# Patient Record
Sex: FEMALE | Race: WHITE | NOT HISPANIC OR LATINO
[De-identification: names, ages, dates, MRNs, and addresses within clinical notes are randomized per-mention and may not be internally consistent; named-entity substitution may affect disease eponyms.]

---

## 2018-07-24 ENCOUNTER — RESULT REVIEW (OUTPATIENT)
Age: 78
End: 2018-07-24

## 2018-09-28 PROBLEM — Z00.00 ENCOUNTER FOR PREVENTIVE HEALTH EXAMINATION: Status: ACTIVE | Noted: 2018-09-28

## 2018-10-11 ENCOUNTER — APPOINTMENT (OUTPATIENT)
Dept: SPINE | Facility: CLINIC | Age: 78
End: 2018-10-11
Payer: MEDICARE

## 2018-10-11 VITALS
SYSTOLIC BLOOD PRESSURE: 132 MMHG | WEIGHT: 135 LBS | BODY MASS INDEX: 23.05 KG/M2 | DIASTOLIC BLOOD PRESSURE: 84 MMHG | HEIGHT: 64 IN

## 2018-10-11 PROCEDURE — 99205 OFFICE O/P NEW HI 60 MIN: CPT

## 2018-10-17 ENCOUNTER — OUTPATIENT (OUTPATIENT)
Dept: OUTPATIENT SERVICES | Facility: HOSPITAL | Age: 78
LOS: 1 days | End: 2018-10-17
Payer: COMMERCIAL

## 2018-10-17 VITALS
SYSTOLIC BLOOD PRESSURE: 147 MMHG | RESPIRATION RATE: 16 BRPM | HEIGHT: 62 IN | TEMPERATURE: 98 F | HEART RATE: 78 BPM | DIASTOLIC BLOOD PRESSURE: 81 MMHG | OXYGEN SATURATION: 99 % | WEIGHT: 132.06 LBS

## 2018-10-17 DIAGNOSIS — M54.16 RADICULOPATHY, LUMBAR REGION: ICD-10-CM

## 2018-10-17 DIAGNOSIS — Z98.890 OTHER SPECIFIED POSTPROCEDURAL STATES: Chronic | ICD-10-CM

## 2018-10-17 DIAGNOSIS — Z29.9 ENCOUNTER FOR PROPHYLACTIC MEASURES, UNSPECIFIED: ICD-10-CM

## 2018-10-17 DIAGNOSIS — S72.009A FRACTURE OF UNSPECIFIED PART OF NECK OF UNSPECIFIED FEMUR, INITIAL ENCOUNTER FOR CLOSED FRACTURE: Chronic | ICD-10-CM

## 2018-10-17 DIAGNOSIS — Z01.818 ENCOUNTER FOR OTHER PREPROCEDURAL EXAMINATION: ICD-10-CM

## 2018-10-17 DIAGNOSIS — M48.00 SPINAL STENOSIS, SITE UNSPECIFIED: ICD-10-CM

## 2018-10-17 DIAGNOSIS — E11.9 TYPE 2 DIABETES MELLITUS WITHOUT COMPLICATIONS: ICD-10-CM

## 2018-10-17 DIAGNOSIS — R91.8 OTHER NONSPECIFIC ABNORMAL FINDING OF LUNG FIELD: Chronic | ICD-10-CM

## 2018-10-17 LAB
ANION GAP SERPL CALC-SCNC: 10 MMOL/L — SIGNIFICANT CHANGE UP (ref 5–17)
BUN SERPL-MCNC: 21 MG/DL — SIGNIFICANT CHANGE UP (ref 7–23)
CALCIUM SERPL-MCNC: 9.7 MG/DL — SIGNIFICANT CHANGE UP (ref 8.4–10.5)
CHLORIDE SERPL-SCNC: 104 MMOL/L — SIGNIFICANT CHANGE UP (ref 96–108)
CO2 SERPL-SCNC: 28 MMOL/L — SIGNIFICANT CHANGE UP (ref 22–31)
CREAT SERPL-MCNC: 0.82 MG/DL — SIGNIFICANT CHANGE UP (ref 0.5–1.3)
GLUCOSE SERPL-MCNC: 103 MG/DL — HIGH (ref 70–99)
HBA1C BLD-MCNC: 5.7 % — HIGH (ref 4–5.6)
HCT VFR BLD CALC: 34.1 % — LOW (ref 34.5–45)
HGB BLD-MCNC: 10.3 G/DL — LOW (ref 11.5–15.5)
MCHC RBC-ENTMCNC: 27.2 PG — SIGNIFICANT CHANGE UP (ref 27–34)
MCHC RBC-ENTMCNC: 30.2 GM/DL — LOW (ref 32–36)
MCV RBC AUTO: 90.2 FL — SIGNIFICANT CHANGE UP (ref 80–100)
PLATELET # BLD AUTO: 165 K/UL — SIGNIFICANT CHANGE UP (ref 150–400)
POTASSIUM SERPL-MCNC: 4.6 MMOL/L — SIGNIFICANT CHANGE UP (ref 3.5–5.3)
POTASSIUM SERPL-SCNC: 4.6 MMOL/L — SIGNIFICANT CHANGE UP (ref 3.5–5.3)
RBC # BLD: 3.78 M/UL — LOW (ref 3.8–5.2)
RBC # FLD: 15.1 % — HIGH (ref 10.3–14.5)
SODIUM SERPL-SCNC: 142 MMOL/L — SIGNIFICANT CHANGE UP (ref 135–145)
WBC # BLD: 5.25 K/UL — SIGNIFICANT CHANGE UP (ref 3.8–10.5)
WBC # FLD AUTO: 5.25 K/UL — SIGNIFICANT CHANGE UP (ref 3.8–10.5)

## 2018-10-17 RX ORDER — LIDOCAINE HCL 20 MG/ML
0.2 VIAL (ML) INJECTION ONCE
Qty: 0 | Refills: 0 | Status: DISCONTINUED | OUTPATIENT
Start: 2018-10-24 | End: 2018-10-24

## 2018-10-17 RX ORDER — CEFAZOLIN SODIUM 1 G
2000 VIAL (EA) INJECTION ONCE
Qty: 0 | Refills: 0 | Status: DISCONTINUED | OUTPATIENT
Start: 2018-10-24 | End: 2018-10-24

## 2018-10-17 RX ORDER — SODIUM CHLORIDE 9 MG/ML
3 INJECTION INTRAMUSCULAR; INTRAVENOUS; SUBCUTANEOUS EVERY 8 HOURS
Qty: 0 | Refills: 0 | Status: DISCONTINUED | OUTPATIENT
Start: 2018-10-24 | End: 2018-10-24

## 2018-10-17 NOTE — H&P PST ADULT - NEUROLOGICAL DETAILS
alert and oriented x 3/responds to verbal commands/responds to pain/cranial nerves intact/normal strength

## 2018-10-17 NOTE — H&P PST ADULT - PSH
Hip fracture  "left hip 2016 & had surgery  pins placed"  Right lower lobe lung mass  Right lower lobectomy 2016   & chemotherapy  till 01/2017  Status post carotid surgery  carotid artery stenosis 2016, left

## 2018-10-17 NOTE — H&P PST ADULT - PMH
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

## 2018-10-17 NOTE — H&P PST ADULT - PROBLEM SELECTOR PLAN 2
Will follow up with labs/ fingerstick.   HgA1c ordered   Instructed to hold Glimepiride the morning of surgery  STAT FS  on the day of surgery ordered

## 2018-10-17 NOTE — H&P PST ADULT - ASSESSMENT
CAPRINI SCORE [CLOT]    AGE RELATED RISK FACTORS                                                       MOBILITY RELATED FACTORS  [ ] Age 41-60 years                                            (1 Point)                  [ ] Bed rest                                                        (1 Point)  [ ] Age: 61-74 years                                           (2 Points)                 [ ] Plaster cast                                                   (2 Points)  [x ] Age= 75 years                                              (3 Points)                 [ ] Bed bound for more than 72 hours                 (2 Points)    DISEASE RELATED RISK FACTORS                                               GENDER SPECIFIC FACTORS  [ ] Edema in the lower extremities                       (1 Point)                  [ ] Pregnancy                                                     (1 Point)  [ ] Varicose veins                                               (1 Point)                  [ ] Post-partum < 6 weeks                                   (1 Point)             [ ] BMI > 25 Kg/m2                                            (1 Point)                  [ ] Hormonal therapy  or oral contraception          (1 Point)                 [ ] Sepsis (in the previous month)                        (1 Point)                  [ ] History of pregnancy complications                 (1 point)  [ ] Pneumonia or serious lung disease                                               [ ] Unexplained or recurrent                     (1 Point)           (in the previous month)                               (1 Point)  [ ] Abnormal pulmonary function test                     (1 Point)                 SURGERY RELATED RISK FACTORS  [ ] Acute myocardial infarction                              (1 Point)                 [ ]  Section                                             (1 Point)  [ ] Congestive heart failure (in the previous month)  (1 Point)               [ ] Minor surgery                                                  (1 Point)   [ ] Inflammatory bowel disease                             (1 Point)                 [ ] Arthroscopic surgery                                        (2 Points)  [ ] Central venous access                                      (2 Points)                [x ] General surgery lasting more than 45 minutes   (2 Points)       [ ] Stroke (in the previous month)                          (5 Points)               [ ] Elective arthroplasty                                         (5 Points)                                                                                                                                               HEMATOLOGY RELATED FACTORS                                                 TRAUMA RELATED RISK FACTORS  [ ] Prior episodes of VTE                                     (3 Points)                 [ ] Fracture of the hip, pelvis, or leg                       (5 Points)  [ ] Positive family history for VTE                         (3 Points)                 [ ] Acute spinal cord injury (in the previous month)  (5 Points)  [ ] Prothrombin 94526 A                                     (3 Points)                 [ ] Paralysis  (less than 1 month)                             (5 Points)  [ ] Factor V Leiden                                             (3 Points)                  [ ] Multiple Trauma within 1 month                        (5 Points)  [ ] Lupus anticoagulants                                     (3 Points)                                                           [ ] Anticardiolipin antibodies                               (3 Points)                                                       [ ] High homocysteine in the blood                      (3 Points)                                             [ ] Other congenital or acquired thrombophilia      (3 Points)                                                [ ] Heparin induced thrombocytopenia                  (3 Points)                                          Total Score [     5     ]

## 2018-10-17 NOTE — H&P PST ADULT - RS GEN PE MLT RESP DETAILS PC
no rales/no wheezes/breath sounds equal/clear to auscultation bilaterally/no chest wall tenderness/no rhonchi

## 2018-10-17 NOTE — H&P PST ADULT - NEUROLOGICAL COMMENTS
lumbar radiculopathy with pain , numbness & tingling in lower back, bilateral lateral lower extremities, more in RLE & right hand

## 2018-10-18 LAB
MRSA PCR RESULT.: SIGNIFICANT CHANGE UP
S AUREUS DNA NOSE QL NAA+PROBE: SIGNIFICANT CHANGE UP

## 2018-10-23 ENCOUNTER — TRANSCRIPTION ENCOUNTER (OUTPATIENT)
Age: 78
End: 2018-10-23

## 2018-10-24 ENCOUNTER — INPATIENT (INPATIENT)
Facility: HOSPITAL | Age: 78
LOS: 1 days | Discharge: HOME | DRG: 460 | End: 2018-10-26
Attending: NEUROLOGICAL SURGERY | Admitting: NEUROLOGICAL SURGERY
Payer: COMMERCIAL

## 2018-10-24 ENCOUNTER — APPOINTMENT (OUTPATIENT)
Dept: SPINE | Facility: HOSPITAL | Age: 78
End: 2018-10-24

## 2018-10-24 VITALS
HEIGHT: 62 IN | RESPIRATION RATE: 16 BRPM | OXYGEN SATURATION: 99 % | WEIGHT: 132.06 LBS | HEART RATE: 74 BPM | DIASTOLIC BLOOD PRESSURE: 76 MMHG | SYSTOLIC BLOOD PRESSURE: 161 MMHG | TEMPERATURE: 98 F

## 2018-10-24 DIAGNOSIS — S72.009A FRACTURE OF UNSPECIFIED PART OF NECK OF UNSPECIFIED FEMUR, INITIAL ENCOUNTER FOR CLOSED FRACTURE: Chronic | ICD-10-CM

## 2018-10-24 DIAGNOSIS — R91.8 OTHER NONSPECIFIC ABNORMAL FINDING OF LUNG FIELD: Chronic | ICD-10-CM

## 2018-10-24 DIAGNOSIS — M48.00 SPINAL STENOSIS, SITE UNSPECIFIED: ICD-10-CM

## 2018-10-24 DIAGNOSIS — Z98.890 OTHER SPECIFIED POSTPROCEDURAL STATES: Chronic | ICD-10-CM

## 2018-10-24 DIAGNOSIS — Z01.818 ENCOUNTER FOR OTHER PREPROCEDURAL EXAMINATION: ICD-10-CM

## 2018-10-24 LAB
ANION GAP SERPL CALC-SCNC: 13 MMOL/L — SIGNIFICANT CHANGE UP (ref 5–17)
BASOPHILS # BLD AUTO: 0 K/UL — SIGNIFICANT CHANGE UP (ref 0–0.2)
BASOPHILS NFR BLD AUTO: 0.4 % — SIGNIFICANT CHANGE UP (ref 0–2)
BUN SERPL-MCNC: 24 MG/DL — HIGH (ref 7–23)
CALCIUM SERPL-MCNC: 8.5 MG/DL — SIGNIFICANT CHANGE UP (ref 8.4–10.5)
CHLORIDE SERPL-SCNC: 107 MMOL/L — SIGNIFICANT CHANGE UP (ref 96–108)
CO2 SERPL-SCNC: 24 MMOL/L — SIGNIFICANT CHANGE UP (ref 22–31)
CREAT SERPL-MCNC: 0.72 MG/DL — SIGNIFICANT CHANGE UP (ref 0.5–1.3)
EOSINOPHIL # BLD AUTO: 0.2 K/UL — SIGNIFICANT CHANGE UP (ref 0–0.5)
EOSINOPHIL NFR BLD AUTO: 4.6 % — SIGNIFICANT CHANGE UP (ref 0–6)
GLUCOSE BLDC GLUCOMTR-MCNC: 185 MG/DL — HIGH (ref 70–99)
GLUCOSE BLDC GLUCOMTR-MCNC: 222 MG/DL — HIGH (ref 70–99)
GLUCOSE BLDC GLUCOMTR-MCNC: 83 MG/DL — SIGNIFICANT CHANGE UP (ref 70–99)
GLUCOSE SERPL-MCNC: 168 MG/DL — HIGH (ref 70–99)
HCT VFR BLD CALC: 28.4 % — LOW (ref 34.5–45)
HGB BLD-MCNC: 9.5 G/DL — LOW (ref 11.5–15.5)
LYMPHOCYTES # BLD AUTO: 1.3 K/UL — SIGNIFICANT CHANGE UP (ref 1–3.3)
LYMPHOCYTES # BLD AUTO: 25.6 % — SIGNIFICANT CHANGE UP (ref 13–44)
MCHC RBC-ENTMCNC: 29.4 PG — SIGNIFICANT CHANGE UP (ref 27–34)
MCHC RBC-ENTMCNC: 33.3 GM/DL — SIGNIFICANT CHANGE UP (ref 32–36)
MCV RBC AUTO: 88.3 FL — SIGNIFICANT CHANGE UP (ref 80–100)
MONOCYTES # BLD AUTO: 0.1 K/UL — SIGNIFICANT CHANGE UP (ref 0–0.9)
MONOCYTES NFR BLD AUTO: 2.3 % — SIGNIFICANT CHANGE UP (ref 2–14)
NEUTROPHILS # BLD AUTO: 3.5 K/UL — SIGNIFICANT CHANGE UP (ref 1.8–7.4)
NEUTROPHILS NFR BLD AUTO: 67.1 % — SIGNIFICANT CHANGE UP (ref 43–77)
PLATELET # BLD AUTO: 115 K/UL — LOW (ref 150–400)
POTASSIUM SERPL-MCNC: 3.6 MMOL/L — SIGNIFICANT CHANGE UP (ref 3.5–5.3)
POTASSIUM SERPL-SCNC: 3.6 MMOL/L — SIGNIFICANT CHANGE UP (ref 3.5–5.3)
RBC # BLD: 3.21 M/UL — LOW (ref 3.8–5.2)
RBC # FLD: 13.3 % — SIGNIFICANT CHANGE UP (ref 10.3–14.5)
SODIUM SERPL-SCNC: 144 MMOL/L — SIGNIFICANT CHANGE UP (ref 135–145)
WBC # BLD: 5.2 K/UL — SIGNIFICANT CHANGE UP (ref 3.8–10.5)
WBC # FLD AUTO: 5.2 K/UL — SIGNIFICANT CHANGE UP (ref 3.8–10.5)

## 2018-10-24 PROCEDURE — 72020 X-RAY EXAM OF SPINE 1 VIEW: CPT

## 2018-10-24 PROCEDURE — 83036 HEMOGLOBIN GLYCOSYLATED A1C: CPT

## 2018-10-24 PROCEDURE — 87641 MR-STAPH DNA AMP PROBE: CPT

## 2018-10-24 PROCEDURE — 72020 X-RAY EXAM OF SPINE 1 VIEW: CPT | Mod: 26

## 2018-10-24 PROCEDURE — 80048 BASIC METABOLIC PNL TOTAL CA: CPT

## 2018-10-24 PROCEDURE — 85027 COMPLETE CBC AUTOMATED: CPT

## 2018-10-24 PROCEDURE — 87640 STAPH A DNA AMP PROBE: CPT

## 2018-10-24 PROCEDURE — G0463: CPT

## 2018-10-24 RX ORDER — OXYCODONE AND ACETAMINOPHEN 5; 325 MG/1; MG/1
1 TABLET ORAL EVERY 4 HOURS
Qty: 0 | Refills: 0 | Status: DISCONTINUED | OUTPATIENT
Start: 2018-10-24 | End: 2018-10-26

## 2018-10-24 RX ORDER — DOCUSATE SODIUM 100 MG
100 CAPSULE ORAL THREE TIMES A DAY
Qty: 0 | Refills: 0 | Status: DISCONTINUED | OUTPATIENT
Start: 2018-10-24 | End: 2018-10-26

## 2018-10-24 RX ORDER — ATORVASTATIN CALCIUM 80 MG/1
20 TABLET, FILM COATED ORAL AT BEDTIME
Qty: 0 | Refills: 0 | Status: DISCONTINUED | OUTPATIENT
Start: 2018-10-24 | End: 2018-10-26

## 2018-10-24 RX ORDER — SODIUM CHLORIDE 9 MG/ML
500 INJECTION INTRAMUSCULAR; INTRAVENOUS; SUBCUTANEOUS ONCE
Qty: 0 | Refills: 0 | Status: COMPLETED | OUTPATIENT
Start: 2018-10-24 | End: 2018-10-24

## 2018-10-24 RX ORDER — DEXTROSE MONOHYDRATE, SODIUM CHLORIDE, AND POTASSIUM CHLORIDE 50; .745; 4.5 G/1000ML; G/1000ML; G/1000ML
1000 INJECTION, SOLUTION INTRAVENOUS
Qty: 0 | Refills: 0 | Status: DISCONTINUED | OUTPATIENT
Start: 2018-10-24 | End: 2018-10-25

## 2018-10-24 RX ORDER — INSULIN LISPRO 100/ML
VIAL (ML) SUBCUTANEOUS AT BEDTIME
Qty: 0 | Refills: 0 | Status: DISCONTINUED | OUTPATIENT
Start: 2018-10-24 | End: 2018-10-26

## 2018-10-24 RX ORDER — FAMOTIDINE 10 MG/ML
20 INJECTION INTRAVENOUS EVERY 12 HOURS
Qty: 0 | Refills: 0 | Status: DISCONTINUED | OUTPATIENT
Start: 2018-10-24 | End: 2018-10-26

## 2018-10-24 RX ORDER — DIAZEPAM 5 MG
5 TABLET ORAL EVERY 8 HOURS
Qty: 0 | Refills: 0 | Status: DISCONTINUED | OUTPATIENT
Start: 2018-10-24 | End: 2018-10-26

## 2018-10-24 RX ORDER — BENZOCAINE AND MENTHOL 5; 1 G/100ML; G/100ML
1 LIQUID ORAL DAILY
Qty: 0 | Refills: 0 | Status: DISCONTINUED | OUTPATIENT
Start: 2018-10-24 | End: 2018-10-26

## 2018-10-24 RX ORDER — HYDROMORPHONE HYDROCHLORIDE 2 MG/ML
0.25 INJECTION INTRAMUSCULAR; INTRAVENOUS; SUBCUTANEOUS EVERY 6 HOURS
Qty: 0 | Refills: 0 | Status: DISCONTINUED | OUTPATIENT
Start: 2018-10-24 | End: 2018-10-26

## 2018-10-24 RX ORDER — INSULIN LISPRO 100/ML
VIAL (ML) SUBCUTANEOUS
Qty: 0 | Refills: 0 | Status: DISCONTINUED | OUTPATIENT
Start: 2018-10-24 | End: 2018-10-26

## 2018-10-24 RX ORDER — ONDANSETRON 8 MG/1
4 TABLET, FILM COATED ORAL EVERY 6 HOURS
Qty: 0 | Refills: 0 | Status: DISCONTINUED | OUTPATIENT
Start: 2018-10-24 | End: 2018-10-26

## 2018-10-24 RX ORDER — CEFAZOLIN SODIUM 1 G
2000 VIAL (EA) INJECTION EVERY 8 HOURS
Qty: 0 | Refills: 0 | Status: COMPLETED | OUTPATIENT
Start: 2018-10-24 | End: 2018-10-25

## 2018-10-24 RX ORDER — ACETAMINOPHEN 500 MG
650 TABLET ORAL EVERY 6 HOURS
Qty: 0 | Refills: 0 | Status: DISCONTINUED | OUTPATIENT
Start: 2018-10-24 | End: 2018-10-26

## 2018-10-24 RX ORDER — ENOXAPARIN SODIUM 100 MG/ML
40 INJECTION SUBCUTANEOUS AT BEDTIME
Qty: 0 | Refills: 0 | Status: DISCONTINUED | OUTPATIENT
Start: 2018-10-25 | End: 2018-10-26

## 2018-10-24 RX ORDER — SENNA PLUS 8.6 MG/1
2 TABLET ORAL AT BEDTIME
Qty: 0 | Refills: 0 | Status: DISCONTINUED | OUTPATIENT
Start: 2018-10-24 | End: 2018-10-26

## 2018-10-24 RX ADMIN — DEXTROSE MONOHYDRATE, SODIUM CHLORIDE, AND POTASSIUM CHLORIDE 85 MILLILITER(S): 50; .745; 4.5 INJECTION, SOLUTION INTRAVENOUS at 22:15

## 2018-10-24 RX ADMIN — BENZOCAINE AND MENTHOL 1 LOZENGE: 5; 1 LIQUID ORAL at 20:48

## 2018-10-24 RX ADMIN — Medication 100 MILLIGRAM(S): at 22:15

## 2018-10-24 RX ADMIN — SODIUM CHLORIDE 1000 MILLILITER(S): 9 INJECTION INTRAMUSCULAR; INTRAVENOUS; SUBCUTANEOUS at 19:38

## 2018-10-24 RX ADMIN — Medication 100 MILLIGRAM(S): at 16:05

## 2018-10-24 RX ADMIN — Medication 5 MILLIGRAM(S): at 16:05

## 2018-10-24 RX ADMIN — Medication 1: at 16:22

## 2018-10-24 RX ADMIN — SENNA PLUS 2 TABLET(S): 8.6 TABLET ORAL at 22:15

## 2018-10-24 RX ADMIN — ATORVASTATIN CALCIUM 20 MILLIGRAM(S): 80 TABLET, FILM COATED ORAL at 22:14

## 2018-10-24 RX ADMIN — Medication 100 MILLIGRAM(S): at 16:06

## 2018-10-24 NOTE — PHYSICAL THERAPY INITIAL EVALUATION ADULT - ADDITIONAL COMMENTS
Pt lives in a  with her daughter, 1 step to enter all needs met on main floor. Pt reports she was ambulating independently without use of an AD and was independent with all ADLS prior. Pt states her daughter will be available at all times to assist upon D/C.

## 2018-10-24 NOTE — PHYSICAL THERAPY INITIAL EVALUATION ADULT - PERTINENT HX OF CURRENT PROBLEM, REHAB EVAL
77 yo F pw Lumbar radiculopathy from L2-5 spinal stenosis causing lower back pain radiating down to bilateral lower extremities more towards the lateral aspect of right leg for nearly a year, tingling & numbness in right hand, legs & feet,  Now s/p L2-5 decompression on 10/24/18.

## 2018-10-24 NOTE — PROGRESS NOTE ADULT - SUBJECTIVE AND OBJECTIVE BOX
Vital Signs Last 24 Hrs  T(C): 36 (24 Oct 2018 13:45), Max: 36.5 (24 Oct 2018 08:40)  T(F): 96.8 (24 Oct 2018 13:45), Max: 97.7 (24 Oct 2018 08:40)  HR: 90 (24 Oct 2018 15:00) (74 - 90)  BP: 130/60 (24 Oct 2018 15:00) (130/60 - 174/72)  BP(mean): 87 (24 Oct 2018 15:00) (87 - 103)  RR: 16 (24 Oct 2018 15:00) (12 - 16)  SpO2: 96% (24 Oct 2018 15:00) (90% - 100%)    PHYSICAL EXAM:    Constitutional: No Acute Distress     Neurological: AOx3, Following Commands    Motor exam:          Upper extremity                         Delt     Bicep     Tricep    HG                                                 R         5/5        5/5        5/5       5/5                                               L          5/5        5/5        5/5       5/5          Lower extremity                        HF         KF        KE       DF         PF                                                  R        5/5        5/5        5/5       5/5         5/5                                               L         5/5        5/5       5/5       5/5          5/5                                                 Sensation: [x] intact to light touch  [] decreased:     No clonus    Incision:

## 2018-10-25 DIAGNOSIS — M54.16 RADICULOPATHY, LUMBAR REGION: ICD-10-CM

## 2018-10-25 LAB
GLUCOSE BLDC GLUCOMTR-MCNC: 126 MG/DL — HIGH (ref 70–99)
GLUCOSE BLDC GLUCOMTR-MCNC: 133 MG/DL — HIGH (ref 70–99)
GLUCOSE BLDC GLUCOMTR-MCNC: 147 MG/DL — HIGH (ref 70–99)
GLUCOSE BLDC GLUCOMTR-MCNC: 174 MG/DL — HIGH (ref 70–99)
HCT VFR BLD CALC: 28.9 % — LOW (ref 34.5–45)
HGB BLD-MCNC: 9.5 G/DL — LOW (ref 11.5–15.5)
MCHC RBC-ENTMCNC: 28.9 PG — SIGNIFICANT CHANGE UP (ref 27–34)
MCHC RBC-ENTMCNC: 32.8 GM/DL — SIGNIFICANT CHANGE UP (ref 32–36)
MCV RBC AUTO: 88.2 FL — SIGNIFICANT CHANGE UP (ref 80–100)
PLATELET # BLD AUTO: 138 K/UL — LOW (ref 150–400)
RBC # BLD: 3.28 M/UL — LOW (ref 3.8–5.2)
RBC # FLD: 13.8 % — SIGNIFICANT CHANGE UP (ref 10.3–14.5)
WBC # BLD: 7.1 K/UL — SIGNIFICANT CHANGE UP (ref 3.8–10.5)
WBC # FLD AUTO: 7.1 K/UL — SIGNIFICANT CHANGE UP (ref 3.8–10.5)

## 2018-10-25 RX ADMIN — Medication 5 MILLIGRAM(S): at 22:37

## 2018-10-25 RX ADMIN — Medication 100 MILLIGRAM(S): at 22:35

## 2018-10-25 RX ADMIN — Medication 5 MILLIGRAM(S): at 00:17

## 2018-10-25 RX ADMIN — Medication 100 MILLIGRAM(S): at 00:16

## 2018-10-25 RX ADMIN — Medication 650 MILLIGRAM(S): at 06:19

## 2018-10-25 RX ADMIN — ATORVASTATIN CALCIUM 20 MILLIGRAM(S): 80 TABLET, FILM COATED ORAL at 22:35

## 2018-10-25 RX ADMIN — SENNA PLUS 2 TABLET(S): 8.6 TABLET ORAL at 22:35

## 2018-10-25 RX ADMIN — Medication 100 MILLIGRAM(S): at 05:49

## 2018-10-25 RX ADMIN — Medication 650 MILLIGRAM(S): at 15:48

## 2018-10-25 RX ADMIN — Medication 100 MILLIGRAM(S): at 13:55

## 2018-10-25 RX ADMIN — Medication 650 MILLIGRAM(S): at 05:49

## 2018-10-25 RX ADMIN — Medication 650 MILLIGRAM(S): at 15:18

## 2018-10-25 RX ADMIN — Medication 5 MILLIGRAM(S): at 13:55

## 2018-10-25 RX ADMIN — ENOXAPARIN SODIUM 40 MILLIGRAM(S): 100 INJECTION SUBCUTANEOUS at 22:35

## 2018-10-25 NOTE — PROGRESS NOTE ADULT - ASSESSMENT
PROCEDURE: Adm 10/24 L3-5 decomp lumbar lami     POD#1    PLAN:  Neuro: Cont HMV drain.  Ck CBC, BMP AM.  Inc activity/OOB.     Respiratory: Patient instructed to use incentive spirometer [ X] YES [ ] NO              DVT ppx: [X ] SQL [ ] SQH and Venodynes [ ] Left [ ] Right [ X] Bilateral    Discharge Planning:  The patient was evaluated by PT and recommended no needs or DME.      Assessment:  Please Check When Present   []  GCS  E   V  M     Heart Failure: []Acute, [] acute on chronic , []chronic  Heart Failure:  [] Diastolic (HFpEF), [] Systolic (HFrEF), []Combined (HFpEF and HFrEF), [] RHF, [] Pulm HTN, [] Other    [] GERSON, [] ATN, [] AIN, [] other  [] CKD1, [] CKD2, [] CKD 3, [] CKD 4, [] CKD 5, []ESRD    Encephalopathy: [] Metabolic, [] Hepatic, [] toxic, [] Neurological, [] Other    Abnormal Nurtitional Status: [] malnurtition (see nutrition note), [ ]underweight: BMI < 19, [] morbid obesity: BMI >40, [] Cachexia    [] Sepsis  [] hypovolemic shock,[] cardiogenic shock, [] hemorrhagic shock, [] neuogenic shock  [] Acute Respiratory Failure  []Cerebral edema, [] Brain compression/ herniation,   [] Functional quadriplegia  [X] Acute blood loss anemia

## 2018-10-25 NOTE — PROGRESS NOTE ADULT - SUBJECTIVE AND OBJECTIVE BOX
SUBJECTIVE: Doing well, no complaints. Wants to be discharged    OVERNIGHT EVENTS: None    Vital Signs Last 24 Hrs  T(C): 36.7 (25 Oct 2018 08:47), Max: 37.2 (24 Oct 2018 22:00)  T(F): 98.1 (25 Oct 2018 08:47), Max: 98.9 (24 Oct 2018 22:00)  HR: 86 (25 Oct 2018 08:47) (75 - 116)  BP: 123/68 (25 Oct 2018 08:47) (110/61 - 186/77)  BP(mean): 90 (24 Oct 2018 20:15) (87 - 114)  RR: 18 (25 Oct 2018 08:47) (11 - 18)  SpO2: 98% (25 Oct 2018 08:47) (91% - 100%)  IVF: [ ] IVL [ X] NS+K@ 85  DIET: [X ] Regular [ ] CCD [ ] Renal [ ] Puree [ ] Dysphagia [ ] Tube Feeds:   PCA: [ ] YES [X ] NO   BRANTLEY: [ ] YES [X ] NO [X ] VOID   BM: [ ] YES [X ] NO     DRAINS: [ ] DALY (cc/24h) [ X]  (cc/24h)    PHYSICAL EXAM:    Constitutional: No Acute Distress     Neurological: AOx3, Following Commands, Moving all Extremities     Motor exam:          Upper extremity                         Delt     Bicep     Tricep    HG                                                 R         5/5        5/5        5/5       5/5                                               L          5/5        5/5        5/5       5/5          Lower extremity                        HF         KF        KE       DF         PF                                                  R        5/5        5/5        5/5     5/5        5/5                                               L         5/5        5/5       5/5     5/5         5/5                                                 Sensation: [X] intact to light touch  [] decreased:     Pulmonary: Clear to Auscultation, No rales, No rhonchi, No wheezes     Cardiovascular: S1, S2, Regular rate and rhythm     Gastrointestinal: Soft, Non-tender, Non-distended     Extremities: No calf tenderness     Incision: HMV active. CDI. Flat    LABS:                        9.5    5.2   )-----------( 115      ( 24 Oct 2018 14:24 )             28.4    10-24    144  |  107  |  24<H>  ----------------------------<  168<H>  3.6   |  24  |  0.72    Ca    8.5      24 Oct 2018 14:24    IMAGING: < from: Xray Spine Single View (10.24.18 @ 13:43) >  Impression: The last well-formed disc will be presumed to be L5-S1. There   is a minimal retrolisthesis of L3 on L4 and a mild grade 1   anterolisthesis of L4 on L5 measuring 2 mm. A posterior localizer is   noted at the L2-L3 level.There is moderate disc height loss at L2-3 and   moderate to severe disc height loss at L3-L4. There is lower lumbar spine   facet arthropathy greatest at L4-5. Aortic calcifications are noted.   Surgical clips and echogenic material project over thelung bases.    MEDICATIONS  (STANDING):  atorvastatin 20 milliGRAM(s) Oral at bedtime  benzocaine 15 mG/menthol 3.6 mG Lozenge 1 Lozenge Oral daily  diazepam    Tablet 5 milliGRAM(s) Oral every 8 hours  docusate sodium 100 milliGRAM(s) Oral three times a day  enoxaparin Injectable 40 milliGRAM(s) SubCutaneous at bedtime  insulin lispro (HumaLOG) corrective regimen sliding scale   SubCutaneous three times a day before meals  insulin lispro (HumaLOG) corrective regimen sliding scale   SubCutaneous at bedtime  senna 2 Tablet(s) Oral at bedtime  sodium chloride 0.9% with potassium chloride 20 mEq/L 1000 milliLiter(s) (85 mL/Hr) IV Continuous <Continuous>    MEDICATIONS  (PRN):  acetaminophen   Tablet .. 650 milliGRAM(s) Oral every 6 hours PRN Temp greater or equal to 38C (100.4F), Mild Pain (1 - 3)  famotidine    Tablet 20 milliGRAM(s) Oral every 12 hours PRN Dyspepsia  HYDROmorphone  Injectable 0.25 milliGRAM(s) IV Push every 6 hours PRN Severe Pain (7 - 10)  ondansetron Injectable 4 milliGRAM(s) IV Push every 6 hours PRN Nausea  oxyCODONE    5 mG/acetaminophen 325 mG 1 Tablet(s) Oral every 4 hours PRN Moderate Pain (4 - 6)

## 2018-10-26 ENCOUNTER — TRANSCRIPTION ENCOUNTER (OUTPATIENT)
Age: 78
End: 2018-10-26

## 2018-10-26 VITALS
DIASTOLIC BLOOD PRESSURE: 66 MMHG | RESPIRATION RATE: 17 BRPM | TEMPERATURE: 99 F | OXYGEN SATURATION: 94 % | HEART RATE: 84 BPM | SYSTOLIC BLOOD PRESSURE: 114 MMHG

## 2018-10-26 LAB
ANION GAP SERPL CALC-SCNC: 9 MMOL/L — SIGNIFICANT CHANGE UP (ref 5–17)
BUN SERPL-MCNC: 15 MG/DL — SIGNIFICANT CHANGE UP (ref 7–23)
CALCIUM SERPL-MCNC: 8.9 MG/DL — SIGNIFICANT CHANGE UP (ref 8.4–10.5)
CHLORIDE SERPL-SCNC: 105 MMOL/L — SIGNIFICANT CHANGE UP (ref 96–108)
CO2 SERPL-SCNC: 27 MMOL/L — SIGNIFICANT CHANGE UP (ref 22–31)
CREAT SERPL-MCNC: 0.64 MG/DL — SIGNIFICANT CHANGE UP (ref 0.5–1.3)
GLUCOSE BLDC GLUCOMTR-MCNC: 128 MG/DL — HIGH (ref 70–99)
GLUCOSE BLDC GLUCOMTR-MCNC: 133 MG/DL — HIGH (ref 70–99)
GLUCOSE BLDC GLUCOMTR-MCNC: 157 MG/DL — HIGH (ref 70–99)
GLUCOSE BLDC GLUCOMTR-MCNC: 158 MG/DL — HIGH (ref 70–99)
GLUCOSE SERPL-MCNC: 137 MG/DL — HIGH (ref 70–99)
POTASSIUM SERPL-MCNC: 3.9 MMOL/L — SIGNIFICANT CHANGE UP (ref 3.5–5.3)
POTASSIUM SERPL-SCNC: 3.9 MMOL/L — SIGNIFICANT CHANGE UP (ref 3.5–5.3)
SODIUM SERPL-SCNC: 141 MMOL/L — SIGNIFICANT CHANGE UP (ref 135–145)

## 2018-10-26 RX ORDER — DOCUSATE SODIUM 100 MG
1 CAPSULE ORAL
Qty: 0 | Refills: 0 | DISCHARGE
Start: 2018-10-26

## 2018-10-26 RX ORDER — ATORVASTATIN CALCIUM 80 MG/1
1 TABLET, FILM COATED ORAL
Qty: 0 | Refills: 0 | COMMUNITY

## 2018-10-26 RX ORDER — SENNA PLUS 8.6 MG/1
2 TABLET ORAL
Qty: 0 | Refills: 0 | DISCHARGE
Start: 2018-10-26

## 2018-10-26 RX ORDER — ATORVASTATIN CALCIUM 80 MG/1
1 TABLET, FILM COATED ORAL
Qty: 0 | Refills: 0 | DISCHARGE
Start: 2018-10-26

## 2018-10-26 RX ORDER — DIAZEPAM 5 MG
1 TABLET ORAL
Qty: 21 | Refills: 0
Start: 2018-10-26 | End: 2018-11-01

## 2018-10-26 RX ORDER — BENZOCAINE AND MENTHOL 5; 1 G/100ML; G/100ML
0 LIQUID ORAL
Qty: 0 | Refills: 0 | DISCHARGE
Start: 2018-10-26

## 2018-10-26 RX ORDER — ACETAMINOPHEN 500 MG
2 TABLET ORAL
Qty: 0 | Refills: 0 | DISCHARGE
Start: 2018-10-26

## 2018-10-26 RX ORDER — MULTIVIT WITH MIN/MFOLATE/K2 340-15/3 G
300 POWDER (GRAM) ORAL ONCE
Qty: 0 | Refills: 0 | Status: COMPLETED | OUTPATIENT
Start: 2018-10-26 | End: 2018-10-26

## 2018-10-26 RX ADMIN — OXYCODONE AND ACETAMINOPHEN 1 TABLET(S): 5; 325 TABLET ORAL at 02:05

## 2018-10-26 RX ADMIN — Medication 1: at 12:07

## 2018-10-26 RX ADMIN — Medication 10 MILLIGRAM(S): at 10:59

## 2018-10-26 RX ADMIN — OXYCODONE AND ACETAMINOPHEN 1 TABLET(S): 5; 325 TABLET ORAL at 02:35

## 2018-10-26 RX ADMIN — Medication 300 MILLILITER(S): at 11:00

## 2018-10-26 RX ADMIN — Medication 5 MILLIGRAM(S): at 06:04

## 2018-10-26 RX ADMIN — Medication 100 MILLIGRAM(S): at 06:04

## 2018-10-26 RX ADMIN — Medication 1 ENEMA: at 09:59

## 2018-10-26 NOTE — DISCHARGE NOTE ADULT - PLAN OF CARE
Strengthening Call Neurosurgery Dr MADHAV De La Rosa for appointment in 1 week for wound check and staple removal.  Followup with your Private MD in 1-2 weeks.  Return to Emergency Department or contact your Neurosurgeon if any changes in mental status, weakness, numbness or tingling of extremities; difficulty swallowing; drainage or redness of wound, fever; pain in legs; difficulty urinating or constipation.  Donot restart your Aspirin or take any Motrin/NSAIDS until checking with your Neurosurgeon. No strenous activity. No heavy lifting. Do not return to work until cleared by physician. No driving until cleared by physician.  Remove dressing on 3rd day after your surgery and leave incision open to air. You may shower on the 3rd day after you surgery.  No soaking in tub,  Donot apply any ointements to incision.

## 2018-10-26 NOTE — DISCHARGE NOTE ADULT - REASON FOR ADMISSION
78 year old female with lumbar radiculopathy from L2-5 spinal stenosis causing lower back pain radiating down to bilateral lower extremities more towards the lateral aspect of right leg for nearly a year, tingling & numbness in right hand, legs & feet,  s/p surgical consult, scheduled for L2-5 laminectomy on 10/24/18.

## 2018-10-26 NOTE — PROGRESS NOTE ADULT - ASSESSMENT
PROCEDURE: Adm 10/24 L3-5 decomp lumbar lami     POD#2    PLAN:  Neuro: HMV Drain DC'd 10/26AM. H/H stable. Bowel regimen.  Inc activity/OOB. DC Home today once BM.    Respiratory: Patient instructed to use incentive spirometer [ X] YES [ ] NO              DVT ppx: [X ] SQL [ ] SQH and Venodynes [ ] Left [ ] Right [ X] Bilateral    Discharge Planning:  The patient was evaluated by PT and recommended no needs or DME.      Assessment:  Please Check When Present   []  GCS  E   V  M     Heart Failure: []Acute, [] acute on chronic , []chronic  Heart Failure:  [] Diastolic (HFpEF), [] Systolic (HFrEF), []Combined (HFpEF and HFrEF), [] RHF, [] Pulm HTN, [] Other    [] GERSON, [] ATN, [] AIN, [] other  [] CKD1, [] CKD2, [] CKD 3, [] CKD 4, [] CKD 5, []ESRD    Encephalopathy: [] Metabolic, [] Hepatic, [] toxic, [] Neurological, [] Other    Abnormal Nurtitional Status: [] malnurtition (see nutrition note), [ ]underweight: BMI < 19, [] morbid obesity: BMI >40, [] Cachexia    [] Sepsis  [] hypovolemic shock,[] cardiogenic shock, [] hemorrhagic shock, [] neuogenic shock  [] Acute Respiratory Failure  []Cerebral edema, [] Brain compression/ herniation,   [] Functional quadriplegia  [X] Acute blood loss anemia

## 2018-10-26 NOTE — DISCHARGE NOTE ADULT - CARE PROVIDER_API CALL
Prakash De La Rosa (MD), Neurological Surgery  [address] [place] [address] [address]  [place] [address]  Phone: [phone]  Fax: [phone]

## 2018-10-26 NOTE — PROGRESS NOTE ADULT - SUBJECTIVE AND OBJECTIVE BOX
SUBJECTIVE: Doing well w/o complaints, except no BM    OVERNIGHT EVENTS: None    Vital Signs Last 24 Hrs  T(C): 37.1 (26 Oct 2018 04:43), Max: 37.3 (25 Oct 2018 21:14)  T(F): 98.7 (26 Oct 2018 04:43), Max: 99.1 (25 Oct 2018 21:14)  HR: 94 (26 Oct 2018 04:43) (80 - 99)  BP: 145/64 (26 Oct 2018 04:43) (94/56 - 151/67)  BP(mean): --  RR: 16 (26 Oct 2018 04:43) (16 - 17)  SpO2: 95% (26 Oct 2018 04:43) (94% - 97%)  IVF: [X ] IVL  DIET: [ ] Regular [X ] CCD [ ] Renal [ ] Puree [ ] Dysphagia [ ] Tube Feeds:   PCA: [ ] YES [X ] NO   BRANTLEY: [ ] YES [X ] NO [X ] VOID   BM: [ ] YES [X ] NO     DRAINS: [ ] DALY (cc/24h) [ X]  (cc/24h)    PHYSICAL EXAM:    Constitutional: No Acute Distress     Neurological: No interval change. Ox3, Following Commands, Moving all Extremities     Motor exam:          Upper extremity                         Delt     Bicep     Tricep    HG                                                 R         5/5        5/5        5/5       5/5                                               L          5/5        5/5        5/5       5/5          Lower extremity                        HF         KF        KE       DF         PF                                                  R        5/5        5/5        5/5     5/5        5/5                                               L         5/5        5/5       5/5     5/5         5/5                                                 Sensation: [X] intact to light touch  [] decreased:     Pulmonary: Clear to Auscultation, No rales, No rhonchi, No wheezes     Cardiovascular: S1, S2, Regular rate and rhythm     Gastrointestinal: Softly distended, Non tender    Extremities: No calf tenderness     Incision: HMV active. +staples. CDI. Flat    LABS:                          9.5    7.1   )-----------( 138      ( 25 Oct 2018 14:07 )             28.9               10-26    141  |  105  |  15  ----------------------------<  137<H>  3.9   |  27  |  0.64    Ca    8.9      26 Oct 2018 07:19    IMAGING: < from: Xray Spine Single View (10.24.18 @ 13:43) >  Impression: The last well-formed disc will be presumed to be L5-S1. There   is a minimal retrolisthesis of L3 on L4 and a mild grade 1   anterolisthesis of L4 on L5 measuring 2 mm. A posterior localizer is   noted at the L2-L3 level.There is moderate disc height loss at L2-3 and   moderate to severe disc height loss at L3-L4. There is lower lumbar spine   facet arthropathy greatest at L4-5. Aortic calcifications are noted.   Surgical clips and echogenic material project over thelung bases.    MEDICATIONS  (STANDING):  atorvastatin 20 milliGRAM(s) Oral at bedtime  benzocaine 15 mG/menthol 3.6 mG Lozenge 1 Lozenge Oral daily  diazepam    Tablet 5 milliGRAM(s) Oral every 8 hours  docusate sodium 100 milliGRAM(s) Oral three times a day  enoxaparin Injectable 40 milliGRAM(s) SubCutaneous at bedtime  insulin lispro (HumaLOG) corrective regimen sliding scale   SubCutaneous three times a day before meals  insulin lispro (HumaLOG) corrective regimen sliding scale   SubCutaneous at bedtime  senna 2 Tablet(s) Oral at bedtime  sodium biphosphate Rectal Enema 1 Enema Rectal once    MEDICATIONS  (PRN):  acetaminophen   Tablet .. 650 milliGRAM(s) Oral every 6 hours PRN Temp greater or equal to 38C (100.4F), Mild Pain (1 - 3)  famotidine    Tablet 20 milliGRAM(s) Oral every 12 hours PRN Dyspepsia  HYDROmorphone  Injectable 0.25 milliGRAM(s) IV Push every 6 hours PRN Severe Pain (7 - 10)  ondansetron Injectable 4 milliGRAM(s) IV Push every 6 hours PRN Nausea  oxyCODONE    5 mG/acetaminophen 325 mG 1 Tablet(s) Oral every 4 hours PRN Moderate Pain (4 - 6)

## 2018-10-26 NOTE — DISCHARGE NOTE ADULT - MEDICATION SUMMARY - MEDICATIONS TO TAKE
I will START or STAY ON the medications listed below when I get home from the hospital:    acetaminophen 325 mg oral tablet  -- 2 tab(s) by mouth every 6 hours, As needed, Temp greater or equal to 38C (100.4F), Mild Pain (1 - 3)  -- Indication: For fever    oxyCODONE-acetaminophen 5 mg-325 mg oral tablet  -- 1  to 2tab(s) by mouth every 4 hours, As needed, Moderate to severe Pain (4 - 6) MDD:6  -- Indication: For pain    glimepiride 2 mg oral tablet  -- 1 tab(s) by mouth once a day  -- Indication: For DM    atorvastatin 20 mg oral tablet  -- 1 tab(s) by mouth once a day (at bedtime)  -- Indication: For HLD    docusate sodium 100 mg oral capsule  -- 1 cap(s) by mouth 3 times a day  -- Indication: For Laxative    senna oral tablet  -- 2 tab(s) by mouth once a day (at bedtime)  -- Indication: For constipation    benzocaine-menthol 15 mg-3.6 mg mucous membrane lozenge  --  mucous membrane   -- Indication: For Soar throat I will START or STAY ON the medications listed below when I get home from the hospital:    acetaminophen 325 mg oral tablet  -- 2 tab(s) by mouth every 6 hours, As needed, Temp greater or equal to 38C (100.4F), Mild Pain (1 - 3)  -- Indication: For fever    oxyCODONE-acetaminophen 5 mg-325 mg oral tablet  -- 1  to 2tab(s) by mouth every 4 hours, As needed, Moderate to severe Pain (4 - 6) MDD:6  -- Indication: For pain    diazePAM 5 mg oral tablet  -- 1 tab(s) by mouth every 8 hours as needed for muscle relaxer MDD:3  -- Indication: For muscle spasm    glimepiride 2 mg oral tablet  -- 1 tab(s) by mouth once a day  -- Indication: For DM    atorvastatin 20 mg oral tablet  -- 1 tab(s) by mouth once a day (at bedtime)  -- Indication: For HLD    docusate sodium 100 mg oral capsule  -- 1 cap(s) by mouth 3 times a day  -- Indication: For Laxative    senna oral tablet  -- 2 tab(s) by mouth once a day (at bedtime)  -- Indication: For constipation    benzocaine-menthol 15 mg-3.6 mg mucous membrane lozenge  --  mucous membrane   -- Indication: For Soar throat

## 2018-10-26 NOTE — PATIENT PROFILE ADULT - NSPROPTRIGHTBILLOFRIGHTS_GEN_A_NUR
Number Of Tubes Drawn: 2 Detail Level: None Venipuncture Paragraph: An alcohol pad was applied to the venipuncture site. Venipuncture was performed using a butterfly needle. Pressure and a bandaid was applied to the site. No complications were noted. patient

## 2018-10-26 NOTE — DISCHARGE NOTE ADULT - PATIENT PORTAL LINK FT
You can access the [facility] Portal, offered by [facility], by registering with the following website: [email or web address]

## 2018-10-26 NOTE — DISCHARGE NOTE ADULT - HOSPITAL COURSE
The patient was seen in PST on 10/17/2018 and admitted via SDA on 10/24/2018 and taken to the OR this same day.  Postop course was uneventful.  The patient was on Ancef and SQL for routine postop management.    PROCEDURE: Adm 10/24 L3-5 decomp lumbar lami     Neuro: HMV Drain DC'd 10/26AM. H/H stable. Bowel regimen.  Inc activity/OOB. DC Home today once BM.    Respiratory: Patient instructed to use incentive spirometer [ X] YES [ ] NO              DVT ppx: [X ] SQL [ ] SQH and Venodynes [ ] Left [ ] Right [ X] Bilateral    Discharge Planning:  The patient was evaluated by PT and recommended no needs or DME.        The patient was evaluated by PT/OT/PMR and recommended out patient PT/A. Rehab/S. Acute Rehab.   She/He was subsequently DC on /2018 in stable condition.

## 2018-10-30 PROBLEM — M54.16 RADICULOPATHY, LUMBAR REGION: Chronic | Status: ACTIVE | Noted: 2018-10-17

## 2018-11-01 ENCOUNTER — APPOINTMENT (OUTPATIENT)
Dept: SPINE | Facility: CLINIC | Age: 78
End: 2018-11-01
Payer: MEDICARE

## 2018-11-01 VITALS
SYSTOLIC BLOOD PRESSURE: 140 MMHG | DIASTOLIC BLOOD PRESSURE: 82 MMHG | TEMPERATURE: 98.1 F | BODY MASS INDEX: 22.2 KG/M2 | WEIGHT: 130 LBS | HEIGHT: 64 IN

## 2018-11-01 PROCEDURE — 99024 POSTOP FOLLOW-UP VISIT: CPT

## 2018-11-11 PROCEDURE — 97161 PT EVAL LOW COMPLEX 20 MIN: CPT

## 2018-11-11 PROCEDURE — 97116 GAIT TRAINING THERAPY: CPT

## 2018-11-11 PROCEDURE — C1889: CPT

## 2018-11-11 PROCEDURE — 80048 BASIC METABOLIC PNL TOTAL CA: CPT

## 2018-11-11 PROCEDURE — 97110 THERAPEUTIC EXERCISES: CPT

## 2018-11-11 PROCEDURE — 82962 GLUCOSE BLOOD TEST: CPT

## 2018-11-11 PROCEDURE — 85027 COMPLETE CBC AUTOMATED: CPT

## 2018-11-29 ENCOUNTER — APPOINTMENT (OUTPATIENT)
Dept: SPINE | Facility: CLINIC | Age: 78
End: 2018-11-29
Payer: MEDICARE

## 2018-11-29 VITALS
SYSTOLIC BLOOD PRESSURE: 134 MMHG | BODY MASS INDEX: 22.2 KG/M2 | DIASTOLIC BLOOD PRESSURE: 76 MMHG | HEIGHT: 64 IN | WEIGHT: 130 LBS

## 2018-11-29 PROCEDURE — 99024 POSTOP FOLLOW-UP VISIT: CPT

## 2018-11-29 RX ORDER — OXYCODONE AND ACETAMINOPHEN 5; 325 MG/1; MG/1
5-325 TABLET ORAL
Refills: 0 | Status: DISCONTINUED | COMMUNITY
End: 2018-11-29

## 2019-01-10 ENCOUNTER — APPOINTMENT (OUTPATIENT)
Dept: SPINE | Facility: CLINIC | Age: 79
End: 2019-01-10
Payer: MEDICARE

## 2019-01-10 VITALS
WEIGHT: 135 LBS | BODY MASS INDEX: 23.92 KG/M2 | DIASTOLIC BLOOD PRESSURE: 84 MMHG | HEIGHT: 63 IN | SYSTOLIC BLOOD PRESSURE: 126 MMHG

## 2019-01-10 PROCEDURE — 99024 POSTOP FOLLOW-UP VISIT: CPT

## 2019-01-10 NOTE — REASON FOR VISIT
[Follow-Up: _____] : a [unfilled] follow-up visit [FreeTextEntry1] : Pain-free and happy with the results of surgery. On no medication. Recent x-rays in flexion and extension show no dynamic instability.

## 2021-03-18 NOTE — PATIENT PROFILE ADULT - NSSCCAGEEYEOPENER_GEN_A_NUR
-- DO NOT REPLY / DO NOT REPLY ALL --  -- Message is from the [facility]--    COVID-19 Universal Screening: Negative    General Patient Message      Reason for Call: Patient would like to schedule another appointment with the dr. Nava Information       Type Contact Phone    03/18/2021 09:12 AM CDT Phone (Incoming) Miguel Berkowitz (Self) [phone] (M)          Alternative phone number: n/a    Turnaround time given to caller:   \"This message will be sent to [state Provider's name]. The clinical team will fulfill your request as soon as they review your message.\"    
no

## 2021-06-02 ENCOUNTER — INPATIENT (INPATIENT)
Facility: HOSPITAL | Age: 81
LOS: 6 days | Discharge: HOME | DRG: 291 | End: 2021-06-09
Attending: FAMILY MEDICINE | Admitting: FAMILY MEDICINE
Payer: MEDICARE

## 2021-06-02 VITALS
SYSTOLIC BLOOD PRESSURE: 203 MMHG | RESPIRATION RATE: 29 BRPM | HEART RATE: 126 BPM | OXYGEN SATURATION: 99 % | DIASTOLIC BLOOD PRESSURE: 92 MMHG

## 2021-06-02 DIAGNOSIS — R91.8 OTHER NONSPECIFIC ABNORMAL FINDING OF LUNG FIELD: Chronic | ICD-10-CM

## 2021-06-02 DIAGNOSIS — S72.009A FRACTURE OF UNSPECIFIED PART OF NECK OF UNSPECIFIED FEMUR, INITIAL ENCOUNTER FOR CLOSED FRACTURE: Chronic | ICD-10-CM

## 2021-06-02 DIAGNOSIS — Z98.890 OTHER SPECIFIED POSTPROCEDURAL STATES: Chronic | ICD-10-CM

## 2021-06-02 DIAGNOSIS — R06.03 ACUTE RESPIRATORY DISTRESS: ICD-10-CM

## 2021-06-02 LAB
ALBUMIN SERPL ELPH-MCNC: 4 G/DL — SIGNIFICANT CHANGE UP (ref 3.3–5)
ALP SERPL-CCNC: 97 U/L — SIGNIFICANT CHANGE UP (ref 40–120)
ALT FLD-CCNC: 30 U/L — SIGNIFICANT CHANGE UP (ref 12–78)
ANION GAP SERPL CALC-SCNC: 10 MMOL/L — SIGNIFICANT CHANGE UP (ref 5–17)
ANION GAP SERPL CALC-SCNC: 9 MMOL/L — SIGNIFICANT CHANGE UP (ref 5–17)
APPEARANCE UR: CLEAR — SIGNIFICANT CHANGE UP
APTT BLD: 44 SEC — HIGH (ref 27.5–35.5)
AST SERPL-CCNC: 39 U/L — HIGH (ref 15–37)
BACTERIA # UR AUTO: ABNORMAL
BASE EXCESS BLDA CALC-SCNC: -0.8 MMOL/L — SIGNIFICANT CHANGE UP (ref -2–2)
BASE EXCESS BLDA CALC-SCNC: 4.6 MMOL/L — HIGH (ref -2–2)
BASOPHILS # BLD AUTO: 0.16 K/UL — SIGNIFICANT CHANGE UP (ref 0–0.2)
BASOPHILS NFR BLD AUTO: 1 % — SIGNIFICANT CHANGE UP (ref 0–2)
BILIRUB SERPL-MCNC: 0.4 MG/DL — SIGNIFICANT CHANGE UP (ref 0.2–1.2)
BILIRUB UR-MCNC: NEGATIVE — SIGNIFICANT CHANGE UP
BLOOD GAS COMMENTS ARTERIAL: SIGNIFICANT CHANGE UP
BUN SERPL-MCNC: 24 MG/DL — HIGH (ref 7–23)
BUN SERPL-MCNC: 25 MG/DL — HIGH (ref 7–23)
CALCIUM SERPL-MCNC: 8.6 MG/DL — SIGNIFICANT CHANGE UP (ref 8.5–10.1)
CALCIUM SERPL-MCNC: 9 MG/DL — SIGNIFICANT CHANGE UP (ref 8.5–10.1)
CHLORIDE SERPL-SCNC: 104 MMOL/L — SIGNIFICANT CHANGE UP (ref 96–108)
CHLORIDE SERPL-SCNC: 106 MMOL/L — SIGNIFICANT CHANGE UP (ref 96–108)
CK MB BLD-MCNC: 5.6 % — HIGH (ref 0–3.5)
CK MB CFR SERPL CALC: 1.7 NG/ML — SIGNIFICANT CHANGE UP (ref 0–3.6)
CK MB CFR SERPL CALC: 2.9 NG/ML — SIGNIFICANT CHANGE UP (ref 0–3.6)
CK SERPL-CCNC: 52 U/L — SIGNIFICANT CHANGE UP (ref 26–192)
CO2 SERPL-SCNC: 24 MMOL/L — SIGNIFICANT CHANGE UP (ref 22–31)
CO2 SERPL-SCNC: 29 MMOL/L — SIGNIFICANT CHANGE UP (ref 22–31)
COLOR SPEC: YELLOW — SIGNIFICANT CHANGE UP
CREAT SERPL-MCNC: 1.1 MG/DL — SIGNIFICANT CHANGE UP (ref 0.5–1.3)
CREAT SERPL-MCNC: 1.2 MG/DL — SIGNIFICANT CHANGE UP (ref 0.5–1.3)
DIFF PNL FLD: ABNORMAL
EOSINOPHIL # BLD AUTO: 0.41 K/UL — SIGNIFICANT CHANGE UP (ref 0–0.5)
EOSINOPHIL NFR BLD AUTO: 2.6 % — SIGNIFICANT CHANGE UP (ref 0–6)
EPI CELLS # UR: SIGNIFICANT CHANGE UP
GLUCOSE SERPL-MCNC: 202 MG/DL — HIGH (ref 70–99)
GLUCOSE SERPL-MCNC: 258 MG/DL — HIGH (ref 70–99)
GLUCOSE UR QL: 100 MG/DL
HCO3 BLDA-SCNC: 23 MMOL/L — SIGNIFICANT CHANGE UP (ref 23–27)
HCO3 BLDA-SCNC: SIGNIFICANT CHANGE UP MMOL/L (ref 23–27)
HCT VFR BLD CALC: 33.5 % — LOW (ref 34.5–45)
HGB BLD-MCNC: 10.3 G/DL — LOW (ref 11.5–15.5)
HOROWITZ INDEX BLDA+IHG-RTO: 100 — SIGNIFICANT CHANGE UP
HOROWITZ INDEX BLDA+IHG-RTO: 40 — SIGNIFICANT CHANGE UP
IMM GRANULOCYTES NFR BLD AUTO: 0.4 % — SIGNIFICANT CHANGE UP (ref 0–1.5)
INR BLD: 1.11 RATIO — SIGNIFICANT CHANGE UP (ref 0.88–1.16)
KETONES UR-MCNC: NEGATIVE — SIGNIFICANT CHANGE UP
LACTATE SERPL-SCNC: 2.2 MMOL/L — HIGH (ref 0.7–2)
LACTATE SERPL-SCNC: 4.5 MMOL/L — CRITICAL HIGH (ref 0.7–2)
LEUKOCYTE ESTERASE UR-ACNC: NEGATIVE — SIGNIFICANT CHANGE UP
LYMPHOCYTES # BLD AUTO: 44.1 % — HIGH (ref 13–44)
LYMPHOCYTES # BLD AUTO: 7.06 K/UL — HIGH (ref 1–3.3)
MAGNESIUM SERPL-MCNC: 1.9 MG/DL — SIGNIFICANT CHANGE UP (ref 1.6–2.6)
MAGNESIUM SERPL-MCNC: 2.3 MG/DL — SIGNIFICANT CHANGE UP (ref 1.6–2.6)
MCHC RBC-ENTMCNC: 28.8 PG — SIGNIFICANT CHANGE UP (ref 27–34)
MCHC RBC-ENTMCNC: 30.7 GM/DL — LOW (ref 32–36)
MCV RBC AUTO: 93.6 FL — SIGNIFICANT CHANGE UP (ref 80–100)
MONOCYTES # BLD AUTO: 0.88 K/UL — SIGNIFICANT CHANGE UP (ref 0–0.9)
MONOCYTES NFR BLD AUTO: 5.5 % — SIGNIFICANT CHANGE UP (ref 2–14)
NEUTROPHILS # BLD AUTO: 7.43 K/UL — HIGH (ref 1.8–7.4)
NEUTROPHILS NFR BLD AUTO: 46.4 % — SIGNIFICANT CHANGE UP (ref 43–77)
NITRITE UR-MCNC: NEGATIVE — SIGNIFICANT CHANGE UP
NRBC # BLD: 0 /100 WBCS — SIGNIFICANT CHANGE UP (ref 0–0)
NT-PROBNP SERPL-SCNC: 1195 PG/ML — HIGH (ref 0–450)
PCO2 BLDA: 46 MMHG — SIGNIFICANT CHANGE UP (ref 32–46)
PCO2 BLDA: 51 MMHG — HIGH (ref 32–46)
PH BLDA: 7.3 — LOW (ref 7.35–7.45)
PH BLDA: 7.42 — SIGNIFICANT CHANGE UP (ref 7.35–7.45)
PH UR: 7 — SIGNIFICANT CHANGE UP (ref 5–8)
PHOSPHATE SERPL-MCNC: 3.4 MG/DL — SIGNIFICANT CHANGE UP (ref 2.5–4.5)
PLATELET # BLD AUTO: 294 K/UL — SIGNIFICANT CHANGE UP (ref 150–400)
PO2 BLDA: 116 MMHG — HIGH (ref 74–108)
PO2 BLDA: 362 MMHG — HIGH (ref 74–108)
POTASSIUM SERPL-MCNC: 3.3 MMOL/L — LOW (ref 3.5–5.3)
POTASSIUM SERPL-MCNC: 3.9 MMOL/L — SIGNIFICANT CHANGE UP (ref 3.5–5.3)
POTASSIUM SERPL-SCNC: 3.3 MMOL/L — LOW (ref 3.5–5.3)
POTASSIUM SERPL-SCNC: 3.9 MMOL/L — SIGNIFICANT CHANGE UP (ref 3.5–5.3)
PROCALCITONIN SERPL-MCNC: 0.05 NG/ML — HIGH (ref 0–0.04)
PROT SERPL-MCNC: 7.6 G/DL — SIGNIFICANT CHANGE UP (ref 6–8.3)
PROT UR-MCNC: NEGATIVE — SIGNIFICANT CHANGE UP
PROTHROM AB SERPL-ACNC: 12.9 SEC — SIGNIFICANT CHANGE UP (ref 10.6–13.6)
RBC # BLD: 3.58 M/UL — LOW (ref 3.8–5.2)
RBC # FLD: 14 % — SIGNIFICANT CHANGE UP (ref 10.3–14.5)
RBC CASTS # UR COMP ASSIST: ABNORMAL /HPF (ref 0–4)
SAO2 % BLDA: 100 % — HIGH (ref 92–96)
SAO2 % BLDA: SIGNIFICANT CHANGE UP % (ref 92–96)
SARS-COV-2 RNA SPEC QL NAA+PROBE: SIGNIFICANT CHANGE UP
SODIUM SERPL-SCNC: 140 MMOL/L — SIGNIFICANT CHANGE UP (ref 135–145)
SODIUM SERPL-SCNC: 142 MMOL/L — SIGNIFICANT CHANGE UP (ref 135–145)
SP GR SPEC: 1.01 — SIGNIFICANT CHANGE UP (ref 1.01–1.02)
TROPONIN I SERPL-MCNC: 0.07 NG/ML — HIGH (ref 0.01–0.04)
TROPONIN I SERPL-MCNC: <.015 NG/ML — SIGNIFICANT CHANGE UP (ref 0.01–0.04)
TSH SERPL-MCNC: 3.09 UIU/ML — SIGNIFICANT CHANGE UP (ref 0.36–3.74)
UROBILINOGEN FLD QL: NEGATIVE — SIGNIFICANT CHANGE UP
WBC # BLD: 16 K/UL — HIGH (ref 3.8–10.5)
WBC # FLD AUTO: 16 K/UL — HIGH (ref 3.8–10.5)
WBC UR QL: SIGNIFICANT CHANGE UP

## 2021-06-02 PROCEDURE — 99292 CRITICAL CARE ADDL 30 MIN: CPT

## 2021-06-02 PROCEDURE — 76604 US EXAM CHEST: CPT | Mod: 26

## 2021-06-02 PROCEDURE — 99291 CRITICAL CARE FIRST HOUR: CPT

## 2021-06-02 PROCEDURE — 71045 X-RAY EXAM CHEST 1 VIEW: CPT | Mod: 26

## 2021-06-02 PROCEDURE — 93010 ELECTROCARDIOGRAM REPORT: CPT

## 2021-06-02 PROCEDURE — 71250 CT THORAX DX C-: CPT | Mod: 26

## 2021-06-02 RX ORDER — DEXTROSE 50 % IN WATER 50 %
25 SYRINGE (ML) INTRAVENOUS ONCE
Refills: 0 | Status: DISCONTINUED | OUTPATIENT
Start: 2021-06-02 | End: 2021-06-03

## 2021-06-02 RX ORDER — POTASSIUM CHLORIDE 20 MEQ
40 PACKET (EA) ORAL EVERY 4 HOURS
Refills: 0 | Status: COMPLETED | OUTPATIENT
Start: 2021-06-02 | End: 2021-06-03

## 2021-06-02 RX ORDER — AZITHROMYCIN 500 MG/1
500 TABLET, FILM COATED ORAL EVERY 24 HOURS
Refills: 0 | Status: DISCONTINUED | OUTPATIENT
Start: 2021-06-02 | End: 2021-06-03

## 2021-06-02 RX ORDER — INSULIN LISPRO 100/ML
VIAL (ML) SUBCUTANEOUS AT BEDTIME
Refills: 0 | Status: DISCONTINUED | OUTPATIENT
Start: 2021-06-02 | End: 2021-06-03

## 2021-06-02 RX ORDER — LABETALOL HCL 100 MG
10 TABLET ORAL ONCE
Refills: 0 | Status: COMPLETED | OUTPATIENT
Start: 2021-06-02 | End: 2021-06-02

## 2021-06-02 RX ORDER — LABETALOL HCL 100 MG
10 TABLET ORAL EVERY 4 HOURS
Refills: 0 | Status: DISCONTINUED | OUTPATIENT
Start: 2021-06-02 | End: 2021-06-04

## 2021-06-02 RX ORDER — NITROGLYCERIN 6.5 MG
0.4 CAPSULE, EXTENDED RELEASE ORAL ONCE
Refills: 0 | Status: COMPLETED | OUTPATIENT
Start: 2021-06-02 | End: 2021-06-02

## 2021-06-02 RX ORDER — DEXTROSE 50 % IN WATER 50 %
12.5 SYRINGE (ML) INTRAVENOUS ONCE
Refills: 0 | Status: DISCONTINUED | OUTPATIENT
Start: 2021-06-02 | End: 2021-06-03

## 2021-06-02 RX ORDER — CEFTRIAXONE 500 MG/1
1000 INJECTION, POWDER, FOR SOLUTION INTRAMUSCULAR; INTRAVENOUS EVERY 24 HOURS
Refills: 0 | Status: DISCONTINUED | OUTPATIENT
Start: 2021-06-02 | End: 2021-06-03

## 2021-06-02 RX ORDER — GLUCAGON INJECTION, SOLUTION 0.5 MG/.1ML
1 INJECTION, SOLUTION SUBCUTANEOUS ONCE
Refills: 0 | Status: DISCONTINUED | OUTPATIENT
Start: 2021-06-02 | End: 2021-06-03

## 2021-06-02 RX ORDER — DEXTROSE 50 % IN WATER 50 %
15 SYRINGE (ML) INTRAVENOUS ONCE
Refills: 0 | Status: DISCONTINUED | OUTPATIENT
Start: 2021-06-02 | End: 2021-06-03

## 2021-06-02 RX ORDER — PIPERACILLIN AND TAZOBACTAM 4; .5 G/20ML; G/20ML
3.38 INJECTION, POWDER, LYOPHILIZED, FOR SOLUTION INTRAVENOUS ONCE
Refills: 0 | Status: DISCONTINUED | OUTPATIENT
Start: 2021-06-02 | End: 2021-06-02

## 2021-06-02 RX ORDER — FUROSEMIDE 40 MG
40 TABLET ORAL ONCE
Refills: 0 | Status: COMPLETED | OUTPATIENT
Start: 2021-06-02 | End: 2021-06-02

## 2021-06-02 RX ORDER — IPRATROPIUM/ALBUTEROL SULFATE 18-103MCG
3 AEROSOL WITH ADAPTER (GRAM) INHALATION ONCE
Refills: 0 | Status: COMPLETED | OUTPATIENT
Start: 2021-06-02 | End: 2021-06-02

## 2021-06-02 RX ORDER — INSULIN LISPRO 100/ML
VIAL (ML) SUBCUTANEOUS
Refills: 0 | Status: DISCONTINUED | OUTPATIENT
Start: 2021-06-02 | End: 2021-06-03

## 2021-06-02 RX ADMIN — Medication 125 MILLIGRAM(S): at 14:10

## 2021-06-02 RX ADMIN — Medication 40 MILLIGRAM(S): at 14:10

## 2021-06-02 RX ADMIN — Medication 0.05 MILLIGRAM(S): at 19:53

## 2021-06-02 RX ADMIN — CEFTRIAXONE 100 MILLIGRAM(S): 500 INJECTION, POWDER, FOR SOLUTION INTRAMUSCULAR; INTRAVENOUS at 18:30

## 2021-06-02 RX ADMIN — AZITHROMYCIN 255 MILLIGRAM(S): 500 TABLET, FILM COATED ORAL at 16:16

## 2021-06-02 RX ADMIN — Medication 0.4 MILLIGRAM(S): at 14:22

## 2021-06-02 RX ADMIN — Medication 10 MILLIGRAM(S): at 18:14

## 2021-06-02 RX ADMIN — Medication 0.4 MILLIGRAM(S): at 14:39

## 2021-06-02 RX ADMIN — Medication 3 MILLILITER(S): at 14:15

## 2021-06-02 RX ADMIN — Medication 40 MILLIEQUIVALENT(S): at 22:14

## 2021-06-02 NOTE — ED PROVIDER NOTE - CLINICAL SUMMARY MEDICAL DECISION MAKING FREE TEXT BOX
80 female in respiratory distress, CHF vs COPD excarbation, f/u labs, ekg, chest xray, abg, duoneb, sol medrol, nitro 0.4mg SL, lasix IVP

## 2021-06-02 NOTE — H&P ADULT - HISTORY OF PRESENT ILLNESS
80 year old female with PMH lymphoma (RCHOP 5 yrs ago) with RLLobectomy, HTN, HLD, "borderline" DM (not on meds), right ankle fx, L ALEXANDRA, herniated disc surgery. Pt at usual well state of health until this afternoon. While at PT for "pulled muscle" in RLE pt developed sudden onset acute SOB, drove to [facility] house, felt worse then was driven to [place] ED by daughter patrica salmon didn't take her to [facility]. ED reports pt on arrival in acute resp distress, hypoxemic (sat 80s), hypertensive urgency (200/100), diaphoretic, rales bilaterally. Failed NRB, given SL NTG x 2, lasix 40 IV x 1, solumedrol 125mg IV x 1, placed on BiPAP.

## 2021-06-02 NOTE — CONSULT NOTE ADULT - ASSESSMENT
[facility]  Chart Reviewed, case discussed with ICU team and agree with abx pending clarification-CT findings impressive and concerning for airspace disease    -Full Consult to follow for any immediate concerns please fell free to contact us directly at  [phone] and have us paged or text my cell # [phone]  Simón Nolasco MD PhD

## 2021-06-02 NOTE — CONSULT NOTE ADULT - ASSESSMENT
80 year old female with PMH lymphoma (RCHOP 5 yrs ago) with RLLobectomy, HTN, HLD, "borderline" DM (not on meds), right ankle fx, L ALEXANDRA, herniated disc surgery. P/w acute SOB, hypoxemia, hypertensive urgency. A/w acute hypercapnic respiratory failure, concern for GERSON (creat 1.2, baseline 0.9 per Dr Juarez), picture more consistent with severe sepsis d/t PNA (viral/COVID vs atypical vs bacterial) rather than acute pulmonary edema.    Plan:  Neuro:  intact, no acute issues    CV:  initial hypertensive urgency now improved post lasix and SL NTG  hold lisinopril for now   nothing to suggest MI as cause of possible acute pulm edema so will hold off on beta-blockers (no aspirin d/t allergy, can resume home dose lipitor)  trend Molina and ECG in am  TTE ordered    Pulm:  initial hypoxemic respiratory failure now improved  continue BiPAP support with increased IPAP (from 12 to 15 for acute hypercapnic respiratory failure (concerning as this is despite pt being tachypneic and on BiPAP)  check repeat ABG  continue steroids  f/u chest CT ordered    GI:  NPO while on BiPAP    :  continue little catheter for urine output monitoring in critically ill patient     Renal:  adjust meds per CrCl  Hold nephrotoxic meds  Trend urine output  Trend BMP  hold further lasix for now    Heme:  heparin subQ and SCDs for DVT PPX    ID:  ceftriaxone and azithromycin for empiric CAP coverage  urine legionella pending  blood cx pending  airborne precautions pending COVID PCR results  repeat lactate ordered  no sepsis bundle IVF boluses based on pt's initial presentation     Endo:  FS AC+HS/Q6 with SSI  A1c pending    Misc:   admit to ICU  supportive care as needed   80 year old female with PMH lymphoma (RCHOP 5 yrs ago) with RLLobectomy, HTN, HLD, "borderline" DM (not on meds), right ankle fx, L ALEXANDRA, herniated disc surgery. P/w acute SOB, hypoxemia, hypertensive urgency. A/w acute hypercapnic respiratory failure, concern for GERSON (creat 1.2, baseline 0.9 per Dr Juarez), picture more consistent with severe sepsis d/t PNA (viral/COVID vs atypical vs bacterial) rather than acute pulmonary edema.    Plan:  Neuro:  intact, no acute issues    CV:  initial hypertensive urgency now improved post lasix and SL NTG  hold lisinopril for now   nothing to suggest MI as cause of possible acute pulm edema so will hold off on beta-blockers (no aspirin d/t allergy, can resume home dose lipitor)  trend Molina and ECG in am  TTE ordered    Pulm:  initial hypoxemic respiratory failure now improved  continue BiPAP support with increased IPAP (from 12 to 15 for acute hypercapnic respiratory failure (concerning as this is despite pt being tachypneic and on BiPAP)  check repeat ABG  hold off on further steroids at this time  f/u chest CT ordered    GI:  NPO while on BiPAP    :  continue little catheter for urine output monitoring in critically ill patient     Renal:  adjust meds per CrCl  Hold nephrotoxic meds  Trend urine output  Trend BMP  hold further lasix for now    Heme:  heparin subQ and SCDs for DVT PPX    ID:  ceftriaxone and azithromycin for empiric CAP coverage  urine legionella pending  blood cx pending  airborne precautions pending COVID PCR results  repeat lactate ordered  no sepsis bundle IVF boluses based on pt's initial presentation     Endo:  FS AC+HS/Q6 with SSI  A1c pending    Misc:   admit to ICU  supportive care as needed

## 2021-06-02 NOTE — ED PROVIDER NOTE - PROGRESS NOTE DETAILS
called ICU PA, case discussed, will consult spoke with pulmonary Dr. Victoria, will consult pagedevante Clark, spoke with Dr. Perlman, will admit to Dr. Clark, patient in CHF excaerabtion, to hold iv fluids,

## 2021-06-02 NOTE — ED PROVIDER NOTE - OBJECTIVE STATEMENT
80 female PMH lymphoma, presents to ER with daughter c/o difficulty breathing, shortness of breath. Patient had gone to PT today for her "pulled muscle", after PT patient drove to her daughter's house she felt shortness of breath, worse with exertion.

## 2021-06-02 NOTE — CONSULT NOTE ADULT - SUBJECTIVE AND OBJECTIVE BOX
Date/Time Patient Seen:  		  Referring MD:   Data Reviewed	       Patient is a 80y old  Female who presents with a chief complaint of     Subjective/HPI     PAST MEDICAL & SURGICAL HISTORY:  No significant past surgical history          Medication list         MEDICATIONS  (STANDING):    MEDICATIONS  (PRN):         Vitals log        ICU Vital Signs Last 24 Hrs  T(C): --  T(F): --  HR: 117 (02 Jun 2021 14:42) (117 - 126)  BP: 178/80 (02 Jun 2021 14:42) (178/80 - 203/92)  BP(mean): --  ABP: --  ABP(mean): --  RR: 33 (02 Jun 2021 14:42) (29 - 33)  SpO2: 100% (02 Jun 2021 14:42) (99% - 100%)           Input and Output:  I&O's Detail      Lab Data                        10.3   16.00 )-----------( 294      ( 02 Jun 2021 14:31 )             33.5                   Review of Systems	      Objective     Physical Examination        Pertinent Lab findings & Imaging      Nuñez:  NO   Adequate UO     I&O's Detail           Discussed with:     Cultures:	        Radiology                             Date/Time Patient Seen:  		  Referring MD:   Data Reviewed	       Patient is a 80y old  Female who presents with a chief complaint of     Subjective/HPI  in bed  seen and examined  vs and meds reviewed  labs reviewed  er provider note reviewed  spoke with dtr  pt lives at home with friend  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   follows with Medicine - PMD  non smoker  non drinker  has 2 children    no travel    ros - resp distress  family hx - ashd  medical hx - Lymphoma treated 5 years ago  · Chief Complaint: The patient is a 80y Female complaining of shortness of breath.  · HPI Objective Statement: 80 female PMH lymphoma, presents to ER with daughter c/o difficulty breathing, shortness of breath. Patient had gone to PT today for her "pulled muscle", after PT patient drove to her daughter's house she felt shortness of breath, worse with exertion.  · Presenting Symptoms: SHORTNESS OF BREATH  · Negative Findings: no vomiting  · Radiation: none  · Timing: sudden onset  · Duration: today  · Severity: SEVERE  · Aggravating Factors: exertion  · Relieving Factors: none       PAST MEDICAL & SURGICAL HISTORY:  No significant past surgical history    Surgical History  Unreviewed Unverified: History of Carotid artery reconstruction  Unreviewed Unverified: History of Hip surgery  Unreviewed Unverified: History of Lung surgery    Family History  Unreviewed Unverified: Family history of diabetes mellitus (V18.0) (Z83.3) : Father    Social History  Unreviewed Unverified: No alcohol use  Unreviewed Unverified: No illicit drug use  Unreviewed Unverified: Non-smoker (V49.89) (Z78.9)    Allergies  aspirin        Medication list         MEDICATIONS  (STANDING):    MEDICATIONS  (PRN):         Vitals log        ICU Vital Signs Last 24 Hrs  T(C): --  T(F): --  HR: 117 (02 Jun 2021 14:42) (117 - 126)  BP: 178/80 (02 Jun 2021 14:42) (178/80 - 203/92)  BP(mean): --  ABP: --  ABP(mean): --  RR: 33 (02 Jun 2021 14:42) (29 - 33)  SpO2: 100% (02 Jun 2021 14:42) (99% - 100%)           Input and Output:  I&O's Detail      Lab Data                        10.3   16.00 )-----------( 294      ( 02 Jun 2021 14:31 )             33.5                   Review of Systems	  resp distress  ams    Objective     Physical Examination    little cath  on bipap  head nc  head at  lethargic  extr chr changes  lung dec BS  heart s1s2  tachy      Pertinent Lab findings & Imaging      Little:  NO   Adequate UO     I&O's Detail           Discussed with:     Cultures:	        Radiology    cxr prelim reviewed

## 2021-06-02 NOTE — H&P ADULT - NSHPPHYSICALEXAM_GEN_ALL_CORE
heent nl  no jvd  cor rrr s1s2  lungs rhonchi bl  abd soft nt  ext -c/c/e, no calf tenderness  neuro non focal

## 2021-06-02 NOTE — CONSULT NOTE ADULT - ATTENDING COMMENTS
Pt seen and assessed with ACP in ED. Agree with above. In brief, pt is 80F h/o lymphoma s/p RCHOP and R lower lobectomy, HLD p/w acute onset SOB. Pt had been at PT earlier in the day, drove to [email or web address] house and had onset of symptoms. Pt placed on BiPAP in ED, received lasix 40mg ivp x1, solumedrol and duonebs, sublingual nitro x2 with some improvement. Initial /92, , SpO2 99% on 100% on BiPAP.    POCUS showed cardiac silhouette with rightward shift likely post-R lower lobectomy with grossly preserved LV function, no RV dilation, plump IVC with respiratory variation. Lungs with b-line predominance, trace to small effusions R pleural effusion and small-moderate L pleural effusion with some LLL consolidation.     CXR: diffuse patchy airspace disease with opacification of RLL c/w history of lobectomy    PE:   Gen: somnalent but arousable, following commands  CV: tachy but regular  Pulm: decreased at bases, rhonchorous diffusely  Abd: soft, nt, nd  Ext: no edema/swelling    A/P: 80F h/o lymphoma s/p RCHOP and R lower lobectomy, HLD a/w hypoxic respiratory failure on BiPAP with differential at this time concerning for bacterial vs viral vs atypical PNA, vs Covid vs less likely flash pulmonary edema.     Neuro: metabolic encephalopathy likely in setting of hypoxia and possible sepsis  CV: POCUS suggesting grossly preserved LV function, will get official TTE  - initial troponin negative and EKG with no ischemic changes - will trend cardiac enzymes  - BP now improved s/p sublingual nitro x2, will monitor  - pro-BNP not convincing, can hold off on standing diuresis and give as needed  Pulm: continue BiPAP for respiratory support  - obtain CT chest now to further assess pulmonary process  - ABG with respiratory acidosis despite tachypnea and BiPAP - high risk for further decompensation  Renal: unknown baseline sCr, likely GERSON from ATN in setting of hypoxia, sepsis  with sCr of 1.2, electrolytes otherwise wnl  - strict I/Os  ID: clinical picture concerning for infectious etiology for her presentation over cardiogenic - Covid swab pending, send pro-april, urine legionella, LDH, blood and urine cultures  - emperic CAP coverage   - repeat lactate  - isolation precautions pending covid result  - CT chest pending  Endo: hyperglycemia likely stress induced - add ISS coverage, check HbA1c in AM  Heme: dvt ppx with hsq    D/w daughter at bedside

## 2021-06-02 NOTE — H&P ADULT - NSHPREVIEWOFSYSTEMS_GEN_ALL_CORE
gen resp distress  heent nl  resp cough with thick yellow sputum   cor denies chest pain or palpitation  Denied N/V, fever/chills, CP, headache, myalgias, palpitations, dizziness, weakness, abd pain, weight loss, ill contacts.   Pt developed chills during eval in ED.  negative x 10 systems except as noted above

## 2021-06-02 NOTE — ED ADULT NURSE NOTE - OBJECTIVE STATEMENT
Patient presents with sudden onset shortness of breath.  Bilateral wheezes on auscultation.  Patient tripoding on 100 percent nonrebreather, stating she cannot breathe.  Respiratory Therapist Nathan at bedside, BiPap applied.  Patient appears more comfortable. Patient presents with sudden onset shortness of breath.  Patient states she has been coughing for about a week and bringing up thick sputum.  Bilateral wheezes on auscultation.  Patient tripoding on 100 percent nonrebreather, stating she cannot breathe.  Respiratory Therapist Nathan at bedside, BiPap applied.  Patient appears more comfortable.

## 2021-06-02 NOTE — CONSULT NOTE ADULT - SUBJECTIVE AND OBJECTIVE BOX
Reason for consult:    HPI:        Review of Systems         Unable to obtain due to: intubated & sedated altered mental status _______________    Constitutional: denies fever, chills, general malaise, weight loss, weight gain, diaphoresis   HEENT: denies dry mouth, sore throat, runny nose, photophobia, blurry vision, double vision, glasses, discharge, eye pain, difficulty hearing, vertigo, dysphagia, epistaxis, recent dental work    Respiratory: denies SOB, IVAN, cough, phlegm, wheezing, hemoptysis  Cardiovascular: denies CP, palpitations, edema, diaphoresis   Gastrointestinal: denies nausea, vomiting, diarrhea, constipation, abdominal pain, melena   Genitourinary: denies dysuria, frequency, urgency, incontinence, hematuria   Skin/Breast: denies rash, hives, itching, masses, hair loss   Musculoskeletal: denies myalgias, arthritis, joint swelling, muscle weakness  Neurologic: denies syncope, LOC, headache, weakness, dizziness, parasthesias, numbness, seizures, confusion, dementia   Psychiatric: denies feeling anxious, depressed, suicidal, homicidal  Endocrine: denies increased fingerstick glucoses, cold or heat intolerance, polydipsia, polyuria, polyphagia   Hematology/Oncology: denies bruising, tender or enlarged lymph nodes   ROS negative x 10 systems except as noted above      PMH/PSH:  Hyperlipidemia    Lymphoma    Former smoker      No significant past surgical history        Relevant Family History  FAMILY HISTORY:      SOCIAL HISTORY:  Smoker:   ETOH use:   Ilicit Drug use:     Occupation:  Lives with:  Assist device use:    Code Status: full code  HCP:      Allergy Status Unknown                                                            LABS:                        10.3   16.00 )-----------( 294      ( 02 Jun 2021 14:31 )             33.5     06-02    140  |  106  |  24<H>  ----------------------------<  258<H>  3.9   |  24  |  1.20    Ca    9.0      02 Jun 2021 14:31  Mg     2.3     06-02    TPro  7.6  /  Alb  4.0  /  TBili  0.4  /  DBili  x   /  AST  39<H>  /  ALT  30  /  AlkPhos  97  06-02    PT/INR - ( 02 Jun 2021 14:31 )   PT: 12.9 sec;   INR: 1.11 ratio         PTT - ( 02 Jun 2021 14:31 )  PTT:44.0 sec    Troponin I, Serum: <.015 ng/mL (06.02.21 @ 14:31)    CKMB Mass Assay (06.02.21 @ 14:31)    CKMB Units: 1.7 ng/mL    Lactate, Blood (06.02.21 @ 14:31)    Lactate, Blood: 4.5 mmol/L    Blood Gas Profile - Arterial (06.02.21 @ 15:23)    pH, Arterial: 7.30    pCO2, Arterial: 51 mmHg    pO2, Arterial: 362 mmHg    HCO3, Arterial: 23 mmol/L    Base Excess, Arterial: -0.8 mmol/L    Oxygen Saturation, Arterial: 100 %    FIO2, Arterial: 100.0    Blood Gas Comments Arterial: Rt. Radial ABG on BiPAP  ST,  12/7,  100%    Thyroid Stimulating Hormone, Serum (06.02.21 @ 14:31)    Thyroid Stimulating Hormone, Serum: 3.09 uIU/mL    Serum Pro-Brain Natriuretic Peptide (06.02.21 @ 14:31)    Serum Pro-Brain Natriuretic Peptide: 1195 pg/mL      CXR (my read): bilateral patchy infiltrates, R base opacity (pt with hx of RLLobectomy), cardiac silhouette shifted to right     POCUS: diffuse B lines bilaterally, cardiac function grossly normal (heart ultrasound performed to right of sternum)    EKG (my read): ST, no acute ST/T wave changes noted      T(C): 36.1 (06-02-21 @ 15:57), Max: 36.1 (06-02-21 @ 15:57)  HR: 111 (06-02-21 @ 15:40) (106 - 126), ST  BP: 164/72 (06-02-21 @ 15:40) (164/72 - 203/92)  RR: 30 (06-02-21 @ 15:40) (29 - 33)  SpO2: 95% (06-02-21 @ 15:40) (95% - 100%) BiPAP 15/6, 60%      Physical Exam  General: comfortable but tachypneic on BiPAP, acute respiratory distress (more tachypneic, accessory muscle use, audible "rattle") when off BiPAP even briefly  Neuro: A+O x 3, non-focal, speech clear and intact  Eyes: PERRL, EOMI   ENT: dry mouth/tongue   Neck: supple, no JVD, trachea midline   Chest: rhonchi throughout, good air entry, equal bilaterally  CV: tachycardic, regular rhythm, +S1S2  GI: soft, NT, ND, +BS  Extremities: WEBER x 4, no C/C/E, distal motor/neuro/circ intact  SKIN: warm, dry, intact  Reason for consult/HPI:  80 year old female with PMH lymphoma (RCHOP 5 yrs ago) with RLLobectomy, HTN, HLD, "borderline" DM (not on meds), right ankle fx, L ALEXANDRA, herniated disc surgery. Pt at usual well state of health until this afternoon. While at PT for "pulled muscle" in RLE pt developed sudden onset acute SOB, drove to daughters house, felt worse then was driven to [place] ED by daughter patrica salmon didn't take her to [facility]. ED reports pt on arrival in acute resp distress, hypoxemic (sat 80s), hypertensive urgency (200/100), diaphoretic, rales bilaterally. Failed NRB, given SL NTG x 2, lasix 40 IV x 1, solumedrol 125mg IV x 1, placed on BiPAP.    ROS:   + cough with thick yellow sputum once/day described by pt as rubber band-like.   Denied N/V, fever/chills, CP, headache, myalgias, palpitations, dizziness, weakness, abd pain, weight loss, ill contacts.   Pt developed chills during eval in ED.  negative x 10 systems except as noted above      SOCIAL HISTORY:  Smoker: quit 20 years ago, smoked ~1 PPD x 40 years  ETOH use: 1 glass wine nightly  Ilicit Drug use: 1 piece of chocolate "edible" nightly for insomnia    Occupation:  Lives with: Bright (family friend)  ADLs: independent    Code Status: full code  HCP:      Home Medications:  atorvastatin 20 mg oral tablet: 1 tab(s) orally once a day (02 Jun 2021 16:39)  lisinopril 20 mg oral tablet: 1 tab(s) orally once a day (02 Jun 2021 16:39)  Trelegy Ellipta 100 mcg-62.5 mcg-25 mcg/inh inhalation powder: 1 puff(s) inhaled once a day  (*spoke to pharmacist at [facility] in [place]. Pt has not filled trelegy Rx yet there)     Allergy: aspirin (unknown reaction)                                                             LABS:                        10.3   16.00 )-----------( 294      ( 02 Jun 2021 14:31 )             33.5     06-02    140  |  106  |  24<H>  ----------------------------<  258<H>  3.9   |  24  |  1.20    Ca    9.0      02 Jun 2021 14:31  Mg     2.3     06-02    TPro  7.6  /  Alb  4.0  /  TBili  0.4  /  DBili  x   /  AST  39<H>  /  ALT  30  /  AlkPhos  97  06-02    PT/INR - ( 02 Jun 2021 14:31 )   PT: 12.9 sec;   INR: 1.11 ratio      PTT - ( 02 Jun 2021 14:31 )  PTT:44.0 sec    Troponin I, Serum: <.015 ng/mL (06.02.21 @ 14:31)    CKMB Mass Assay (06.02.21 @ 14:31)    CKMB Units: 1.7 ng/mL    Lactate, Blood (06.02.21 @ 14:31)    Lactate, Blood: 4.5 mmol/L    Blood Gas Profile - Arterial (06.02.21 @ 15:23)    pH, Arterial: 7.30    pCO2, Arterial: 51 mmHg    pO2, Arterial: 362 mmHg    HCO3, Arterial: 23 mmol/L    Base Excess, Arterial: -0.8 mmol/L    Oxygen Saturation, Arterial: 100 %    FIO2, Arterial: 100.0    Blood Gas Comments Arterial: Rt. Radial ABG on BiPAP  ST,  12/7,  100%    Thyroid Stimulating Hormone, Serum (06.02.21 @ 14:31)    Thyroid Stimulating Hormone, Serum: 3.09 uIU/mL    Serum Pro-Brain Natriuretic Peptide (06.02.21 @ 14:31)    Serum Pro-Brain Natriuretic Peptide: 1195 pg/mL      CXR (my read): bilateral patchy infiltrates, R base opacity (pt with hx of RLLobectomy), cardiac silhouette shifted to right     POCUS: diffuse B lines bilaterally, LV systolic function grossly normal (heart ultrasound performed to right of sternum)    EKG (my read): ST, no acute ST/T wave changes noted      T(C): 36.1 (06-02-21 @ 15:57), Max: 36.1 (06-02-21 @ 15:57)  HR: 111 (06-02-21 @ 15:40) (106 - 126), ST  BP: 164/72 (06-02-21 @ 15:40) (164/72 - 203/92)  RR: 30 (06-02-21 @ 15:40) (29 - 33)  SpO2: 95% (06-02-21 @ 15:40) (95% - 100%) BiPAP 15/6, 60% (IPAP increased from 12/5 once ABG results reviewed, FiO2 weaned as tolerated)      Physical Exam  General: comfortable but tachypneic on BiPAP, acute respiratory distress (more tachypneic, accessory muscle use, audible "rattle") when off BiPAP even briefly  Neuro: A+O x 3, non-focal, speech clear and intact  Eyes: PERRL, EOMI   ENT: dry mouth/tongue   Neck: supple, no JVD, trachea midline   Chest: rhonchi throughout, good air entry, equal bilaterally  CV: tachycardic, regular rhythm, +S1S2  GI: soft, NT, ND, +BS  Extremities: WEBER x 4, no C/C/E, distal motor/neuro/circ intact  SKIN: warm, dry, intact       Critical Care time: 57 minutes assessing presenting problems of acute illness that poses high probability of life threatening deterioration or end organ damage/dysfunction.  Medical decision making including Initiating plan of care, reviewing data, reviewing radiology, discussing with multidisciplinary team, non inclusive of procedures, discussing goals of care with patient/family

## 2021-06-02 NOTE — H&P ADULT - ASSESSMENT
80 yr old female adm to ICU with acute hypoxic respiratory failure in setting of atypical pneumonia and hypertensive urgency  NIPPV with Bipap  iv nanette/zmax  plan per ICU team

## 2021-06-02 NOTE — ED ADULT NURSE REASSESSMENT NOTE - NS ED NURSE REASSESS COMMENT FT1
Patient on bi-pap, appears more comfortable.  Nuñez catheter inserted under sterile conditions.  Patient tolerated procedure well.  Clear yellow urine draining.  Respiratory Therapist Nathan at bedside drawing ABG

## 2021-06-02 NOTE — ED ADULT NURSE NOTE - NSIMPLEMENTINTERV_GEN_ALL_ED
Implemented All Fall Risk Interventions:  [place] to call system. Call bell, personal items and telephone within reach. Instruct patient to call for assistance. Room bathroom lighting operational. Non-slip footwear when patient is off stretcher. Physically safe environment: no spills, clutter or unnecessary equipment. Stretcher in lowest position, wheels locked, appropriate side rails in place. Provide visual cue, wrist band, yellow gown, etc. Monitor gait and stability. Monitor for mental status changes and reorient to person, place, and time. Review medications for side effects contributing to fall risk. Reinforce activity limits and safety measures with patient and family.

## 2021-06-03 DIAGNOSIS — R06.03 ACUTE RESPIRATORY DISTRESS: ICD-10-CM

## 2021-06-03 DIAGNOSIS — J18.9 PNEUMONIA, UNSPECIFIED ORGANISM: ICD-10-CM

## 2021-06-03 LAB
A1C WITH ESTIMATED AVERAGE GLUCOSE RESULT: 5.8 % — HIGH (ref 4–5.6)
ALBUMIN SERPL ELPH-MCNC: 3.2 G/DL — LOW (ref 3.3–5)
ALP SERPL-CCNC: 72 U/L — SIGNIFICANT CHANGE UP (ref 40–120)
ALT FLD-CCNC: 26 U/L — SIGNIFICANT CHANGE UP (ref 12–78)
ANION GAP SERPL CALC-SCNC: 10 MMOL/L — SIGNIFICANT CHANGE UP (ref 5–17)
ANION GAP SERPL CALC-SCNC: 6 MMOL/L — SIGNIFICANT CHANGE UP (ref 5–17)
AST SERPL-CCNC: 27 U/L — SIGNIFICANT CHANGE UP (ref 15–37)
BASOPHILS # BLD AUTO: 0 K/UL — SIGNIFICANT CHANGE UP (ref 0–0.2)
BASOPHILS NFR BLD AUTO: 0 % — SIGNIFICANT CHANGE UP (ref 0–2)
BILIRUB SERPL-MCNC: 0.4 MG/DL — SIGNIFICANT CHANGE UP (ref 0.2–1.2)
BUN SERPL-MCNC: 26 MG/DL — HIGH (ref 7–23)
BUN SERPL-MCNC: 27 MG/DL — HIGH (ref 7–23)
CALCIUM SERPL-MCNC: 8 MG/DL — LOW (ref 8.5–10.1)
CALCIUM SERPL-MCNC: 9.2 MG/DL — SIGNIFICANT CHANGE UP (ref 8.5–10.1)
CHLORIDE SERPL-SCNC: 100 MMOL/L — SIGNIFICANT CHANGE UP (ref 96–108)
CHLORIDE SERPL-SCNC: 104 MMOL/L — SIGNIFICANT CHANGE UP (ref 96–108)
CK MB BLD-MCNC: 4.7 % — HIGH (ref 0–3.5)
CK MB CFR SERPL CALC: 2.5 NG/ML — SIGNIFICANT CHANGE UP (ref 0–3.6)
CK SERPL-CCNC: 53 U/L — SIGNIFICANT CHANGE UP (ref 26–192)
CO2 SERPL-SCNC: 26 MMOL/L — SIGNIFICANT CHANGE UP (ref 22–31)
CO2 SERPL-SCNC: 32 MMOL/L — HIGH (ref 22–31)
COVID-19 SPIKE DOMAIN AB INTERP: POSITIVE
COVID-19 SPIKE DOMAIN ANTIBODY RESULT: >250 U/ML — HIGH
CREAT SERPL-MCNC: 1 MG/DL — SIGNIFICANT CHANGE UP (ref 0.5–1.3)
CREAT SERPL-MCNC: 1.2 MG/DL — SIGNIFICANT CHANGE UP (ref 0.5–1.3)
CULTURE RESULTS: NO GROWTH — SIGNIFICANT CHANGE UP
EOSINOPHIL # BLD AUTO: 0 K/UL — SIGNIFICANT CHANGE UP (ref 0–0.5)
EOSINOPHIL NFR BLD AUTO: 0 % — SIGNIFICANT CHANGE UP (ref 0–6)
ESTIMATED AVERAGE GLUCOSE: 120 MG/DL — HIGH (ref 68–114)
GLUCOSE SERPL-MCNC: 130 MG/DL — HIGH (ref 70–99)
GLUCOSE SERPL-MCNC: 167 MG/DL — HIGH (ref 70–99)
HCT VFR BLD CALC: 25.5 % — LOW (ref 34.5–45)
HGB BLD-MCNC: 8.4 G/DL — LOW (ref 11.5–15.5)
IMM GRANULOCYTES NFR BLD AUTO: 0.3 % — SIGNIFICANT CHANGE UP (ref 0–1.5)
LDH SERPL L TO P-CCNC: 197 U/L — SIGNIFICANT CHANGE UP (ref 50–242)
LEGIONELLA AG UR QL: NEGATIVE — SIGNIFICANT CHANGE UP
LYMPHOCYTES # BLD AUTO: 0.44 K/UL — LOW (ref 1–3.3)
LYMPHOCYTES # BLD AUTO: 11.1 % — LOW (ref 13–44)
MAGNESIUM SERPL-MCNC: 1.8 MG/DL — SIGNIFICANT CHANGE UP (ref 1.6–2.6)
MCHC RBC-ENTMCNC: 29.4 PG — SIGNIFICANT CHANGE UP (ref 27–34)
MCHC RBC-ENTMCNC: 32.9 GM/DL — SIGNIFICANT CHANGE UP (ref 32–36)
MCV RBC AUTO: 89.2 FL — SIGNIFICANT CHANGE UP (ref 80–100)
MONOCYTES # BLD AUTO: 0.28 K/UL — SIGNIFICANT CHANGE UP (ref 0–0.9)
MONOCYTES NFR BLD AUTO: 7 % — SIGNIFICANT CHANGE UP (ref 2–14)
NEUTROPHILS # BLD AUTO: 3.25 K/UL — SIGNIFICANT CHANGE UP (ref 1.8–7.4)
NEUTROPHILS NFR BLD AUTO: 81.6 % — HIGH (ref 43–77)
NRBC # BLD: 0 /100 WBCS — SIGNIFICANT CHANGE UP (ref 0–0)
PHOSPHATE SERPL-MCNC: 3.2 MG/DL — SIGNIFICANT CHANGE UP (ref 2.5–4.5)
PLATELET # BLD AUTO: 108 K/UL — LOW (ref 150–400)
POTASSIUM SERPL-MCNC: 4.2 MMOL/L — SIGNIFICANT CHANGE UP (ref 3.5–5.3)
POTASSIUM SERPL-MCNC: 4.3 MMOL/L — SIGNIFICANT CHANGE UP (ref 3.5–5.3)
POTASSIUM SERPL-SCNC: 4.2 MMOL/L — SIGNIFICANT CHANGE UP (ref 3.5–5.3)
POTASSIUM SERPL-SCNC: 4.3 MMOL/L — SIGNIFICANT CHANGE UP (ref 3.5–5.3)
PROT SERPL-MCNC: 6.2 G/DL — SIGNIFICANT CHANGE UP (ref 6–8.3)
RBC # BLD: 2.86 M/UL — LOW (ref 3.8–5.2)
RBC # FLD: 13.7 % — SIGNIFICANT CHANGE UP (ref 10.3–14.5)
SARS-COV-2 IGG+IGM SERPL QL IA: >250 U/ML — HIGH
SARS-COV-2 IGG+IGM SERPL QL IA: POSITIVE
SODIUM SERPL-SCNC: 138 MMOL/L — SIGNIFICANT CHANGE UP (ref 135–145)
SODIUM SERPL-SCNC: 140 MMOL/L — SIGNIFICANT CHANGE UP (ref 135–145)
SPECIMEN SOURCE: SIGNIFICANT CHANGE UP
TROPONIN I SERPL-MCNC: 0.04 NG/ML — SIGNIFICANT CHANGE UP (ref 0.01–0.04)
WBC # BLD: 3.98 K/UL — SIGNIFICANT CHANGE UP (ref 3.8–10.5)
WBC # FLD AUTO: 3.98 K/UL — SIGNIFICANT CHANGE UP (ref 3.8–10.5)

## 2021-06-03 PROCEDURE — 93306 TTE W/DOPPLER COMPLETE: CPT | Mod: 26

## 2021-06-03 PROCEDURE — 99223 1ST HOSP IP/OBS HIGH 75: CPT

## 2021-06-03 PROCEDURE — 99291 CRITICAL CARE FIRST HOUR: CPT

## 2021-06-03 PROCEDURE — 93010 ELECTROCARDIOGRAM REPORT: CPT

## 2021-06-03 PROCEDURE — 76604 US EXAM CHEST: CPT | Mod: 26

## 2021-06-03 RX ORDER — TIOTROPIUM BROMIDE 18 UG/1
1 CAPSULE ORAL; RESPIRATORY (INHALATION) DAILY
Refills: 0 | Status: DISCONTINUED | OUTPATIENT
Start: 2021-06-03 | End: 2021-06-09

## 2021-06-03 RX ORDER — FUROSEMIDE 40 MG
40 TABLET ORAL DAILY
Refills: 0 | Status: DISCONTINUED | OUTPATIENT
Start: 2021-06-03 | End: 2021-06-04

## 2021-06-03 RX ORDER — HEPARIN SODIUM 5000 [USP'U]/ML
5000 INJECTION INTRAVENOUS; SUBCUTANEOUS EVERY 8 HOURS
Refills: 0 | Status: DISCONTINUED | OUTPATIENT
Start: 2021-06-03 | End: 2021-06-06

## 2021-06-03 RX ORDER — ATORVASTATIN CALCIUM 80 MG/1
0 TABLET, FILM COATED ORAL
Qty: 0 | Refills: 0 | DISCHARGE

## 2021-06-03 RX ORDER — ACETAMINOPHEN 500 MG
650 TABLET ORAL ONCE
Refills: 0 | Status: COMPLETED | OUTPATIENT
Start: 2021-06-03 | End: 2021-06-03

## 2021-06-03 RX ORDER — LISINOPRIL 2.5 MG/1
20 TABLET ORAL DAILY
Refills: 0 | Status: DISCONTINUED | OUTPATIENT
Start: 2021-06-03 | End: 2021-06-09

## 2021-06-03 RX ORDER — BUDESONIDE AND FORMOTEROL FUMARATE DIHYDRATE 160; 4.5 UG/1; UG/1
2 AEROSOL RESPIRATORY (INHALATION)
Refills: 0 | Status: DISCONTINUED | OUTPATIENT
Start: 2021-06-03 | End: 2021-06-09

## 2021-06-03 RX ORDER — ATORVASTATIN CALCIUM 80 MG/1
20 TABLET, FILM COATED ORAL
Qty: 0 | Refills: 0 | DISCHARGE

## 2021-06-03 RX ORDER — ALPRAZOLAM 0.25 MG
0.25 TABLET ORAL ONCE
Refills: 0 | Status: DISCONTINUED | OUTPATIENT
Start: 2021-06-03 | End: 2021-06-03

## 2021-06-03 RX ADMIN — LISINOPRIL 20 MILLIGRAM(S): 2.5 TABLET ORAL at 10:40

## 2021-06-03 RX ADMIN — BUDESONIDE AND FORMOTEROL FUMARATE DIHYDRATE 2 PUFF(S): 160; 4.5 AEROSOL RESPIRATORY (INHALATION) at 21:38

## 2021-06-03 RX ADMIN — Medication 10 MILLIGRAM(S): at 15:54

## 2021-06-03 RX ADMIN — Medication 650 MILLIGRAM(S): at 16:56

## 2021-06-03 RX ADMIN — Medication 40 MILLIEQUIVALENT(S): at 02:16

## 2021-06-03 RX ADMIN — HEPARIN SODIUM 5000 UNIT(S): 5000 INJECTION INTRAVENOUS; SUBCUTANEOUS at 21:38

## 2021-06-03 RX ADMIN — Medication 40 MILLIGRAM(S): at 10:40

## 2021-06-03 RX ADMIN — Medication 650 MILLIGRAM(S): at 16:34

## 2021-06-03 RX ADMIN — Medication 0.25 MILLIGRAM(S): at 22:33

## 2021-06-03 NOTE — CONSULT NOTE ADULT - ASSESSMENT
80 year old female with PMH lymphoma (RCHOP 5 yrs ago) with RL Lobectomy, HTN, HLD, "borderline" DM (not on meds), here with an episode of dyspnea.  She was found to be significantly hypertensive and dyspneic, and required IV diuresis and bipap    - dyspnea and volume overload in the setting of hypertensive urgency  - improvement in her volume and breathing with iv diuresis and nitrates  - some degree of aortic stenosis based on exam  - cont iv Lasix  - check echocardiogram to evaluate LV function and AS  - monitor BP closely. Cont with lisinopril for now, and can add vasodilators if needed.  - strict i/o's and daily weights  - oxygen supplementation as needed  - ct with ground glass infiltrates though COVID negative and no suggestion of infection  - trend creatinine and electrolytes. Keep K>4, Mg>2  - will follow with you

## 2021-06-03 NOTE — PROGRESS NOTE ADULT - SUBJECTIVE AND OBJECTIVE BOX
Date/Time Patient Seen:  		  Referring MD:   Data Reviewed	       Patient is a 80y old  Female who presents with a chief complaint of     Subjective/HPI     PAST MEDICAL & SURGICAL HISTORY:  Hyperlipidemia    Lymphoma    Former smoker    No significant past surgical history          Medication list         MEDICATIONS  (STANDING):  azithromycin  IVPB 500 milliGRAM(s) IV Intermittent every 24 hours  cefTRIAXone   IVPB 1000 milliGRAM(s) IV Intermittent every 24 hours  dextrose 40% Gel 15 Gram(s) Oral once  dextrose 50% Injectable 25 Gram(s) IV Push once  dextrose 50% Injectable 12.5 Gram(s) IV Push once  dextrose 50% Injectable 25 Gram(s) IV Push once  glucagon  Injectable 1 milliGRAM(s) IntraMuscular once  insulin lispro (ADMELOG) corrective regimen sliding scale   SubCutaneous three times a day before meals  insulin lispro (ADMELOG) corrective regimen sliding scale   SubCutaneous at bedtime    MEDICATIONS  (PRN):  labetalol Injectable 10 milliGRAM(s) IV Push every 4 hours PRN SBP >160 and/or HR >90         Vitals log        ICU Vital Signs Last 24 Hrs  T(C): 37 (03 Jun 2021 03:33), Max: 37.1 (02 Jun 2021 21:01)  T(F): 98.6 (03 Jun 2021 03:33), Max: 98.8 (02 Jun 2021 21:01)  HR: 84 (03 Jun 2021 05:00) (80 - 126)  BP: 114/56 (03 Jun 2021 05:00) (114/54 - 203/92)  BP(mean): 80 (03 Jun 2021 05:00) (78 - 126)  ABP: --  ABP(mean): --  RR: 25 (03 Jun 2021 05:00) (17 - 39)  SpO2: 94% (03 Jun 2021 05:00) (91% - 100%)           Input and Output:  I&O's Detail    02 Jun 2021 07:01  -  03 Jun 2021 06:01  --------------------------------------------------------  IN:    IV PiggyBack: 50 mL  Total IN: 50 mL    OUT:    Indwelling Catheter - Urethral (mL): 2000 mL  Total OUT: 2000 mL    Total NET: -1950 mL          Lab Data                        10.3   16.00 )-----------( 294      ( 02 Jun 2021 14:31 )             33.5     06-02    142  |  104  |  25<H>  ----------------------------<  202<H>  3.3<L>   |  29  |  1.10    Ca    8.6      02 Jun 2021 20:59  Phos  3.4     06-02  Mg     1.9     06-02    TPro  7.6  /  Alb  4.0  /  TBili  0.4  /  DBili  x   /  AST  39<H>  /  ALT  30  /  AlkPhos  97  06-02    ABG - ( 02 Jun 2021 18:17 )  pH, Arterial: 7.42  pH, Blood: x     /  pCO2: 46    /  pO2: 116   / HCO3: ?28.4 / Base Excess: 4.6   /  SaO2: ?98.6             CARDIAC MARKERS ( 02 Jun 2021 20:59 )  .072 ng/mL / x     / 52 U/L / x     / 2.9 ng/mL  CARDIAC MARKERS ( 02 Jun 2021 14:31 )  <.015 ng/mL / x     / x     / x     / 1.7 ng/mL        Review of Systems	      Objective     Physical Examination    heart s1s2  lung dc BS  abd soft  head nc  on o2 support      Pertinent Lab findings & Imaging      [place]:  NO   Adequate UO     I&O's Detail    02 Jun 2021 07:01  -  03 Jun 2021 06:01  --------------------------------------------------------  IN:    IV PiggyBack: 50 mL  Total IN: 50 mL    OUT:    Indwelling Catheter - Urethral (mL): 2000 mL  Total OUT: 2000 mL    Total NET: -1950 mL               Discussed with:     Cultures:	        Radiology

## 2021-06-03 NOTE — CONSULT NOTE ADULT - SUBJECTIVE AND OBJECTIVE BOX
[facility] - Mg Win, Marguerite, June, Faby, Jada Avery  Office Number: [phone]    Initial Consult Note    CHIEF COMPLAINT: Patient is a 80y old  Female who presents with a chief complaint of dyspnea on exertion    HPI:  80 year old female with PMH lymphoma (RCHOP 5 yrs ago) with RLLobectomy, HTN, HLD, "borderline" DM (not on meds), right ankle fx, L ALEXANDRA, herniated disc surgery. Pt at usual well state of health until this afternoon. While at PT for "pulled muscle" in RLE pt developed sudden onset acute SOB, drove to [facility] house, felt worse then was driven to [place] ED by daughter patrica salmon didn't take her to [facility]. ED reports pt on arrival in acute resp distress, hypoxemic (sat 80s), hypertensive urgency (200/100), diaphoretic, rales bilaterally. Failed NRB, given SL NTG x 2, lasix 40 IV x 1, solumedrol 125mg IV x 1, placed on BiPAP.    She Lasix and bipap with improvement  She feels better this morning and wants to go home  a reported history of aortic stenosis  she last saw a cardiologist before the pandemic    PAST MEDICAL & SURGICAL HISTORY:  Hyperlipidemia    Lymphoma    Former smoker    No significant past surgical history        SOCIAL HISTORY:  No tobacco, ethanol, or drug abuse.    FAMILY HISTORY:    No family history of acute MI or sudden cardiac death.    MEDICATIONS  (STANDING):  budesonide  80 MICROgram(s)/formoterol 4.5 MICROgram(s) Inhaler 2 Puff(s) Inhalation two times a day  cefTRIAXone   IVPB 1000 milliGRAM(s) IV Intermittent every 24 hours  dextrose 40% Gel 15 Gram(s) Oral once  dextrose 50% Injectable 25 Gram(s) IV Push once  dextrose 50% Injectable 12.5 Gram(s) IV Push once  dextrose 50% Injectable 25 Gram(s) IV Push once  furosemide   Injectable 40 milliGRAM(s) IV Push daily  glucagon  Injectable 1 milliGRAM(s) IntraMuscular once  insulin lispro (ADMELOG) corrective regimen sliding scale   SubCutaneous three times a day before meals  insulin lispro (ADMELOG) corrective regimen sliding scale   SubCutaneous at bedtime  lisinopril 20 milliGRAM(s) Oral daily  tiotropium 18 MICROgram(s) Capsule 1 Capsule(s) Inhalation daily    MEDICATIONS  (PRN):  labetalol Injectable 10 milliGRAM(s) IV Push every 4 hours PRN SBP >160 and/or HR >90      Allergies    aspirin (Unknown)    Intolerances        REVIEW OF SYSTEMS:    CONSTITUTIONAL: No weakness, fevers or chills  EYES/ENT: No visual changes;  No vertigo or throat pain   NECK: No pain or stiffness  RESPIRATORY: No cough, wheezing, hemoptysis; reported shortness of breath  CARDIOVASCULAR: No chest pain or palpitations  GASTROINTESTINAL: No abdominal pain. No nausea, vomiting, or hematemesis; No diarrhea or constipation. No melena or hematochezia.  GENITOURINARY: No dysuria, frequency or hematuria  NEUROLOGICAL: No numbness or weakness  SKIN: No itching or rash  All other review of systems is negative unless indicated above    VITAL SIGNS:   Vital Signs Last 24 Hrs  T(C): 37 (03 Jun 2021 12:00), Max: 37.2 (03 Jun 2021 08:00)  T(F): 98.6 (03 Jun 2021 12:00), Max: 99 (03 Jun 2021 08:00)  HR: 97 (03 Jun 2021 13:00) (80 - 126)  BP: 159/74 (03 Jun 2021 13:00) (95/45 - 203/92)  BP(mean): 106 (03 Jun 2021 13:00) (65 - 126)  RR: 33 (03 Jun 2021 13:00) (17 - 39)  SpO2: 97% (03 Jun 2021 13:00) (91% - 100%)    I&O's Summary    02 Jun 2021 07:01  -  03 Jun 2021 07:00  --------------------------------------------------------  IN: 50 mL / OUT: 3200 mL / NET: -3150 mL    03 Jun 2021 07:01  -  03 Jun 2021 13:21  --------------------------------------------------------  IN: 0 mL / OUT: 1140 mL / NET: -1140 mL        On Exam:    Constitutional: NAD, alert and oriented x 3  Lungs:  Non-labored, breath sounds are clear bilaterally, No wheezing, rales or rhonchi  Cardiovascular: RRR.  S1 and S2 positive. syst murmur at ru/lusb, no rubs, gallops or clicks  Gastrointestinal: Bowel Sounds present, soft, nontender.   Lymph: No peripheral edema. No cervical lymphadenopathy.  Neurological: Alert, no focal deficits  Skin: No rashes or ulcers   Psych:  Mood & affect appropriate.    LABS: All Labs Reviewed:                        8.4    3.98  )-----------( 108      ( 03 Jun 2021 05:49 )             25.5                         10.3   16.00 )-----------( 294      ( 02 Jun 2021 14:31 )             33.5     03 Jun 2021 05:49    140    |  104    |  26     ----------------------------<  167    4.2     |  26     |  1.00   02 Jun 2021 20:59    142    |  104    |  25     ----------------------------<  202    3.3     |  29     |  1.10   02 Jun 2021 14:31    140    |  106    |  24     ----------------------------<  258    3.9     |  24     |  1.20     Ca    8.0        03 Jun 2021 05:49  Ca    8.6        02 Jun 2021 20:59  Ca    9.0        02 Jun 2021 14:31  Phos  3.2       03 Jun 2021 05:49  Phos  3.4       02 Jun 2021 20:59  Mg     1.8       03 Jun 2021 05:49  Mg     1.9       02 Jun 2021 20:59  Mg     2.3       02 Jun 2021 14:31    TPro  6.2    /  Alb  3.2    /  TBili  0.4    /  DBili  x      /  AST  27     /  ALT  26     /  AlkPhos  72     03 Jun 2021 05:49  TPro  7.6    /  Alb  4.0    /  TBili  0.4    /  DBili  x      /  AST  39     /  ALT  30     /  AlkPhos  97     02 Jun 2021 14:31    PT/INR - ( 02 Jun 2021 14:31 )   PT: 12.9 sec;   INR: 1.11 ratio         PTT - ( 02 Jun 2021 14:31 )  PTT:44.0 sec  CARDIAC MARKERS ( 03 Jun 2021 05:49 )  .044 ng/mL / x     / 53 U/L / x     / 2.5 ng/mL  CARDIAC MARKERS ( 02 Jun 2021 20:59 )  .072 ng/mL / x     / 52 U/L / x     / 2.9 ng/mL  CARDIAC MARKERS ( 02 Jun 2021 14:31 )  <.015 ng/mL / x     / x     / x     / 1.7 ng/mL      Blood Culture:   06-02 @ 14:31  Pro Bnp [id number]    06-02 @ 14:31  TSH: 3.09      RADIOLOGY:    EKG st

## 2021-06-03 NOTE — CONSULT NOTE ADULT - ASSESSMENT
80 year old female with PMH lymphoma (RCHOP 5 yrs ago) with RLLobectomy, HTN, HLD, "borderline" DM (not on meds), right ankle fx, L ALEXANDRA, herniated disc surgery. adm after acuute onset of SOB,     RECOMMENDATIONS    history is much more consistent with hemodynamic issue despite CT findings. No sig leukocytosis, no fever, lower suspicion for an acute infectious  so would dc azithro now,  and if no concerning micro data anticipate dc of CTX after today's dose.    Thank you for consulting us and involving us in the management of this most interesting and challenging case.  We will follow along in the care of this patient. Please call us at [phone] or text me directly on my cell# at [phone] with any concerns.

## 2021-06-03 NOTE — PROGRESS NOTE ADULT - ASSESSMENT
80 year old female with PMH lymphoma (RCHOP 5 yrs ago) with RLLobectomy, HTN, HLD, "borderline" DM (not on meds), right ankle fx, L ALEXANDRA, herniated disc surgery. Pt at usual well state of health until this afternoon. While at PT for "pulled muscle" in RLE pt developed sudden onset acute SOB, drove to [employer], felt worse then was driven to [place] ED by daughter so patrica hoyt didn't take her to [facility]. ED reports pt on arrival in acute resp distress, hypoxemic (sat 80s), hypertensive urgency (200/100), diaphoretic, rales bilaterally. Failed NRB, given SL NTG x 2, lasix 40 IV x 1, solumedrol 125mg IV x 1, placed on BiPAP.  off bipap  ct chest with pna and small eff, crazy paving seen on CT parenchyma -   eval for Acute Infectious Process  ID eval noted  I and O  cx and markers in progress  monitor VS and HD and Sat  ICU management in progress  cvs rx regimen, TTE planned,

## 2021-06-03 NOTE — DIETITIAN INITIAL EVALUATION ADULT. - OTHER INFO
80 year old female with PMH lymphoma (RCHOP 5 yrs ago) with RLLobectomy, HTN, HLD, "borderline" DM (not on meds), right ankle fx, L ALEXANDRA, herniated disc surgery. Admitted to ICU with acute hypoxic respiratory failure in setting of atypical pneumonia and hypertensive urgency.    Pt A+Ox4 during visit. Off bipap now on O2 via NC. Tolerating consistent carbohydrate, low na diet however reports lack of appetite due to current medical condition. Usually with relatively good appetite/po intake pta; consumes ~2 meals/day with snacks throughout the day. No report difficulty chewing/swallowing. No N/V. +BM 6/2. -130lbs. Relatively stable per pt. Hx pre-diabetes, hgbA1c 5.8%. Diet controlled. Hyperglycemia on admission. Pt contributes to new medication. States she was recently started on Trelegy for SOB possible COPD. Avoids adding salt to food though does admit some items she consumes may be higher in salt than other. Verbal education discussed on Low Na, consistent carbohydrate diet with emphasis on importance of HBV protein sources. Pt declined nutritional supplements. Food preferences/meal alternatives obtained. 80 year old female with PMH lymphoma (RCHOP 5 yrs ago) with RLLobectomy, HTN, HLD, "borderline" DM (not on meds), right ankle fx, L ALEXANDRA, herniated disc surgery. Admitted to ICU with acute hypoxic respiratory failure in setting of atypical pneumonia and hypertensive urgency.    Pt A+Ox4 during visit. Off bipap now on O2 via NC. Tolerating consistent carbohydrate, low na diet however reports lack of appetite due to current medical condition. Usually with relatively good appetite/po intake pta; consumes ~2 meals/day with snacks throughout the day. No report difficulty chewing/swallowing. No N/V. +BM 6/2. -130lbs. Relatively stable per pt. Hx pre-diabetes, hgbA1c 5.8%. Diet controlled. Hyperglycemia on admission. Pt contributes to new medication. States she was recently started on Trelegy for SOB possible COPD. Avoids adding salt to food though does admit some items she consumes may be higher in salt than other. Verbal education discussed on Low Na, consistent carbohydrate diet with emphasis on importance of HBV protein sources & nutrient dense foods. Pt declined nutritional supplements. Food preferences/meal alternatives obtained.

## 2021-06-03 NOTE — PROGRESS NOTE ADULT - SUBJECTIVE AND OBJECTIVE BOX
Patient is a 80y old  Female who presents with a chief complaint of   PAST MEDICAL & SURGICAL HISTORY:  Hyperlipidemia    Lymphoma    Former smoker    No significant past surgical history      CAROL ALEXANDRE   80y    Female    BRIEF HOSPITAL COURSE:      80 year old female with PMH lymphoma (RCHOP 5 yrs ago) with RLLobectomy, HTN, HLD, "borderline" DM (not on meds), right ankle fx, L ALEXANDRA, herniated disc surgery. Pt at usual well state of health until this afternoon. While at PT for "pulled muscle" in RLE pt developed sudden onset acute SOB, drove to [facility] house, felt worse then was driven to [place] ED by daughter erlinda hoyt,ance didn't take her to [facility]. ED reports pt on arrival in acute resp distress, hypoxemic (sat 80s), hypertensive urgency (200/100), diaphoretic, rales bilaterally. Failed NRB, given SL NTG x 2, lasix 40 IV x 1, solumedrol 125mg IV x 1, placed on BiPAP.      Review of Systems:    leg cramps                    All other ROS are negative.    Allergies    aspirin (Unknown)    Intolerances      Weight (kg): 59 (21 @ 17:10)  BMI (kg/m2): 23 (21 @ 17:10)    ICU Vital Signs Last 24 Hrs  T(C): 36.8 (2021 23:45), Max: 37.1 (2021 21:01)  T(F): 98.2 (2021 23:45), Max: 98.8 (2021 21:01)  HR: 91 (2021 01:00) (80 - 126)  BP: 115/56 (2021 01:00) (115/56 - 203/92)  BP(mean): 81 (2021 01:00) (80 - 126)  ABP: --  ABP(mean): --  RR: 20 (2021 01:00) (17 - 39)  SpO2: 91% (2021 01:00) (91% - 100%)    Physical Examination:    General: NAD    HEENT: No JVD    PULM: bilateral BS     CVS: s1 s2 reg    ABD: soft NT    EXT: no edema     SKIN:  warm    Neuro: nf    ABG - ( 2021 18:17 )  pH, Arterial: 7.42  pH, Blood: x     /  pCO2: 46    /  pO2: 116   / HCO3: ?28.4 / Base Excess: 4.6   /  SaO2: ?98.6         LABS:                        10.3   16.00 )-----------( 294      ( 2021 14:31 )             33.5     06-02    142  |  104  |  25<H>  ----------------------------<  202<H>  3.3<L>   |  29  |  1.10    Ca    8.6      2021 20:59  Phos  3.4     06-02  Mg     1.9     06-02    TPro  7.6  /  Alb  4.0  /  TBili  0.4  /  DBili  x   /  AST  39<H>  /  ALT  30  /  AlkPhos  97  06-02      CARDIAC MARKERS ( 2021 20:59 )  .072 ng/mL / x     / 52 U/L / x     / 2.9 ng/mL  CARDIAC MARKERS ( 2021 14:31 )  <.015 ng/mL / x     / x     / x     / 1.7 ng/mL      CAPILLARY BLOOD GLUCOSE      POCT Blood Glucose.: 215 mg/dL (2021 22:11)    PT/INR - ( 2021 14:31 )   PT: 12.9 sec;   INR: 1.11 ratio         PTT - ( 2021 14:31 )  PTT:44.0 sec  Urinalysis Basic - ( 2021 17:43 )    Color: Yellow / Appearance: Clear / S.010 / pH: x  Gluc: x / Ketone: Negative  / Bili: Negative / Urobili: Negative   Blood: x / Protein: Negative / Nitrite: Negative   Leuk Esterase: Negative / RBC: 6-10 /HPF / WBC 0-2   Sq Epi: x / Non Sq Epi: Occasional / Bacteria: Occasional      CULTURES:      Medications:  MEDICATIONS  (STANDING):  azithromycin  IVPB 500 milliGRAM(s) IV Intermittent every 24 hours  cefTRIAXone   IVPB 1000 milliGRAM(s) IV Intermittent every 24 hours  dextrose 40% Gel 15 Gram(s) Oral once  dextrose 50% Injectable 25 Gram(s) IV Push once  dextrose 50% Injectable 12.5 Gram(s) IV Push once  dextrose 50% Injectable 25 Gram(s) IV Push once  glucagon  Injectable 1 milliGRAM(s) IntraMuscular once  insulin lispro (ADMELOG) corrective regimen sliding scale   SubCutaneous three times a day before meals  insulin lispro (ADMELOG) corrective regimen sliding scale   SubCutaneous at bedtime  potassium chloride    Tablet ER 40 milliEquivalent(s) Oral every 4 hours    MEDICATIONS  (PRN):  labetalol Injectable 10 milliGRAM(s) IV Push every 4 hours PRN SBP >160 and/or HR >90        06-02 @ 07:01  -  06-03 @ 01:31  --------------------------------------------------------  IN: 50 mL / OUT: 2000 mL / NET: -1950 mL        RADIOLOGY/IMAGING/ECHO      < from: CT Chest No Cont (21 @ 17:08) >  IMPRESSION: Small bilateral pleural effusions with loculation of the right pleural fluid in a subpulmonic location.  Extensive bilateral groundglass infiltrates  Upper abdomen demonstrates ascites and probable splenomegaly. There is also cholelithiasis.  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

## 2021-06-03 NOTE — PROGRESS NOTE ADULT - SUBJECTIVE AND OBJECTIVE BOX
Patient is a 80y old  Female who presents with a chief complaint of SOB    INTERVAL /OVERNIGHT EVENTS: still dyspneic    MEDICATIONS  (STANDING):  budesonide  80 MICROgram(s)/formoterol 4.5 MICROgram(s) Inhaler 2 Puff(s) Inhalation two times a day  furosemide   Injectable 40 milliGRAM(s) IV Push daily  lisinopril 20 milliGRAM(s) Oral daily  tiotropium 18 MICROgram(s) Capsule 1 Capsule(s) Inhalation daily    MEDICATIONS  (PRN):  labetalol Injectable 10 milliGRAM(s) IV Push every 4 hours PRN SBP >160 and/or HR >90      Allergies    aspirin (Unknown)    Intolerances    aspirin (Stomach Upset)      REVIEW OF SYSTEMS:  CONSTITUTIONAL: No fever, weight loss, or fatigue  EYES: No eye pain, visual disturbances, or discharge  ENMT:  No difficulty hearing, tinnitus, vertigo; No sinus or throat pain  NECK: No pain or stiffness  RESPIRATORY: No cough, wheezing, chills or hemoptysis; + shortness of breath  CARDIOVASCULAR: No chest pain, palpitations, dizziness, or leg swelling  GASTROINTESTINAL: No abdominal or epigastric pain. No nausea, vomiting, or hematemesis; No diarrhea or constipation. No melena or hematochezia.  GENITOURINARY: No dysuria, frequency, hematuria, or incontinence  NEUROLOGICAL: No headaches, memory loss, loss of strength, numbness, or tremors  SKIN: No itching, burning, rashes, or lesions   LYMPH NODES: No enlarged glands  ENDOCRINE: No heat or cold intolerance; No hair loss; No polydipsia or polyuria  MUSCULOSKELETAL: No joint pain or swelling; No muscle, back, or extremity pain  PSYCHIATRIC: No depression, anxiety, mood swings, or difficulty sleeping  HEME/LYMPH: No easy bruising, or bleeding gums  ALLERGY AND IMMUNOLOGIC: No hives or eczema    Vital Signs Last 24 Hrs  T(C): 37 (2021 12:00), Max: 37.2 (2021 08:00)  T(F): 98.6 (2021 12:00), Max: 99 (2021 08:00)  HR: 97 (2021 14:00) (80 - 113)  BP: 167/72 (2021 14:00) (95/45 - 198/88)  BP(mean): 104 (2021 14:00) (65 - 126)  RR: 28 (2021 14:00) (17 - 39)  SpO2: 97% (2021 14:00) (91% - 100%)    PHYSICAL EXAM:  GENERAL: NAD, well-groomed, well-developed  HEAD:  Atraumatic, Normocephalic  EYES: EOMI, PERRLA, conjunctiva and sclera clear  ENMT: No tonsillar erythema, exudates, or enlargement; Moist mucous membranes, Good dentition, No lesions  NECK: Supple, No JVD, Normal thyroid  NERVOUS SYSTEM:  Alert & Oriented X3, Good concentration; Motor Strength 5/5 B/L upper and lower extremities; DTRs 2+ intact and symmetric  CHEST/LUNG: Clear to auscultation bilaterally; No rales, rhonchi, wheezing, or rubs  HEART: Regular rate and rhythm; No murmurs, rubs, or gallops  ABDOMEN: Soft, Nontender, Nondistended; Bowel sounds present  EXTREMITIES:  2+ Peripheral Pulses, No clubbing, cyanosis, or edema  LYMPH: No lymphadenopathy noted  SKIN: No rashes or lesions    LABS:                        8.4    3.98  )-----------( 108      ( 2021 05:49 )             25.5     2021 05:49    140    |  104    |  26     ----------------------------<  167    4.2     |  26     |  1.00     Ca    8.0        2021 05:49  Phos  3.2       2021 05:49  Mg     1.8       2021 05:49    TPro  6.2    /  Alb  3.2    /  TBili  0.4    /  DBili  x      /  AST  27     /  ALT  26     /  AlkPhos  72     2021 05:49    PT/INR - ( 2021 14:31 )   PT: 12.9 sec;   INR: 1.11 ratio         PTT - ( 2021 14:31 )  PTT:44.0 sec  Urinalysis Basic - ( 2021 17:43 )    Color: Yellow / Appearance: Clear / S.010 / pH: x  Gluc: x / Ketone: Negative  / Bili: Negative / Urobili: Negative   Blood: x / Protein: Negative / Nitrite: Negative   Leuk Esterase: Negative / RBC: 6-10 /HPF / WBC 0-2   Sq Epi: x / Non Sq Epi: Occasional / Bacteria: Occasional      CAPILLARY BLOOD GLUCOSE      POCT Blood Glucose.: 115 mg/dL (2021 12:14)  POCT Blood Glucose.: 130 mg/dL (2021 07:44)  POCT Blood Glucose.: 215 mg/dL (2021 22:11)      RADIOLOGY & ADDITIONAL TESTS:    Notes Reviewed:  [x ] YES  [ ] NO    Care Discussed with Consultants/Other Providers [x ] YES  [ ] NO

## 2021-06-03 NOTE — CONSULT NOTE ADULT - SUBJECTIVE AND OBJECTIVE BOX
[facility] DIVISION of INFECTIOUS DISEASE  Simón Nolasco MD PhD, Patrica Bear MD, Lluvia Sathl MD, Tree Avery MD  and providing coverage with Karmen Larkin MD and Gerson Ortiz MD  Providing Infectious Disease Consultations at [facility], [facility], [facility]    Office# [phone] to schedule follow up appointments  Answering Service for urgent calls or New Consults [phone]  Cell# to text for urgent issues Simón Nolasco [phone]     HPI:  80 year old female with PMH lymphoma (RCHOP 5 yrs ago) with RLLobectomy, HTN, HLD, "borderline" DM (not on meds), right ankle fx, L ALEXANDRA, herniated disc surgery. Pt at usual [facility]. While at PT for "pulled muscle" in RLE pt developed sudden onset acute SOB, drove to [facility], felt worse then was driven to [facility] by daughter patrica salmon didn't take her to [facility]. ED reports pt on arrival in acute resp distress, hypoxemic (sat 80s), hypertensive urgency (200/100), diaphoretic, rales bilaterally. Failed NRB, given SL NTG x 2, lasix 40 IV x 1, solumedrol 125mg IV x 1, placed on BiPAP.     (2021 19:47)      PAST MEDICAL & SURGICAL HISTORY:  Hyperlipidemia  Lymphoma  AS    Former smoker    No significant past surgical history        Antimicrobials  azithromycin  IVPB 500 milliGRAM(s) IV Intermittent every 24 hours  cefTRIAXone   IVPB 1000 milliGRAM(s) IV Intermittent every 24 hours      Immunological      Other  dextrose 40% Gel 15 Gram(s) Oral once  dextrose 50% Injectable 25 Gram(s) IV Push once  dextrose 50% Injectable 12.5 Gram(s) IV Push once  dextrose 50% Injectable 25 Gram(s) IV Push once  glucagon  Injectable 1 milliGRAM(s) IntraMuscular once  insulin lispro (ADMELOG) corrective regimen sliding scale   SubCutaneous three times a day before meals  insulin lispro (ADMELOG) corrective regimen sliding scale   SubCutaneous at bedtime  labetalol Injectable 10 milliGRAM(s) IV Push every 4 hours PRN      Allergies    aspirin (Unknown)    Intolerances        SOCIAL HISTORY:  Social History:  quit tobacco 20 yrs ago  social etoh  consumes marijuana edible for sleep (2021 19:47)      FAMILY HISTORY: noncontrib      ROS:    EYES:  Negative  blurry vision or double vision  GASTROINTESTINAL:  Negative for nausea, vomiting, diarrhea  -otherwise negative except for subjective    Vital Signs Last 24 Hrs  T(C): 37.2 (2021 08:00), Max: 37.2 (2021 08:00)  T(F): 99 (2021 08:00), Max: 99 (2021 08:00)  HR: 85 (2021 08:00) (80 - 126)  BP: 147/68 (2021 08:00) (95/45 - 203/92)  BP(mean): 98 (2021 08:00) (65 - 126)  RR: 21 (2021 08:00) (17 - 39)  SpO2: 95% (2021 08:00) (91% - 100%)    PE:  WDWN in no distress  HEENT:  NC, PERRL, sclerae anicteric, conjunctivae clear, EOMI.  Sinuses nontender, no nasal exudate.  No buccal or pharyngeal lesions, erythema or exudate  Neck:  Supple, no adenopathy, noted JVD  Lungs:  No accessory muscle use, bilaterally clear to auscultation  Cor:  RRR, S1, S2, murmur appreciated  Abd:  Symmetric, normoactive BS.  Soft, nontender, no masses, guarding or rebound.  Liver and spleen not enlarged  Extrem:  No cyanosis or edema  Skin:  No rashes.  Neuro: grossly intact  Musc: moving all limbs freely, no focal deficits        LABS:                        8.4    3.98  )-----------( 108      ( 2021 05:49 )             25.5       WBC Count: 3.98 K/uL (21 @ 05:49)  WBC Count: 16.00 K/uL (21 @ 14:31)      -    140  |  104  |  26<H>  ----------------------------<  167<H>  4.2   |  26  |  1.00    Ca    8.0<L>      2021 05:49  Phos  3.2     06-  Mg     1.8     06-    TPro  6.2  /  Alb  3.2<L>  /  TBili  0.4  /  DBili  x   /  AST  27  /  ALT  26  /  AlkPhos  72        Creatinine, Serum: 1.00 mg/dL (21 @ 05:49)  Creatinine, Serum: 1.10 mg/dL (21 @ 20:59)  Creatinine, Serum: 1.20 mg/dL (21 @ 14:31)      Urinalysis Basic - ( 2021 17:43 )    Color: Yellow / Appearance: Clear / S.010 / pH: x  Gluc: x / Ketone: Negative  / Bili: Negative / Urobili: Negative   Blood: x / Protein: Negative / Nitrite: Negative   Leuk Esterase: Negative / RBC: 6-10 /HPF / WBC 0-2   Sq Epi: x / Non Sq Epi: Occasional / Bacteria: Occasional    Procalcitonin, Serum: 0.05:  ng/mL (21 @ 14:31)      MICROBIOLOGY:      RADIOLOGY & ADDITIONAL STUDIES:    --< from: CT Chest No Cont (21 @ 17:08) >    EXAM:  CT CHEST                            PROCEDURE DATE:  2021          INTERPRETATION:  CLINICAL INFORMATION: Shortness of breath. History of lymphoma    COMPARISON: Chest x-ray of earlier in the day.    CONTRAST/COMPLICATIONS:  IV Contrast: NONE  0 cc administered   0 cc discarded  Oral Contrast: NONE  Complications: None reported at time of study completion    PROCEDURE:  CT of the Chest was performed.  Sagittal and coronal reformats were performed.    FINDINGS:    LUNGS AND AIRWAYS:Patent central airways.  Lungs are remarkable for extensive groundglass infiltrates in the lungs with relative sparing of the left lung base. There is an additional focus of linear scar or atelectasis at the lateral left lung base. There is subpleural scarring in the right upper lobe and lateral right lower lobe.  PLEURA: There is a small free-flowing left pleural effusion. There is a small right pleural effusion which appears subpulmonic.  MEDIASTINUM AND MIS: No lymphadenopathy. There are surgical clips in the right infrahilar region  VESSELS: There is extensive atherosclerotic calcification of the thoracic aorta. There is coronary artery calcification  HEART: Heart size is normal. No pericardial effusion.  CHEST WALL AND LOWER NECK: Smallhypodense focus in the lower pole of the left thyroid gland measures approximately 8 mm.  VISUALIZED UPPER ABDOMEN: Small amount of upper abdominal ascites. Cholelithiasis. Visualized spleen suggests enlargement. Relative atrophy of the left kidney. Fullness of the visualized right renal collecting system.  BONES: There are degenerative changes of the spine    IMPRESSION: Small bilateral pleural effusions with loculation of the right pleural fluid in a subpulmonic location.  Extensive bilateral groundglass infiltrates  Upper abdomen demonstrates ascites and probable splenomegaly. There is also cholelithiasis.  Postsurgical changes with surgical clips in the right perihilar region

## 2021-06-03 NOTE — PROGRESS NOTE ADULT - SUBJECTIVE AND OBJECTIVE BOX
CHARTING IN PROGRESS    Patient is a 80y old  Female who presents with a chief complaint of   24 hour events: ***    REVIEW OF SYSTEMS  Constitutional: No fever, chills, fatigue  Neuro: No headache, numbness, weakness  Resp: No cough, wheezing, shortness of breath  CVS: No chest pain, palpitations, leg swelling  GI: No abdominal pain, nausea, vomiting, diarrhea   : No dysuria, frequency, incontinence  Skin: No itching, burning, rashes, or lesions   Msk: No joint pain or swelling  Psych: No depression, anxiety, mood swings  Heme: No bleeding    T(F): 99 (21 @ 08:00), Max: 99 (21 @ 08:00)  HR: 91 (21 @ 10:00) (80 - 126)  BP: 168/72 (21 @ 10:00) (95/45 - 203/92)  RR: 28 (21 @ 10:00) (17 - 39)  SpO2: 96% (21 @ 10:00) (91% - 100%)  Wt(kg): --            I&O's Summary     @ 07:  -   @ 07:00  --------------------------------------------------------  IN: 50 mL / OUT: 3200 mL / NET: -3150 mL     @ 07:  -   @ 10:37  --------------------------------------------------------  IN: 0 mL / OUT: 60 mL / NET: -60 mL      PHYSICAL EXAM  General:   CNS:   HEENT:   Resp:   CVS:   Abd:   Ext:   Skin:     MEDICATIONS  cefTRIAXone   IVPB IV Intermittent    furosemide   Injectable IV Push  labetalol Injectable IV Push PRN  lisinopril Oral    dextrose 40% Gel Oral  dextrose 50% Injectable IV Push  dextrose 50% Injectable IV Push  dextrose 50% Injectable IV Push  glucagon  Injectable IntraMuscular  insulin lispro (ADMELOG) corrective regimen sliding scale SubCutaneous  insulin lispro (ADMELOG) corrective regimen sliding scale SubCutaneous    budesonide  80 MICROgram(s)/formoterol 4.5 MICROgram(s) Inhaler Inhalation  tiotropium 18 MICROgram(s) Capsule Inhalation                                            8.4    3.98  )-----------( 108      ( 2021 05:49 )             25.5       06-03    140  |  104  |  26<H>  ----------------------------<  167<H>  4.2   |  26  |  1.00    Ca    8.0<L>      2021 05:49  Phos  3.2     06-03  Mg     1.8     06-03    TPro  6.2  /  Alb  3.2<L>  /  TBili  0.4  /  DBili  x   /  AST  27  /  ALT  26  /  AlkPhos  72  06-03    Lactate 2.2           06-02 @ 20:59    Lactate 4.5           06-02 @ 14:31      CARDIAC MARKERS ( 2021 05:49 )  .044 ng/mL / x     / 53 U/L / x     / 2.5 ng/mL  CARDIAC MARKERS ( 2021 20:59 )  .072 ng/mL / x     / 52 U/L / x     / 2.9 ng/mL  CARDIAC MARKERS ( 2021 14:31 )  <.015 ng/mL / x     / x     / x     / 1.7 ng/mL      PT/INR - ( 2021 14:31 )   PT: 12.9 sec;   INR: 1.11 ratio         PTT - ( 2021 14:31 )  PTT:44.0 sec  Urinalysis Basic - ( 2021 17:43 )    Color: Yellow / Appearance: Clear / S.010 / pH: x  Gluc: x / Ketone: Negative  / Bili: Negative / Urobili: Negative   Blood: x / Protein: Negative / Nitrite: Negative   Leuk Esterase: Negative / RBC: 6-10 /HPF / WBC 0-2   Sq Epi: x / Non Sq Epi: Occasional / Bacteria: Occasional            Radiology: ***  Bedside lung ultrasound: ***  Bedside ECHO: ***    CENTRAL LINE: Y/N          DATE INSERTED:              REMOVE: Y/N  BRANTLEY: Y/N                        DATE INSERTED:              REMOVE: Y/N  A-LINE: Y/N                       DATE INSERTED:              REMOVE: Y/N    GLOBAL ISSUE/BEST PRACTICE  Analgesia:   Sedation:   CAM-ICU:   HOB elevation: yes  Stress ulcer prophylaxis:   VTE prophylaxis:   Glycemic control:   Nutrition:     CODE STATUS: ***  GOC discussion: Y       CHARTING IN PROGRESS    Patient is a 80y old  Female with PMH lymphoma (RCHOP 5 yrs ago) with RLLobectomy, HTN, HLD, "borderline" DM (not on meds), right ankle fx, L ALEXANDRA, herniated disc surgery who presents for acute SOB, hypoxemia and hypertensive urgency (200/100 at time of arrival). Patient reports that she took her BP medication yesterday morning, but she skipped the previous 4 days due to running out of medication. She has been experiencing SOB upon exertion on occasion, for the past month. To alleviate these symptoms, she has been using her Trelegy inhaler for the past week that was prescribed by her PCP. Her SOB came on suddenly yesterday and was severe. Patient reports that her SOB today is dramatically improved compared to yesterday. She denies heart palpitations chest pain, fever, changes in vision.   24 hour events: Patient was having cramps in her calves and feet last night and was given Atavan for the pain. Patient reports that these leg cramps have been occurring for years when she does not drink enough water. Patient was diuresed prior to the onset of this symptom.      REVIEW OF SYSTEMS  Constitutional: No fever, chills, fatigue  Neuro: No headache, numbness, weakness  Resp: No cough, wheezing, mild shortness of breath  CVS: No chest pain, palpitations, leg swelling  GI: No abdominal pain, nausea, vomiting, diarrhea   : No dysuria, frequency, incontinence  Skin: No itching, burning, rashes, or lesions   Msk: No joint pain or swelling    T(F): 99 (21 @ 08:00), Max: 99 (21 @ 08:00)  HR: 91 (21 @ 10:00) (80 - 126)  BP: 168/72 (21 @ 10:00) (95/45 - 203/92)  RR: 28 (21 @ 10:00) (17 - 39)  SpO2: 96% (21 @ 10:00) (91% - 100%)  Wt(kg): --            I&O's Summary     @ 07:01  -   @ 07:00  --------------------------------------------------------  IN: 50 mL / OUT: 3200 mL / NET: -3150 mL     @ 07:01  -   @ 10:37  --------------------------------------------------------  IN: 0 mL / OUT: 60 mL / NET: -60 mL      PHYSICAL EXAM  General: Patient is alert and oriented x3, in NAD, very pleasant.  CNS: Patient answers all questions appropriately.   HEENT: Head is normocephalic, EOM intact, Pupils equal and round, anicteric, no injection, moist mucus membranes.   Resp: Slight rhonchi in lower lobes b/l.   CVS: S1, S2, no murmurs, rubs or gallops. No JVD.   Abd: Soft and nontender.  Ext: No edema, cyanosis, tenderness.  Skin: Warm, no rashes    MEDICATIONS  cefTRIAXone   IVPB IV Intermittent    furosemide   Injectable IV Push  labetalol Injectable IV Push PRN  lisinopril Oral    dextrose 40% Gel Oral  dextrose 50% Injectable IV Push  dextrose 50% Injectable IV Push  dextrose 50% Injectable IV Push  glucagon  Injectable IntraMuscular  insulin lispro (ADMELOG) corrective regimen sliding scale SubCutaneous  insulin lispro (ADMELOG) corrective regimen sliding scale SubCutaneous    budesonide  80 MICROgram(s)/formoterol 4.5 MICROgram(s) Inhaler Inhalation  tiotropium 18 MICROgram(s) Capsule Inhalation                                            8.4    3.98  )-----------( 108      ( 2021 05:49 )             25.5       06-    140  |  104  |  26<H>  ----------------------------<  167<H>  4.2   |  26  |  1.00    Ca    8.0<L>      2021 05:49  Phos  3.2     06-03  Mg     1.8     06-    TPro  6.2  /  Alb  3.2<L>  /  TBili  0.4  /  DBili  x   /  AST  27  /  ALT  26  /  AlkPhos  72  06-03    Lactate 2.2           06-02 @ 20:59    Lactate 4.5           06-02 @ 14:31      CARDIAC MARKERS ( 2021 05:49 )  .044 ng/mL / x     / 53 U/L / x     / 2.5 ng/mL  CARDIAC MARKERS ( 2021 20:59 )  .072 ng/mL / x     / 52 U/L / x     / 2.9 ng/mL  CARDIAC MARKERS ( 2021 14:31 )  <.015 ng/mL / x     / x     / x     / 1.7 ng/mL      PT/INR - ( 2021 14:31 )   PT: 12.9 sec;   INR: 1.11 ratio         PTT - ( 2021 14:31 )  PTT:44.0 sec  Urinalysis Basic - ( 2021 17:43 )    Color: Yellow / Appearance: Clear / S.010 / pH: x  Gluc: x / Ketone: Negative  / Bili: Negative / Urobili: Negative   Blood: x / Protein: Negative / Nitrite: Negative   Leuk Esterase: Negative / RBC: 6-10 /HPF / WBC 0-2   Sq Epi: x / Non Sq Epi: Occasional / Bacteria: Occasional            Radiology: ***  Bedside lung ultrasound: Small pleural effusions b/l.   Bedside ECHO: Aortic valve not able to be clearly assessed.     CENTRAL LINE: Y/N          DATE INSERTED:              REMOVE: Y/N  BRANTLEY: Y/N                        DATE INSERTED:              REMOVE: Y/N  A-LINE: Y/N                       DATE INSERTED:              REMOVE: Y/N    GLOBAL ISSUE/BEST PRACTICE  Analgesia:   Sedation:   CAM-ICU:   HOB elevation: yes  Stress ulcer prophylaxis:   VTE prophylaxis:   Glycemic control:   Nutrition:     CODE STATUS: ***  GO discussion: Y

## 2021-06-03 NOTE — PROGRESS NOTE ADULT - ASSESSMENT
80 year old female with PMH lymphoma (RCHOP 5 yrs ago) with RLLobectomy, HTN, HLD, "borderline" DM (not on meds), right ankle fx, L ALEXANDRA, herniated disc surgery. P/w acute SOB, hypoxemia, hypertensive urgency. A/w acute hypercapnic respiratory failure, concern for GERSON (creat 1.2, baseline 0.9 per Dr Juarez), picture more consistent with severe sepsis d/t PNA (viral/COVID vs atypical vs bacterial) rather than acute pulmonary edema.    Plan:  Neuro:  intact, no acute issues    CV:  initial hypertensive urgency now improved post lasix and SL NTG  hold lisinopril for now   nothing to suggest MI as cause of possible acute pulm edema so will hold off on beta-blockers (no aspirin d/t allergy, can resume home dose lipitor)  trend Molina and ECG in am  TTE ordered    Pulm:  initial hypoxemic respiratory failure now improved  continue BiPAP support with increased IPAP (from 12 to 15 for acute hypercapnic respiratory failure (concerning as this is despite pt being tachypneic and on BiPAP)  check repeat ABG  hold off on further steroids at this time  f/u chest CT ordered    GI:  NPO while on BiPAP    :  continue little catheter for urine output monitoring in critically ill patient     Renal:  adjust meds per CrCl  Hold nephrotoxic meds  Trend urine output  Trend BMP  hold further lasix for now    Heme:  heparin subQ and SCDs for DVT PPX    ID:  ceftriaxone and azithromycin for empiric CAP coverage  urine legionella pending  blood cx pending  airborne precautions pending COVID PCR results  repeat lactate ordered  no sepsis bundle IVF boluses based on pt's initial presentation     Endo:  FS AC+HS/Q6 with SSI  A1c pending    Misc:   admit to ICU  supportive care as needed   80 year old female with PMH lymphoma (RCHOP 5 yrs ago) with RLLobectomy, HTN, HLD, "borderline" DM (not on meds), right ankle fx, L ALEXANDRA, herniated disc surgery. P/w acute SOB, hypoxemia, hypertensive urgency. A/w acute hypercapnic respiratory failure, concern for GERSON (creat 1.2, baseline 0.9 per Dr Juarez), picture more consistent with severe sepsis d/t PNA (viral/COVID vs atypical vs bacterial) rather than acute pulmonary edema.    Plan:  Neuro:  intact, no acute issues    CV:  initial hypertensive urgency now improved post lasix and SL NTG  home lisinopril restarted  POCUS suggesting grossly preserved LV function  ?history of AS, unable to discern on bedside POCUS  TTE ordered, f/u    Pulm:  initial hypoxemic respiratory failure now improved and patient saturating well off BIPAP, on NC  hold off on further steroids at this time  will diurese with IV lasix 40mg x1 as bedisde US showed small b/l pleural effusions     GI:  Diet as ordered    :  continue little catheter for urine output monitoring in critically ill patient     Renal:  adjust meds per CrCl  Trend urine output  Trend BMP  Keep K >4, repeat BMP at 2pm    Heme:  SCDs for DVT PPX    ID:  ceftriaxone for empiric CAP coverage  covid pcr negative  lactate downtrending, no further IVF given tenuous respiratory status  urine legionella pending  blood cx pending    Endo:  FS AC+HS/Q6 with SSI  A1c 5.8, AM   Elevated BG on admission likely stress induced

## 2021-06-04 DIAGNOSIS — D64.9 ANEMIA, UNSPECIFIED: ICD-10-CM

## 2021-06-04 LAB
ALBUMIN SERPL ELPH-MCNC: 3.4 G/DL — SIGNIFICANT CHANGE UP (ref 3.3–5)
ALP SERPL-CCNC: 71 U/L — SIGNIFICANT CHANGE UP (ref 40–120)
ALT FLD-CCNC: 20 U/L — SIGNIFICANT CHANGE UP (ref 12–78)
ANION GAP SERPL CALC-SCNC: 5 MMOL/L — SIGNIFICANT CHANGE UP (ref 5–17)
ANION GAP SERPL CALC-SCNC: 6 MMOL/L — SIGNIFICANT CHANGE UP (ref 5–17)
APTT BLD: 56.4 SEC — HIGH (ref 27.5–35.5)
AST SERPL-CCNC: 17 U/L — SIGNIFICANT CHANGE UP (ref 15–37)
BASOPHILS # BLD AUTO: 0.02 K/UL — SIGNIFICANT CHANGE UP (ref 0–0.2)
BASOPHILS NFR BLD AUTO: 0.5 % — SIGNIFICANT CHANGE UP (ref 0–2)
BILIRUB SERPL-MCNC: 0.6 MG/DL — SIGNIFICANT CHANGE UP (ref 0.2–1.2)
BUN SERPL-MCNC: 26 MG/DL — HIGH (ref 7–23)
BUN SERPL-MCNC: 27 MG/DL — HIGH (ref 7–23)
CALCIUM SERPL-MCNC: 8.6 MG/DL — SIGNIFICANT CHANGE UP (ref 8.5–10.1)
CALCIUM SERPL-MCNC: 9.1 MG/DL — SIGNIFICANT CHANGE UP (ref 8.5–10.1)
CHLORIDE SERPL-SCNC: 101 MMOL/L — SIGNIFICANT CHANGE UP (ref 96–108)
CHLORIDE SERPL-SCNC: 98 MMOL/L — SIGNIFICANT CHANGE UP (ref 96–108)
CO2 SERPL-SCNC: 32 MMOL/L — HIGH (ref 22–31)
CO2 SERPL-SCNC: 33 MMOL/L — HIGH (ref 22–31)
CREAT SERPL-MCNC: 1 MG/DL — SIGNIFICANT CHANGE UP (ref 0.5–1.3)
CREAT SERPL-MCNC: 1.1 MG/DL — SIGNIFICANT CHANGE UP (ref 0.5–1.3)
EOSINOPHIL # BLD AUTO: 0.03 K/UL — SIGNIFICANT CHANGE UP (ref 0–0.5)
EOSINOPHIL NFR BLD AUTO: 0.7 % — SIGNIFICANT CHANGE UP (ref 0–6)
GLUCOSE SERPL-MCNC: 126 MG/DL — HIGH (ref 70–99)
GLUCOSE SERPL-MCNC: 133 MG/DL — HIGH (ref 70–99)
HCT VFR BLD CALC: 27.1 % — LOW (ref 34.5–45)
HGB BLD-MCNC: 8.7 G/DL — LOW (ref 11.5–15.5)
IMM GRANULOCYTES NFR BLD AUTO: 0.2 % — SIGNIFICANT CHANGE UP (ref 0–1.5)
INR BLD: 1.29 RATIO — HIGH (ref 0.88–1.16)
LYMPHOCYTES # BLD AUTO: 0.69 K/UL — LOW (ref 1–3.3)
LYMPHOCYTES # BLD AUTO: 16.7 % — SIGNIFICANT CHANGE UP (ref 13–44)
MAGNESIUM SERPL-MCNC: 1.6 MG/DL — SIGNIFICANT CHANGE UP (ref 1.6–2.6)
MAGNESIUM SERPL-MCNC: 2.6 MG/DL — SIGNIFICANT CHANGE UP (ref 1.6–2.6)
MCHC RBC-ENTMCNC: 28.7 PG — SIGNIFICANT CHANGE UP (ref 27–34)
MCHC RBC-ENTMCNC: 32.1 GM/DL — SIGNIFICANT CHANGE UP (ref 32–36)
MCV RBC AUTO: 89.4 FL — SIGNIFICANT CHANGE UP (ref 80–100)
MONOCYTES # BLD AUTO: 0.43 K/UL — SIGNIFICANT CHANGE UP (ref 0–0.9)
MONOCYTES NFR BLD AUTO: 10.4 % — SIGNIFICANT CHANGE UP (ref 2–14)
NEUTROPHILS # BLD AUTO: 2.96 K/UL — SIGNIFICANT CHANGE UP (ref 1.8–7.4)
NEUTROPHILS NFR BLD AUTO: 71.5 % — SIGNIFICANT CHANGE UP (ref 43–77)
NRBC # BLD: 0 /100 WBCS — SIGNIFICANT CHANGE UP (ref 0–0)
PHOSPHATE SERPL-MCNC: 2.4 MG/DL — LOW (ref 2.5–4.5)
PHOSPHATE SERPL-MCNC: 2.5 MG/DL — SIGNIFICANT CHANGE UP (ref 2.5–4.5)
PLATELET # BLD AUTO: 105 K/UL — LOW (ref 150–400)
POTASSIUM SERPL-MCNC: 3.5 MMOL/L — SIGNIFICANT CHANGE UP (ref 3.5–5.3)
POTASSIUM SERPL-MCNC: 4.1 MMOL/L — SIGNIFICANT CHANGE UP (ref 3.5–5.3)
POTASSIUM SERPL-SCNC: 3.5 MMOL/L — SIGNIFICANT CHANGE UP (ref 3.5–5.3)
POTASSIUM SERPL-SCNC: 4.1 MMOL/L — SIGNIFICANT CHANGE UP (ref 3.5–5.3)
PROT SERPL-MCNC: 6.8 G/DL — SIGNIFICANT CHANGE UP (ref 6–8.3)
PROTHROM AB SERPL-ACNC: 14.9 SEC — HIGH (ref 10.6–13.6)
RBC # BLD: 3.03 M/UL — LOW (ref 3.8–5.2)
RBC # FLD: 13.7 % — SIGNIFICANT CHANGE UP (ref 10.3–14.5)
SODIUM SERPL-SCNC: 136 MMOL/L — SIGNIFICANT CHANGE UP (ref 135–145)
SODIUM SERPL-SCNC: 139 MMOL/L — SIGNIFICANT CHANGE UP (ref 135–145)
WBC # BLD: 4.14 K/UL — SIGNIFICANT CHANGE UP (ref 3.8–10.5)
WBC # FLD AUTO: 4.14 K/UL — SIGNIFICANT CHANGE UP (ref 3.8–10.5)

## 2021-06-04 PROCEDURE — 76604 US EXAM CHEST: CPT | Mod: 26

## 2021-06-04 PROCEDURE — 99233 SBSQ HOSP IP/OBS HIGH 50: CPT

## 2021-06-04 RX ORDER — ZALEPLON 10 MG
5 CAPSULE ORAL ONCE
Refills: 0 | Status: DISCONTINUED | OUTPATIENT
Start: 2021-06-04 | End: 2021-06-05

## 2021-06-04 RX ORDER — SODIUM,POTASSIUM PHOSPHATES 278-250MG
1 POWDER IN PACKET (EA) ORAL ONCE
Refills: 0 | Status: COMPLETED | OUTPATIENT
Start: 2021-06-04 | End: 2021-06-04

## 2021-06-04 RX ORDER — POTASSIUM CHLORIDE 20 MEQ
40 PACKET (EA) ORAL ONCE
Refills: 0 | Status: COMPLETED | OUTPATIENT
Start: 2021-06-04 | End: 2021-06-04

## 2021-06-04 RX ORDER — FUROSEMIDE 40 MG
20 TABLET ORAL DAILY
Refills: 0 | Status: DISCONTINUED | OUTPATIENT
Start: 2021-06-04 | End: 2021-06-09

## 2021-06-04 RX ORDER — ALPRAZOLAM 0.25 MG
0.25 TABLET ORAL ONCE
Refills: 0 | Status: DISCONTINUED | OUTPATIENT
Start: 2021-06-04 | End: 2021-06-04

## 2021-06-04 RX ORDER — MAGNESIUM SULFATE 500 MG/ML
2 VIAL (ML) INJECTION ONCE
Refills: 0 | Status: COMPLETED | OUTPATIENT
Start: 2021-06-04 | End: 2021-06-04

## 2021-06-04 RX ADMIN — Medication 50 GRAM(S): at 07:59

## 2021-06-04 RX ADMIN — Medication 1 TABLET(S): at 17:12

## 2021-06-04 RX ADMIN — Medication 0.25 MILLIGRAM(S): at 23:16

## 2021-06-04 RX ADMIN — HEPARIN SODIUM 5000 UNIT(S): 5000 INJECTION INTRAVENOUS; SUBCUTANEOUS at 06:25

## 2021-06-04 RX ADMIN — LISINOPRIL 20 MILLIGRAM(S): 2.5 TABLET ORAL at 06:24

## 2021-06-04 RX ADMIN — HEPARIN SODIUM 5000 UNIT(S): 5000 INJECTION INTRAVENOUS; SUBCUTANEOUS at 14:35

## 2021-06-04 RX ADMIN — Medication 40 MILLIGRAM(S): at 06:25

## 2021-06-04 RX ADMIN — TIOTROPIUM BROMIDE 1 CAPSULE(S): 18 CAPSULE ORAL; RESPIRATORY (INHALATION) at 06:25

## 2021-06-04 RX ADMIN — Medication 40 MILLIEQUIVALENT(S): at 17:12

## 2021-06-04 RX ADMIN — BUDESONIDE AND FORMOTEROL FUMARATE DIHYDRATE 2 PUFF(S): 160; 4.5 AEROSOL RESPIRATORY (INHALATION) at 06:25

## 2021-06-04 RX ADMIN — BUDESONIDE AND FORMOTEROL FUMARATE DIHYDRATE 2 PUFF(S): 160; 4.5 AEROSOL RESPIRATORY (INHALATION) at 19:11

## 2021-06-04 RX ADMIN — HEPARIN SODIUM 5000 UNIT(S): 5000 INJECTION INTRAVENOUS; SUBCUTANEOUS at 22:21

## 2021-06-04 NOTE — PROGRESS NOTE ADULT - ASSESSMENT
80 year old female with PMH lymphoma (RCHOP 5 yrs ago) with RLLobectomy, HTN, HLD, "borderline" DM (not on meds), right ankle fx, L ALEXANDRA, herniated disc surgery. adm after acuute onset of SOB.    RECOMMENDATIONS  history is much more consistent with hemodynamic issue despite CT findings.   No sig leukocytosis, no fever, lower suspicion for an acute infectious    Infectious w/u, including uPNA Ag, negative    C/w monitoring off Abx at this time.     Dr. Ortiz will be covering the service this weekend.   Infectious Diseases will continue to follow. Please call with any questions.   Lluvia Stahl M.D.  [facility], Division of Infectious Diseases [phone]  For over the weekend and after hours, please call [phone]

## 2021-06-04 NOTE — PROGRESS NOTE ADULT - ASSESSMENT
INCOMPLETE NOTE - CHARTING IN PROGRESS    80 year old female with PMH lymphoma (RCHOP 5 yrs ago) with RLLobectomy, HTN, HLD, "borderline" DM (not on meds), right ankle fx, L ALEXANDRA, herniated disc surgery. P/w acute SOB, hypoxemia, hypertensive urgency. A/w acute hypercapnic respiratory failure, concern for GERSON (creat 1.2, baseline 0.9 per Dr Juarez), picture more consistent with severe sepsis d/t PNA (viral/COVID vs atypical vs bacterial) rather than acute pulmonary edema.    Plan:    #Neuro:  - Intact, No acute issues  - s/p Xanax x1 last night    #CV:  - Hypertensive urgency improved  - Continue home Lisinopril  - POCUS: Grossly preserved LV function  - Questionable history of AS, unable to discern on bedside POCUS  - FU TTE report    #Pulm:  - Initial hypoxemic respiratory failure now improved and patient saturating well off BIPAP, on 2L NC  - Hold off on further steroids at this time  - Symbicort and Spiriva    #GI:  - DASH/TLC Diet    #Renal:  - Adjust meds per CrCl  - Continue Nuñez catheter  - Trend urine output/I&Os  - Trend BMPs; Replete electrolytes as needed    #Heme:  - DVT ppx: SQ Heparin, SCDs    #ID:  - Continue Ceftriaxone for empiric CAP coverage  - Urine legionella negative  - BCx NGTD (6/2)  - UCx: NGF    #Endo:  - Fingersticks at night and Q6hrs  - Continue sliding scale  - Hgb A1c 5.8    #Dispo  - Continue critical care management   INCOMPLETE NOTE - CHARTING IN PROGRESS    80 year old female with PMH lymphoma (RCHOP 5 yrs ago) with RLLobectomy, HTN, HLD, "borderline" DM (not on meds), right ankle fx, L ALEXANDRA, herniated disc surgery. P/w acute SOB, hypoxemia, hypertensive urgency. A/w acute hypercapnic respiratory failure, concern for GERSON (creat 1.2, baseline 0.9 per Dr Juarez), picture more consistent with severe sepsis d/t PNA (viral/COVID vs atypical vs bacterial) rather than acute pulmonary edema.    Plan:    #Neuro:  - Intact, No acute issues    #CV:  - Hypertensive urgency improved  - Continue home Lisinopril  - POCUS: Grossly preserved LV function  - Questionable history of AS, unable to discern on bedside POCUS  - FU TTE report    #Pulm:  - Initial hypoxemic respiratory failure now improved and patient saturating well off BIPAP, on 2L NC  - Hold off on further steroids at this time  - Symbicort and Spiriva    #GI:  - Continue DASH/TLC Diet    #Renal:  - Adjust meds per CrCl  - Continue Nuñez catheter  - Trend urine output/I&Os  - Trend BMPs; Replete electrolytes as needed. Keep >4.     #Heme:  - DVT ppx: SQ Heparin, SCDs    #ID:  - Antibiotics have been discontinued due to negative blood cultures.  - Urine legionella negative  - BCx NGTD (6/2)  - UCx: NGF    #Endo:  - Fingersticks at night and Q6hrs  - Sliding scale/ diabetic meds discontinued.   - Hgb A1c 5.8    #Dispo  - Continue critical care management     80 year old female with PMH lymphoma (RCHOP 5 yrs ago) with RLLobectomy, HTN, HLD, "borderline" DM (not on meds), right ankle fx, L ALEXANDRA, herniated disc surgery. P/w acute SOB, hypoxemia, hypertensive urgency. A/w acute hypercapnic respiratory failure, concern for GERSON (creat 1.2, baseline 0.9 per Dr Juarez), picture more consistent with severe sepsis d/t PNA (viral/COVID vs atypical vs bacterial) rather than acute pulmonary edema.    Plan:    #Neuro:  - Intact, No acute issues    #CV:  - Hypertensive urgency improved  - Continue home Lisinopril  - POCUS: Grossly preserved LV function  - Questionable history of AS, unable to discern on bedside POCUS  - FU TTE report  - Lasix 20mg for diuresis    #Pulm:  - Initial hypoxemic respiratory failure now improved and patient saturating well off BIPAP, on 2L NC  - Hold off on further steroids at this time  - Symbicort and Spiriva  - Bedside lung ultrasound: Small pleural effusions still present b/l. Noticeable improvement from yesterday.     #GI:  - Continue DASH/TLC Diet    #Renal:  - Adjust meds per CrCl  - Continue Nuñez catheter  - Trend urine output/I&Os  - Trend BMPs; Replete electrolytes as needed. Keep >4.     #Heme:  - DVT ppx: SQ Heparin, SCDs    #ID:  - Antibiotics have been discontinued due to negative blood cultures.  - Urine legionella negative  - BCx NGTD (6/2)  - UCx: NGF    #Endo:  - Fingersticks at night and Q6hrs  - Sliding scale/ diabetic meds discontinued.   - Hgb A1c 5.8    #Dispo  - Continue critical care management

## 2021-06-04 NOTE — PROGRESS NOTE ADULT - SUBJECTIVE AND OBJECTIVE BOX
[facility]    Mg Win, Marguerite, June, Faby, Bennie, Jada      [phone]    CHIEF COMPLAINT: Patient is a 80y old  Female who presents with a chief complaint of SOB (03 Jun 2021 11:02)      Follow Up: hypertensive urgency, hf    Interim history: The patient reports no new symptoms.  Denies chest discomfort and shortness of breath.  No abdominal pain.  No new neurologic symptoms.      MEDICATIONS  (STANDING):  budesonide  80 MICROgram(s)/formoterol 4.5 MICROgram(s) Inhaler 2 Puff(s) Inhalation two times a day  furosemide   Injectable 40 milliGRAM(s) IV Push daily  heparin   Injectable 5000 Unit(s) SubCutaneous every 8 hours  lisinopril 20 milliGRAM(s) Oral daily  tiotropium 18 MICROgram(s) Capsule 1 Capsule(s) Inhalation daily    MEDICATIONS  (PRN):  labetalol Injectable 10 milliGRAM(s) IV Push every 4 hours PRN SBP >160 and/or HR >90      REVIEW OF SYSTEMS:  eye, ent, GI, , allergic, dermatologic, musculoskeletal and neurologic are negative except as described above    Vital Signs Last 24 Hrs  T(C): 36.9 (04 Jun 2021 03:40), Max: 37.3 (04 Jun 2021 00:00)  T(F): 98.5 (04 Jun 2021 03:40), Max: 99.1 (04 Jun 2021 00:00)  HR: 83 (04 Jun 2021 09:00) (73 - 97)  BP: 131/60 (04 Jun 2021 09:00) (117/57 - 182/78)  BP(mean): 86 (04 Jun 2021 09:00) (80 - 112)  RR: 14 (04 Jun 2021 09:00) (14 - 35)  SpO2: 99% (04 Jun 2021 09:00) (92% - 100%)    I&O's Summary    03 Jun 2021 07:01  -  04 Jun 2021 07:00  --------------------------------------------------------  IN: 210 mL / OUT: 3235 mL / NET: -3025 mL    04 Jun 2021 07:01  -  04 Jun 2021 09:40  --------------------------------------------------------  IN: 0 mL / OUT: 1150 mL / NET: -1150 mL        Telemetry past 24h: sr apcs    PHYSICAL EXAM:    Constitutional: well-nourished, well-developed, NAD   HEENT:  MMM, sclerae anicteric, conjunctivae clear, no oral cyanosis.  Pulmonary: Non-labored, breath sounds are clear bilaterally, No wheezing, rales or rhonchi  Cardiovascular: Regular, S1 and S2.  2/6 sys murmur.  No rubs, gallops or clicks  Gastrointestinal: Bowel Sounds present, soft, nontender.   Lymph: No peripheral edema.   Neurological: Alert, no focal deficits  Skin: No rashes.  Psych:  Mood & affect appropriate    LABS: All Labs Reviewed:                        8.7    4.14  )-----------( 105      ( 04 Jun 2021 04:57 )             27.1                         8.4    3.98  )-----------( 108      ( 03 Jun 2021 05:49 )             25.5                         10.3   16.00 )-----------( 294      ( 02 Jun 2021 14:31 )             33.5     04 Jun 2021 04:57    139    |  101    |  27     ----------------------------<  133    4.1     |  32     |  1.10   03 Jun 2021 14:49    138    |  100    |  27     ----------------------------<  130    4.3     |  32     |  1.20   03 Jun 2021 05:49    140    |  104    |  26     ----------------------------<  167    4.2     |  26     |  1.00     Ca    8.6        04 Jun 2021 04:57  Ca    9.2        03 Jun 2021 14:49  Ca    8.0        03 Jun 2021 05:49  Phos  2.5       04 Jun 2021 04:57  Phos  3.2       03 Jun 2021 05:49  Phos  3.4       02 Jun 2021 20:59  Mg     1.6       04 Jun 2021 04:57  Mg     1.8       03 Jun 2021 05:49  Mg     1.9       02 Jun 2021 20:59    TPro  6.8    /  Alb  3.4    /  TBili  0.6    /  DBili  x      /  AST  17     /  ALT  20     /  AlkPhos  71     04 Jun 2021 04:57  TPro  6.2    /  Alb  3.2    /  TBili  0.4    /  DBili  x      /  AST  27     /  ALT  26     /  AlkPhos  72     03 Jun 2021 05:49  TPro  7.6    /  Alb  4.0    /  TBili  0.4    /  DBili  x      /  AST  39     /  ALT  30     /  AlkPhos  97     02 Jun 2021 14:31    PT/INR - ( 04 Jun 2021 04:57 )   PT: 14.9 sec;   INR: 1.29 ratio         PTT - ( 04 Jun 2021 04:57 )  PTT:56.4 sec  CARDIAC MARKERS ( 03 Jun 2021 05:49 )  .044 ng/mL / x     / 53 U/L / x     / 2.5 ng/mL  CARDIAC MARKERS ( 02 Jun 2021 20:59 )  .072 ng/mL / x     / 52 U/L / x     / 2.9 ng/mL  CARDIAC MARKERS ( 02 Jun 2021 14:31 )  <.015 ng/mL / x     / x     / x     / 1.7 ng/mL      Blood Culture: Organism --  Gram Stain Blood -- Gram Stain --  Specimen Source .Urine Catheterized  Culture-Blood --    Organism --  Gram Stain Blood -- Gram Stain --  Specimen Source .Blood Blood-Peripheral  Culture-Blood --      06-02 @ 14:31  Pro Bnp [id number]    06-02 @ 14:31  TSH: 3.09      RADIOLOGY:    EKG:    Echo:

## 2021-06-04 NOTE — CHART NOTE - NSCHARTNOTEFT_GEN_A_CORE
Called by RN for rapid a-fib for 6 minutes, BP stable.    Cardio note appreciated, not AC candidate.     Routine monitoring, requesting benzo for sleep, on diazepam outpatient, will give 0.25 mg PO.

## 2021-06-04 NOTE — PROVIDER CONTACT NOTE (OTHER) - ACTION/TREATMENT ORDERED:
MD Mckeon made aware and ordered Ativan PO, but pt refused it. Spoke with Dr. Sanchez who's is covering for Dr. Clark and ordered Xanax 0.25mg PO. pt converted back to NSR.

## 2021-06-04 NOTE — PROGRESS NOTE ADULT - ASSESSMENT
80 year old female with PMH lymphoma (RCHOP 5 yrs ago) with RL Lobectomy, HTN, HLD, "borderline" DM (not on meds), here with an episode of dyspnea.  She was found to be significantly hypertensive and dyspneic, and required IV diuresis and bipap    - presented with dyspnea and volume overload in the setting of hypertensive urgency  - improvement in her volume and breathing with iv diuresis and nitrates  -net out >3L 48h ago and >1L out in the last 24h  -remains on iv lasix and would consider transitioning to PO after iv dose tomorrow pending clinical course  - strict i/o's and daily weights  - oxygen supplementation as needed, off bipap    - some degree of aortic stenosis based on exam  - await echocardiogram to evaluate LV function and AS    - monitor BP closely. Cont with lisinopril for now, and can add vasodilators if needed.  - bp remains labile but is improved overall    -rhythm over the past 24h was sinus with apcs  -unable to fully review tele from earlier given technical limitations. after midnight early yesterday am, there are recorded strips with a tachycardic rate, which appears to be afib  -ekgs difficult to interpret and though was officially read as sinus tach, might have been af  -is possible that in the setting of rapid af and AS she evolved heart failure, and this needs to be considered  -for now would continue to monitor  -noting anemia and thrombocytopenia, she is not an ideal AC candidate, but if she has a recurrence this will need to be considered more carefully     - trend creatinine and electrolytes. Keep K>4, Mg>2  - will follow with you

## 2021-06-04 NOTE — PROGRESS NOTE ADULT - SUBJECTIVE AND OBJECTIVE BOX
[facility], Division of Infectious Diseases  KAREN Pedraza Y. Patel, S. Shah  [phone]    Name: CAROL ALEXANDRE  Age: 80y  Gender: Female  MRN: [id number]    Interval History:  Patient seen and examined at bedside this morning  No acute overnight events. Afebrile overnight   Notes reviewed    Antibiotics:      Medications:  budesonide  80 MICROgram(s)/formoterol 4.5 MICROgram(s) Inhaler 2 Puff(s) Inhalation two times a day  furosemide   Injectable 40 milliGRAM(s) IV Push daily  heparin   Injectable 5000 Unit(s) SubCutaneous every 8 hours  labetalol Injectable 10 milliGRAM(s) IV Push every 4 hours PRN  lisinopril 20 milliGRAM(s) Oral daily  tiotropium 18 MICROgram(s) Capsule 1 Capsule(s) Inhalation daily      Review of Systems:  A 10-point review of systems was obtained.   Review of systems otherwise negative except as previously noted.    Allergies: aspirin (Unknown)    For details regarding the patient's past medical history, social history, family history, and other miscellaneous elements, please refer the initial infectious diseases consultation and/or the admitting history and physical examination for this admission.    Objective:  Vitals:   T(C): 36.9 (21 @ 03:40), Max: 37.3 (21 @ 00:00)  HR: 83 (21 @ 09:00) (73 - 97)  BP: 131/60 (21 @ 09:00) (117/57 - 182/78)  RR: 14 (21 @ 09:00) (14 - 35)  SpO2: 99% (21 @ 09:00) (92% - 100%)    Physical Examination:  General: no acute distress  HEENT: NC/AT, EOMI  Neck: supple, no palpable LAD  Cardio: S1, S2 heard, RRR, no murmurs  Resp: decreased b/l breath sounds  Abd: soft, NT, ND, + bowel sounds  Ext: no edema or cyanosis    Laboratory Studies:  CBC:                       8.7    4.14  )-----------( 105      ( 2021 04:57 )             27.1     CMP:     139  |  101  |  27<H>  ----------------------------<  133<H>  4.1   |  32<H>  |  1.10    Ca    8.6      2021 04:57  Phos  2.5     06-  Mg     1.6     -    TPro  6.8  /  Alb  3.4  /  TBili  0.6  /  DBili  x   /  AST  17  /  ALT  20  /  AlkPhos  71  06-04    LIVER FUNCTIONS - ( 2021 04:57 )  Alb: 3.4 g/dL / Pro: 6.8 g/dL / ALK PHOS: 71 U/L / ALT: 20 U/L / AST: 17 U/L / GGT: x           Urinalysis Basic - ( 2021 17:43 )    Color: Yellow / Appearance: Clear / S.010 / pH: x  Gluc: x / Ketone: Negative  / Bili: Negative / Urobili: Negative   Blood: x / Protein: Negative / Nitrite: Negative   Leuk Esterase: Negative / RBC: 6-10 /HPF / WBC 0-2   Sq Epi: x / Non Sq Epi: Occasional / Bacteria: Occasional        Microbiology: reviewed    Culture - Urine (collected 21 @ 22:06)  Source: .Urine Catheterized  Final Report (21 @ 17:30):    No growth    Culture - Blood (collected 21 @ 18:34)  Source: .Blood Blood-Peripheral  Preliminary Report (21 @ 19:01):    No growth to date.    Culture - Blood (collected 21 @ 18:34)  Source: .Blood Blood-Peripheral  Preliminary Report (21 @ 19:01):    No growth to date.        Radiology: reviewed

## 2021-06-04 NOTE — PROGRESS NOTE ADULT - SUBJECTIVE AND OBJECTIVE BOX
Patient is a 80y old  Female who presents with a chief complaint of SOB (03 Jun 2021 11:02)      INTERVAL /OVERNIGHT EVENTS: feels much better    MEDICATIONS  (STANDING):  budesonide  80 MICROgram(s)/formoterol 4.5 MICROgram(s) Inhaler 2 Puff(s) Inhalation two times a day  heparin   Injectable 5000 Unit(s) SubCutaneous every 8 hours  lisinopril 20 milliGRAM(s) Oral daily  tiotropium 18 MICROgram(s) Capsule 1 Capsule(s) Inhalation daily    MEDICATIONS  (PRN):      Allergies    aspirin (Unknown)    Intolerances    aspirin (Stomach Upset)      REVIEW OF SYSTEMS:  CONSTITUTIONAL: No fever, weight loss, or fatigue  EYES: No eye pain, visual disturbances, or discharge  ENMT:  No difficulty hearing, tinnitus, vertigo; No sinus or throat pain  NECK: No pain or stiffness  RESPIRATORY: No cough, wheezing, chills or hemoptysis; No shortness of breath  CARDIOVASCULAR: No chest pain, palpitations, dizziness, or leg swelling  GASTROINTESTINAL: No abdominal or epigastric pain. No nausea, vomiting, or hematemesis; No diarrhea or constipation. No melena or hematochezia.  GENITOURINARY: No dysuria, frequency, hematuria, or incontinence  NEUROLOGICAL: No headaches, memory loss, loss of strength, numbness, or tremors  SKIN: No itching, burning, rashes, or lesions   LYMPH NODES: No enlarged glands  ENDOCRINE: No heat or cold intolerance; No hair loss; No polydipsia or polyuria  MUSCULOSKELETAL: No joint pain or swelling; No muscle, back, or extremity pain  PSYCHIATRIC: No depression, anxiety, mood swings, or difficulty sleeping  HEME/LYMPH: No easy bruising, or bleeding gums  ALLERGY AND IMMUNOLOGIC: No hives or eczema    Vital Signs Last 24 Hrs  T(C): 36.9 (04 Jun 2021 03:40), Max: 37.3 (04 Jun 2021 00:00)  T(F): 98.5 (04 Jun 2021 03:40), Max: 99.1 (04 Jun 2021 00:00)  HR: 81 (04 Jun 2021 18:00) (70 - 89)  BP: 142/63 (04 Jun 2021 18:00) (117/57 - 177/72)  BP(mean): 91 (04 Jun 2021 18:00) (76 - 115)  RR: 26 (04 Jun 2021 18:00) (14 - 34)  SpO2: 98% (04 Jun 2021 18:00) (80% - 100%)    PHYSICAL EXAM:  GENERAL: NAD, well-groomed, well-developed  HEAD:  Atraumatic, Normocephalic  EYES: EOMI, PERRLA, conjunctiva and sclera clear  ENMT: No tonsillar erythema, exudates, or enlargement; Moist mucous membranes, Good dentition, No lesions  NECK: Supple, No JVD, Normal thyroid  NERVOUS SYSTEM:  Alert & Oriented X3, Good concentration; Motor Strength 5/5 B/L upper and lower extremities; DTRs 2+ intact and symmetric  CHEST/LUNG: Clear to auscultation bilaterally; No rales, rhonchi, wheezing, or rubs  HEART: Regular rate and rhythm; No murmurs, rubs, or gallops  ABDOMEN: Soft, Nontender, Nondistended; Bowel sounds present  EXTREMITIES:  2+ Peripheral Pulses, No clubbing, cyanosis, or edema  LYMPH: No lymphadenopathy noted  SKIN: No rashes or lesions    LABS:                        8.7    4.14  )-----------( 105      ( 04 Jun 2021 04:57 )             27.1     04 Jun 2021 15:12    136    |  98     |  26     ----------------------------<  126    3.5     |  33     |  1.00     Ca    9.1        04 Jun 2021 15:12  Phos  2.4       04 Jun 2021 15:12  Mg     2.6       04 Jun 2021 15:12    TPro  6.8    /  Alb  3.4    /  TBili  0.6    /  DBili  x      /  AST  17     /  ALT  20     /  AlkPhos  71     04 Jun 2021 04:57    PT/INR - ( 04 Jun 2021 04:57 )   PT: 14.9 sec;   INR: 1.29 ratio         PTT - ( 04 Jun 2021 04:57 )  PTT:56.4 sec    CAPILLARY BLOOD GLUCOSE          RADIOLOGY & ADDITIONAL TESTS:    Notes Reviewed:  [x ] YES  [ ] NO    Care Discussed with Consultants/Other Providers [x ] YES  [ ] NO

## 2021-06-04 NOTE — PROGRESS NOTE ADULT - SUBJECTIVE AND OBJECTIVE BOX
CHARTING IN PROGRESS    Patient is a 80y old  Female with PMH lymphoma (RCHOP 5 yrs ago) with RLLobectomy, HTN, HLD, "borderline" DM (not on meds), right ankle fx, L ALEXANDRA, herniated disc surgery who presents for acute SOB, hypoxemia and hypertensive urgency (200/100 at time of arrival). Patient reports that she took her BP medication yesterday morning, but she skipped the previous 4 days due to running out of medication. She has been experiencing SOB upon exertion on occasion, for the past month. To alleviate these symptoms, she has been using her Trelegy inhaler for the past week that was prescribed by her PCP. Her SOB came on suddenly yesterday and was severe. Patient reports that her SOB today is dramatically improved compared to yesterday. She denies heart palpitations chest pain, fever, changes in vision.       Interval events: Patient seen and examined.     REVIEW OF SYSTEMS  Constitutional: No fever, chills, fatigue  Neuro: No headache, numbness, weakness  Resp: No cough, wheezing, mild shortness of breath  CVS: No chest pain, palpitations, leg swelling  GI: No abdominal pain, nausea, vomiting, diarrhea   : No dysuria, frequency, incontinence  Skin: No itching, burning, rashes, or lesions   Msk: No joint pain or swelling      ICU Vital Signs Last 24 Hrs  T(C): 36.9 (2021 03:40), Max: 37.3 (2021 00:00)  T(F): 98.5 (2021 03:40), Max: 99.1 (2021 00:00)  HR: 74 (2021 07:00) (74 - 97)  BP: 132/60 (2021 07:00) (117/57 - 182/78)  BP(mean): 87 (2021 07:00) (80 - 112)  ABP: --  ABP(mean): --  RR: 24 (2021 07:00) (18 - 36)  SpO2: 96% (2021 07:00) (92% - 100%)                            8.7    4.14  )-----------( 105      ( 2021 04:57 )             27.1       06-04    139  |  101  |  27<H>  ----------------------------<  133<H>  4.1   |  32<H>  |  1.10    Ca    8.6      2021 04:57  Phos  2.5     06-04  Mg     1.6     06-04    TPro  6.8  /  Alb  3.4  /  TBili  0.6  /  DBili  x   /  AST  17  /  ALT  20  /  AlkPhos  71  06-04        ABG - ( 2021 18:17 )  pH, Arterial: 7.42  pH, Blood: x     /  pCO2: 46    /  pO2: 116   / HCO3: ?28.4 / Base Excess: 4.6   /  SaO2: ?98.6    PT/INR - ( 2021 04:57 )   PT: 14.9 sec;   INR: 1.29 ratio    PTT - ( 2021 04:57 )  PTT:56.4 sec      PHYSICAL EXAM  General: Patient is alert and oriented x3, in NAD, very pleasant.  CNS: Patient answers all questions appropriately.   HEENT: Head is normocephalic, EOM intact, Pupils equal and round, anicteric, no injection, moist mucus membranes.   Resp: Slight rhonchi in lower lobes b/l.   CVS: S1, S2, no murmurs, rubs or gallops. No JVD.   Abd: Soft and nontender.  Ext: No edema, cyanosis, tenderness.  Skin: Warm, no rashes                            8.7    4.14  )-----------( 105      ( 2021 04:57 )             27.1       06-04    139  |  101  |  27<H>  ----------------------------<  133<H>  4.1   |  32<H>  |  1.10    Ca    8.6      2021 04:57  Phos  2.5     06-04  Mg     1.6     06-04    TPro  6.8  /  Alb  3.4  /  TBili  0.6  /  DBili  x   /  AST  17  /  ALT  20  /  AlkPhos  71  06-04          ABG - ( 2021 18:17 )  pH, Arterial: 7.42  pH, Blood: x     /  pCO2: 46    /  pO2: 116   / HCO3: ?28.4 / Base Excess: 4.6   /  SaO2: ?98.6     PT/INR - ( 2021 04:57 )   PT: 14.9 sec;   INR: 1.29 ratio      PTT - ( 2021 04:57 )  PTT:56.4 sec    Lactate 2.2           06-02 @ 20:59    Lactate 4.5           06-02 @ 14:31      CARDIAC MARKERS ( 2021 05:49 )  .044 ng/mL / x     / 53 U/L / x     / 2.5 ng/mL  CARDIAC MARKERS ( 2021 20:59 )  .072 ng/mL / x     / 52 U/L / x     / 2.9 ng/mL  CARDIAC MARKERS ( 2021 14:31 )  <.015 ng/mL / x     / x     / x     / 1.7 ng/mL    Urinalysis Basic - ( 2021 17:43 )    Color: Yellow / Appearance: Clear / S.010 / pH: x  Gluc: x / Ketone: Negative  / Bili: Negative / Urobili: Negative   Blood: x / Protein: Negative / Nitrite: Negative   Leuk Esterase: Negative / RBC: 6-10 /HPF / WBC 0-2   Sq Epi: x / Non Sq Epi: Occasional / Bacteria: Occasional      Bedside lung ultrasound: Small pleural effusions b/l.   Bedside ECHO: Aortic valve not able to be clearly assessed.     CENTRAL LINE: Y/N          DATE INSERTED:              REMOVE: Y/N  BRANTLEY: Y/N                        DATE INSERTED:              REMOVE: Y/N  A-LINE: Y/N                       DATE INSERTED:              REMOVE: Y/N    GLOBAL ISSUE/BEST PRACTICE  Analgesia:   Sedation:   CAM-ICU:   HOB elevation: yes  Stress ulcer prophylaxis:   VTE prophylaxis:   Glycemic control:   Nutrition:   GOC discussion: Y       CHARTING IN PROGRESS    Patient is a 80y old  Female with PMH lymphoma (RCHOP 5 yrs ago) with RLLobectomy, HTN, HLD, "borderline" DM (not on meds), right ankle fx, L ALEXANDRA, herniated disc surgery who presents for acute SOB, hypoxemia and hypertensive urgency (200/100 at time of arrival). Patient reports that she took her BP medication 2 days ago (the morning of the day she was admitted), but she skipped the previous 4 days due to running out of medication. She has been experiencing SOB upon exertion on occasion, for the past month. To alleviate these symptoms, she has been using her Trelegy inhaler for the past week that was prescribed by her PCP. Her SOB came on suddenly 2 days ago and was severe. Patient reports that today she has no SOB and she feels much better. She denies heart palpitations chest pain.    Interval events: Patient seen and examined at bedside. She reports feeling much better. She has no SOB, chest pain, edema, or complaints at this time. Yesterday at 4:30PM she had a headache, and was given acetaminophen which resolved her headache within an hour. Patient was given IV lasix 40 once yesterday. She has remained off Bipap and is on oxygen 2L, with an O2 stat of 96%.  Her blood cultures all came back negative, so her antibiotics were discontinued. Yesterday afternoon a TTE echo with contrast was done, results pending.    REVIEW OF SYSTEMS  Constitutional: No fever, chills, fatigue  Neuro: No headache, numbness, weakness  Resp: No cough, wheezing, mild shortness of breath  CVS: No chest pain, palpitations, leg swelling  GI: No abdominal pain, nausea, vomiting, diarrhea   : No dysuria, frequency, incontinence  Skin: No itching, burning, rashes, or lesions   Msk: No joint pain or swelling      ICU Vital Signs Last 24 Hrs  T(C): 36.9 (2021 03:40), Max: 37.3 (2021 00:00)  T(F): 98.5 (2021 03:40), Max: 99.1 (2021 00:00)  HR: 74 (2021 07:00) (74 - 97)  BP: 132/60 (2021 07:00) (117/57 - 182/78)  BP(mean): 87 (2021 07:00) (80 - 112)  ABP: --  ABP(mean): --  RR: 24 (2021 07:00) (18 - 36)  SpO2: 96% (2021 07:00) (92% - 100%)                            8.7    4.14  )-----------( 105      ( 2021 04:57 )             27.1       06-04    139  |  101  |  27<H>  ----------------------------<  133<H>  4.1   |  32<H>  |  1.10    Ca    8.6      2021 04:57  Phos  2.5     06-04  Mg     1.6     06-04    TPro  6.8  /  Alb  3.4  /  TBili  0.6  /  DBili  x   /  AST  17  /  ALT  20  /  AlkPhos  71  06-04        ABG - ( 2021 18:17 )  pH, Arterial: 7.42  pH, Blood: x     /  pCO2: 46    /  pO2: 116   / HCO3: ?28.4 / Base Excess: 4.6   /  SaO2: ?98.6    PT/INR - ( 2021 04:57 )   PT: 14.9 sec;   INR: 1.29 ratio    PTT - ( 2021 04:57 )  PTT:56.4 sec      PHYSICAL EXAM  General: Patient is alert and oriented x3, in NAD, very pleasant. Patient was sleepy because I woke her up.  CNS: Patient answers all questions appropriately.   HEENT: Head is normocephalic, EOM intact, Pupils equal and round, anicteric, no injection, moist mucus membranes.   Resp: No wheezes, rhonchi, rhales.  CVS: S1, S2, no murmurs, rubs or gallops. No JVD.   Abd: Soft and nontender.  Ext: No edema, cyanosis, tenderness.  Skin: Warm, no rashes                            8.7    4.14  )-----------( 105      ( 2021 04:57 )             27.1       06-04    139  |  101  |  27<H>  ----------------------------<  133<H>  4.1   |  32<H>  |  1.10    Ca    8.6      2021 04:57  Phos  2.5     06-04  Mg     1.6     06-04    TPro  6.8  /  Alb  3.4  /  TBili  0.6  /  DBili  x   /  AST  17  /  ALT  20  /  AlkPhos  71  06-04          ABG - ( 2021 18:17 )  pH, Arterial: 7.42  pH, Blood: x     /  pCO2: 46    /  pO2: 116   / HCO3: ?28.4 / Base Excess: 4.6   /  SaO2: ?98.6     PT/INR - ( 2021 04:57 )   PT: 14.9 sec;   INR: 1.29 ratio      PTT - ( 2021 04:57 )  PTT:56.4 sec    Lactate 2.2           06-02 @ 20:59    Lactate 4.5           06-02 @ 14:31      CARDIAC MARKERS ( 2021 05:49 )  .044 ng/mL / x     / 53 U/L / x     / 2.5 ng/mL  CARDIAC MARKERS ( 2021 20:59 )  .072 ng/mL / x     / 52 U/L / x     / 2.9 ng/mL  CARDIAC MARKERS ( 2021 14:31 )  <.015 ng/mL / x     / x     / x     / 1.7 ng/mL    Urinalysis Basic - ( 2021 17:43 )    Color: Yellow / Appearance: Clear / S.010 / pH: x  Gluc: x / Ketone: Negative  / Bili: Negative / Urobili: Negative   Blood: x / Protein: Negative / Nitrite: Negative   Leuk Esterase: Negative / RBC: 6-10 /HPF / WBC 0-2   Sq Epi: x / Non Sq Epi: Occasional / Bacteria: Occasional      Bedside lung ultrasound: Small pleural effusions still present b/l. Noticeable improvement from yesterday.     CENTRAL LINE: Y/N          DATE INSERTED:              REMOVE: Y/N  BRANTLEY: Y/N                        DATE INSERTED:              REMOVE: Y/N  A-LINE: Y/N                       DATE INSERTED:              REMOVE: Y/N    GLOBAL ISSUE/BEST PRACTICE  Analgesia:   Sedation:   CAM-ICU:   HOB elevation: yes  Stress ulcer prophylaxis:   VTE prophylaxis:   Glycemic control:   Nutrition:   GOC discussion: Y       Patient is a 80y old  Female with PMH lymphoma (RCHOP 5 yrs ago) with RLLobectomy, HTN, HLD, "borderline" DM (not on meds), right ankle fx, L ALEXANDRA, herniated disc surgery who presents for acute SOB, hypoxemia and hypertensive urgency (200/100 at time of arrival). Patient reports that she took her BP medication 2 days ago (the morning of the day she was admitted), but she skipped the previous 4 days due to running out of medication. She has been experiencing SOB upon exertion on occasion, for the past month. To alleviate these symptoms, she has been using her Trelegy inhaler for the past week that was prescribed by her PCP. Her SOB came on suddenly 2 days ago and was severe. Patient reports that today she has no SOB and she feels much better. She denies heart palpitations chest pain.    Interval events: Patient seen and examined at bedside. She reports feeling much better. She has no SOB, chest pain, edema, or complaints at this time. Yesterday at 4:30PM she had a headache, and was given acetaminophen which resolved her headache within an hour. Patient was given IV lasix 40 once yesterday. She has remained off Bipap and is on oxygen 2L, with an O2 stat of 96%.  Her blood cultures all came back negative, so her antibiotics were discontinued. Yesterday afternoon a TTE echo with contrast was done, results pending.    REVIEW OF SYSTEMS  Constitutional: No fever, chills, fatigue  Neuro: No headache, numbness, weakness  Resp: No cough, wheezing, mild shortness of breath  CVS: No chest pain, palpitations, leg swelling  GI: No abdominal pain, nausea, vomiting, diarrhea   : No dysuria, frequency, incontinence  Skin: No itching, burning, rashes, or lesions   Msk: No joint pain or swelling      ICU Vital Signs Last 24 Hrs  T(C): 36.9 (2021 03:40), Max: 37.3 (2021 00:00)  T(F): 98.5 (2021 03:40), Max: 99.1 (2021 00:00)  HR: 74 (2021 07:00) (74 - 97)  BP: 132/60 (2021 07:00) (117/57 - 182/78)  BP(mean): 87 (2021 07:00) (80 - 112)  ABP: --  ABP(mean): --  RR: 24 (2021 07:00) (18 - 36)  SpO2: 96% (2021 07:00) (92% - 100%)                            8.7    4.14  )-----------( 105      ( 2021 04:57 )             27.1       06-04    139  |  101  |  27<H>  ----------------------------<  133<H>  4.1   |  32<H>  |  1.10    Ca    8.6      2021 04:57  Phos  2.5     06-04  Mg     1.6     06-04    TPro  6.8  /  Alb  3.4  /  TBili  0.6  /  DBili  x   /  AST  17  /  ALT  20  /  AlkPhos  71  06-04        ABG - ( 2021 18:17 )  pH, Arterial: 7.42  pH, Blood: x     /  pCO2: 46    /  pO2: 116   / HCO3: ?28.4 / Base Excess: 4.6   /  SaO2: ?98.6    PT/INR - ( 2021 04:57 )   PT: 14.9 sec;   INR: 1.29 ratio    PTT - ( 2021 04:57 )  PTT:56.4 sec      PHYSICAL EXAM  General: Patient is alert and oriented x3, in NAD, very pleasant. Patient was sleepy because I woke her up.  CNS: Patient answers all questions appropriately.   HEENT: Head is normocephalic, EOM intact, Pupils equal and round, anicteric, no injection, moist mucus membranes.   Resp: No wheezes, rhonchi, rhales.  CVS: S1, S2, no murmurs, rubs or gallops. No JVD.   Abd: Soft and nontender.  Ext: No edema, cyanosis, tenderness.  Skin: Warm, no rashes                   Lactate 2.2           06-02 @ 20:59    Lactate 4.5           06-02 @ 14:31      CARDIAC MARKERS ( 2021 05:49 )  .044 ng/mL / x     / 53 U/L / x     / 2.5 ng/mL  CARDIAC MARKERS ( 2021 20:59 )  .072 ng/mL / x     / 52 U/L / x     / 2.9 ng/mL  CARDIAC MARKERS ( 2021 14:31 )  <.015 ng/mL / x     / x     / x     / 1.7 ng/mL    Urinalysis Basic - ( 2021 17:43 )    Color: Yellow / Appearance: Clear / S.010 / pH: x  Gluc: x / Ketone: Negative  / Bili: Negative / Urobili: Negative   Blood: x / Protein: Negative / Nitrite: Negative   Leuk Esterase: Negative / RBC: 6-10 /HPF / WBC 0-2   Sq Epi: x / Non Sq Epi: Occasional / Bacteria: Occasional      Bedside lung ultrasound: Small pleural effusions still present b/l. Noticeable improvement from yesterday.     CENTRAL LINE: Y/N          DATE INSERTED:              REMOVE: Y/N  BRANTLEY: Y/N                        DATE INSERTED:              REMOVE: Y/N  A-LINE: Y/N                       DATE INSERTED:              REMOVE: Y/N    GLOBAL ISSUE/BEST PRACTICE  Analgesia:   Sedation:   CAM-ICU:   HOB elevation: yes  Stress ulcer prophylaxis:   VTE prophylaxis:   Glycemic control:   Nutrition:   GOC discussion: Y

## 2021-06-04 NOTE — PROVIDER CONTACT NOTE (OTHER) - ASSESSMENT
pt is alert and awake, calming speaking with her daughter on the phone. denies chest pain/discomfort.   /76, . pt stated she was agitated because she asked for Xanax and was unable to get it. pt is alert and awake, calmly speaking with her daughter on the phone. denies chest pain/discomfort.   /76, . pt stated she was agitated because she asked for Xanax and was unable to get it.

## 2021-06-04 NOTE — PROGRESS NOTE ADULT - SUBJECTIVE AND OBJECTIVE BOX
Date/Time Patient Seen:  		  Referring MD:   Data Reviewed	       Patient is a 80y old  Female who presents with a chief complaint of SOB (03 Jun 2021 11:02)      Subjective/HPI     PAST MEDICAL & SURGICAL HISTORY:  HLD (hyperlipidemia)    Carotid artery obstruction, left  2016, had carotid surgery 2016    DM (diabetes mellitus)  2    Lung mass  RLL, 2016  lymphoma, had RL lobectomy &amp; chemotherapy    Hip fracture  left femur, had pins placed , 2016    Lumbar radiculopathy    Hyperlipidemia    Lymphoma    Former smoker    Hip fracture  &quot;left hip 2016 &amp; had surgery  pins placed&quot;    Right lower lobe lung mass  Right lower lobectomy 2016   &amp; chemotherapy  till 01/2017    Status post carotid surgery  carotid artery stenosis 2016, left    No significant past surgical history          Medication list         MEDICATIONS  (STANDING):  budesonide  80 MICROgram(s)/formoterol 4.5 MICROgram(s) Inhaler 2 Puff(s) Inhalation two times a day  furosemide   Injectable 40 milliGRAM(s) IV Push daily  heparin   Injectable 5000 Unit(s) SubCutaneous every 8 hours  lisinopril 20 milliGRAM(s) Oral daily  tiotropium 18 MICROgram(s) Capsule 1 Capsule(s) Inhalation daily    MEDICATIONS  (PRN):  labetalol Injectable 10 milliGRAM(s) IV Push every 4 hours PRN SBP >160 and/or HR >90         Vitals log        ICU Vital Signs Last 24 Hrs  T(C): 36.9 (04 Jun 2021 03:40), Max: 37.3 (04 Jun 2021 00:00)  T(F): 98.5 (04 Jun 2021 03:40), Max: 99.1 (04 Jun 2021 00:00)  HR: 80 (04 Jun 2021 04:00) (80 - 97)  BP: 145/64 (04 Jun 2021 04:00) (95/45 - 182/78)  BP(mean): 92 (04 Jun 2021 04:00) (65 - 112)  ABP: --  ABP(mean): --  RR: 18 (04 Jun 2021 04:00) (18 - 36)  SpO2: 98% (04 Jun 2021 04:00) (92% - 100%)           Input and Output:  I&O's Detail    02 Jun 2021 07:01  -  03 Jun 2021 07:00  --------------------------------------------------------  IN:    IV PiggyBack: 50 mL  Total IN: 50 mL    OUT:    Indwelling Catheter - Urethral (mL): 3200 mL  Total OUT: 3200 mL    Total NET: -3150 mL      03 Jun 2021 07:01  -  04 Jun 2021 05:46  --------------------------------------------------------  IN:    Oral Fluid: 100 mL  Total IN: 100 mL    OUT:    Indwelling Catheter - Urethral (mL): 2915 mL  Total OUT: 2915 mL    Total NET: -2815 mL          Lab Data                        8.7    4.14  )-----------( 105      ( 04 Jun 2021 04:57 )             27.1     06-04    139  |  101  |  27<H>  ----------------------------<  133<H>  4.1   |  32<H>  |  1.10    Ca    8.6      04 Jun 2021 04:57  Phos  2.5     06-04  Mg     1.6     06-04    TPro  6.8  /  Alb  3.4  /  TBili  0.6  /  DBili  x   /  AST  17  /  ALT  20  /  AlkPhos  71  06-04    ABG - ( 02 Jun 2021 18:17 )  pH, Arterial: 7.42  pH, Blood: x     /  pCO2: 46    /  pO2: 116   / HCO3: ?28.4 / Base Excess: 4.6   /  SaO2: ?98.6             CARDIAC MARKERS ( 03 Jun 2021 05:49 )  .044 ng/mL / x     / 53 U/L / x     / 2.5 ng/mL  CARDIAC MARKERS ( 02 Jun 2021 20:59 )  .072 ng/mL / x     / 52 U/L / x     / 2.9 ng/mL  CARDIAC MARKERS ( 02 Jun 2021 14:31 )  <.015 ng/mL / x     / x     / x     / 1.7 ng/mL        Review of Systems	      Objective     Physical Examination    heart s1s2  lung dc BS  abd soft  head nc      Pertinent Lab findings & Imaging      [place]:  NO   Adequate UO     I&O's Detail    02 Jun 2021 07:01  -  03 Jun 2021 07:00  --------------------------------------------------------  IN:    IV PiggyBack: 50 mL  Total IN: 50 mL    OUT:    Indwelling Catheter - Urethral (mL): 3200 mL  Total OUT: 3200 mL    Total NET: -3150 mL      03 Jun 2021 07:01  -  04 Jun 2021 05:46  --------------------------------------------------------  IN:    Oral Fluid: 100 mL  Total IN: 100 mL    OUT:    Indwelling Catheter - Urethral (mL): 2915 mL  Total OUT: 2915 mL    Total NET: -2815 mL               Discussed with:     Cultures:	        Radiology

## 2021-06-04 NOTE — PROGRESS NOTE ADULT - ASSESSMENT
80 year old female with PMH lymphoma (RCHOP 5 yrs ago) with RLLobectomy, HTN, HLD, "borderline" DM (not on meds), right ankle fx, L ALEXANDRA, herniated disc surgery. Pt at usual well state of health until this afternoon. While at PT for "pulled muscle" in RLE pt developed sudden onset acute SOB, drove to [employer], felt worse then was driven to [place] ED by daughter erlinda patrica hoyt didn't take her to [facility]. ED reports pt on arrival in acute resp distress, hypoxemic (sat 80s), hypertensive urgency (200/100), diaphoretic, rales bilaterally. Failed NRB, given SL NTG x 2, lasix 40 IV x 1, solumedrol 125mg IV x 1, placed on BiPAP.  off bipap - oxygenation improved  ct chest with pna and small eff, crazy paving seen on CT parenchyma -   eval for Acute Infectious Process - done - ID eval noted  I and O  cardio eval noted - on lasix - cvs rx regimen - HF eval - AS eval in progress -   cx and markers in progress  monitor VS and HD and Sat  ICU management in progress  cvs rx regimen, TTE planned,

## 2021-06-05 ENCOUNTER — TRANSCRIPTION ENCOUNTER (OUTPATIENT)
Age: 81
End: 2021-06-05

## 2021-06-05 DIAGNOSIS — J81.0 ACUTE PULMONARY EDEMA: ICD-10-CM

## 2021-06-05 LAB
ALBUMIN SERPL ELPH-MCNC: 3.4 G/DL — SIGNIFICANT CHANGE UP (ref 3.3–5)
ALP SERPL-CCNC: 73 U/L — SIGNIFICANT CHANGE UP (ref 40–120)
ALT FLD-CCNC: 15 U/L — SIGNIFICANT CHANGE UP (ref 12–78)
ANION GAP SERPL CALC-SCNC: 5 MMOL/L — SIGNIFICANT CHANGE UP (ref 5–17)
APTT BLD: 64.2 SEC — HIGH (ref 27.5–35.5)
AST SERPL-CCNC: 13 U/L — LOW (ref 15–37)
BASOPHILS # BLD AUTO: 0.04 K/UL — SIGNIFICANT CHANGE UP (ref 0–0.2)
BASOPHILS NFR BLD AUTO: 0.9 % — SIGNIFICANT CHANGE UP (ref 0–2)
BILIRUB SERPL-MCNC: 0.4 MG/DL — SIGNIFICANT CHANGE UP (ref 0.2–1.2)
BUN SERPL-MCNC: 27 MG/DL — HIGH (ref 7–23)
CALCIUM SERPL-MCNC: 8.6 MG/DL — SIGNIFICANT CHANGE UP (ref 8.5–10.1)
CHLORIDE SERPL-SCNC: 101 MMOL/L — SIGNIFICANT CHANGE UP (ref 96–108)
CO2 SERPL-SCNC: 33 MMOL/L — HIGH (ref 22–31)
CREAT SERPL-MCNC: 0.97 MG/DL — SIGNIFICANT CHANGE UP (ref 0.5–1.3)
EOSINOPHIL # BLD AUTO: 0.17 K/UL — SIGNIFICANT CHANGE UP (ref 0–0.5)
EOSINOPHIL NFR BLD AUTO: 3.7 % — SIGNIFICANT CHANGE UP (ref 0–6)
GLUCOSE SERPL-MCNC: 122 MG/DL — HIGH (ref 70–99)
HCT VFR BLD CALC: 30 % — LOW (ref 34.5–45)
HGB BLD-MCNC: 9.6 G/DL — LOW (ref 11.5–15.5)
IMM GRANULOCYTES NFR BLD AUTO: 0.2 % — SIGNIFICANT CHANGE UP (ref 0–1.5)
INR BLD: 1.16 RATIO — SIGNIFICANT CHANGE UP (ref 0.88–1.16)
LYMPHOCYTES # BLD AUTO: 1.23 K/UL — SIGNIFICANT CHANGE UP (ref 1–3.3)
LYMPHOCYTES # BLD AUTO: 27 % — SIGNIFICANT CHANGE UP (ref 13–44)
MAGNESIUM SERPL-MCNC: 2.4 MG/DL — SIGNIFICANT CHANGE UP (ref 1.6–2.6)
MCHC RBC-ENTMCNC: 28.6 PG — SIGNIFICANT CHANGE UP (ref 27–34)
MCHC RBC-ENTMCNC: 32 GM/DL — SIGNIFICANT CHANGE UP (ref 32–36)
MCV RBC AUTO: 89.3 FL — SIGNIFICANT CHANGE UP (ref 80–100)
MONOCYTES # BLD AUTO: 0.52 K/UL — SIGNIFICANT CHANGE UP (ref 0–0.9)
MONOCYTES NFR BLD AUTO: 11.4 % — SIGNIFICANT CHANGE UP (ref 2–14)
NEUTROPHILS # BLD AUTO: 2.59 K/UL — SIGNIFICANT CHANGE UP (ref 1.8–7.4)
NEUTROPHILS NFR BLD AUTO: 56.8 % — SIGNIFICANT CHANGE UP (ref 43–77)
NRBC # BLD: 0 /100 WBCS — SIGNIFICANT CHANGE UP (ref 0–0)
PHOSPHATE SERPL-MCNC: 3.1 MG/DL — SIGNIFICANT CHANGE UP (ref 2.5–4.5)
PLATELET # BLD AUTO: 154 K/UL — SIGNIFICANT CHANGE UP (ref 150–400)
POTASSIUM SERPL-MCNC: 4.3 MMOL/L — SIGNIFICANT CHANGE UP (ref 3.5–5.3)
POTASSIUM SERPL-SCNC: 4.3 MMOL/L — SIGNIFICANT CHANGE UP (ref 3.5–5.3)
PROT SERPL-MCNC: 6.9 G/DL — SIGNIFICANT CHANGE UP (ref 6–8.3)
PROTHROM AB SERPL-ACNC: 13.5 SEC — SIGNIFICANT CHANGE UP (ref 10.6–13.6)
RBC # BLD: 3.36 M/UL — LOW (ref 3.8–5.2)
RBC # FLD: 13.5 % — SIGNIFICANT CHANGE UP (ref 10.3–14.5)
SODIUM SERPL-SCNC: 139 MMOL/L — SIGNIFICANT CHANGE UP (ref 135–145)
WBC # BLD: 4.56 K/UL — SIGNIFICANT CHANGE UP (ref 3.8–10.5)
WBC # FLD AUTO: 4.56 K/UL — SIGNIFICANT CHANGE UP (ref 3.8–10.5)

## 2021-06-05 PROCEDURE — 93970 EXTREMITY STUDY: CPT | Mod: 26

## 2021-06-05 PROCEDURE — 99233 SBSQ HOSP IP/OBS HIGH 50: CPT

## 2021-06-05 PROCEDURE — 71045 X-RAY EXAM CHEST 1 VIEW: CPT | Mod: 26

## 2021-06-05 RX ORDER — FUROSEMIDE 40 MG
1 TABLET ORAL
Qty: 30 | Refills: 0 | DISCHARGE
Start: 2021-06-05 | End: 2021-07-04

## 2021-06-05 RX ORDER — ALPRAZOLAM 0.25 MG
0.25 TABLET ORAL ONCE
Refills: 0 | Status: DISCONTINUED | OUTPATIENT
Start: 2021-06-05 | End: 2021-06-05

## 2021-06-05 RX ORDER — LANOLIN ALCOHOL/MO/W.PET/CERES
3 CREAM (GRAM) TOPICAL AT BEDTIME
Refills: 0 | Status: DISCONTINUED | OUTPATIENT
Start: 2021-06-05 | End: 2021-06-09

## 2021-06-05 RX ORDER — FUROSEMIDE 40 MG
1 TABLET ORAL
Qty: 30 | Refills: 0
Start: 2021-06-05 | End: 2021-07-04

## 2021-06-05 RX ORDER — LANOLIN ALCOHOL/MO/W.PET/CERES
1 CREAM (GRAM) TOPICAL
Qty: 0 | Refills: 0 | DISCHARGE
Start: 2021-06-05

## 2021-06-05 RX ORDER — ATORVASTATIN CALCIUM 80 MG/1
1 TABLET, FILM COATED ORAL
Qty: 0 | Refills: 0 | DISCHARGE

## 2021-06-05 RX ADMIN — TIOTROPIUM BROMIDE 1 CAPSULE(S): 18 CAPSULE ORAL; RESPIRATORY (INHALATION) at 05:46

## 2021-06-05 RX ADMIN — HEPARIN SODIUM 5000 UNIT(S): 5000 INJECTION INTRAVENOUS; SUBCUTANEOUS at 13:11

## 2021-06-05 RX ADMIN — BUDESONIDE AND FORMOTEROL FUMARATE DIHYDRATE 2 PUFF(S): 160; 4.5 AEROSOL RESPIRATORY (INHALATION) at 05:46

## 2021-06-05 RX ADMIN — LISINOPRIL 20 MILLIGRAM(S): 2.5 TABLET ORAL at 05:45

## 2021-06-05 RX ADMIN — Medication 0.25 MILLIGRAM(S): at 22:20

## 2021-06-05 RX ADMIN — HEPARIN SODIUM 5000 UNIT(S): 5000 INJECTION INTRAVENOUS; SUBCUTANEOUS at 05:45

## 2021-06-05 RX ADMIN — BUDESONIDE AND FORMOTEROL FUMARATE DIHYDRATE 2 PUFF(S): 160; 4.5 AEROSOL RESPIRATORY (INHALATION) at 17:05

## 2021-06-05 RX ADMIN — HEPARIN SODIUM 5000 UNIT(S): 5000 INJECTION INTRAVENOUS; SUBCUTANEOUS at 21:16

## 2021-06-05 RX ADMIN — Medication 20 MILLIGRAM(S): at 05:45

## 2021-06-05 NOTE — CONSULT NOTE ADULT - ASSESSMENT
79 y/o woman w hx lymphoma (dx'd 5 yrs ago via right lung bx/resection w axillary LN involvement, post RCHOP under care Dr Kandy Griffin [facility], last seen 12/2019, has not been back cue to COVID pandemic), HTN, HLD, "borderline" DM (not on meds), right ankle fx, L ALEXANDRA, herniated disc surgery.  Reports 1 mon hx in SOB/IVAN, dry cough, no fever/anorexia/weight loss, denies sweats but daughter reports sweats "all the time", respiratory sx much improved when traveled to [place] 2 wks ago.  Adm w acute exacerbation of SOB while doing PT. When seen in ER was in acute resp distress, hypoxemic (sat 80s), hypertensive urgency (200/100), diaphoretic, rales bilaterally. Failed NRB, given SL NTG x 2, lasix 40 IV x 1, solumedrol 125mg IV x 1, placed on BiPAP.  Feeling much improved today, off BiPAP.  CT chest w extensive groundglass infiltrates in lungs w relative sparring of Left base, focus of ilnear scar or atelectasis lateral lung base, subpleural scarring RUL and lat RLL, sm luis alberto effusions. No LADs. Sm ascites.  Echo no sig left or right heart ds    -CT chest findings-no lymphadenopathy, no distinct masses. ?infectious. skye viral as WBC sl low on admission(higher now but may be due to the steroid) Already seen by Pulm who also feels likely infectious etiology, w/u in progress  -continue present management, instructed once discharged to obtain tests reports and CT chest disc to bring to own Heme/Onc to f/u as well  -anemia-may be from chronic ds, acute illness. Improved since admission, will check iron, B12, folate, thyroid function, monitor serially    further heme/onc recommendations pending above  discussed w pt and daughter  thank you, will follow w you

## 2021-06-05 NOTE — DISCHARGE NOTE PROVIDER - NSDCCPCAREPLAN_GEN_ALL_CORE_FT
PRINCIPAL DISCHARGE DIAGNOSIS  Diagnosis: Respiratory distress  Assessment and Plan of Treatment: follow up with lung Dr. GRANT       PRINCIPAL DISCHARGE DIAGNOSIS  Diagnosis: Respiratory distress  Assessment and Plan of Treatment: follow up with lung Dr. GRANT, cardiologist dr Everett.  Respiratory distress was secondary to fluid overload from diastolic heart dysfunction      SECONDARY DISCHARGE DIAGNOSES  Diagnosis: Paroxysmal atrial fibrillation  Assessment and Plan of Treatment: per cardiologist recommendations, eliquis for stroke prevention, and amiodarone to help control heart rate

## 2021-06-05 NOTE — SWALLOW BEDSIDE ASSESSMENT ADULT - COMMENTS
Per charting, the patient is a "80 year old female with PMH lymphoma (RCHOP 5 yrs ago) with RLLobectomy, HTN, HLD, "borderline" DM (not on meds), right ankle fx, L ALEXANDRA, herniated disc surgery. adm after acuute onset of SOB."    CT CHEST 6/2: "Small bilateral pleural effusions with loculation of the right pleural fluid in a subpulmonic location. Extensive bilateral groundglass infiltrates. Upper abdomen demonstrates ascites and probable splenomegaly. There is also cholelithiasis. Postsurgical changes with surgical clips in the right perihilar region"    Consult received and chart reviewed. Patient seen at bedside this morning for an assessment of swallow function, at which time she was awake/alert and oriented X3. The patient was able to follow directives, communicate basic wants/needs and engage in conversational discourse with SLP. Patient stated the "vegetables are too hard" and requested a soft solid diet.

## 2021-06-05 NOTE — DISCHARGE NOTE PROVIDER - CARE PROVIDER_API CALL
Raheem Juarez  FAMILY MEDICINE  [address] [id number]  Phone: [phone]  Fax: [phone]  Follow Up Time:     Arnaud Fisher  INTERNAL MEDICINE  [address] [facility] [address]  [place] [address]  Phone: [phone]  Fax: [phone]  Follow Up Time:     Raleigh Everett)  Cardiovascular Disease; Internal Medicine  [facility], [address] [place] [address], [address] [place] [address]  Phone: [phone]  Fax: [phone]  Follow Up Time:     Amanda Kennedy)  Medical Oncology  [address] [facility], [place] [address]  Phone: [phone]  Fax: [phone]  Follow Up Time:

## 2021-06-05 NOTE — SWALLOW BEDSIDE ASSESSMENT ADULT - SWALLOW EVAL: DIAGNOSIS
1. The patient demonstrated functional oral management of puree, mechanical soft and thin liquid textures marked by adequate bolus collection, transfer and posterior transport. 2. Mild oral dysphagia for regular solids marked by increased mastication time/prolonged manipulation resulting in delayed bolus collection, transfer and posterior transport. Trace lingual residue noted subsequent to deglutition which cleared with a liquid wash. 3. Patient demonstrated judged functional pharyngeal skills for all consistencies trialed marked by suspected timely initiation of pharyngeal swallow trigger with + hyolaryngeal elevation noted upon digital palpation without evidence of airway penetration.

## 2021-06-05 NOTE — PROGRESS NOTE ADULT - SUBJECTIVE AND OBJECTIVE BOX
[facility] -- Mg Win Grossman, [place], Bennie Hobbs Savella, Goodger: Office # [phone]    Follow Up:  Afib, HTN, CHF    Subjective/Observations: Patient seen and examined. Patient awake, alert, resting in bed. No complaints of chest pain, dyspnea, palpitations or dizziness. No signs of orthopnea or PND. Tolerating O2 via nasal cannula. Had episode of A fib RVR overnight lasting almost 45 minutes     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, had RL lobectomy &amp; chemotherapy    Hip fracture  left femur, had pins placed , 2016    Lumbar radiculopathy    Hyperlipidemia    Lymphoma    Former smoker    Hip fracture  &quot;left hip 2016 &amp; had surgery  pins placed&quot;    Right lower lobe lung mass  Right lower lobectomy 2016   &amp; chemotherapy  till 01/2017    Status post carotid surgery  carotid artery stenosis 2016, left    No significant past surgical history    MEDICATIONS  (STANDING):  budesonide  80 MICROgram(s)/formoterol 4.5 MICROgram(s) Inhaler 2 Puff(s) Inhalation two times a day  furosemide    Tablet 20 milliGRAM(s) Oral daily  heparin   Injectable 5000 Unit(s) SubCutaneous every 8 hours  lisinopril 20 milliGRAM(s) Oral daily  tiotropium 18 MICROgram(s) Capsule 1 Capsule(s) Inhalation daily    MEDICATIONS  (PRN):  zaleplon 5 milliGRAM(s) Oral once PRN Insomnia    Allergies  aspirin (Unknown)    Intolerances  aspirin (Stomach Upset)    Vital Signs Last 24 Hrs  T(C): 36.4 (05 Jun 2021 04:19), Max: 36.7 (04 Jun 2021 20:07)  T(F): 97.6 (05 Jun 2021 04:19), Max: 98 (04 Jun 2021 20:07)  HR: 76 (05 Jun 2021 04:19) (76 - 140)  BP: 126/60 (05 Jun 2021 04:19) (118/53 - 159/67)  BP(mean): 85 (04 Jun 2021 19:00) (76 - 115)  RR: 19 (05 Jun 2021 04:19) (15 - 29)  SpO2: 99% (05 Jun 2021 04:19) (80% - 100%)  I&O's Summary    04 Jun 2021 07:01  -  05 Jun 2021 07:00  --------------------------------------------------------  IN: 0 mL / OUT: 2340 mL / NET: -2340 mL      Weight (kg): 53 (06-04 @ 20:07)    TELE: SR-> Afib -150s --> SR 70-80s   PHYSICAL EXAM:  Appearance: NAD, no distress, alert, Frail   HEENT: Moist Mucous Membranes, Anicteric  Cardiovascular: Regular rate and rhythm, Normal S1 S2, No JVD, + murmurs, No rubs, gallops or clicks  Respiratory: Non-labored, Clear to auscultation, No rales, No rhonchi, No wheezing.   Gastrointestinal:  Soft, Non-tender, + BS  Neurologic: Non-focal  Skin: Warm and dry, No visible rashes or ulcers, No ecchymosis, No cyanosis  Musculoskeletal: No clubbing, No cyanosis, No joint swelling/tenderness  Psychiatry: Mood & affect appropriate  Lymph: No peripheral edema.     LABS: All Labs Reviewed:                        9.6    4.56  )-----------( 154      ( 05 Jun 2021 06:36 )             30.0                         8.7    4.14  )-----------( 105      ( 04 Jun 2021 04:57 )             27.1                         8.4    3.98  )-----------( 108      ( 03 Jun 2021 05:49 )             25.5     05 Jun 2021 06:36    139    |  101    |  27     ----------------------------<  122    4.3     |  33     |  0.97   04 Jun 2021 15:12    136    |  98     |  26     ----------------------------<  126    3.5     |  33     |  1.00   04 Jun 2021 04:57    139    |  101    |  27     ----------------------------<  133    4.1     |  32     |  1.10     Ca    8.6        05 Jun 2021 06:36  Ca    9.1        04 Jun 2021 15:12  Ca    8.6        04 Jun 2021 04:57  Phos  3.1       05 Jun 2021 06:36  Phos  2.4       04 Jun 2021 15:12  Phos  2.5       04 Jun 2021 04:57  Mg     2.4       05 Jun 2021 06:36  Mg     2.6       04 Jun 2021 15:12  Mg     1.6       04 Jun 2021 04:57    TPro  6.9    /  Alb  3.4    /  TBili  0.4    /  DBili  x      /  AST  13     /  ALT  15     /  AlkPhos  73     05 Jun 2021 06:36  TPro  6.8    /  Alb  3.4    /  TBili  0.6    /  DBili  x      /  AST  17     /  ALT  20     /  AlkPhos  71     04 Jun 2021 04:57  TPro  6.2    /  Alb  3.2    /  TBili  0.4    /  DBili  x      /  AST  27     /  ALT  26     /  AlkPhos  72     03 Jun 2021 05:49    PT/INR - ( 05 Jun 2021 06:36 )   PT: 13.5 sec;   INR: 1.16 ratio    PTT - ( 05 Jun 2021 06:36 )  PTT:64.2 sec  Creatine Kinase, Serum: 53 U/L (06-03-21 @ 05:49)  Troponin I, Serum: .044 ng/mL (06-03-21 @ 05:49)  Creatine Kinase, Serum: 52 U/L (06-02-21 @ 20:59)    Lactate, Blood: 2.2 mmol/L (06-02-21 @ 20:59)  Lactate, Blood: 4.5 mmol/L (06-02-21 @ 14:31)    12 Lead ECG:   Ventricular Rate 96 BPM  Atrial Rate 96 BPM  P-R Interval 172 ms  QRS Duration 106 ms  Q-T Interval 382 ms  QTC Calculation(Bazett) 482 ms  P Axis 43 degrees  R Axis -50 degrees  T Axis 41 degrees  Diagnosis Line Normal sinus rhythm  Left axis deviation  Confirmed by emanuel Elias ([id number]) on 6/3/2021 3:31:49 PM (06-03-21 @ 09:06)    < from: TTE Echo Complete w/o Contrast w/ Doppler (06.03.21 @ 20:01) >  Dimensions:  LA 3.7       Normal Values: 2.0 - 4.0 cm  Ao 2.7        Normal Values: 2.0 - 3.8 cm  SEPTUM 1.0       Normal Values: 0.6 - 1.2 cm  PWT 0.9       Normal Values: 0.6 - 1.1 cm  LVIDd 5.2         Normal Values: 3.0 - 5.6 cm  LVIDs 3.8         Normal Values: 1.8 - 4.0 cm    OBSERVATIONS:  Mitral Valve: Mild MR.  Aortic Valve/Aorta: Calcified trileaflet aortic valve with decreased opening. Peak transaortic valve gradient equals 15 mmHg with a mean transaortic valve gradient of 9 mmHg. This consistent with mild aortic stenosis.  Tricuspid Valve: Mild TR.  Pulmonic Valve: Not well-visualized  Left Atrium: normal  Right Atrium: Not well-visualized  Left Ventricle: normal LV size and systolic function, estimated LVEF of 55%.  Right Ventricle: Grossly normal size and systolic function.  Pericardium: no significant pericardial effusion.  Small bilateral pleural effusions    IMPRESSION:  Normal left ventricular internal dimensions and systolic function, estimated LVEF of 55%.  Grossly normal RV size and systolic function.  Calcified trileaflet aortic valve with mild aortic stenosis  Mild MR and TR.  No significant pericardial effusion.  < end of copied text >    < from: CT Chest No Cont (06.02.21 @ 17:08) >  IMPRESSION: Small bilateral pleural effusions with loculation of the right pleural fluid in a subpulmonic location.  Extensive bilateral groundglass infiltrates  Upper abdomen demonstrates ascites and probable splenomegaly. There is also cholelithiasis.  Postsurgical changes with surgical clips in the right perihilar region  < end of copied text >    < from: Xray Chest 1 View- PORTABLE-Urgent (Xray Chest 1 View- PORTABLE-Urgent .) (06.02.21 @ 15:10) >  IMPRESSION:   Bilateral lung diffuse airspace disease. RIGHT apical/RIGHT basilar pleural thickening  and/or basilar airspace consolidation.  < end of copied text >

## 2021-06-05 NOTE — DISCHARGE NOTE PROVIDER - NSDCMRMEDTOKEN_GEN_ALL_CORE_FT
acetaminophen 325 mg oral tablet: 2 tab(s) orally every 6 hours, As needed, Temp greater or equal to 38C (100.4F), Mild Pain (1 - 3)  atorvastatin 20 mg oral tablet: 1 tab(s) orally once a day (at bedtime)  docusate sodium 100 mg oral capsule: 1 cap(s) orally 3 times a day  furosemide 20 mg oral tablet: 1 tab(s) orally once a day  glimepiride 2 mg oral tablet: 1 tab(s) orally once a day  lisinopril 20 mg oral tablet: 1 tab(s) orally once a day  melatonin 3 mg oral tablet: 1 tab(s) orally once a day (at bedtime)  senna oral tablet: 2 tab(s) orally once a day (at bedtime)  Trelegy Ellipta 100 mcg-62.5 mcg-25 mcg/inh inhalation powder: 1 puff(s) inhaled once a day    *New regimen   acetaminophen 325 mg oral tablet: 2 tab(s) orally every 6 hours, As needed, Temp greater or equal to 38C (100.4F), Mild Pain (1 - 3)  amiodarone 200 mg oral tablet: 1 tab(s) orally once a day   atorvastatin 20 mg oral tablet: 1 tab(s) orally once a day (at bedtime)  docusate sodium 100 mg oral capsule: 1 cap(s) orally 3 times a day  Eliquis 5 mg oral tablet: 1 tab(s) orally 2 times a day   FeroSul 325 mg (65 mg elemental iron) oral tablet: 1 tab(s) orally once a day   furosemide 20 mg oral tablet: 1 tab(s) orally once a day  glimepiride 2 mg oral tablet: 1 tab(s) orally once a day  lisinopril 20 mg oral tablet: 1 tab(s) orally once a day  melatonin 3 mg oral tablet: 1 tab(s) orally once a day (at bedtime)  senna oral tablet: 2 tab(s) orally once a day (at bedtime)  Trelegy Ellipta 100 mcg-62.5 mcg-25 mcg/inh inhalation powder: 1 puff(s) inhaled once a day    *New regimen

## 2021-06-05 NOTE — OCCUPATIONAL THERAPY INITIAL EVALUATION ADULT - ADDITIONAL COMMENTS
Patient lives on the main floor of a house with no steps. Patient has a stall shower with grab bars. Patient owns a shower chair, rollator, knee scooter, commode and  shoe horn. Patient drives a car and reports that she gets her groceries delivered by [employer]. Patient reports that she sustained a right foot fracture 12/2020 that she wore a Cam boot for and went to outpatient PT. Patient was recently going to outpatient PT to improve strength Right LE. Patient performed ADLs and functional ambulation in hospital room with no devices at a supervision level. Patient reports that her daughter lives nearby and that she lives with a friend who is available to assist her upon d/c home prn.

## 2021-06-05 NOTE — PROGRESS NOTE ADULT - SUBJECTIVE AND OBJECTIVE BOX
Patient is a 80y old  Female who presents with a chief complaint of sob (04 Jun 2021 13:46)      INTERVAL /OVERNIGHT EVENTS: feels better    MEDICATIONS  (STANDING):  budesonide  80 MICROgram(s)/formoterol 4.5 MICROgram(s) Inhaler 2 Puff(s) Inhalation two times a day  furosemide    Tablet 20 milliGRAM(s) Oral daily  heparin   Injectable 5000 Unit(s) SubCutaneous every 8 hours  lisinopril 20 milliGRAM(s) Oral daily  melatonin 3 milliGRAM(s) Oral at bedtime  tiotropium 18 MICROgram(s) Capsule 1 Capsule(s) Inhalation daily    MEDICATIONS  (PRN):      Allergies    aspirin (Unknown)    Intolerances    aspirin (Stomach Upset)      REVIEW OF SYSTEMS:  CONSTITUTIONAL: No fever, weight loss, or fatigue  EYES: No eye pain, visual disturbances, or discharge  ENMT:  No difficulty hearing, tinnitus, vertigo; No sinus or throat pain  NECK: No pain or stiffness  RESPIRATORY: No cough, wheezing, chills or hemoptysis; No shortness of breath  CARDIOVASCULAR: No chest pain, palpitations, dizziness, or leg swelling  GASTROINTESTINAL: No abdominal or epigastric pain. No nausea, vomiting, or hematemesis; No diarrhea or constipation. No melena or hematochezia.  GENITOURINARY: No dysuria, frequency, hematuria, or incontinence  NEUROLOGICAL: No headaches, memory loss, loss of strength, numbness, or tremors  SKIN: No itching, burning, rashes, or lesions   LYMPH NODES: No enlarged glands  ENDOCRINE: No heat or cold intolerance; No hair loss; No polydipsia or polyuria  MUSCULOSKELETAL: No joint pain or swelling; No muscle, back, or extremity pain  PSYCHIATRIC: No depression, anxiety, mood swings, or difficulty sleeping  HEME/LYMPH: No easy bruising, or bleeding gums  ALLERGY AND IMMUNOLOGIC: No hives or eczema    Vital Signs Last 24 Hrs  T(C): 36.5 (05 Jun 2021 12:37), Max: 36.7 (04 Jun 2021 20:07)  T(F): 97.7 (05 Jun 2021 12:37), Max: 98 (04 Jun 2021 20:07)  HR: 65 (05 Jun 2021 12:37) (65 - 140)  BP: 145/67 (05 Jun 2021 12:37) (126/59 - 152/67)  BP(mean): 85 (04 Jun 2021 19:00) (85 - 115)  RR: 18 (05 Jun 2021 12:37) (15 - 28)  SpO2: 100% (05 Jun 2021 12:37) (95% - 100%)    PHYSICAL EXAM:  GENERAL: NAD, well-groomed, well-developed  HEAD:  Atraumatic, Normocephalic  EYES: EOMI, PERRLA, conjunctiva and sclera clear  ENMT: No tonsillar erythema, exudates, or enlargement; Moist mucous membranes, Good dentition, No lesions  NECK: Supple, No JVD, Normal thyroid  NERVOUS SYSTEM:  Alert & Oriented X3, Good concentration; Motor Strength 5/5 B/L upper and lower extremities; DTRs 2+ intact and symmetric  CHEST/LUNG: Clear to auscultation bilaterally; No rales, rhonchi, wheezing, or rubs  HEART: Regular rate and rhythm; No murmurs, rubs, or gallops  ABDOMEN: Soft, Nontender, Nondistended; Bowel sounds present  EXTREMITIES:  2+ Peripheral Pulses, No clubbing, cyanosis, or edema  LYMPH: No lymphadenopathy noted  SKIN: No rashes or lesions    LABS:                        9.6    4.56  )-----------( 154      ( 05 Jun 2021 06:36 )             30.0     05 Jun 2021 06:36    139    |  101    |  27     ----------------------------<  122    4.3     |  33     |  0.97     Ca    8.6        05 Jun 2021 06:36  Phos  3.1       05 Jun 2021 06:36  Mg     2.4       05 Jun 2021 06:36    TPro  6.9    /  Alb  3.4    /  TBili  0.4    /  DBili  x      /  AST  13     /  ALT  15     /  AlkPhos  73     05 Jun 2021 06:36    PT/INR - ( 05 Jun 2021 06:36 )   PT: 13.5 sec;   INR: 1.16 ratio         PTT - ( 05 Jun 2021 06:36 )  PTT:64.2 sec    CAPILLARY BLOOD GLUCOSE          RADIOLOGY & ADDITIONAL TESTS:    Notes Reviewed:  [x ] YES  [ ] NO    Care Discussed with Consultants/Other Providers [x ] YES  [ ] NO

## 2021-06-05 NOTE — DISCHARGE NOTE PROVIDER - NSDCDCMDCOMP_GEN_ALL_CORE
PT DAILY TREATMENT NOTE 10-18    Patient Name: Belkis Saenz  Date:2020  : 1976  [x]  Patient  Verified  Payor: [address] [place] [address] Road / Plan: Theresa Hoof / Product Type: Workers Comp /    In time:1:15  Out time:1:56  Total Treatment Time (min): 41  Visit #: 5 of 9    Treatment Area: Low back pain [M54.5]    SUBJECTIVE  Pain Level (0-10 scale): 5  Any medication changes, allergies to medications, adverse drug reactions, diagnosis change, or new procedure performed?: [x] No    [] Yes (see summary sheet for update)  Subjective functional status/changes:   [] No changes reported  Pt reports still hurting and pain going into his thigh    OBJECTIVE    31 min Therapeutic Exercise:  [x] See flow sheet :   Rationale: increase ROM and increase strength to improve the patients ability to perform ADLs      10 min Neuromuscular Re-education:  [x]  See flow sheet :   Rationale: increase strength, improve balance and increase proprioception  to improve the patients ability to perform functional tasks            With   [] TE   [] TA   [] neuro   [] other: Patient Education: [x] Review HEP    [] Progressed/Changed HEP based on:   [] positioning   [] body mechanics   [] transfers   [] heat/ice application    [] other:      Other Objective/Functional Measures:   vc's for breathing technique     Pain Level (0-10 scale) post treatment: 4    ASSESSMENT/Changes in Function: Challenged with positional changes, req's extra time for sit to stand due to pain. Cont's to req vc's for breathing technique and to avoid abdominal bulge with TA activation. Only min decr'd pain following treatment today.      Patient will continue to benefit from skilled PT services to modify and progress therapeutic interventions, address functional mobility deficits, address ROM deficits, address strength deficits, analyze and address soft tissue restrictions, analyze and cue movement patterns, analyze and modify body mechanics/ergonomics and assess and modify postural abnormalities to attain remaining goals. []  See Plan of Care  []  See progress note/recertification  []  See Discharge Summary         Progress towards goals / Updated goals:  Short Term Goals: To be accomplished in 1 weeks:  1. Patient will be ind and compliant with HEP 1-2x/day to increase ease with ADLs.              Eval: HEP established               MET     Long Term Goals: To be accomplished in 3 weeks:  1. Patient will improve FOTO to at least 55  to demonstrate improved function. Eval; FOTO: 44              To be addressed at end of POC  2. Patient will report being able to ambulate 20 minutes with pain no more then 3/10 to increase ease with grocery shopping. Eval: Pain level is approximately 6-7/10 at 20 minutes of walking. Pt reports pain levels are never lower than 6/10 pain unless he takes his medicine  3. Patient will be able to perform 5 sit to stand transfers with only 1 UE support to increase ease with car transfers.               Eval: patient utilizes (B) UE support with sit to stand transfers.               No significant changes due to pain        PLAN  []  Upgrade activities as tolerated     [x]  Continue plan of care  []  Update interventions per flow sheet       []  Discharge due to:_  []  Other:_      Henry Florence, PTA 9/9/2020  1:13 PM    Future Appointments   Date Time Provider Tim Chapa   9/9/2020  1:15 PM Lonny Luevano [facility]   9/11/2020  1:15 PM Elian Wiseman PT [facility]   9/14/2020  1:15 PM [facility] 1 [facility]   9/16/2020  1:15 PM [facility] PT [facility] 1 [facility]   9/18/2020  1:15 PM Elian Wiseman PT [facility] This document is complete and the patient is ready for discharge.

## 2021-06-05 NOTE — CONSULT NOTE ADULT - CONSULT REQUESTED DATE/TIME
03-Jun-2021 13:20
05-Jun-2021 14:36
03-Jun-2021 09:35
02-Jun-2021 14:48
02-Jun-2021 15:00
02-Jun-2021 17:48

## 2021-06-05 NOTE — OCCUPATIONAL THERAPY INITIAL EVALUATION ADULT - MD ORDER
79 y/o female with PMH lymphoma (RCHOP 5 yrs ago) with RL Lobectomy, HTN, HLD, "borderline" DM (not on meds) here with an episode of dyspnea. She was found to be significantly hypertensive and dyspneic, and required IV diuresis and bipap HTN urgency Pt presented with dyspnea and volume overload in the setting of hypertensive urgency. OT evaluate and treat. OT evaluate and treat. OOB with assistance.

## 2021-06-05 NOTE — CONSULT NOTE ADULT - CONSULT REASON
Resp distress
op  oa  resp distress
respiratory distress
dyspnea
hx lymphoma, abnormal CT chest, hypoxia
resp distress

## 2021-06-05 NOTE — DISCHARGE NOTE PROVIDER - PROVIDER TOKENS
PROVIDER:[TOKEN:[[id number]]],PROVIDER:[TOKEN:[[id number]]],PROVIDER:[TOKEN:[[id number]]],PROVIDER:[TOKEN:[[id number]]]

## 2021-06-05 NOTE — DISCHARGE NOTE PROVIDER - HOSPITAL COURSE
admitted for acute respiratory failure  likely from acute pulm edema  responded well to diuresis with lasix  HTN - labile  PNA ruled out  DC after PULM and cardio clearance admitted for acute respiratory failure  likely from acute pulm edema  responded well to diuresis with lasix  HTN - labile  PNA ruled out  PAF - now on amiodarone and AC with lovenox  DC after PULM and cardio clearance admitted for acute respiratory failure  likely from acute pulm edema  responded well to diuresis with lasix  HTN - labile  PNA ruled out  PAF - now on amiodarone and - eliquis on dc  DC after PULM and cardio clearance

## 2021-06-05 NOTE — PROGRESS NOTE ADULT - ASSESSMENT
TTE shows valv heart disease - pt is on PO Lasix - Cardio following  ID f/u noted - monitored off ABX  ? need for Inhaler regimen - no hx of COPD  on RA - VS noted - Labs reviewed -     80 year old female with PMH lymphoma (RCHOP 5 yrs ago) with RLLobectomy, HTN, HLD, "borderline" DM (not on meds), right ankle fx, L ALEXANDRA, herniated disc surgery. Pt at usual well state of health until this afternoon. While at PT for "pulled muscle" in RLE pt developed sudden onset acute SOB, drove to [employer], felt worse then was driven to [place] ED by daughter erlinda hoyt,[facility] didn't take her to [facility]. ED reports pt on arrival in acute resp distress, hypoxemic (sat 80s), hypertensive urgency (200/100), diaphoretic, rales bilaterally. Failed NRB, given SL NTG x 2, lasix 40 IV x 1, solumedrol 125mg IV x 1, placed on BiPAP.    off bipap - oxygenation improved  ct chest with pna and small eff, crazy paving seen on CT parenchyma -   eval for Acute Infectious Process - done - ID eval noted - monitored off ABX  I and O  cardio eval noted - on lasix - [facility] rx regimen - TTE reviewed -   cx and markers in progress  monitor VS and HD and Sat

## 2021-06-05 NOTE — PROGRESS NOTE ADULT - SUBJECTIVE AND OBJECTIVE BOX
Date/Time Patient Seen:  		  Referring MD:   Data Reviewed	       Patient is a 80y old  Female who presents with a chief complaint of sob (04 Jun 2021 13:46)      Subjective/HPI     PAST MEDICAL & SURGICAL HISTORY:  HLD (hyperlipidemia)    Carotid artery obstruction, left  2016, had carotid surgery 2016    DM (diabetes mellitus)  2    Lung mass  RLL, 2016  lymphoma, had RL lobectomy &amp; chemotherapy    Hip fracture  left femur, had pins placed , 2016    Lumbar radiculopathy    Hyperlipidemia    Lymphoma    Former smoker    Hip fracture  &quot;left hip 2016 &amp; had surgery  pins placed&quot;    Right lower lobe lung mass  Right lower lobectomy 2016   &amp; chemotherapy  till 01/2017    Status post carotid surgery  carotid artery stenosis 2016, left    No significant past surgical history          Medication list         MEDICATIONS  (STANDING):  budesonide  80 MICROgram(s)/formoterol 4.5 MICROgram(s) Inhaler 2 Puff(s) Inhalation two times a day  furosemide    Tablet 20 milliGRAM(s) Oral daily  heparin   Injectable 5000 Unit(s) SubCutaneous every 8 hours  lisinopril 20 milliGRAM(s) Oral daily  tiotropium 18 MICROgram(s) Capsule 1 Capsule(s) Inhalation daily    MEDICATIONS  (PRN):  zaleplon 5 milliGRAM(s) Oral once PRN Insomnia         Vitals log        ICU Vital Signs Last 24 Hrs  T(C): 36.4 (05 Jun 2021 04:19), Max: 36.7 (04 Jun 2021 20:07)  T(F): 97.6 (05 Jun 2021 04:19), Max: 98 (04 Jun 2021 20:07)  HR: 76 (05 Jun 2021 04:19) (70 - 140)  BP: 126/60 (05 Jun 2021 04:19) (118/53 - 159/67)  BP(mean): 85 (04 Jun 2021 19:00) (76 - 115)  ABP: --  ABP(mean): --  RR: 19 (05 Jun 2021 04:19) (14 - 29)  SpO2: 99% (05 Jun 2021 04:19) (80% - 100%)           Input and Output:  I&O's Detail    03 Jun 2021 07:01  -  04 Jun 2021 07:00  --------------------------------------------------------  IN:    IV PiggyBack: 50 mL    Oral Fluid: 160 mL  Total IN: 210 mL    OUT:    Indwelling Catheter - Urethral (mL): 3235 mL  Total OUT: 3235 mL    Total NET: -3025 mL      04 Jun 2021 07:01  -  05 Jun 2021 06:03  --------------------------------------------------------  IN:  Total IN: 0 mL    OUT:    Indwelling Catheter - Urethral (mL): 1740 mL    Voided (mL): 600 mL  Total OUT: 2340 mL    Total NET: -2340 mL          Lab Data                        8.7    4.14  )-----------( 105      ( 04 Jun 2021 04:57 )             27.1     06-04    136  |  98  |  26<H>  ----------------------------<  126<H>  3.5   |  33<H>  |  1.00    Ca    9.1      04 Jun 2021 15:12  Phos  2.4     06-04  Mg     2.6     06-04    TPro  6.8  /  Alb  3.4  /  TBili  0.6  /  DBili  x   /  AST  17  /  ALT  20  /  AlkPhos  71  06-04            Review of Systems	      Objective     Physical Examination    heart s1s2  lung dec BS  abd soft  head nc  on RA  verbal      Pertinent Lab findings & Imaging      [place]:  NO   Adequate UO     I&O's Detail    03 Jun 2021 07:01  -  04 Jun 2021 07:00  --------------------------------------------------------  IN:    IV PiggyBack: 50 mL    Oral Fluid: 160 mL  Total IN: 210 mL    OUT:    Indwelling Catheter - Urethral (mL): 3235 mL  Total OUT: 3235 mL    Total NET: -3025 mL      04 Jun 2021 07:01  -  05 Jun 2021 06:03  --------------------------------------------------------  IN:  Total IN: 0 mL    OUT:    Indwelling Catheter - Urethral (mL): 1740 mL    Voided (mL): 600 mL  Total OUT: 2340 mL    Total NET: -2340 mL               Discussed with:     Cultures:	        Radiology

## 2021-06-05 NOTE — PROGRESS NOTE ADULT - ASSESSMENT
80 year old female with PMH lymphoma (RCHOP 5 yrs ago) with RL Lobectomy, HTN, HLD, "borderline" DM (not on meds), here with an episode of dyspnea. She was found to be significantly hypertensive and dyspneic, and required IV diuresis and bipap    HTN urgency   - Pt presented with dyspnea and volume overload in the setting of hypertensive urgency  - BP: 126/60 (06-05-21 @ 04:19) (118/53 - 159/67)  - BP improved and there is improvement in her volume and breathing with iv diuresis and nitrates  - S/p IV Lasix and is now on Lasix 20 mg PO daily   - Monitor strict i/o's and daily weights  - Oxygen supplementation as needed,   - ECHO showed normal LV & RV size and function, mild AS, mild MR and TR  - Continue lisinopril for now, and can add vasodilators if needed.  - BP remains labile but is improved overall.     A fib.   - Patient was in SR with PACs on admission with questionable episode of A fib in ICU. Patient went into A fib RVR overnight lasted for 45 minutes, now SR.   - It is possible thatshe evolved heart failure in the setting of A fib RVR  - Would start Amiodarone load 5 gm to keep her out of A fib.   - Patient does have anemia and thrombocytopenia, she is not an ideal AC candidate, but given recurrence, need to consider AC    Anemia/GI Bleed   - Hemoglobin <--9.6, <--8.7, <--8.4  - Hematocrit <--30.0, <--27.1, <--25.5  - Trend CBC    - Monitor and replete lytes, keep K>4, Mg>2.  - All other medical needs as per primary team.  - Other cardiovascular workup will depend on clinical course.  - Will continue to follow.    Jocelyn Grant, MS FNP, [facility]  Nurse Practitioner- Cardiology   Spectra #[phone] 80 year old female with PMH lymphoma (RCHOP 5 yrs ago) with RL Lobectomy, HTN, HLD, "borderline" DM (not on meds), here with an episode of dyspnea. She was found to be significantly hypertensive and dyspneic, and required IV diuresis and bipap    HTN urgency   - Pt presented with dyspnea and volume overload in the setting of hypertensive urgency  - BP: 126/60 (06-05-21 @ 04:19) (118/53 - 159/67)  - BP improved and there is improvement in her volume and breathing with iv diuresis and nitrates  - S/p IV Lasix and is now on Lasix 20 mg PO daily   - Monitor strict i/o's and daily weights  - Oxygen supplementation as needed,   - ECHO showed normal LV & RV size and function, mild AS, mild MR and TR  - Continue lisinopril for now, and can add vasodilators if needed.  - BP remains labile but is improved overall.     A fib.   - Patient was in SR with PACs on admission with questionable episode of A fib in ICU. Patient went into A fib RVR overnight lasted for 45 minutes, now SR.   - It is possible thatshe evolved heart failure in the setting of A fib RVR  - Would start Amiodarone load 5 gm to keep her out of A fib.   - Patient does have anemia and thrombocytopenia, she is not an ideal AC candidate, but given recurrence, need to consider AC    Anemia/GI Bleed   - Hemoglobin <--9.6, <--8.7, <--8.4  - Hematocrit <--30.0, <--27.1, <--25.5  - Trend CBC    Hyperlipidemia  - Resume Lipitor    - Monitor and replete lytes, keep K>4, Mg>2.  - All other medical needs as per primary team.  - Other cardiovascular workup will depend on clinical course.  - Will continue to follow.    Jocelyn Grant, MS FNP, [facility]  Nurse Practitioner- Cardiology   Spectra #[phone]

## 2021-06-05 NOTE — OCCUPATIONAL THERAPY INITIAL EVALUATION ADULT - PERTINENT HX OF CURRENT PROBLEM, REHAB EVAL
81 y/o female with PMH lymphoma (RCHOP 5 yrs ago) with RL Lobectomy, HTN, HLD, "borderline" DM (not on meds) here with an episode of dyspnea. She was found to be significantly hypertensive and dyspneic, and required IV diuresis and bipap HTN urgency Pt presented with dyspnea and volume overload in the setting of hypertensive urgency. 81 y/o female with PMH lymphoma (RCHOP 5 yrs ago) with RL Lobectomy, HTN, HLD, "borderline" DM (not on meds) here with an episode of dyspnea. Patient was found to be significantly hypertensive and dyspneic, and required IV diuresis and bipap. Patient admitted 6/2/2021 with acute respiratory distress. Doppler 6/5 (-) DVT LE's, CT angio 6/6 (-) PE. Patient reports that she fractured her right foot 12/2020.

## 2021-06-05 NOTE — CONSULT NOTE ADULT - SUBJECTIVE AND OBJECTIVE BOX
All records reviewed.  Hx from chart, pt, daughter at bedside    HPI:  79 y/o woman w hx lymphoma (dx'd 5 yrs ago via right lung bx/resection w axillary LN involvement, [facility] last seen 12/2019, has not been back cue to COVID pandemic), HTN, HLD, "borderline" DM (not on meds), right ankle fx, L ALEXANDRA, herniated disc surgery.  Reports 1 mon hx in SOB/IVAN, dry cough, no fever/anorexia/weight loss, denies sweats but daughter reports sweats "all the time", respiratory sx much improved when traveled to [place] 2 wks ago.  Adm w acute exacerbation of SOB while doing PT. When seen in ER was in acute resp distress, hypoxemic (sat 80s), hypertensive urgency (200/100), diaphoretic, rales bilaterally. Failed NRB, given SL NTG x 2, lasix 40 IV x 1, solumedrol 125mg IV x 1, placed on BiPAP.  Feeling much improved today, off BiPAP.  CT chest w extensive groundglass infiltrates in lungs w relative sparring of Left base, focus of ilnear scar or atelectasis lateral lung base, subpleural scarring RUL and lat RLL, sm luis alberto effusions. No LADs. Sm ascites.  Echo no sig left or right heart ds.      PAST MEDICAL & SURGICAL HISTORY:  HLD (hyperlipidemia)    Carotid artery obstruction, left  2016, had carotid surgery 2016    DM (diabetes mellitus)  2    Lung mass  RLL, 2016  lymphoma, had RL lobectomy &amp; chemotherapy    Hip fracture  left femur, had pins placed , 2016    Lumbar radiculopathy    Hyperlipidemia    Lymphoma    Former smoker    Hip fracture  &quot;left hip 2016 &amp; had surgery  pins placed&quot;    Right lower lobe lung mass  Right lower lobectomy 2016   &amp; chemotherapy  till 01/2017    Status post carotid surgery  carotid artery stenosis 2016, left    No significant past surgical history        Review of System:  see above    MEDICATIONS  (STANDING):  budesonide  80 MICROgram(s)/formoterol 4.5 MICROgram(s) Inhaler 2 Puff(s) Inhalation two times a day  furosemide    Tablet 20 milliGRAM(s) Oral daily  heparin   Injectable 5000 Unit(s) SubCutaneous every 8 hours  lisinopril 20 milliGRAM(s) Oral daily  melatonin 3 milliGRAM(s) Oral at bedtime  tiotropium 18 MICROgram(s) Capsule 1 Capsule(s) Inhalation daily    MEDICATIONS  (PRN):  zaleplon 5 milliGRAM(s) Oral once PRN Insomnia      Allergies    aspirin (Unknown)    Intolerances    aspirin (Stomach Upset)      SOCIAL HISTORY:  d/c tobacco 20+ yrs ago, no Etoh    FAMILY HISTORY:  no lymphoma      Vital Signs Last 24 Hrs  T(C): 36.5 (05 Jun 2021 12:37), Max: 36.7 (04 Jun 2021 20:07)  T(F): 97.7 (05 Jun 2021 12:37), Max: 98 (04 Jun 2021 20:07)  HR: 65 (05 Jun 2021 12:37) (65 - 140)  BP: 145/67 (05 Jun 2021 12:37) (126/59 - 152/67)  BP(mean): 85 (04 Jun 2021 19:00) (85 - 115)  RR: 18 (05 Jun 2021 12:37) (15 - 29)  SpO2: 100% (05 Jun 2021 12:37) (80% - 100%)    PHYSICAL EXAM:      General:well developed well nourished, in no acute distress  Eyes:sclera anicteric, pupils equal and reactive to light  ENMT:buccal mucosa moist, pharynx not injected  Neck:supple, trachea midline  Lungs:clear, no wheeze/rhonchi  Cardiovascular:regular rate and rhythm, S1 S2  Abdomen:soft, nontender, no organomegaly present, bowel sounds normal  Neurological:alert and oriented x3, Cranial Nerves II-XII grossly intact  Skin:no increased ecchymosis/petechiae/purpura  Lymph Nodes:no palpable cervical/supraclavicular lymph nodes enlargements  Extremities:no cyanosis/clubbing/edema        LABS:                        9.6    4.56  )-----------( 154      ( 05 Jun 2021 06:36 )             30.0     06-05 @ 06:36  WBC4.56  RBC3.36 Hgb9.6 Hct30.0  MCV89.3  [id number]  Auto Ozrhjk42.8 Band-- Auto Lymph27.0 Atypical Lymph-- Reactive Lymph-- Auto Mono11.4 Auto Eos3.7 Auto Baso0.9        06-04 @ 04:57  WBC4.14  RBC3.03 Hgb8.7 Hct27.1  MCV89.4  Qsnd277  Auto Eximrh45.5 Band-- Auto Lymph16.7 Atypical Lymph-- Reactive Lymph-- Auto Mono10.4 Auto Eos0.7 Auto Baso0.5        06-03 @ 05:49  WBC3.98  RBC2.86 Hgb8.4 Hct25.5  MCV89.2  Dxfe637  Auto Qyazyd64.6 Band-- Auto Lymph11.1 Atypical Lymph-- Reactive Lymph-- Auto Mono7.0 Auto Eos0.0 Auto Baso0.0        06-02 @ 14:31  WBC16.00  RBC3.58 Hgb10.3 Hct33.5  MCV93.6  Teoc171  Auto Ikxggw69.4 Band-- Auto Lymph44.1 Atypical Lymph-- Reactive Lymph-- Auto Mono5.5 Auto Eos2.6 Auto Baso1.0          06-05    139  |  101  |  27<H>  ----------------------------<  122<H>  4.3   |  33<H>  |  0.97    Ca    8.6      05 Jun 2021 06:36  Phos  3.1     06-05  Mg     2.4     06-05    TPro  6.9  /  Alb  3.4  /  TBili  0.4  /  DBili  x   /  AST  13<L>  /  ALT  15  /  AlkPhos  73  06-05    06-05 @ 06:36  PT13.5 INR1.16  PTT64.2  06-04 @ 04:57  PT14.9 INR1.29  PTT56.4  06-02 @ 14:31  PT12.9 INR1.11  PTT44.0        PERIPHERAL BLOOD SMEAR REVIEW:      RADIOLOGY & ADDITIONAL STUDIES:  < from: CT Chest No Cont (06.02.21 @ 17:08) >    EXAM:  CT CHEST                            PROCEDURE DATE:  06/02/2021          INTERPRETATION:  CLINICAL INFORMATION: Shortness of breath. History of lymphoma    COMPARISON: Chest x-ray of earlier in the day.    CONTRAST/COMPLICATIONS:  IV Contrast: NONE  0 cc administered   0 cc discarded  Oral Contrast: NONE  Complications: None reported at time of study completion    PROCEDURE:  CT of the Chest was performed.  Sagittal and coronal reformats were performed.    FINDINGS:    LUNGS AND AIRWAYS:Patent central airways.  Lungs are remarkable for extensive groundglass infiltrates in the lungs with relative sparing of the left lung base. There is an additional focus of linear scar or atelectasis at the lateral left lung base. There is subpleural scarring in the right upper lobe and lateral right lower lobe.  PLEURA: There is a small free-flowing left pleural effusion. There is a small right pleural effusion which appears subpulmonic.  MEDIASTINUM AND MIS: No lymphadenopathy. There are surgical clips in the right infrahilar region  VESSELS: There is extensive atherosclerotic calcification of the thoracic aorta. There is coronary artery calcification  HEART: Heart size is normal. No pericardial effusion.  CHEST WALL AND LOWER NECK: Smallhypodense focus in the lower pole of the left thyroid gland measures approximately 8 mm.  VISUALIZED UPPER ABDOMEN: Small amount of upper abdominal ascites. Cholelithiasis. Visualized spleen suggests enlargement. Relative atrophy of the left kidney. Fullness of the visualized right renal collecting system.  BONES: There are degenerative changes of the spine    IMPRESSION: Small bilateral pleural effusions with loculation of the right pleural fluid in a subpulmonic location.  Extensive bilateral groundglass infiltrates  Upper abdomen demonstrates ascites and probable splenomegaly. There is also cholelithiasis.  Postsurgical changes with surgical clips in the right perihilar region      < end of copied text >  < from: US Duplex Venous Lower Ext Complete, Bilateral (06.05.21 @ 14:01) >    EXAM:  US DPLX LWR EXT VEINS COMPL BI                            PROCEDURE DATE:  06/05/2021          INTERPRETATION:  CLINICAL INFORMATION: Fall and respiratory failure, no other history    COMPARISON: None available.    TECHNIQUE: Duplex sonography of the BILATERAL LOWER extremity veins with color and spectral Doppler, with and without compression.    FINDINGS:    RIGHT:  Normal compressibility of the RIGHT common femoral, femoral and popliteal veins.  Doppler examination shows normal spontaneous and phasic flow.  No RIGHT calf vein thrombosis is detected.    LEFT:  Normal compressibility of the LEFT common femoral, femoral and popliteal veins.  Doppler examination shows normal spontaneous and phasic flow.  No LEFT calf vein thrombosis isdetected.    IMPRESSION:  No evidence of deep venous thrombosis in either lower extremity.      < end of copied text >

## 2021-06-06 LAB
FERRITIN SERPL-MCNC: 49 NG/ML — SIGNIFICANT CHANGE UP (ref 15–150)
FOLATE SERPL-MCNC: 11.1 NG/ML — SIGNIFICANT CHANGE UP
HCT VFR BLD CALC: 29.4 % — LOW (ref 34.5–45)
HGB BLD-MCNC: 9.5 G/DL — LOW (ref 11.5–15.5)
IRON SATN MFR SERPL: 26 UG/DL — LOW (ref 30–160)
IRON SATN MFR SERPL: 9 % — LOW (ref 14–50)
MCHC RBC-ENTMCNC: 28.5 PG — SIGNIFICANT CHANGE UP (ref 27–34)
MCHC RBC-ENTMCNC: 32.3 GM/DL — SIGNIFICANT CHANGE UP (ref 32–36)
MCV RBC AUTO: 88.3 FL — SIGNIFICANT CHANGE UP (ref 80–100)
NRBC # BLD: 0 /100 WBCS — SIGNIFICANT CHANGE UP (ref 0–0)
PLATELET # BLD AUTO: SIGNIFICANT CHANGE UP K/UL (ref 150–400)
RBC # BLD: 3.33 M/UL — LOW (ref 3.8–5.2)
RBC # FLD: 13.3 % — SIGNIFICANT CHANGE UP (ref 10.3–14.5)
T4 FREE SERPL-MCNC: 1.1 NG/DL — SIGNIFICANT CHANGE UP (ref 0.9–1.8)
TIBC SERPL-MCNC: 291 UG/DL — SIGNIFICANT CHANGE UP (ref 220–430)
TSH SERPL-MCNC: 3.46 UIU/ML — SIGNIFICANT CHANGE UP (ref 0.36–3.74)
UIBC SERPL-MCNC: 264 UG/DL — SIGNIFICANT CHANGE UP (ref 110–370)
VIT B12 SERPL-MCNC: 530 PG/ML — SIGNIFICANT CHANGE UP (ref 232–1245)
WBC # BLD: 3.9 K/UL — SIGNIFICANT CHANGE UP (ref 3.8–10.5)
WBC # FLD AUTO: 3.9 K/UL — SIGNIFICANT CHANGE UP (ref 3.8–10.5)

## 2021-06-06 PROCEDURE — 71275 CT ANGIOGRAPHY CHEST: CPT | Mod: 26

## 2021-06-06 PROCEDURE — 99233 SBSQ HOSP IP/OBS HIGH 50: CPT

## 2021-06-06 RX ORDER — AMIODARONE HYDROCHLORIDE 400 MG/1
200 TABLET ORAL DAILY
Refills: 0 | Status: DISCONTINUED | OUTPATIENT
Start: 2021-06-10 | End: 2021-06-09

## 2021-06-06 RX ORDER — ALPRAZOLAM 0.25 MG
0.25 TABLET ORAL AT BEDTIME
Refills: 0 | Status: DISCONTINUED | OUTPATIENT
Start: 2021-06-06 | End: 2021-06-06

## 2021-06-06 RX ORDER — AMIODARONE HYDROCHLORIDE 400 MG/1
400 TABLET ORAL EVERY 8 HOURS
Refills: 0 | Status: DISCONTINUED | OUTPATIENT
Start: 2021-06-06 | End: 2021-06-09

## 2021-06-06 RX ORDER — AMIODARONE HYDROCHLORIDE 400 MG/1
TABLET ORAL
Refills: 0 | Status: DISCONTINUED | OUTPATIENT
Start: 2021-06-06 | End: 2021-06-09

## 2021-06-06 RX ORDER — ENOXAPARIN SODIUM 100 MG/ML
60 INJECTION SUBCUTANEOUS EVERY 12 HOURS
Refills: 0 | Status: DISCONTINUED | OUTPATIENT
Start: 2021-06-06 | End: 2021-06-09

## 2021-06-06 RX ADMIN — AMIODARONE HYDROCHLORIDE 400 MILLIGRAM(S): 400 TABLET ORAL at 21:46

## 2021-06-06 RX ADMIN — Medication 20 MILLIGRAM(S): at 05:34

## 2021-06-06 RX ADMIN — AMIODARONE HYDROCHLORIDE 400 MILLIGRAM(S): 400 TABLET ORAL at 14:11

## 2021-06-06 RX ADMIN — ENOXAPARIN SODIUM 60 MILLIGRAM(S): 100 INJECTION SUBCUTANEOUS at 17:11

## 2021-06-06 RX ADMIN — LISINOPRIL 20 MILLIGRAM(S): 2.5 TABLET ORAL at 05:34

## 2021-06-06 RX ADMIN — BUDESONIDE AND FORMOTEROL FUMARATE DIHYDRATE 2 PUFF(S): 160; 4.5 AEROSOL RESPIRATORY (INHALATION) at 21:48

## 2021-06-06 RX ADMIN — TIOTROPIUM BROMIDE 1 CAPSULE(S): 18 CAPSULE ORAL; RESPIRATORY (INHALATION) at 07:31

## 2021-06-06 RX ADMIN — HEPARIN SODIUM 5000 UNIT(S): 5000 INJECTION INTRAVENOUS; SUBCUTANEOUS at 05:34

## 2021-06-06 RX ADMIN — HEPARIN SODIUM 5000 UNIT(S): 5000 INJECTION INTRAVENOUS; SUBCUTANEOUS at 14:11

## 2021-06-06 RX ADMIN — Medication 0.25 MILLIGRAM(S): at 21:46

## 2021-06-06 RX ADMIN — BUDESONIDE AND FORMOTEROL FUMARATE DIHYDRATE 2 PUFF(S): 160; 4.5 AEROSOL RESPIRATORY (INHALATION) at 07:30

## 2021-06-06 NOTE — CHART NOTE - NSCHARTNOTEFT_GEN_A_CORE
Assessment: Pt seen for malnutrition follow-up. As per chart pt is a 80 year old male with a PMH of lymphoma (RCHOP 5 yrs ago) with RL Lobectomy, HTN, HLD, "borderline" DM (not on meds), here with an episode of dyspnea. Found to have respiratory distress, PNA, HTN urgency.     Pt seen at bedside. Seen by SLP on 6/5 recommending Soft solids with thin liquids. Pt reports poor to fair appetite and PO intake, admits to not liking much of the food. States that she does not consider cereal, bread, and pasta "real food", made aware of alternative options she can have. Also pt's daughter is bringing her in food. Per chart pt noted to be consuming about 25-75% of meals in house. Pt noted to decline oral nutritional supplements. Denies any nausea/ vomiting/ diarrhea/ constipation, reports + 3 BMs (6/5).     Factors impacting intake: [ x] none [ ] nausea  [ ] vomiting [ ] diarrhea [ ] constipation  [ ]chewing problems [ ] swallowing issues  [ ] other:     Diet Presciption: Diet, DASH/TLC:   Sodium & Cholesterol Restricted  Soft  Dysphagia 3, Soft, Thin Liquids ([id number]) (06-05-21 @ 12:06)    Intake:     Current Weight: Weight (kg): 53 (06-04 @ 20:07)  % Weight Change    Pertinent Medications: MEDICATIONS  (STANDING):  aMIOdarone    Tablet   Oral   aMIOdarone    Tablet 400 milliGRAM(s) Oral every 8 hours  budesonide  80 MICROgram(s)/formoterol 4.5 MICROgram(s) Inhaler 2 Puff(s) Inhalation two times a day  furosemide    Tablet 20 milliGRAM(s) Oral daily  heparin   Injectable 5000 Unit(s) SubCutaneous every 8 hours  lisinopril 20 milliGRAM(s) Oral daily  melatonin 3 milliGRAM(s) Oral at bedtime  tiotropium 18 MICROgram(s) Capsule 1 Capsule(s) Inhalation daily    MEDICATIONS  (PRN):    Pertinent Labs: 06-05 Na139 mmol/L Glu 122 mg/dL<H> K+ 4.3 mmol/L Cr  0.97 mg/dL BUN 27 mg/dL<H> 06-05 Phos 3.1 mg/dL 06-05 Alb 3.4 g/dL     CAPILLARY BLOOD GLUCOSE      POCT Blood Glucose.: 128 mg/dL (06 Jun 2021 07:35)    Skin:     Estimated Needs:   [ ] no change since previous assessment  [ ] recalculated:     Previous Nutrition Diagnosis:   [ ] Inadequate Energy Intake [ ]Inadequate Oral Intake [ ] Excessive Energy Intake   [ ] Underweight [ ] Increased Nutrient Needs [ ] Overweight/Obesity   [ ] Altered GI Function [ ] Unintended Weight Loss [ ] Food & Nutrition Related Knowledge Deficit [ ] Malnutrition     Nutrition Diagnosis is [ ] ongoing  [ ] resolved [ ] not applicable     New Nutrition Diagnosis: [ ] not applicable       Interventions:   Recommend  [ ] Change Diet To:  [ ] Nutrition Supplement  [ ] Nutrition Support  [ ] Other:     Monitoring and Evaluation:   [ ] PO intake [ x ] Tolerance to diet prescription [ x ] weights [ x ] labs[ x ] follow up per protocol  [ ] other: Assessment: Pt seen for malnutrition follow-up. As per chart pt is a 80 year old male with a PMH of lymphoma (RCHOP 5 yrs ago) with RL Lobectomy, HTN, HLD, "borderline" DM (not on meds), here with an episode of dyspnea. Found to have respiratory distress, PNA, HTN urgency.     Pt seen at bedside. Seen by SLP on 6/5 recommending Soft solids with thin liquids. Pt reports poor to fair appetite and PO intake, admits to not liking much of the food. States that she does not consider cereal, bread, and pasta "real food", made aware of alternative options she can have. Also pt's daughter is bringing her in food. Per chart pt noted to be consuming about 25-75% of meals in house. Pt noted to decline oral nutritional supplements. Denies any nausea/ vomiting/ diarrhea/ constipation, reports + 3 BMs (6/5).     Factors impacting intake: [ x] none [ ] nausea  [ ] vomiting [ ] diarrhea [ ] constipation  [ ]chewing problems [ ] swallowing issues  [ ] other:     Diet Presciption: Diet, DASH/TLC:   Sodium & Cholesterol Restricted  Soft  Dysphagia 3, Soft, Thin Liquids ([id number]) (06-05-21 @ 12:06)    Intake: poor to fair     Current Weight: (6/5) 116.1lbs  Previous Weight: (6/4) 121.9lbs   Admission Weight: 116.4lbs   % Weight Change- slight weight fluctuations likely due to fluid shifts as pt is currently on Lasix, continue to monitor weights     Pertinent Medications: MEDICATIONS  (STANDING):  aMIOdarone    Tablet   Oral   aMIOdarone    Tablet 400 milliGRAM(s) Oral every 8 hours  budesonide  80 MICROgram(s)/formoterol 4.5 MICROgram(s) Inhaler 2 Puff(s) Inhalation two times a day  furosemide    Tablet 20 milliGRAM(s) Oral daily  heparin   Injectable 5000 Unit(s) SubCutaneous every 8 hours  lisinopril 20 milliGRAM(s) Oral daily  melatonin 3 milliGRAM(s) Oral at bedtime  tiotropium 18 MICROgram(s) Capsule 1 Capsule(s) Inhalation daily    MEDICATIONS  (PRN):    Pertinent Labs: 06-05 Na139 mmol/L Glu 122 mg/dL<H> K+ 4.3 mmol/L Cr  0.97 mg/dL BUN 27 mg/dL<H> 06-05 Phos 3.1 mg/dL 06-05 Alb 3.4 g/dL, 6/6- Hgb 9.5, Hct 29.4      CAPILLARY BLOOD GLUCOSE    POCT Blood Glucose.: 128 mg/dL (06 Jun 2021 07:35)    Skin: No edema or pressure injuries noted at this time     Estimated Needs:   [x ] no change since previous assessment  [ ] recalculated:     Previous Nutrition Diagnosis:    [x ] Malnutrition     Nutrition Diagnosis is [x ] ongoing    New Nutrition Diagnosis: [x ] not applicable     Interventions: Continue with current DASH/TLC diet   Recommend  [ ] Change Diet To:  [ ] Nutrition Supplement  [ ] Nutrition Support  [x ] Other:   1) Pt encouraged adequate PO intake, made aware of meal alternatives to choose from   2) Monitor pt's PO intake, weight, skin, edema, GI distress     Monitoring and Evaluation:   [ x] PO intake [ x ] Tolerance to diet prescription [ x ] weights [ x ] labs[ x ] follow up per protocol  [x ] other: RD to remain available

## 2021-06-06 NOTE — PROGRESS NOTE ADULT - ASSESSMENT
79 y/o woman w hx lymphoma (dx'd 5 yrs ago via right lung bx/resection w axillary LN involvement, post RCHOP under care Dr Kandy Griffin [facility], last seen 12/2019, has not been back cue to COVID pandemic), HTN, HLD, "borderline" DM (not on meds), right ankle fx, L ALEXANDRA, herniated disc surgery.  Reports 1 mon hx in SOB/IVAN, dry cough, no fever/anorexia/weight loss, denies sweats but daughter reports sweats "all the time", respiratory sx much improved when traveled to [place] 2 wks ago.  Adm w acute exacerbation of SOB while doing PT. When seen in ER was in acute resp distress, hypoxemic (sat 80s), hypertensive urgency (200/100), diaphoretic, rales bilaterally. Failed NRB, given SL NTG x 2, lasix 40 IV x 1, solumedrol 125mg IV x 1, placed on BiPAP. w subsequent sig improvement of sx  CT chest w extensive groundglass infiltrates in lungs w relative sparring of Left base, focus of ilnear scar or atelectasis lateral lung base, subpleural scarring RUL and lat RLL, sm luis alberto effusions. No LADs. Sm ascites.  Echo no sig left or right heart ds    -CT chest findings-no lymphadenopathy, no distinct masses. ?infectious. skye viral as WBC sl low on admission(higher now but may be due to the steroid) Already seen by Pulm who also feels likely infectious etiology, w/u in progress  clinically continues to be stable, no recur of sig respiratory distress.    -mild pancytopenia-stable, ?baseline w exacerbation by acute illness. Not symptomatic at this level, can cont serial monitoring. Anemia w/u in progress will f/u  -ultimately will follow up w own Heme Onc upon discharge.

## 2021-06-06 NOTE — PROGRESS NOTE ADULT - ASSESSMENT
80 year old female with PMH lymphoma (RCHOP 5 yrs ago) with RL Lobectomy, HTN, HLD, "borderline" DM (not on meds), here with an episode of dyspnea. She was found to be significantly hypertensive and dyspneic, and required IV diuresis and bipap    HTN urgency   - Pt presented with dyspnea and volume overload in the setting of hypertensive urgency  - BP: 113/62 (06-06-21 @ 11:04) (113/62 - 147/77)  - BP improved and there is improvement in her volume and breathing with iv diuresis and nitrates  - S/p IV Lasix and is now on Lasix 20 mg PO daily   - Monitor strict i/o's and daily weights  - Oxygen supplementation as needed,   - ECHO showed normal LV & RV size and function, mild AS, mild MR and TR  - Continue lisinopril for now, and can add vasodilators if needed.  - BP remains labile but is improved overall.     A fib.   - Patient was in SR with PACs on admission with questionable episode of A fib in ICU. Patyient had episodes of p Afib during the stay in tele.   - It is possible thatshe evolved heart failure in the setting of A fib RVR  - Would start Amiodarone load 5 gm to keep her out of A fib.   - Patient does have anemia and thrombocytopenia, she is not an ideal AC candidate, but given recurrence, need to consider AC    Anemia/GI Bleed   - Hemoglobin <--9.5, <--9.6, <--8.7  - Hematocrit <--29.4, <--30.0, <--27.1  - Trend CBC    Hyperlipidemia  - Resume Lipitor    - Monitor and replete lytes, keep K>4, Mg>2.  - All other medical needs as per primary team.  - Other cardiovascular workup will depend on clinical course.  - Will continue to follow.    Jocelyn Grant, MS FNP, [facility]  Nurse Practitioner- Cardiology   Spectra #[phone]

## 2021-06-06 NOTE — PROGRESS NOTE ADULT - ASSESSMENT
on RA - feels and looks better - vs noted  TTE shows valv heart disease - pt is on PO Lasix - Cardio following  ID f/u noted - monitored off ABX  pt does not have COPD - pt is a remote ex smoker - would DC inhaler regimen    80 year old female with PMH lymphoma (RCHOP 5 yrs ago) with RLLobectomy, HTN, HLD, "borderline" DM (not on meds), right ankle fx, L ALEXANDRA, herniated disc surgery. Pt at usual well state of health until this afternoon. While at PT for "pulled muscle" in RLE pt developed sudden onset acute SOB, drove to [facility], felt worse then was driven to [place] ED by daughter erlinda hoytpatrica didn't take her to [facility]. ED reports pt on arrival in acute resp distress, hypoxemic (sat 80s), hypertensive urgency (200/100), diaphoretic, rales bilaterally. Failed NRB, given SL NTG x 2, lasix 40 IV x 1, solumedrol 125mg IV x 1, placed on BiPAP.    off bipap - oxygenation improved  ct chest with pna and small eff, crazy paving seen on CT parenchyma -   eval for Acute Infectious Process - done - ID eval noted - monitored off ABX  I and O  cardio eval noted - on lasix - [facility] rx regimen - TTE reviewed -

## 2021-06-06 NOTE — PROGRESS NOTE ADULT - SUBJECTIVE AND OBJECTIVE BOX
Patient is a 80y old  Female who presents with a chief complaint of sob (04 Jun 2021 13:46)      INTERVAL /OVERNIGHT EVENTS: feels better    MEDICATIONS  (STANDING):  aMIOdarone    Tablet   Oral   aMIOdarone    Tablet 400 milliGRAM(s) Oral every 8 hours  budesonide  80 MICROgram(s)/formoterol 4.5 MICROgram(s) Inhaler 2 Puff(s) Inhalation two times a day  enoxaparin Injectable 60 milliGRAM(s) SubCutaneous every 12 hours  furosemide    Tablet 20 milliGRAM(s) Oral daily  lisinopril 20 milliGRAM(s) Oral daily  melatonin 3 milliGRAM(s) Oral at bedtime  tiotropium 18 MICROgram(s) Capsule 1 Capsule(s) Inhalation daily    MEDICATIONS  (PRN):      REVIEW OF SYSTEMS:  CONSTITUTIONAL: No fever, weight loss, or fatigue  EYES: No eye pain, visual disturbances, or discharge  ENMT:  No difficulty hearing, tinnitus, vertigo; No sinus or throat pain  NECK: No pain or stiffness  RESPIRATORY: No cough, wheezing, chills or hemoptysis; No shortness of breath  CARDIOVASCULAR: No chest pain, palpitations, dizziness, or leg swelling  GASTROINTESTINAL: No abdominal or epigastric pain. No nausea, vomiting, or hematemesis; No diarrhea or constipation. No melena or hematochezia.  GENITOURINARY: No dysuria, frequency, hematuria, or incontinence  NEUROLOGICAL: No headaches, memory loss, loss of strength, numbness, or tremors  SKIN: No itching, burning, rashes, or lesions   LYMPH NODES: No enlarged glands  ENDOCRINE: No heat or cold intolerance; No hair loss; No polydipsia or polyuria  MUSCULOSKELETAL: No joint pain or swelling; No muscle, back, or extremity pain  PSYCHIATRIC: No depression, anxiety, mood swings, or difficulty sleeping  HEME/LYMPH: No easy bruising, or bleeding gums  ALLERGY AND IMMUNOLOGIC: No hives or eczema    T(C): 36.8 (06-06-21 @ 19:01), Max: 36.9 (06-06-21 @ 11:04)  HR: 85 (06-06-21 @ 19:01) (72 - 85)  BP: 163/66 (06-06-21 @ 19:01) (113/62 - 163/66)  RR: 18 (06-06-21 @ 19:01) (18 - 18)  SpO2: 96% (06-06-21 @ 19:01) (96% - 96%)    PHYSICAL EXAM:  GENERAL: NAD, well-groomed, well-developed  HEAD:  Atraumatic, Normocephalic  NECK: Supple, No JVD, Normal thyroid  NERVOUS SYSTEM:  Alert & Oriented X3, Good concentration; Motor Strength 5/5 B/L upper and lower extremities; DTRs 2+ intact and symmetric  CHEST/LUNG: Clear to auscultation bilaterally; No rales, rhonchi, wheezing, or rubs  HEART: Regular rate and rhythm; No murmurs, rubs, or gallops  ABDOMEN: Soft, Nontender, Nondistended; Bowel sounds present  EXTREMITIES:  2+ Peripheral Pulses, No clubbing, cyanosis, or edema  LYMPH: No lymphadenopathy noted  SKIN: No rashes or lesions                            9.5    3.90  )-----------( Clumped    ( 06 Jun 2021 06:32 )             29.4           LIVER FUNCTIONS - ( 05 Jun 2021 06:36 )  Alb: 3.4 g/dL / Pro: 6.9 g/dL / ALK PHOS: 73 U/L / ALT: 15 U/L / AST: 13 U/L / GGT: x           PT/INR - ( 05 Jun 2021 06:36 )   PT: 13.5 sec;   INR: 1.16 ratio         PTT - ( 05 Jun 2021 06:36 )  PTT:64.2 sec      CAPILLARY BLOOD GLUCOSE      POCT Blood Glucose.: 128 mg/dL (06 Jun 2021 07:35)        RADIOLOGY & ADDITIONAL TESTS:    Notes Reviewed:  [x ] YES  [ ] NO    Care Discussed with Consultants/Other Providers [x ] YES  [ ] NO

## 2021-06-06 NOTE — PROGRESS NOTE ADULT - SUBJECTIVE AND OBJECTIVE BOX
All interim records and events noted.    comfortable in bed, HOB sl elevated, not in respiratory distress, reports breathing fine, on phone w daughter, sl confused, wants to go home  spoke w daughter who reports mother was like this yesterday morning as well( I saw both of them yesterday afternoon and mental status fine)      MEDICATIONS  (STANDING):  budesonide  80 MICROgram(s)/formoterol 4.5 MICROgram(s) Inhaler 2 Puff(s) Inhalation two times a day  furosemide    Tablet 20 milliGRAM(s) Oral daily  heparin   Injectable 5000 Unit(s) SubCutaneous every 8 hours  lisinopril 20 milliGRAM(s) Oral daily  melatonin 3 milliGRAM(s) Oral at bedtime  tiotropium 18 MICROgram(s) Capsule 1 Capsule(s) Inhalation daily    MEDICATIONS  (PRN):      Vital Signs Last 24 Hrs  T(C): 36.4 (06 Jun 2021 05:07), Max: 36.7 (05 Jun 2021 19:45)  T(F): 97.5 (06 Jun 2021 05:07), Max: 98 (05 Jun 2021 19:45)  HR: 80 (06 Jun 2021 05:07) (65 - 87)  BP: 135/74 (06 Jun 2021 05:07) (135/74 - 147/77)  BP(mean): --  RR: 18 (06 Jun 2021 05:07) (18 - 18)  SpO2: 94% (06 Jun 2021 05:07) (94% - 100%)    PHYSICAL EXAM  General: well developed  well nourished, in no acute distress  Head: atraumatic, normocephalic  ENT: sclera anicteric, buccal mucosa moist  Neck: supple, trachea midline  CV: S1 S2, regular rate and rhythm  Lungs: clear to auscultation, no wheezes/rhonchi  Abdomen: soft, nontender, bowel sounds present, no palpable masses  Skin: no significant increased ecchymosis/petechiae  Neuro: alert and oriented X3,  no focal deficits      LABS:             9.5    3.90  )-----------( Clumped    ( 06-06 @ 06:32 )             29.4                9.6    4.56  )-----------( 154      ( 06-05 @ 06:36 )             30.0                8.7    4.14  )-----------( 105      ( 06-04 @ 04:57 )             27.1       06-05    139  |  101  |  27<H>  ----------------------------<  122<H>  4.3   |  33<H>  |  0.97    Ca    8.6      05 Jun 2021 06:36  Phos  3.1     06-05  Mg     2.4     06-05    TPro  6.9  /  Alb  3.4  /  TBili  0.4  /  DBili  x   /  AST  13<L>  /  ALT  15  /  AlkPhos  73  06-05    06-05 @ 06:36  PT13.5 INR1.16  PTT64.2  06-04 @ 04:57  PT14.9 INR1.29  PTT56.4  06-02 @ 14:31  PT12.9 INR1.11  PTT44.0      RADIOLOGY & ADDITIONAL STUDIES:    IMPRESSION/RECOMMENDATIONS:

## 2021-06-06 NOTE — PROGRESS NOTE ADULT - SUBJECTIVE AND OBJECTIVE BOX
[facility] -- Mg Win, June Everett, Bennie Hobbs [place], Goodger: Office # [phone]    Follow Up:  Afib, HTN, CHF    Subjective/Observations: Patient seen and examined. Patient awake, alert, resting in bed. No complaints of chest pain, dyspnea, palpitations or dizziness. No signs of orthopnea or PND. Tolerating O2 via nasal cannula. Had episode of A fib briefly overnight, rate controlled.     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, had RL lobectomy &amp; chemotherapy    Hip fracture  left femur, had pins placed , 2016    Lumbar radiculopathy    Hyperlipidemia    Lymphoma    Former smoker    Hip fracture  &quot;left hip 2016 &amp; had surgery  pins placed&quot;    Right lower lobe lung mass  Right lower lobectomy 2016   &amp; chemotherapy  till 01/2017    Status post carotid surgery  carotid artery stenosis 2016, left    No significant past surgical history    MEDICATIONS  (STANDING):  budesonide  80 MICROgram(s)/formoterol 4.5 MICROgram(s) Inhaler 2 Puff(s) Inhalation two times a day  furosemide    Tablet 20 milliGRAM(s) Oral daily  heparin   Injectable 5000 Unit(s) SubCutaneous every 8 hours  lisinopril 20 milliGRAM(s) Oral daily  melatonin 3 milliGRAM(s) Oral at bedtime  tiotropium 18 MICROgram(s) Capsule 1 Capsule(s) Inhalation daily    MEDICATIONS  (PRN):    Allergies  aspirin (Unknown)    Intolerances  aspirin (Stomach Upset)    Vital Signs Last 24 Hrs  T(C): 36.9 (06 Jun 2021 11:04), Max: 36.9 (06 Jun 2021 11:04)  T(F): 98.4 (06 Jun 2021 11:04), Max: 98.4 (06 Jun 2021 11:04)  HR: 72 (06 Jun 2021 11:04) (65 - 87)  BP: 113/62 (06 Jun 2021 11:04) (113/62 - 147/77)  BP(mean): --  RR: 18 (06 Jun 2021 11:04) (18 - 18)  SpO2: 96% (06 Jun 2021 11:04) (94% - 100%)  I&O's Summary    05 Jun 2021 07:01  -  06 Jun 2021 07:00  --------------------------------------------------------  IN: 0 mL / OUT: 250 mL / NET: -250 mL    TELE:  PACS 60-80s, p Afib briefly   PHYSICAL EXAM:  Appearance: NAD, no distress, alert, Frail   HEENT: Moist Mucous Membranes, Anicteric  Cardiovascular: Regular rate and rhythm, Normal S1 S2, No JVD, + murmurs, No rubs, gallops or clicks  Respiratory: Non-labored, Clear to auscultation, No rales, No rhonchi, No wheezing.   Gastrointestinal:  Soft, Non-tender, + BS  Neurologic: Non-focal  Skin: Warm and dry, No visible rashes or ulcers, No ecchymosis, No cyanosis  Musculoskeletal: No clubbing, No cyanosis, No joint swelling/tenderness  Psychiatry: Mood & affect appropriate  Lymph: No peripheral edema.     LABS: All Labs Reviewed:                        9.5    3.90  )-----------( Clumped    ( 06 Jun 2021 06:32 )             29.4                         9.6    4.56  )-----------( 154      ( 05 Jun 2021 06:36 )             30.0                         8.7    4.14  )-----------( 105      ( 04 Jun 2021 04:57 )             27.1     05 Jun 2021 06:36    139    |  101    |  27     ----------------------------<  122    4.3     |  33     |  0.97   04 Jun 2021 15:12    136    |  98     |  26     ----------------------------<  126    3.5     |  33     |  1.00   04 Jun 2021 04:57    139    |  101    |  27     ----------------------------<  133    4.1     |  32     |  1.10     Ca    8.6        05 Jun 2021 06:36  Ca    9.1        04 Jun 2021 15:12  Ca    8.6        04 Jun 2021 04:57  Phos  3.1       05 Jun 2021 06:36  Phos  2.4       04 Jun 2021 15:12  Phos  2.5       04 Jun 2021 04:57  Mg     2.4       05 Jun 2021 06:36  Mg     2.6       04 Jun 2021 15:12  Mg     1.6       04 Jun 2021 04:57    TPro  6.9    /  Alb  3.4    /  TBili  0.4    /  DBili  x      /  AST  13     /  ALT  15     /  AlkPhos  73     05 Jun 2021 06:36  TPro  6.8    /  Alb  3.4    /  TBili  0.6    /  DBili  x      /  AST  17     /  ALT  20     /  AlkPhos  71     04 Jun 2021 04:57    PT/INR - ( 05 Jun 2021 06:36 )   PT: 13.5 sec;   INR: 1.16 ratio         PTT - ( 05 Jun 2021 06:36 )  PTT:64.2 sec  Creatine Kinase, Serum: 53 U/L (06-03-21 @ 05:49)  Troponin I, Serum: .044 ng/mL (06-03-21 @ 05:49)  Creatine Kinase, Serum: 52 U/L (06-02-21 @ 20:59)      12 Lead ECG:   Ventricular Rate 96 BPM  Atrial Rate 96 BPM  P-R Interval 172 ms  QRS Duration 106 ms  Q-T Interval 382 ms  QTC Calculation(Bazett) 482 ms  P Axis 43 degrees  R Axis -50 degrees  T Axis 41 degrees  Diagnosis Line Normal sinus rhythm  Left axis deviation  Confirmed by emanuel Elias ([id number]) on 6/3/2021 3:31:49 PM (06-03-21 @ 09:06)    < from: TTE Echo Complete w/o Contrast w/ Doppler (06.03.21 @ 20:01) >  Dimensions:  LA 3.7       Normal Values: 2.0 - 4.0 cm  Ao 2.7        Normal Values: 2.0 - 3.8 cm  SEPTUM 1.0       Normal Values: 0.6 - 1.2 cm  PWT 0.9       Normal Values: 0.6 - 1.1 cm  LVIDd 5.2         Normal Values: 3.0 - 5.6 cm  LVIDs 3.8         Normal Values: 1.8 - 4.0 cm    OBSERVATIONS:  Mitral Valve: Mild MR.  Aortic Valve/Aorta: Calcified trileaflet aortic valve with decreased opening. Peak transaortic valve gradient equals 15 mmHg with a mean transaortic valve gradient of 9 mmHg. This consistent with mild aortic stenosis.  Tricuspid Valve: Mild TR.  Pulmonic Valve: Not well-visualized  Left Atrium: normal  Right Atrium: Not well-visualized  Left Ventricle: normal LV size and systolic function, estimated LVEF of 55%.  Right Ventricle: Grossly normal size and systolic function.  Pericardium: no significant pericardial effusion.  Small bilateral pleural effusions    IMPRESSION:  Normal left ventricular internal dimensions and systolic function, estimated LVEF of 55%.  Grossly normal RV size and systolic function.  Calcified trileaflet aortic valve with mild aortic stenosis  Mild MR and TR.  No significant pericardial effusion.  < end of copied text >    < from: CT Chest No Cont (06.02.21 @ 17:08) >  IMPRESSION: Small bilateral pleural effusions with loculation of the right pleural fluid in a subpulmonic location.  Extensive bilateral ground glass infiltrates  Upper abdomen demonstrates ascites and probable splenomegaly. There is also cholelithiasis.  Postsurgical changes with surgical clips in the right perihilar region  < end of copied text >    < from: Xray Chest 1 View- PORTABLE-Urgent (Xray Chest 1 View- PORTABLE-Urgent .) (06.02.21 @ 15:10) >  IMPRESSION:   Bilateral lung diffuse airspace disease. RIGHT apical/RIGHT basilar pleural thickening  and/or basilar airspace consolidation.  < end of copied text >    < from: CT Angio Chest PE Protocol w/ IV Cont (06.06.21 @ 09:49) >  IMPRESSION:  No pulmonary embolus.  In comparison with 6/2/2021, interval resolution of interlobular septal thickening and patchy groundglass opacities representing pulmonary edema.  Again noted, the patient is status post right lower lobectomy. Severe diffuse right pleural thickening is unchanged. Trace loculated right pleural effusion is unchanged. Trace layering left pleural effusion is decreased.  No consolidation. No lymphadenopathy.  < end of copied text >

## 2021-06-06 NOTE — PROGRESS NOTE ADULT - SUBJECTIVE AND OBJECTIVE BOX
Date/Time Patient Seen:  		  Referring MD:   Data Reviewed	       Patient is a 80y old  Female who presents with a chief complaint of respiratory distress (05 Jun 2021 16:59)      Subjective/HPI     PAST MEDICAL & SURGICAL HISTORY:  HLD (hyperlipidemia)    Carotid artery obstruction, left  2016, had carotid surgery 2016    DM (diabetes mellitus)  2    Lung mass  RLL, 2016  lymphoma, had RL lobectomy &amp; chemotherapy    Hip fracture  left femur, had pins placed , 2016    Lumbar radiculopathy    Hyperlipidemia    Lymphoma    Former smoker    Hip fracture  &quot;left hip 2016 &amp; had surgery  pins placed&quot;    Right lower lobe lung mass  Right lower lobectomy 2016   &amp; chemotherapy  till 01/2017    Status post carotid surgery  carotid artery stenosis 2016, left    No significant past surgical history          Medication list         MEDICATIONS  (STANDING):  budesonide  80 MICROgram(s)/formoterol 4.5 MICROgram(s) Inhaler 2 Puff(s) Inhalation two times a day  furosemide    Tablet 20 milliGRAM(s) Oral daily  heparin   Injectable 5000 Unit(s) SubCutaneous every 8 hours  lisinopril 20 milliGRAM(s) Oral daily  melatonin 3 milliGRAM(s) Oral at bedtime  tiotropium 18 MICROgram(s) Capsule 1 Capsule(s) Inhalation daily    MEDICATIONS  (PRN):         Vitals log        ICU Vital Signs Last 24 Hrs  T(C): 36.4 (06 Jun 2021 05:07), Max: 36.7 (05 Jun 2021 19:45)  T(F): 97.5 (06 Jun 2021 05:07), Max: 98 (05 Jun 2021 19:45)  HR: 80 (06 Jun 2021 05:07) (65 - 87)  BP: 135/74 (06 Jun 2021 05:07) (135/74 - 147/77)  BP(mean): --  ABP: --  ABP(mean): --  RR: 18 (06 Jun 2021 05:07) (18 - 18)  SpO2: 94% (06 Jun 2021 05:07) (94% - 100%)           Input and Output:  I&O's Detail    04 Jun 2021 07:01  -  05 Jun 2021 07:00  --------------------------------------------------------  IN:  Total IN: 0 mL    OUT:    Indwelling Catheter - Urethral (mL): 1740 mL    Voided (mL): 600 mL  Total OUT: 2340 mL    Total NET: -2340 mL      05 Jun 2021 07:01  -  06 Jun 2021 06:04  --------------------------------------------------------  IN:  Total IN: 0 mL    OUT:    Indwelling Catheter - Urethral (mL): 250 mL  Total OUT: 250 mL    Total NET: -250 mL          Lab Data                        9.6    4.56  )-----------( 154      ( 05 Jun 2021 06:36 )             30.0     06-05    139  |  101  |  27<H>  ----------------------------<  122<H>  4.3   |  33<H>  |  0.97    Ca    8.6      05 Jun 2021 06:36  Phos  3.1     06-05  Mg     2.4     06-05    TPro  6.9  /  Alb  3.4  /  TBili  0.4  /  DBili  x   /  AST  13<L>  /  ALT  15  /  AlkPhos  73  06-05            Review of Systems	      Objective     Physical Examination    heart s1s2  lung dec BS  abd soft  head nc  head at      Pertinent Lab findings & Imaging      [place]:  NO   Adequate UO     I&O's Detail    04 Jun 2021 07:01  -  05 Jun 2021 07:00  --------------------------------------------------------  IN:  Total IN: 0 mL    OUT:    Indwelling Catheter - Urethral (mL): 1740 mL    Voided (mL): 600 mL  Total OUT: 2340 mL    Total NET: -2340 mL      05 Jun 2021 07:01  -  06 Jun 2021 06:04  --------------------------------------------------------  IN:  Total IN: 0 mL    OUT:    Indwelling Catheter - Urethral (mL): 250 mL  Total OUT: 250 mL    Total NET: -250 mL               Discussed with:     Cultures:	        Radiology

## 2021-06-06 NOTE — PHYSICAL THERAPY INITIAL EVALUATION ADULT - THERAPY FREQUENCY, PT EVAL
Pt is Independent with all mobility and is D/C from physical therapy services.  Will no longer follow.

## 2021-06-07 DIAGNOSIS — I48.91 UNSPECIFIED ATRIAL FIBRILLATION: ICD-10-CM

## 2021-06-07 LAB
ANION GAP SERPL CALC-SCNC: 7 MMOL/L — SIGNIFICANT CHANGE UP (ref 5–17)
BUN SERPL-MCNC: 40 MG/DL — HIGH (ref 7–23)
CALCIUM SERPL-MCNC: 9.1 MG/DL — SIGNIFICANT CHANGE UP (ref 8.5–10.1)
CHLORIDE SERPL-SCNC: 101 MMOL/L — SIGNIFICANT CHANGE UP (ref 96–108)
CO2 SERPL-SCNC: 30 MMOL/L — SIGNIFICANT CHANGE UP (ref 22–31)
CREAT SERPL-MCNC: 1.3 MG/DL — SIGNIFICANT CHANGE UP (ref 0.5–1.3)
CULTURE RESULTS: SIGNIFICANT CHANGE UP
CULTURE RESULTS: SIGNIFICANT CHANGE UP
GLUCOSE SERPL-MCNC: 138 MG/DL — HIGH (ref 70–99)
POTASSIUM SERPL-MCNC: 4.7 MMOL/L — SIGNIFICANT CHANGE UP (ref 3.5–5.3)
POTASSIUM SERPL-SCNC: 4.7 MMOL/L — SIGNIFICANT CHANGE UP (ref 3.5–5.3)
SODIUM SERPL-SCNC: 138 MMOL/L — SIGNIFICANT CHANGE UP (ref 135–145)
SPECIMEN SOURCE: SIGNIFICANT CHANGE UP
SPECIMEN SOURCE: SIGNIFICANT CHANGE UP

## 2021-06-07 PROCEDURE — 74230 X-RAY XM SWLNG FUNCJ C+: CPT | Mod: 26

## 2021-06-07 PROCEDURE — 99233 SBSQ HOSP IP/OBS HIGH 50: CPT

## 2021-06-07 RX ORDER — IRON SUCROSE 20 MG/ML
100 INJECTION, SOLUTION INTRAVENOUS EVERY 24 HOURS
Refills: 0 | Status: COMPLETED | OUTPATIENT
Start: 2021-06-07 | End: 2021-06-09

## 2021-06-07 RX ORDER — ALPRAZOLAM 0.25 MG
0.25 TABLET ORAL DAILY
Refills: 0 | Status: DISCONTINUED | OUTPATIENT
Start: 2021-06-07 | End: 2021-06-08

## 2021-06-07 RX ADMIN — Medication 20 MILLIGRAM(S): at 05:20

## 2021-06-07 RX ADMIN — AMIODARONE HYDROCHLORIDE 400 MILLIGRAM(S): 400 TABLET ORAL at 05:20

## 2021-06-07 RX ADMIN — AMIODARONE HYDROCHLORIDE 400 MILLIGRAM(S): 400 TABLET ORAL at 21:52

## 2021-06-07 RX ADMIN — LISINOPRIL 20 MILLIGRAM(S): 2.5 TABLET ORAL at 05:20

## 2021-06-07 RX ADMIN — Medication 0.25 MILLIGRAM(S): at 21:52

## 2021-06-07 RX ADMIN — ENOXAPARIN SODIUM 60 MILLIGRAM(S): 100 INJECTION SUBCUTANEOUS at 16:42

## 2021-06-07 RX ADMIN — TIOTROPIUM BROMIDE 1 CAPSULE(S): 18 CAPSULE ORAL; RESPIRATORY (INHALATION) at 05:20

## 2021-06-07 RX ADMIN — IRON SUCROSE 100 MILLIGRAM(S): 20 INJECTION, SOLUTION INTRAVENOUS at 16:42

## 2021-06-07 RX ADMIN — AMIODARONE HYDROCHLORIDE 400 MILLIGRAM(S): 400 TABLET ORAL at 13:44

## 2021-06-07 RX ADMIN — ENOXAPARIN SODIUM 60 MILLIGRAM(S): 100 INJECTION SUBCUTANEOUS at 05:20

## 2021-06-07 RX ADMIN — BUDESONIDE AND FORMOTEROL FUMARATE DIHYDRATE 2 PUFF(S): 160; 4.5 AEROSOL RESPIRATORY (INHALATION) at 05:21

## 2021-06-07 RX ADMIN — BUDESONIDE AND FORMOTEROL FUMARATE DIHYDRATE 2 PUFF(S): 160; 4.5 AEROSOL RESPIRATORY (INHALATION) at 21:51

## 2021-06-07 NOTE — PROGRESS NOTE ADULT - SUBJECTIVE AND OBJECTIVE BOX
Date/Time Patient Seen:  		  Referring MD:   Data Reviewed	       Patient is a 80y old  Female who presents with a chief complaint of sob (06 Jun 2021 16:24)      Subjective/HPI     PAST MEDICAL & SURGICAL HISTORY:  HLD (hyperlipidemia)    Carotid artery obstruction, left  2016, had carotid surgery 2016    DM (diabetes mellitus)  2    Lung mass  RLL, 2016  lymphoma, had RL lobectomy &amp; chemotherapy    Hip fracture  left femur, had pins placed , 2016    Lumbar radiculopathy    Hyperlipidemia    Lymphoma    Former smoker    Hip fracture  &quot;left hip 2016 &amp; had surgery  pins placed&quot;    Right lower lobe lung mass  Right lower lobectomy 2016   &amp; chemotherapy  till 01/2017    Status post carotid surgery  carotid artery stenosis 2016, left    No significant past surgical history          Medication list         MEDICATIONS  (STANDING):  aMIOdarone    Tablet 400 milliGRAM(s) Oral every 8 hours  aMIOdarone    Tablet   Oral   budesonide  80 MICROgram(s)/formoterol 4.5 MICROgram(s) Inhaler 2 Puff(s) Inhalation two times a day  enoxaparin Injectable 60 milliGRAM(s) SubCutaneous every 12 hours  furosemide    Tablet 20 milliGRAM(s) Oral daily  lisinopril 20 milliGRAM(s) Oral daily  melatonin 3 milliGRAM(s) Oral at bedtime  tiotropium 18 MICROgram(s) Capsule 1 Capsule(s) Inhalation daily    MEDICATIONS  (PRN):         Vitals log        ICU Vital Signs Last 24 Hrs  T(C): 36.4 (07 Jun 2021 04:57), Max: 36.9 (06 Jun 2021 11:04)  T(F): 97.5 (07 Jun 2021 04:57), Max: 98.4 (06 Jun 2021 11:04)  HR: 74 (07 Jun 2021 05:18) (62 - 85)  BP: 112/52 (07 Jun 2021 05:18) (102/56 - 163/66)  BP(mean): --  ABP: --  ABP(mean): --  RR: 18 (07 Jun 2021 04:57) (18 - 18)  SpO2: 99% (07 Jun 2021 04:57) (96% - 99%)           Input and Output:  I&O's Detail    05 Jun 2021 07:01  -  06 Jun 2021 07:00  --------------------------------------------------------  IN:  Total IN: 0 mL    OUT:    Indwelling Catheter - Urethral (mL): 250 mL  Total OUT: 250 mL    Total NET: -250 mL      06 Jun 2021 07:01  -  07 Jun 2021 06:03  --------------------------------------------------------  IN:    Oral Fluid: 480 mL  Total IN: 480 mL    OUT:  Total OUT: 0 mL    Total NET: 480 mL          Lab Data                        9.5    3.90  )-----------( Clumped    ( 06 Jun 2021 06:32 )             29.4     06-05    139  |  101  |  27<H>  ----------------------------<  122<H>  4.3   |  33<H>  |  0.97    Ca    8.6      05 Jun 2021 06:36  Phos  3.1     06-05  Mg     2.4     06-05    TPro  6.9  /  Alb  3.4  /  TBili  0.4  /  DBili  x   /  AST  13<L>  /  ALT  15  /  AlkPhos  73  06-05            Review of Systems	      Objective     Physical Examination    heart s1s2  lung dec BS  abd soft  head nc      Pertinent Lab findings & Imaging      [place]:  NO   Adequate UO     I&O's Detail    05 Jun 2021 07:01  -  06 Jun 2021 07:00  --------------------------------------------------------  IN:  Total IN: 0 mL    OUT:    Indwelling Catheter - Urethral (mL): 250 mL  Total OUT: 250 mL    Total NET: -250 mL      06 Jun 2021 07:01  -  07 Jun 2021 06:03  --------------------------------------------------------  IN:    Oral Fluid: 480 mL  Total IN: 480 mL    OUT:  Total OUT: 0 mL    Total NET: 480 mL               Discussed with:     Cultures:	        Radiology

## 2021-06-07 NOTE — SWALLOW VFSS/MBS ASSESSMENT ADULT - RECOMMENDED CONSISTENCY
1. Regular solids with thin liquids via single/small cup sips.  2. Aspiration precautions.  3. Safe swallowing guidelines: position upright 90 degrees, small bite/sip, complete full mastication of solid textures, alternate bite/sip and complete double swallow per bolus to assist with pharyngeal clearance, pace meal slowly, and remain upright 30 minutes post meal.  4. Continue to monitor respiratory status closely.  5. Consider short-term swallowing therapy s/p d/c to maximize oropharyngeal swallow mechanism.  6. Ongoing oral care.

## 2021-06-07 NOTE — SWALLOW VFSS/MBS ASSESSMENT ADULT - ORAL PHASE
Incomplete tongue to palate contact/Uncontrolled bolus / spillover in amaya-pharynx Incomplete tongue to palate contact/Uncontrolled bolus / spillover in hypopharynx within functional limits

## 2021-06-07 NOTE — PROGRESS NOTE ADULT - SUBJECTIVE AND OBJECTIVE BOX
[facility] -- Mg Win, June Everett, Bennie Hobbs [place], [place]: Office # [phone]    Follow Up:   Afib, HTN, CHF    Subjective/Observations:  Patient seen and examined. Patient awake, alert, resting in bed. No complaints of chest pain, dyspnea, palpitations or dizziness. No signs of orthopnea or PND. Tolerating O2 via nasal cannula. Had episode of A fib overnight for 3 hours, rate controlled.     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, had RL lobectomy &amp; chemotherapy    Hip fracture  left femur, had pins placed , 2016    Lumbar radiculopathy    Hyperlipidemia    Lymphoma    Former smoker    Hip fracture  &quot;left hip 2016 &amp; had surgery  pins placed&quot;    Right lower lobe lung mass  Right lower lobectomy 2016   &amp; chemotherapy  till 01/2017    Status post carotid surgery  carotid artery stenosis 2016, left    No significant past surgical history    MEDICATIONS  (STANDING):  ALPRAZolam 0.25 milliGRAM(s) Oral daily  aMIOdarone    Tablet   Oral   aMIOdarone    Tablet 400 milliGRAM(s) Oral every 8 hours  budesonide  80 MICROgram(s)/formoterol 4.5 MICROgram(s) Inhaler 2 Puff(s) Inhalation two times a day  enoxaparin Injectable 60 milliGRAM(s) SubCutaneous every 12 hours  furosemide    Tablet 20 milliGRAM(s) Oral daily  lisinopril 20 milliGRAM(s) Oral daily  melatonin 3 milliGRAM(s) Oral at bedtime  tiotropium 18 MICROgram(s) Capsule 1 Capsule(s) Inhalation daily    MEDICATIONS  (PRN):    Allergies  aspirin (Unknown)    Intolerances  aspirin (Stomach Upset)    Vital Signs Last 24 Hrs  T(C): 36.4 (07 Jun 2021 04:57), Max: 36.8 (06 Jun 2021 19:01)  T(F): 97.5 (07 Jun 2021 04:57), Max: 98.3 (06 Jun 2021 19:01)  HR: 74 (07 Jun 2021 05:18) (62 - 85)  BP: 112/52 (07 Jun 2021 05:18) (102/56 - 163/66)  BP(mean): --  RR: 18 (07 Jun 2021 04:57) (18 - 18)  SpO2: 99% (07 Jun 2021 04:57) (96% - 99%)  I&O's Summary    06 Jun 2021 07:01  -  07 Jun 2021 07:00  --------------------------------------------------------  IN: 480 mL / OUT: 0 mL / NET: 480 mL      TELE:  60-80s, A fib overnight for 3 hours rate controlled   PHYSICAL EXAM:  Appearance: NAD, no distress, alert, Frail   HEENT: Moist Mucous Membranes, Anicteric  Cardiovascular: Regular rate and rhythm, Normal S1 S2, No JVD, + murmurs, No rubs, gallops or clicks  Respiratory: Non-labored, Clear to auscultation, No rales, No rhonchi, No wheezing.   Gastrointestinal:  Soft, Non-tender, + BS  Neurologic: Non-focal  Skin: Warm and dry, No visible rashes or ulcers, No ecchymosis, No cyanosis  Musculoskeletal: No clubbing, No cyanosis, No joint swelling/tenderness  Psychiatry: Mood & affect appropriate  Lymph: No peripheral edema.     LABS: All Labs Reviewed:                        9.5    3.90  )-----------( Clumped    ( 06 Jun 2021 06:32 )             29.4                         9.6    4.56  )-----------( 154      ( 05 Jun 2021 06:36 )             30.0     05 Jun 2021 06:36    139    |  101    |  27     ----------------------------<  122    4.3     |  33     |  0.97   04 Jun 2021 15:12    136    |  98     |  26     ----------------------------<  126    3.5     |  33     |  1.00     Ca    8.6        05 Jun 2021 06:36  Ca    9.1        04 Jun 2021 15:12  Phos  3.1       05 Jun 2021 06:36  Phos  2.4       04 Jun 2021 15:12  Mg     2.4       05 Jun 2021 06:36  Mg     2.6       04 Jun 2021 15:12    TPro  6.9    /  Alb  3.4    /  TBili  0.4    /  DBili  x      /  AST  13     /  ALT  15     /  AlkPhos  73     05 Jun 2021 06:36  Creatine Kinase, Serum: 53 U/L (06-03-21 @ 05:49)  Troponin I, Serum: .044 ng/mL (06-03-21 @ 05:49)  Creatine Kinase, Serum: 52 U/L (06-02-21 @ 20:59)    12 Lead ECG:   Ventricular Rate 96 BPM  Atrial Rate 96 BPM  P-R Interval 172 ms  QRS Duration 106 ms  Q-T Interval 382 ms  QTC Calculation(Bazett) 482 ms  P Axis 43 degrees  R Axis -50 degrees  T Axis 41 degrees  Diagnosis Line Normal sinus rhythm  Left axis deviation  Confirmed by emanuel Elias ([id number]) on 6/3/2021 3:31:49 PM (06-03-21 @ 09:06)      < from: TTE Echo Complete w/o Contrast w/ Doppler (06.03.21 @ 20:01) >  Dimensions:  LA 3.7       Normal Values: 2.0 - 4.0 cm  Ao 2.7        Normal Values: 2.0 - 3.8 cm  SEPTUM 1.0       Normal Values: 0.6 - 1.2 cm  PWT 0.9       Normal Values: 0.6 - 1.1 cm  LVIDd 5.2         Normal Values: 3.0 - 5.6 cm  LVIDs 3.8         Normal Values: 1.8 - 4.0 cm    OBSERVATIONS:  Mitral Valve: Mild MR.  Aortic Valve/Aorta: Calcified trileaflet aortic valve with decreased opening. Peak transaortic valve gradient equals 15 mmHg with a mean transaortic valve gradient of 9 mmHg. This consistent with mild aortic stenosis.  Tricuspid Valve: Mild TR.  Pulmonic Valve: Not well-visualized  Left Atrium: normal  Right Atrium: Not well-visualized  Left Ventricle: normal LV size and systolic function, estimated LVEF of 55%.  Right Ventricle: Grossly normal size and systolic function.  Pericardium: no significant pericardial effusion.  Small bilateral pleural effusions    IMPRESSION:  Normal left ventricular internal dimensions and systolic function, estimated LVEF of 55%.  Grossly normal RV size and systolic function.  Calcified trileaflet aortic valve with mild aortic stenosis  Mild MR and TR.  No significant pericardial effusion.  < end of copied text >    < from: CT Chest No Cont (06.02.21 @ 17:08) >  IMPRESSION: Small bilateral pleural effusions with loculation of the right pleural fluid in a subpulmonic location.  Extensive bilateral ground glass infiltrates  Upper abdomen demonstrates ascites and probable splenomegaly. There is also cholelithiasis.  Postsurgical changes with surgical clips in the right perihilar region  < end of copied text >    < from: Xray Chest 1 View- PORTABLE-Urgent (Xray Chest 1 View- PORTABLE-Urgent .) (06.02.21 @ 15:10) >  IMPRESSION:   Bilateral lung diffuse airspace disease. RIGHT apical/RIGHT basilar pleural thickening  and/or basilar airspace consolidation.  < end of copied text >    < from: CT Angio Chest PE Protocol w/ IV Cont (06.06.21 @ 09:49) >  IMPRESSION:  No pulmonary embolus.  In comparison with 6/2/2021, interval resolution of interlobular septal thickening and patchy groundglass opacities representing pulmonary edema.  Again noted, the patient is status post right lower lobectomy. Severe diffuse right pleural thickening is unchanged. Trace loculated right pleural effusion is unchanged. Trace layering left pleural effusion is decreased.  No consolidation. No lymphadenopathy.  < end of copied text >

## 2021-06-07 NOTE — SWALLOW VFSS/MBS ASSESSMENT ADULT - LARYNGEAL PENETRATION DURING THE SWALLOW - SILENT
Ongoing SW/CM Assessment/Plan of Care Note     See SW/CM flowsheets for goals and other objective data.    Patient/Family discharge goal (s):  Goal #1: Communication facilitated  Goal #2: Psychosocial needs assessed       PT Recommendation:  Recommendation for Discharge: PT WI: SNF    OT Recommendation:  Recommendations for Discharge: OT WI: SNF    SLP Recommendation:       Disposition:  Planned Discharge Destination: Rehabilitation/Skilled Care    Progress note:   8:42- Writer received VM from Maida from [facility] ([phone]). Maida asked writer if pt is in isolation. Maida asked writer to call back to coordinate d/c time.    Writer spoke to Zainab ARMSTRONG. Pt is in isolation; droplet for influenza, and contact for MRSA. Zainab ARMSTRONG asked writer to f/u with [facility] regarding fully cath.      Writer spoke to Maida from [facility]. Maida said she will notify facility of pt's isolation and fully cath. Maida said she will contact writer later regarding d/c time.      UPDATE 9:36- Per morning meeting, MD said pt can d/c today. MD said fully cath will be pulled prior to d/c. MD told writer to scheduled transportation for 11:00. Per conversation with pt's [facility]/kecia Polk yesterday, [place] told writer to arrange ambulance. [facility] scheduled for 11:15. Ambulance was not available for 11:00 but is for 11:15. MD, RN, [facility] [place], and Maida from [facility] aware of d/c today and time.    RN report number is [facility] Main number at [phone]; ask for RN supervisor.       UPDATE 11:33- Writer confirmed with pharmacy that avs if completed. D/c summary and avs faxed to [facility].   Placed call to [phone] a prior Gerson Santana is being faxed over. with consecutive sips only, eliminated with single/small cup sips/Mild

## 2021-06-07 NOTE — SWALLOW VFSS/MBS ASSESSMENT ADULT - SLP PERTINENT HISTORY OF CURRENT PROBLEM
Per charting, "80 female PMH lymphoma, presents to ER with daughter c/o difficulty breathing, shortness of breath. Patient had gone to PT today for her "pulled muscle", after PT patient drove to her daughter's house she felt shortness of breath, worse with exertion."

## 2021-06-07 NOTE — PROGRESS NOTE ADULT - SUBJECTIVE AND OBJECTIVE BOX
Patient seen and examined;  Chart reviewed and events noted;   insistent that she is going home today  she was started on Lovenox full dose due to A-fib new onset    MEDICATIONS  (STANDING):  ALPRAZolam 0.25 milliGRAM(s) Oral daily  aMIOdarone    Tablet   Oral   aMIOdarone    Tablet 400 milliGRAM(s) Oral every 8 hours  budesonide  80 MICROgram(s)/formoterol 4.5 MICROgram(s) Inhaler 2 Puff(s) Inhalation two times a day  enoxaparin Injectable 60 milliGRAM(s) SubCutaneous every 12 hours  furosemide    Tablet 20 milliGRAM(s) Oral daily  lisinopril 20 milliGRAM(s) Oral daily  melatonin 3 milliGRAM(s) Oral at bedtime  tiotropium 18 MICROgram(s) Capsule 1 Capsule(s) Inhalation daily      Vital Signs Last 24 Hrs  T(C): 36.4 (07 Jun 2021 04:57), Max: 36.8 (06 Jun 2021 19:01)  T(F): 97.5 (07 Jun 2021 04:57), Max: 98.3 (06 Jun 2021 19:01)  HR: 74 (07 Jun 2021 05:18) (62 - 85)  BP: 112/52 (07 Jun 2021 05:18) (102/56 - 163/66)  RR: 18 (07 Jun 2021 04:57) (18 - 18)  SpO2: 99% (07 Jun 2021 04:57) (96% - 99%)    PHYSICAL EXAM  General: adult in NAD  HEENT: clear oropharynx, anicteric sclera, pink conjunctivae  Neck: supple  CV: normal S1S2 with no murmur rubs or gallops  Lungs: reduced breath sounds at right base  Abdomen: soft non-tender non-distended, no hepato/splenomegaly  Ext: no clubbing cyanosis or edema  Skin: no rashes and no petichiae  Neuro: alert and oriented X3 no focal deficits      LABS:                        9.5    3.90  )-----------( Clumped    ( 06 Jun 2021 06:32 )             29.4       iron - 26  TIBC 291  Iron sat 9%  Ferritin 49    B12 530  Folate 11.1        RADIOLOGY STUDIES:

## 2021-06-07 NOTE — SWALLOW VFSS/MBS ASSESSMENT ADULT - COMMENTS
Clinical swallow assessment completed 6/5, at which time pt was recommended soft solids with thin liquids. MD placed order for MBS to r/o silent aspiration.    CT chest 6/6: "No pulmonary embolus. In comparison with 6/2/2021, interval resolution of interlobular septal thickening and patchy groundglass opacities representing pulmonary edema. Again noted, the patient is status post right lower lobectomy. Severe diffuse right pleural thickening is unchanged. Trace loculated right pleural effusion is unchanged. Trace layering left pleural effusion is decreased. No consolidation. No lymphadenopathy"

## 2021-06-07 NOTE — PROGRESS NOTE ADULT - ASSESSMENT
CT chest repeat shows improvement in GGO and Pulm edema -   on RA - feels and looks better - vs noted  TTE shows valv heart disease - pt is on PO Lasix - Cardio following  ID f/u noted - monitored off ABX  pt does not have COPD - pt is a remote ex smoker - would DC inhaler regimen    80 year old female with PMH lymphoma (RCHOP 5 yrs ago) with RLLobectomy, HTN, HLD, "borderline" DM (not on meds), right ankle fx, L ALEXANDRA, herniated disc surgery. Pt at usual well state of health until this afternoon. While at PT for "pulled muscle" in [place] pt developed sudden onset acute SOB, drove to [place], felt worse then was driven to [place] ED by daughter erlinda hoytpatrica didn't take her to [facility]. ED reports pt on arrival in acute resp distress, hypoxemic (sat 80s), hypertensive urgency (200/100), diaphoretic, rales bilaterally. Failed NRB, given SL NTG x 2, lasix 40 IV x 1, solumedrol 125mg IV x 1, placed on BiPAP.    off bipap - oxygenation improved  ct chest with pna and small eff, crazy paving seen on CT parenchyma -   eval for Acute Infectious Process - done - ID eval noted - monitored off ABX  I and O  cardio eval noted - on lasix - [facility] rx regimen - TTE reviewed -

## 2021-06-07 NOTE — SWALLOW VFSS/MBS ASSESSMENT ADULT - ROSENBEK'S PENETRATION ASPIRATION SCALE
(1) no aspiration, contrast does not enter airway with consecutive cup sips only, eliminated with single/small cup sips/(2) contrast enters airway, remains above the vocal cords, no residue remains (penetration)

## 2021-06-07 NOTE — PROGRESS NOTE ADULT - ASSESSMENT
80 yr old female adm to ICU with acute hypoxic respiratory failure in setting of atypical pneumonia and hypertensive urgency  NIPPV with Bipap prn

## 2021-06-07 NOTE — PROGRESS NOTE ADULT - SUBJECTIVE AND OBJECTIVE BOX
Patient is a 80y old  Female who presents with a chief complaint of dyspnea (07 Jun 2021 12:00)      INTERVAL /OVERNIGHT EVENTS: anxious to go home, now on amiodarone loading    MEDICATIONS  (STANDING):  ALPRAZolam 0.25 milliGRAM(s) Oral daily  aMIOdarone    Tablet   Oral   aMIOdarone    Tablet 400 milliGRAM(s) Oral every 8 hours  budesonide  80 MICROgram(s)/formoterol 4.5 MICROgram(s) Inhaler 2 Puff(s) Inhalation two times a day  enoxaparin Injectable 60 milliGRAM(s) SubCutaneous every 12 hours  furosemide    Tablet 20 milliGRAM(s) Oral daily  iron sucrose Injectable 100 milliGRAM(s) IV Push every 24 hours  lisinopril 20 milliGRAM(s) Oral daily  melatonin 3 milliGRAM(s) Oral at bedtime  tiotropium 18 MICROgram(s) Capsule 1 Capsule(s) Inhalation daily    MEDICATIONS  (PRN):      Allergies    aspirin (Unknown)    Intolerances    aspirin (Stomach Upset)      REVIEW OF SYSTEMS:  CONSTITUTIONAL: No fever, weight loss, or fatigue  EYES: No eye pain, visual disturbances, or discharge  ENMT:  No difficulty hearing, tinnitus, vertigo; No sinus or throat pain  NECK: No pain or stiffness  RESPIRATORY: No cough, wheezing, chills or hemoptysis; No shortness of breath  CARDIOVASCULAR: No chest pain, palpitations, dizziness, or leg swelling  GASTROINTESTINAL: No abdominal or epigastric pain. No nausea, vomiting, or hematemesis; No diarrhea or constipation. No melena or hematochezia.  GENITOURINARY: No dysuria, frequency, hematuria, or incontinence  NEUROLOGICAL: No headaches, memory loss, loss of strength, numbness, or tremors  SKIN: No itching, burning, rashes, or lesions   LYMPH NODES: No enlarged glands  ENDOCRINE: No heat or cold intolerance; No hair loss; No polydipsia or polyuria  MUSCULOSKELETAL: No joint pain or swelling; No muscle, back, or extremity pain  PSYCHIATRIC: No depression, anxiety, mood swings, or difficulty sleeping  HEME/LYMPH: No easy bruising, or bleeding gums  ALLERGY AND IMMUNOLOGIC: No hives or eczema    Vital Signs Last 24 Hrs  T(C): 36.4 (07 Jun 2021 12:02), Max: 36.8 (06 Jun 2021 19:01)  T(F): 97.5 (07 Jun 2021 12:02), Max: 98.3 (06 Jun 2021 19:01)  HR: 71 (07 Jun 2021 12:02) (62 - 85)  BP: 147/61 (07 Jun 2021 12:02) (102/56 - 163/66)  BP(mean): --  RR: 17 (07 Jun 2021 12:02) (17 - 18)  SpO2: 92% (07 Jun 2021 12:02) (92% - 99%)    PHYSICAL EXAM:  GENERAL: NAD, well-groomed, well-developed  HEAD:  Atraumatic, Normocephalic  EYES: EOMI, PERRLA, conjunctiva and sclera clear  ENMT: No tonsillar erythema, exudates, or enlargement; Moist mucous membranes, Good dentition, No lesions  NECK: Supple, No JVD, Normal thyroid  NERVOUS SYSTEM:  Alert & Oriented X3, Good concentration; Motor Strength 5/5 B/L upper and lower extremities; DTRs 2+ intact and symmetric  CHEST/LUNG: Clear to auscultation bilaterally; No rales, rhonchi, wheezing, or rubs  HEART: Regular rate and rhythm; No murmurs, rubs, or gallops  ABDOMEN: Soft, Nontender, Nondistended; Bowel sounds present  EXTREMITIES:  2+ Peripheral Pulses, No clubbing, cyanosis, or edema  LYMPH: No lymphadenopathy noted  SKIN: No rashes or lesions    LABS:    07 Jun 2021 12:59    138    |  101    |  40     ----------------------------<  138    4.7     |  30     |  1.30     Ca    9.1        07 Jun 2021 12:59          CAPILLARY BLOOD GLUCOSE          RADIOLOGY & ADDITIONAL TESTS:    Notes Reviewed:  [ x] YES  [ ] NO    Care Discussed with Consultants/Other Providers [x ] YES  [ ] NO

## 2021-06-07 NOTE — PROGRESS NOTE ADULT - ASSESSMENT
79 y/o woman w hx lymphoma (dx'd 5 yrs ago via right lung bx/resection w axillary LN involvement, post RCHOP under care Dr [facility], last seen 12/2019, has not been back cue to COVID pandemic), HTN, HLD, "borderline" DM (not on meds), right ankle fx, L ALEXANDRA, herniated disc surgery.    Presented with 1 month of  SOB/IVAN, dry cough, no fever/anorexia/weight loss, denies sweats but daughter reports sweats "all the time", respiratory sx much improved when traveled to [place] 2 wks ago.  Adm w acute exacerbation of SOB while doing PT. When seen in ER was in acute resp distress, hypoxemic (sat 80s), hypertensive urgency (200/100), diaphoretic, rales bilaterally. Failed NRB, given SL NTG x 2, lasix 40 IV x 1, solumedrol 125mg IV x 1, placed on BiPAP. w subsequent sig improvement of sx    CT chest w extensive groundglass infiltrates in lungs w relative sparring of Left base, focus of linear scar or atelectasis lateral lung base, subpleural scarring RUL and lat RLL, sm luis alberto effusions. No LADs. Sm ascites.  Echo no sig left or right heart ds    -CT chest findings-no lymphadenopathy, no distinct masses. ?infectious. skye viral as WBC sl low on admission(higher now but may be due to the steroid) Already seen by Pulm who also feels likely infectious etiology, w/u in progress  clinically continues to be stable, no recur of sig respiratory distress.    -mild pancytopenia-stable, ?baseline w exacerbation by acute illness. Not symptomatic at this level, can cont serial monitoring  - anemia work-up c/w partially treated iron deficiency; will order IV iron for 3 days  - started on anticoagulation for A-fib; prior history of gastritis noted on EGD in 6/2020 when she was admitted to [facility]  - patient to follow-up with Dr. Kandy Griffin (heme/Onc at [place]); email message sent to Dr. Griffin to notify her of admission  - no additional acute heme/onc intervention; will sign off; reconsult if needed  - discussed with Dr. Clark

## 2021-06-07 NOTE — SWALLOW VFSS/MBS ASSESSMENT ADULT - SLP GENERAL OBSERVATIONS
Pt received sitting upright in MBS chair, +lateral view. Pt was awake and cooperative, on room air. Pt was able to follow simple commands for purposes of study. Pt denied pain pre and post study.

## 2021-06-07 NOTE — SWALLOW VFSS/MBS ASSESSMENT ADULT - DIAGNOSTIC IMPRESSIONS
1. Mild oral dysphagia when given thin and nectar thick liquids marked by adequate retrieval and containment, reduced bolus cohesion resulting in premature spillage to amaya/hypopharynx, timely oral transit, and adequate clearance post swallow.  2. Functional oral phase when given puree and regular solids marked by adequate retrieval and containment, timely mastication, adequate bolus cohesion, timely oral transit, and adequate clearance post swallow.   3. Mild pharyngeal dysphagia when given thin liquids, nectar thick liquids, puree, and regular solids marked by brief delay in pharyngeal swallow trigger with liquids (at the level of the valleculae with nectar thick, at the level of the pyriforms with thin liquids), timely pharyngeal swallow trigger with puree and regular solids, reduced BOT retraction, grossly adequate hyolaryngeal elevation/excursion, and mainly complete epiglottic retroflexion. There was intermittent flash penetration during the swallow with complete retrieval when consuming consecutive sips of thin liquids. There was no penetration and/or aspiration pre/during/post swallow when given thin liquids via single/small cup sips, nectar thick liquids, puree, and regular solids. With puree and regular solids, there was mild to moderate valleculae and trace lateral pharyngeal wall residue post swallow. With thin and nectar thick liquids, there was mild valleculae and trace posterior pharyngeal wall residue post swallow. Pt completed a briefly delayed additional dry swallow, which resulted in partial clearance of pharyngeal residue. Pt also benefitted from alternating bite/sip.

## 2021-06-07 NOTE — SWALLOW VFSS/MBS ASSESSMENT ADULT - RECOMMENDED FEEDING/EATING TECHNIQUES
allow for swallow between intakes/alternate food with liquid/maintain upright posture during/after eating for 30 mins/no straws/oral hygiene/position upright (90 degrees)/provide rest periods between swallows

## 2021-06-07 NOTE — PROGRESS NOTE ADULT - ASSESSMENT
80 year old female with PMH lymphoma (RCHOP 5 yrs ago) with RL Lobectomy, HTN, HLD, "borderline" DM (not on meds), here with an episode of dyspnea. She was found to be significantly hypertensive and dyspneic, and required IV diuresis and bipap    HTN urgency   - Pt presented with dyspnea and volume overload in the setting of hypertensive urgency  - BP: 112/52 (06-07-21 @ 05:18) (102/56 - 163/66)  - BP improved and there is improvement in her volume and breathing with iv diuresis and nitrates  - S/p IV Lasix and is now on Lasix 20 mg PO daily   - Monitor strict i/o's and daily weights  - Oxygen supplementation as needed,   - ECHO showed normal LV & RV size and function, mild AS, mild MR and TR  - Continue lisinopril for now, and can add vasodilators if needed.  - BP remains labile but is improved overall.     A fib.   - Patient noted to have paroxysmal episodes of Afib during the stay in [facility].   - It is possible that she evolved heart failure in the setting of A fib RVR  - Continue Amiodarone load 5 gm to keep her out of A fib.   - Patient does have anemia and thrombocytopenia, she is not an ideal AC candidate, but given recurrence, need to consider AC. Would trial with Lovenox FD during stay and can switch to NOAC on discharge     Anemia/GI Bleed   - Hemoglobin <--9.5, <--9.6, <--8.7  - Hematocrit <--29.4, <--30.0, <--27.1  - Trend CBC    Hyperlipidemia  - Resume Lipitor    - Monitor and replete lytes, keep K>4, Mg>2.  - All other medical needs as per primary team.  - Other cardiovascular workup will depend on clinical course.  - Will continue to follow.    Jocelyn Grant, MS FNP, [facility]  Nurse Practitioner- Cardiology   Spectra #[phone]

## 2021-06-07 NOTE — PROGRESS NOTE ADULT - SUBJECTIVE AND OBJECTIVE BOX
[facility] DIVISION of INFECTIOUS DISEASE  Simón Nolasco MD PhD, Patrica Bear MD, Lluvia Stahl MD, Tree Avery MD  and providing coverage with Karmen Larkin MD and Gerson Ortiz MD  Providing Infectious Disease Consultations at [facility], [facility], [facility]'s    Office# [phone] to schedule follow up appointments  Answering Service for urgent calls or New Consults [phone]  Cell# to text for urgent issues Simón Nolasco [phone]     infectious diseases progress note:    CAROL ALEXANDRE is a 80y y. o. Female patient    No concerning overnight events    Allergies    aspirin (Unknown)    Intolerances    aspirin (Stomach Upset)      ANTIBIOTICS/RELEVANT:  antimicrobials    immunologic:    OTHER:  ALPRAZolam 0.25 milliGRAM(s) Oral daily  aMIOdarone    Tablet   Oral   aMIOdarone    Tablet 400 milliGRAM(s) Oral every 8 hours  budesonide  80 MICROgram(s)/formoterol 4.5 MICROgram(s) Inhaler 2 Puff(s) Inhalation two times a day  enoxaparin Injectable 60 milliGRAM(s) SubCutaneous every 12 hours  furosemide    Tablet 20 milliGRAM(s) Oral daily  iron sucrose Injectable 100 milliGRAM(s) IV Push every 24 hours  lisinopril 20 milliGRAM(s) Oral daily  melatonin 3 milliGRAM(s) Oral at bedtime  tiotropium 18 MICROgram(s) Capsule 1 Capsule(s) Inhalation daily      Objective:  Vital Signs Last 24 Hrs  T(C): 36.4 (07 Jun 2021 12:02), Max: 36.8 (06 Jun 2021 19:01)  T(F): 97.5 (07 Jun 2021 12:02), Max: 98.3 (06 Jun 2021 19:01)  HR: 71 (07 Jun 2021 12:02) (62 - 85)  BP: 147/61 (07 Jun 2021 12:02) (102/56 - 163/66)  BP(mean): --  RR: 17 (07 Jun 2021 12:02) (17 - 18)  SpO2: 92% (07 Jun 2021 12:02) (92% - 99%)    T(C): 36.4 (06-07-21 @ 12:02), Max: 36.9 (06-06-21 @ 11:04)  T(C): 36.4 (06-07-21 @ 12:02), Max: 36.9 (06-06-21 @ 11:04)  T(C): 36.4 (06-07-21 @ 12:02), Max: 37.3 (06-04-21 @ 00:00)    PHYSICAL EXAM:  HEENT: NC atraumatic  Neck: supple  Respiratory: no accessory muscle use, breathing comfortably  Cardiovascular: distant  Gastrointestinal: normal appearing, nondistended  Extremities: no clubbing, no cyanosis,        LABS:                          9.5    3.90  )-----------( Clumped    ( 06 Jun 2021 06:32 )             29.4       3.90 06-06 @ 06:32  4.56 06-05 @ 06:36  4.14 06-04 @ 04:57  3.98 06-03 @ 05:49  16.00 06-02 @ 14:31              Creatinine, Serum: 0.97 mg/dL (06-05-21 @ 06:36)  Creatinine, Serum: 1.00 mg/dL (06-04-21 @ 15:12)  Creatinine, Serum: 1.10 mg/dL (06-04-21 @ 04:57)  Creatinine, Serum: 1.20 mg/dL (06-03-21 @ 14:49)  Creatinine, Serum: 1.00 mg/dL (06-03-21 @ 05:49)  Creatinine, Serum: 1.10 mg/dL (06-02-21 @ 20:59)  Creatinine, Serum: 1.20 mg/dL (06-02-21 @ 14:31)                INFLAMMATORY MARKERS  Auto Neutrophil #: 2.59 K/uL (06-05-21 @ 06:36)  Auto Lymphocyte #: 1.23 K/uL (06-05-21 @ 06:36)  Auto Neutrophil #: 2.96 K/uL (06-04-21 @ 04:57)  Auto Lymphocyte #: 0.69 K/uL (06-04-21 @ 04:57)  Auto Neutrophil #: 3.25 K/uL (06-03-21 @ 05:49)  Auto Lymphocyte #: 0.44 K/uL (06-03-21 @ 05:49)  Auto Lymphocyte #: 7.06 K/uL (06-02-21 @ 14:31)  Auto Neutrophil #: 7.43 K/uL (06-02-21 @ 14:31)    Lactate, Blood: 2.2 mmol/L (06-02-21 @ 20:59)  Lactate, Blood: 4.5 mmol/L (06-02-21 @ 14:31)    Auto Eosinophil #: 0.17 K/uL (06-05-21 @ 06:36)  Auto Eosinophil #: 0.03 K/uL (06-04-21 @ 04:57)  Auto Eosinophil #: 0.00 K/uL (06-03-21 @ 05:49)  Auto Eosinophil #: 0.41 K/uL (06-02-21 @ 14:31)    Lactate Dehydrogenase, Serum: 197 U/L (06-03-21 @ 01:35)      Procalcitonin, Serum: 0.05 ng/mL (06-02-21 @ 14:31)    Troponin I, Serum: .044 ng/mL (06-03-21 @ 05:49)  Troponin I, Serum: .072 ng/mL (06-02-21 @ 20:59)  Troponin I, Serum: <.015 ng/mL (06-02-21 @ 14:31)    Creatine Kinase, Serum: 53 U/L (06-03-21 @ 05:49)  Creatine Kinase, Serum: 52 U/L (06-02-21 @ 20:59)        Ferritin, Serum: 49 ng/mL (06-06-21 @ 11:42)        INR: 1.16 ratio (06-05-21 @ 06:36)  Activated Partial Thromboplastin Time: 64.2 sec (06-05-21 @ 06:36)  INR: 1.29 ratio (06-04-21 @ 04:57)  Activated Partial Thromboplastin Time: 56.4 sec (06-04-21 @ 04:57)  INR: 1.11 ratio (06-02-21 @ 14:31)  Activated Partial Thromboplastin Time: 44.0 sec (06-02-21 @ 14:31)          MICROBIOLOGY:              RADIOLOGY & ADDITIONAL STUDIES:

## 2021-06-08 LAB
ALBUMIN SERPL ELPH-MCNC: 3.8 G/DL — SIGNIFICANT CHANGE UP (ref 3.3–5)
ALP SERPL-CCNC: 81 U/L — SIGNIFICANT CHANGE UP (ref 40–120)
ALT FLD-CCNC: 19 U/L — SIGNIFICANT CHANGE UP (ref 12–78)
ANION GAP SERPL CALC-SCNC: 6 MMOL/L — SIGNIFICANT CHANGE UP (ref 5–17)
AST SERPL-CCNC: 16 U/L — SIGNIFICANT CHANGE UP (ref 15–37)
BILIRUB SERPL-MCNC: 0.4 MG/DL — SIGNIFICANT CHANGE UP (ref 0.2–1.2)
BUN SERPL-MCNC: 35 MG/DL — HIGH (ref 7–23)
CALCIUM SERPL-MCNC: 9.3 MG/DL — SIGNIFICANT CHANGE UP (ref 8.5–10.1)
CHLORIDE SERPL-SCNC: 101 MMOL/L — SIGNIFICANT CHANGE UP (ref 96–108)
CO2 SERPL-SCNC: 31 MMOL/L — SIGNIFICANT CHANGE UP (ref 22–31)
CREAT SERPL-MCNC: 1.3 MG/DL — SIGNIFICANT CHANGE UP (ref 0.5–1.3)
GLUCOSE SERPL-MCNC: 123 MG/DL — HIGH (ref 70–99)
HCT VFR BLD CALC: 32.5 % — LOW (ref 34.5–45)
HGB BLD-MCNC: 10.5 G/DL — LOW (ref 11.5–15.5)
MCHC RBC-ENTMCNC: 28.8 PG — SIGNIFICANT CHANGE UP (ref 27–34)
MCHC RBC-ENTMCNC: 32.3 GM/DL — SIGNIFICANT CHANGE UP (ref 32–36)
MCV RBC AUTO: 89.3 FL — SIGNIFICANT CHANGE UP (ref 80–100)
NRBC # BLD: 0 /100 WBCS — SIGNIFICANT CHANGE UP (ref 0–0)
PLATELET # BLD AUTO: 190 K/UL — SIGNIFICANT CHANGE UP (ref 150–400)
POTASSIUM SERPL-MCNC: 5.1 MMOL/L — SIGNIFICANT CHANGE UP (ref 3.5–5.3)
POTASSIUM SERPL-SCNC: 5.1 MMOL/L — SIGNIFICANT CHANGE UP (ref 3.5–5.3)
PROT SERPL-MCNC: 7.4 G/DL — SIGNIFICANT CHANGE UP (ref 6–8.3)
RBC # BLD: 3.64 M/UL — LOW (ref 3.8–5.2)
RBC # FLD: 13.2 % — SIGNIFICANT CHANGE UP (ref 10.3–14.5)
SODIUM SERPL-SCNC: 138 MMOL/L — SIGNIFICANT CHANGE UP (ref 135–145)
WBC # BLD: 4.74 K/UL — SIGNIFICANT CHANGE UP (ref 3.8–10.5)
WBC # FLD AUTO: 4.74 K/UL — SIGNIFICANT CHANGE UP (ref 3.8–10.5)

## 2021-06-08 PROCEDURE — 99232 SBSQ HOSP IP/OBS MODERATE 35: CPT

## 2021-06-08 RX ORDER — FERROUS SULFATE 325(65) MG
1 TABLET ORAL
Qty: 30 | Refills: 0
Start: 2021-06-08 | End: 2021-07-07

## 2021-06-08 RX ORDER — ALPRAZOLAM 0.25 MG
0.25 TABLET ORAL THREE TIMES A DAY
Refills: 0 | Status: DISCONTINUED | OUTPATIENT
Start: 2021-06-08 | End: 2021-06-09

## 2021-06-08 RX ORDER — AMIODARONE HYDROCHLORIDE 400 MG/1
1 TABLET ORAL
Qty: 30 | Refills: 0
Start: 2021-06-08 | End: 2021-07-07

## 2021-06-08 RX ORDER — APIXABAN 2.5 MG/1
1 TABLET, FILM COATED ORAL
Qty: 60 | Refills: 0
Start: 2021-06-08 | End: 2021-07-07

## 2021-06-08 RX ADMIN — TIOTROPIUM BROMIDE 1 CAPSULE(S): 18 CAPSULE ORAL; RESPIRATORY (INHALATION) at 06:15

## 2021-06-08 RX ADMIN — Medication 0.25 MILLIGRAM(S): at 22:05

## 2021-06-08 RX ADMIN — AMIODARONE HYDROCHLORIDE 400 MILLIGRAM(S): 400 TABLET ORAL at 14:22

## 2021-06-08 RX ADMIN — AMIODARONE HYDROCHLORIDE 400 MILLIGRAM(S): 400 TABLET ORAL at 06:14

## 2021-06-08 RX ADMIN — ENOXAPARIN SODIUM 60 MILLIGRAM(S): 100 INJECTION SUBCUTANEOUS at 06:15

## 2021-06-08 RX ADMIN — Medication 20 MILLIGRAM(S): at 06:14

## 2021-06-08 RX ADMIN — BUDESONIDE AND FORMOTEROL FUMARATE DIHYDRATE 2 PUFF(S): 160; 4.5 AEROSOL RESPIRATORY (INHALATION) at 22:07

## 2021-06-08 RX ADMIN — ENOXAPARIN SODIUM 60 MILLIGRAM(S): 100 INJECTION SUBCUTANEOUS at 17:03

## 2021-06-08 RX ADMIN — AMIODARONE HYDROCHLORIDE 400 MILLIGRAM(S): 400 TABLET ORAL at 22:05

## 2021-06-08 RX ADMIN — BUDESONIDE AND FORMOTEROL FUMARATE DIHYDRATE 2 PUFF(S): 160; 4.5 AEROSOL RESPIRATORY (INHALATION) at 06:15

## 2021-06-08 RX ADMIN — IRON SUCROSE 100 MILLIGRAM(S): 20 INJECTION, SOLUTION INTRAVENOUS at 14:22

## 2021-06-08 NOTE — PROGRESS NOTE ADULT - SUBJECTIVE AND OBJECTIVE BOX
Patient is a 80y old  Female who presents with a chief complaint of shortness of breath (08 Jun 2021 10:10)      INTERVAL /OVERNIGHT EVENTS: feels frustated    MEDICATIONS  (STANDING):  aMIOdarone    Tablet   Oral   aMIOdarone    Tablet 400 milliGRAM(s) Oral every 8 hours  budesonide  80 MICROgram(s)/formoterol 4.5 MICROgram(s) Inhaler 2 Puff(s) Inhalation two times a day  enoxaparin Injectable 60 milliGRAM(s) SubCutaneous every 12 hours  furosemide    Tablet 20 milliGRAM(s) Oral daily  iron sucrose Injectable 100 milliGRAM(s) IV Push every 24 hours  lisinopril 20 milliGRAM(s) Oral daily  melatonin 3 milliGRAM(s) Oral at bedtime  tiotropium 18 MICROgram(s) Capsule 1 Capsule(s) Inhalation daily    MEDICATIONS  (PRN):  ALPRAZolam 0.25 milliGRAM(s) Oral three times a day PRN anxiety      Allergies    aspirin (Unknown)    Intolerances    aspirin (Stomach Upset)      REVIEW OF SYSTEMS:  CONSTITUTIONAL: No fever, weight loss, or fatigue  EYES: No eye pain, visual disturbances, or discharge  ENMT:  No difficulty hearing, tinnitus, vertigo; No sinus or throat pain  NECK: No pain or stiffness  RESPIRATORY: No cough, wheezing, chills or hemoptysis; No shortness of breath  CARDIOVASCULAR: No chest pain, palpitations, dizziness, or leg swelling  GASTROINTESTINAL: No abdominal or epigastric pain. No nausea, vomiting, or hematemesis; No diarrhea or constipation. No melena or hematochezia.  GENITOURINARY: No dysuria, frequency, hematuria, or incontinence  NEUROLOGICAL: No headaches, memory loss, loss of strength, numbness, or tremors  SKIN: No itching, burning, rashes, or lesions   LYMPH NODES: No enlarged glands  ENDOCRINE: No heat or cold intolerance; No hair loss; No polydipsia or polyuria  MUSCULOSKELETAL: No joint pain or swelling; No muscle, back, or extremity pain  PSYCHIATRIC: No depression, anxiety, mood swings, or difficulty sleeping  HEME/LYMPH: No easy bruising, or bleeding gums  ALLERGY AND IMMUNOLOGIC: No hives or eczema    Vital Signs Last 24 Hrs  T(C): 36.7 (08 Jun 2021 12:24), Max: 36.7 (08 Jun 2021 12:24)  T(F): 98 (08 Jun 2021 12:24), Max: 98 (08 Jun 2021 12:24)  HR: 76 (08 Jun 2021 12:24) (62 - 80)  BP: 166/63 (08 Jun 2021 12:24) (102/49 - 166/63)  BP(mean): --  RR: 18 (08 Jun 2021 12:24) (18 - 18)  SpO2: 97% (08 Jun 2021 12:24) (96% - 97%)    PHYSICAL EXAM:  GENERAL: NAD, well-groomed, well-developed  HEAD:  Atraumatic, Normocephalic  EYES: EOMI, PERRLA, conjunctiva and sclera clear  ENMT: No tonsillar erythema, exudates, or enlargement; Moist mucous membranes, Good dentition, No lesions  NECK: Supple, No JVD, Normal thyroid  NERVOUS SYSTEM:  Alert & Oriented X3, Good concentration; Motor Strength 5/5 B/L upper and lower extremities; DTRs 2+ intact and symmetric  CHEST/LUNG: Clear to auscultation bilaterally; No rales, rhonchi, wheezing, or rubs  HEART: Regular rate and rhythm; No murmurs, rubs, or gallops  ABDOMEN: Soft, Nontender, Nondistended; Bowel sounds present  EXTREMITIES:  2+ Peripheral Pulses, No clubbing, cyanosis, or edema  LYMPH: No lymphadenopathy noted  SKIN: No rashes or lesions    LABS:                        10.5   4.74  )-----------( 190      ( 08 Jun 2021 12:14 )             32.5     08 Jun 2021 12:14    138    |  101    |  35     ----------------------------<  123    5.1     |  31     |  1.30     Ca    9.3        08 Jun 2021 12:14    TPro  7.4    /  Alb  3.8    /  TBili  0.4    /  DBili  x      /  AST  16     /  ALT  19     /  AlkPhos  81     08 Jun 2021 12:14        CAPILLARY BLOOD GLUCOSE          RADIOLOGY & ADDITIONAL TESTS:    Notes Reviewed:  [x ] YES  [ ] NO    Care Discussed with Consultants/Other Providers [x ] YES  [ ] NO

## 2021-06-08 NOTE — PROGRESS NOTE ADULT - SUBJECTIVE AND OBJECTIVE BOX
[facility] -- Mg Win, June Everett, Bennie Hobbs Savella  Office # [phone]      Follow Up:    shortness of breath   Subjective/Observations:   No events overnight resting comfortably in bed.  No complaints of chest pain, dyspnea, or palpitations reported. No signs of orthopnea or PND.      REVIEW OF SYSTEMS: All other review of systems is negative unless indicated above    PAST MEDICAL & SURGICAL HISTORY:  HLD (hyperlipidemia)    Carotid artery obstruction, left  2016, had carotid surgery 2016    DM (diabetes mellitus)  2    Lung mass  RLL, 2016  lymphoma, had RL lobectomy &amp; chemotherapy    Hip fracture  left femur, had pins placed , 2016    Lumbar radiculopathy    Hyperlipidemia    Lymphoma    Former smoker    Hip fracture  &quot;left hip 2016 &amp; had surgery  pins placed&quot;    Right lower lobe lung mass  Right lower lobectomy 2016   &amp; chemotherapy  till 01/2017    Status post carotid surgery  carotid artery stenosis 2016, left    No significant past surgical history        MEDICATIONS  (STANDING):  ALPRAZolam 0.25 milliGRAM(s) Oral daily  aMIOdarone    Tablet   Oral   aMIOdarone    Tablet 400 milliGRAM(s) Oral every 8 hours  budesonide  80 MICROgram(s)/formoterol 4.5 MICROgram(s) Inhaler 2 Puff(s) Inhalation two times a day  enoxaparin Injectable 60 milliGRAM(s) SubCutaneous every 12 hours  furosemide    Tablet 20 milliGRAM(s) Oral daily  iron sucrose Injectable 100 milliGRAM(s) IV Push every 24 hours  lisinopril 20 milliGRAM(s) Oral daily  melatonin 3 milliGRAM(s) Oral at bedtime  tiotropium 18 MICROgram(s) Capsule 1 Capsule(s) Inhalation daily    MEDICATIONS  (PRN):      Allergies    aspirin (Unknown)    Intolerances    aspirin (Stomach Upset)      Vital Signs Last 24 Hrs  T(C): 36.4 (08 Jun 2021 06:00), Max: 36.4 (07 Jun 2021 12:02)  T(F): 97.5 (08 Jun 2021 06:00), Max: 97.5 (07 Jun 2021 12:02)  HR: 62 (08 Jun 2021 06:00) (62 - 80)  BP: 102/49 (08 Jun 2021 06:00) (102/49 - 147/61)  BP(mean): --  RR: 18 (08 Jun 2021 06:00) (17 - 18)  SpO2: 96% (08 Jun 2021 06:00) (92% - 97%)    I&O's Summary    07 Jun 2021 07:01  -  08 Jun 2021 07:00  --------------------------------------------------------  IN: 240 mL / OUT: 0 mL / NET: 240 mL          PHYSICAL EXAM:  TELE: SR   Constitutional: NAD, frail appearing   HEENT: Moist Mucous Membranes, Anicteric  Pulmonary: Non-labored, breath sounds are clear bilaterally, No wheezing, crackles or rhonchi  Cardiovascular: Regular, S1 and S2 nl,  Gastrointestinal: Bowel Sounds present, soft, nontender.   Lymph: No lymphadenopathy. No peripheral edema.  Skin: No visible rashes or ulcers.  Psych:  Mood & affect appropriate    LABS: All Labs Reviewed:                        9.5    3.90  )-----------( Clumped    ( 06 Jun 2021 06:32 )             29.4     07 Jun 2021 12:59    138    |  101    |  40     ----------------------------<  138    4.7     |  30     |  1.30     Ca    9.1        07 Jun 2021 12:59      12 Lead ECG:   Ventricular Rate 96 BPM  Atrial Rate 96 BPM  P-R Interval 172 ms  QRS Duration 106 ms  Q-T Interval 382 ms  QTC Calculation(Bazett) 482 ms  P Axis 43 degrees  R Axis -50 degrees  T Axis 41 degrees  Diagnosis Line Normal sinus rhythm  Left axis deviation  Confirmed by emanuel Elias ([id number]) on 6/3/2021 3:31:49 PM (06-03-21 @ 09:06)      < from: TTE Echo Complete w/o Contrast w/ Doppler (06.03.21 @ 20:01) >  Dimensions:  LA 3.7       Normal Values: 2.0 - 4.0 cm  Ao 2.7        Normal Values: 2.0 - 3.8 cm  SEPTUM 1.0       Normal Values: 0.6 - 1.2 cm  PWT 0.9       Normal Values: 0.6 - 1.1 cm  LVIDd 5.2         Normal Values: 3.0 - 5.6 cm  LVIDs 3.8         Normal Values: 1.8 - 4.0 cm    OBSERVATIONS:  Mitral Valve: Mild MR.  Aortic Valve/Aorta: Calcified trileaflet aortic valve with decreased opening. Peak transaortic valve gradient equals 15 mmHg with a mean transaortic valve gradient of 9 mmHg. This consistent with mild aortic stenosis.  Tricuspid Valve: Mild TR.  Pulmonic Valve: Not well-visualized  Left Atrium: normal  Right Atrium: Not well-visualized  Left Ventricle: normal LV size and systolic function, estimated LVEF of 55%.  Right Ventricle: Grossly normal size and systolic function.  Pericardium: no significant pericardial effusion.  Small bilateral pleural effusions    IMPRESSION:  Normal left ventricular internal dimensions and systolic function, estimated LVEF of 55%.  Grossly normal RV size and systolic function.  Calcified trileaflet aortic valve with mild aortic stenosis  Mild MR and TR.  No significant pericardial effusion.  < end of copied text >    < from: CT Chest No Cont (06.02.21 @ 17:08) >  IMPRESSION: Small bilateral pleural effusions with loculation of the right pleural fluid in a subpulmonic location.  Extensive bilateral ground glass infiltrates  Upper abdomen demonstrates ascites and probable splenomegaly. There is also cholelithiasis.  Postsurgical changes with surgical clips in the right perihilar region  < end of copied text >    < from: Xray Chest 1 View- PORTABLE-Urgent (Xray Chest 1 View- PORTABLE-Urgent .) (06.02.21 @ 15:10) >  IMPRESSION:   Bilateral lung diffuse airspace disease. RIGHT apical/RIGHT basilar pleural thickening  and/or basilar airspace consolidation.  < end of copied text >    < from: CT Angio Chest PE Protocol w/ IV Cont (06.06.21 @ 09:49) >  IMPRESSION:  No pulmonary embolus.  In comparison with 6/2/2021, interval resolution of interlobular septal thickening and patchy groundglass opacities representing pulmonary edema.  Again noted, the patient is status post right lower lobectomy. Severe diffuse right pleural thickening is unchanged. Trace loculated right pleural effusion is unchanged. Trace layering left pleural effusion is decreased.  No consolidation. No lymphadenopathy.

## 2021-06-08 NOTE — CHART NOTE - NSCHARTNOTEFT_GEN_A_CORE

## 2021-06-08 NOTE — PROGRESS NOTE ADULT - ASSESSMENT
80 year old female with PMH lymphoma (RCHOP 5 yrs ago) with RL Lobectomy, HTN, HLD, "borderline" DM (not on meds), here with an episode of dyspnea. She was found to be significantly hypertensive and dyspneic, and required IV diuresis and bipap    HTN   - now 102/49   -continue lisinopril   -continue po lasix , laying flat with no orthopnea     A fib.   - Patient noted to have paroxysmal episodes of Afib during the stay in tele  -tele revealing Sr 70bpm   - Continue Amiodarone load 5 gm to keep her out of A fib.   - Would trial with Lovenox FD during stay and can switch to NOAC on discharge   -echo 6/3/2021 revealing lv ef 55%, mild tr, mild AS , small bl pleural effusions     Anemia  -followed by Hem onc     Hyperlipidemia  - Resume Lipitor    - Monitor and replete lytes, keep K>4, Mg>2.  - All other medical needs as per primary team.  - Other cardiovascular workup will depend on clinical course.  - Will continue to follow.  Alix Herrera FNP-C  Cardiology NP  [id number] [phone]

## 2021-06-08 NOTE — PROGRESS NOTE ADULT - ASSESSMENT
CT chest repeat shows improvement in GGO and Pulm edema -   on RA - feels and looks better - vs noted  TTE shows valv heart disease - pt is on PO Lasix - Cardio following  ID f/u noted - monitored off ABX  pt does not have COPD - pt is a remote ex smoker - would DC inhaler regimen  MBS noted    80 year old female with PMH lymphoma (RCHOP 5 yrs ago) with RLLobectomy, HTN, HLD, "borderline" DM (not on meds), right ankle fx, L ALEXANDRA, herniated disc surgery. Pt at usual well state of health until this afternoon. While at PT for "pulled muscle" in RLE pt developed sudden onset acute SOB, drove to [place], felt worse then was driven to [place] ED by daughter erlinda hoyt,patrica didn't take her to [facility]. ED reports pt on arrival in acute resp distress, hypoxemic (sat 80s), hypertensive urgency (200/100), diaphoretic, rales bilaterally. Failed NRB, given SL NTG x 2, lasix 40 IV x 1, solumedrol 125mg IV x 1, placed on BiPAP.    off bipap - oxygenation improved  ct chest with pna and small eff, crazy paving seen on CT parenchyma -   eval for Acute Infectious Process - done - ID eval noted - monitored off ABX  I and O  cardio eval noted - on lasix - [facility] rx regimen - TTE reviewed -

## 2021-06-08 NOTE — PROGRESS NOTE ADULT - SUBJECTIVE AND OBJECTIVE BOX
Date/Time Patient Seen:  		  Referring MD:   Data Reviewed	       Patient is a 80y old  Female who presents with a chief complaint of dyspnea (07 Jun 2021 12:00)      Subjective/HPI     PAST MEDICAL & SURGICAL HISTORY:  HLD (hyperlipidemia)    Carotid artery obstruction, left  2016, had carotid surgery 2016    DM (diabetes mellitus)  2    Lung mass  RLL, 2016  lymphoma, had RL lobectomy &amp; chemotherapy    Hip fracture  left femur, had pins placed , 2016    Lumbar radiculopathy    Hyperlipidemia    Lymphoma    Former smoker    Hip fracture  &quot;left hip 2016 &amp; had surgery  pins placed&quot;    Right lower lobe lung mass  Right lower lobectomy 2016   &amp; chemotherapy  till 01/2017    Status post carotid surgery  carotid artery stenosis 2016, left    No significant past surgical history          Medication list         MEDICATIONS  (STANDING):  ALPRAZolam 0.25 milliGRAM(s) Oral daily  aMIOdarone    Tablet   Oral   aMIOdarone    Tablet 400 milliGRAM(s) Oral every 8 hours  budesonide  80 MICROgram(s)/formoterol 4.5 MICROgram(s) Inhaler 2 Puff(s) Inhalation two times a day  enoxaparin Injectable 60 milliGRAM(s) SubCutaneous every 12 hours  furosemide    Tablet 20 milliGRAM(s) Oral daily  iron sucrose Injectable 100 milliGRAM(s) IV Push every 24 hours  lisinopril 20 milliGRAM(s) Oral daily  melatonin 3 milliGRAM(s) Oral at bedtime  tiotropium 18 MICROgram(s) Capsule 1 Capsule(s) Inhalation daily    MEDICATIONS  (PRN):         Vitals log        ICU Vital Signs Last 24 Hrs  T(C): 36.4 (08 Jun 2021 06:00), Max: 36.4 (07 Jun 2021 12:02)  T(F): 97.5 (08 Jun 2021 06:00), Max: 97.5 (07 Jun 2021 12:02)  HR: 62 (08 Jun 2021 06:00) (62 - 80)  BP: 102/49 (08 Jun 2021 06:00) (102/49 - 147/61)  BP(mean): --  ABP: --  ABP(mean): --  RR: 18 (08 Jun 2021 06:00) (17 - 18)  SpO2: 96% (08 Jun 2021 06:00) (92% - 97%)           Input and Output:  I&O's Detail    06 Jun 2021 07:01  -  07 Jun 2021 07:00  --------------------------------------------------------  IN:    Oral Fluid: 480 mL  Total IN: 480 mL    OUT:  Total OUT: 0 mL    Total NET: 480 mL      07 Jun 2021 07:01  -  08 Jun 2021 06:14  --------------------------------------------------------  IN:    Oral Fluid: 240 mL  Total IN: 240 mL    OUT:  Total OUT: 0 mL    Total NET: 240 mL          Lab Data                        9.5    3.90  )-----------( Clumped    ( 06 Jun 2021 06:32 )             29.4     06-07    138  |  101  |  40<H>  ----------------------------<  138<H>  4.7   |  30  |  1.30    Ca    9.1      07 Jun 2021 12:59              Review of Systems	      Objective     Physical Examination    heart s1s2  lung dec BS  abd soft  head nc  head at  on RA    Pertinent Lab findings & Imaging      [place]:  NO   Adequate UO     I&O's Detail    06 Jun 2021 07:01  -  07 Jun 2021 07:00  --------------------------------------------------------  IN:    Oral Fluid: 480 mL  Total IN: 480 mL    OUT:  Total OUT: 0 mL    Total NET: 480 mL      07 Jun 2021 07:01  -  08 Jun 2021 06:14  --------------------------------------------------------  IN:    Oral Fluid: 240 mL  Total IN: 240 mL    OUT:  Total OUT: 0 mL    Total NET: 240 mL               Discussed with:     Cultures:	        Radiology

## 2021-06-09 ENCOUNTER — TRANSCRIPTION ENCOUNTER (OUTPATIENT)
Age: 81
End: 2021-06-09

## 2021-06-09 ENCOUNTER — EMERGENCY (EMERGENCY)
Facility: HOSPITAL | Age: 81
LOS: 1 days | Discharge: HOME | End: 2021-06-09
Attending: EMERGENCY MEDICINE | Admitting: EMERGENCY MEDICINE
Payer: COMMERCIAL

## 2021-06-09 VITALS
HEART RATE: 72 BPM | DIASTOLIC BLOOD PRESSURE: 66 MMHG | SYSTOLIC BLOOD PRESSURE: 194 MMHG | WEIGHT: 110.01 LBS | HEIGHT: 63 IN | OXYGEN SATURATION: 99 % | RESPIRATION RATE: 18 BRPM

## 2021-06-09 VITALS
OXYGEN SATURATION: 94 % | SYSTOLIC BLOOD PRESSURE: 147 MMHG | HEART RATE: 68 BPM | DIASTOLIC BLOOD PRESSURE: 51 MMHG | RESPIRATION RATE: 18 BRPM

## 2021-06-09 DIAGNOSIS — Z98.890 OTHER SPECIFIED POSTPROCEDURAL STATES: Chronic | ICD-10-CM

## 2021-06-09 DIAGNOSIS — S72.009A FRACTURE OF UNSPECIFIED PART OF NECK OF UNSPECIFIED FEMUR, INITIAL ENCOUNTER FOR CLOSED FRACTURE: Chronic | ICD-10-CM

## 2021-06-09 DIAGNOSIS — R91.8 OTHER NONSPECIFIC ABNORMAL FINDING OF LUNG FIELD: Chronic | ICD-10-CM

## 2021-06-09 LAB
ALBUMIN SERPL ELPH-MCNC: 3.8 G/DL — SIGNIFICANT CHANGE UP (ref 3.3–5)
ALP SERPL-CCNC: 84 U/L — SIGNIFICANT CHANGE UP (ref 40–120)
ALT FLD-CCNC: 21 U/L — SIGNIFICANT CHANGE UP (ref 12–78)
ANION GAP SERPL CALC-SCNC: 9 MMOL/L — SIGNIFICANT CHANGE UP (ref 5–17)
AST SERPL-CCNC: 35 U/L — SIGNIFICANT CHANGE UP (ref 15–37)
BILIRUB SERPL-MCNC: 0.6 MG/DL — SIGNIFICANT CHANGE UP (ref 0.2–1.2)
BUN SERPL-MCNC: 37 MG/DL — HIGH (ref 7–23)
CALCIUM SERPL-MCNC: 9.2 MG/DL — SIGNIFICANT CHANGE UP (ref 8.5–10.1)
CHLORIDE SERPL-SCNC: 102 MMOL/L — SIGNIFICANT CHANGE UP (ref 96–108)
CO2 SERPL-SCNC: 23 MMOL/L — SIGNIFICANT CHANGE UP (ref 22–31)
CREAT SERPL-MCNC: 1.7 MG/DL — HIGH (ref 0.5–1.3)
GLUCOSE SERPL-MCNC: 156 MG/DL — HIGH (ref 70–99)
LIDOCAIN IGE QN: 24 U/L — LOW (ref 73–393)
POTASSIUM SERPL-MCNC: 4.9 MMOL/L — SIGNIFICANT CHANGE UP (ref 3.5–5.3)
POTASSIUM SERPL-SCNC: 4.9 MMOL/L — SIGNIFICANT CHANGE UP (ref 3.5–5.3)
PROT SERPL-MCNC: 7.9 G/DL — SIGNIFICANT CHANGE UP (ref 6–8.3)
SODIUM SERPL-SCNC: 134 MMOL/L — LOW (ref 135–145)

## 2021-06-09 PROCEDURE — U0003: CPT

## 2021-06-09 PROCEDURE — 84443 ASSAY THYROID STIM HORMONE: CPT

## 2021-06-09 PROCEDURE — 93005 ELECTROCARDIOGRAM TRACING: CPT

## 2021-06-09 PROCEDURE — 80053 COMPREHEN METABOLIC PANEL: CPT

## 2021-06-09 PROCEDURE — 99232 SBSQ HOSP IP/OBS MODERATE 35: CPT

## 2021-06-09 PROCEDURE — C8929: CPT

## 2021-06-09 PROCEDURE — 83550 IRON BINDING TEST: CPT

## 2021-06-09 PROCEDURE — 86769 SARS-COV-2 COVID-19 ANTIBODY: CPT

## 2021-06-09 PROCEDURE — 85027 COMPLETE CBC AUTOMATED: CPT

## 2021-06-09 PROCEDURE — 87449 NOS EACH ORGANISM AG IA: CPT

## 2021-06-09 PROCEDURE — 92611 MOTION FLUOROSCOPY/SWALLOW: CPT

## 2021-06-09 PROCEDURE — 83540 ASSAY OF IRON: CPT

## 2021-06-09 PROCEDURE — 94640 AIRWAY INHALATION TREATMENT: CPT

## 2021-06-09 PROCEDURE — 84484 ASSAY OF TROPONIN QUANT: CPT

## 2021-06-09 PROCEDURE — 83615 LACTATE (LD) (LDH) ENZYME: CPT

## 2021-06-09 PROCEDURE — A9698: CPT

## 2021-06-09 PROCEDURE — 84100 ASSAY OF PHOSPHORUS: CPT

## 2021-06-09 PROCEDURE — 71045 X-RAY EXAM CHEST 1 VIEW: CPT

## 2021-06-09 PROCEDURE — 74230 X-RAY XM SWLNG FUNCJ C+: CPT

## 2021-06-09 PROCEDURE — 82553 CREATINE MB FRACTION: CPT

## 2021-06-09 PROCEDURE — 87086 URINE CULTURE/COLONY COUNT: CPT

## 2021-06-09 PROCEDURE — 94660 CPAP INITIATION&MGMT: CPT

## 2021-06-09 PROCEDURE — 81001 URINALYSIS AUTO W/SCOPE: CPT

## 2021-06-09 PROCEDURE — U0005: CPT

## 2021-06-09 PROCEDURE — 92610 EVALUATE SWALLOWING FUNCTION: CPT

## 2021-06-09 PROCEDURE — 83605 ASSAY OF LACTIC ACID: CPT

## 2021-06-09 PROCEDURE — 97162 PT EVAL MOD COMPLEX 30 MIN: CPT

## 2021-06-09 PROCEDURE — 84439 ASSAY OF FREE THYROXINE: CPT

## 2021-06-09 PROCEDURE — 83735 ASSAY OF MAGNESIUM: CPT

## 2021-06-09 PROCEDURE — 85610 PROTHROMBIN TIME: CPT

## 2021-06-09 PROCEDURE — 71250 CT THORAX DX C-: CPT

## 2021-06-09 PROCEDURE — 96374 THER/PROPH/DIAG INJ IV PUSH: CPT

## 2021-06-09 PROCEDURE — 82803 BLOOD GASES ANY COMBINATION: CPT

## 2021-06-09 PROCEDURE — 94760 N-INVAS EAR/PLS OXIMETRY 1: CPT

## 2021-06-09 PROCEDURE — 87040 BLOOD CULTURE FOR BACTERIA: CPT

## 2021-06-09 PROCEDURE — 82746 ASSAY OF FOLIC ACID SERUM: CPT

## 2021-06-09 PROCEDURE — 93970 EXTREMITY STUDY: CPT

## 2021-06-09 PROCEDURE — 82962 GLUCOSE BLOOD TEST: CPT

## 2021-06-09 PROCEDURE — 71275 CT ANGIOGRAPHY CHEST: CPT

## 2021-06-09 PROCEDURE — 85730 THROMBOPLASTIN TIME PARTIAL: CPT

## 2021-06-09 PROCEDURE — 99284 EMERGENCY DEPT VISIT MOD MDM: CPT

## 2021-06-09 PROCEDURE — 93306 TTE W/DOPPLER COMPLETE: CPT

## 2021-06-09 PROCEDURE — 36415 COLL VENOUS BLD VENIPUNCTURE: CPT

## 2021-06-09 PROCEDURE — 96375 TX/PRO/DX INJ NEW DRUG ADDON: CPT

## 2021-06-09 PROCEDURE — 82550 ASSAY OF CK (CPK): CPT

## 2021-06-09 PROCEDURE — 82728 ASSAY OF FERRITIN: CPT

## 2021-06-09 PROCEDURE — 80048 BASIC METABOLIC PNL TOTAL CA: CPT

## 2021-06-09 PROCEDURE — 97166 OT EVAL MOD COMPLEX 45 MIN: CPT

## 2021-06-09 PROCEDURE — 36600 WITHDRAWAL OF ARTERIAL BLOOD: CPT

## 2021-06-09 PROCEDURE — 71045 X-RAY EXAM CHEST 1 VIEW: CPT | Mod: 26

## 2021-06-09 PROCEDURE — 84145 PROCALCITONIN (PCT): CPT

## 2021-06-09 PROCEDURE — 99291 CRITICAL CARE FIRST HOUR: CPT | Mod: 25

## 2021-06-09 PROCEDURE — 83036 HEMOGLOBIN GLYCOSYLATED A1C: CPT

## 2021-06-09 PROCEDURE — 85025 COMPLETE CBC W/AUTO DIFF WBC: CPT

## 2021-06-09 PROCEDURE — 83880 ASSAY OF NATRIURETIC PEPTIDE: CPT

## 2021-06-09 PROCEDURE — 82607 VITAMIN B-12: CPT

## 2021-06-09 RX ORDER — APIXABAN 2.5 MG/1
1 TABLET, FILM COATED ORAL
Qty: 60 | Refills: 0
Start: 2021-06-09 | End: 2021-07-08

## 2021-06-09 RX ORDER — METOCLOPRAMIDE HCL 10 MG
10 TABLET ORAL ONCE
Refills: 0 | Status: COMPLETED | OUTPATIENT
Start: 2021-06-09 | End: 2021-06-09

## 2021-06-09 RX ORDER — AMIODARONE HYDROCHLORIDE 400 MG/1
1 TABLET ORAL
Qty: 30 | Refills: 0
Start: 2021-06-09 | End: 2021-07-08

## 2021-06-09 RX ORDER — FUROSEMIDE 40 MG
1 TABLET ORAL
Qty: 30 | Refills: 0
Start: 2021-06-09 | End: 2021-07-08

## 2021-06-09 RX ORDER — SODIUM CHLORIDE 9 MG/ML
1000 INJECTION INTRAMUSCULAR; INTRAVENOUS; SUBCUTANEOUS ONCE
Refills: 0 | Status: COMPLETED | OUTPATIENT
Start: 2021-06-09 | End: 2021-06-09

## 2021-06-09 RX ORDER — ONDANSETRON 8 MG/1
4 TABLET, FILM COATED ORAL ONCE
Refills: 0 | Status: COMPLETED | OUTPATIENT
Start: 2021-06-09 | End: 2021-06-09

## 2021-06-09 RX ADMIN — ENOXAPARIN SODIUM 60 MILLIGRAM(S): 100 INJECTION SUBCUTANEOUS at 06:11

## 2021-06-09 RX ADMIN — ONDANSETRON 4 MILLIGRAM(S): 8 TABLET, FILM COATED ORAL at 22:49

## 2021-06-09 RX ADMIN — SODIUM CHLORIDE 1000 MILLILITER(S): 9 INJECTION INTRAMUSCULAR; INTRAVENOUS; SUBCUTANEOUS at 22:49

## 2021-06-09 RX ADMIN — Medication 20 MILLIGRAM(S): at 06:11

## 2021-06-09 RX ADMIN — TIOTROPIUM BROMIDE 1 CAPSULE(S): 18 CAPSULE ORAL; RESPIRATORY (INHALATION) at 06:11

## 2021-06-09 RX ADMIN — ENOXAPARIN SODIUM 60 MILLIGRAM(S): 100 INJECTION SUBCUTANEOUS at 17:06

## 2021-06-09 RX ADMIN — SODIUM CHLORIDE 1000 MILLILITER(S): 9 INJECTION INTRAMUSCULAR; INTRAVENOUS; SUBCUTANEOUS at 23:53

## 2021-06-09 RX ADMIN — Medication 10 MILLIGRAM(S): at 23:17

## 2021-06-09 RX ADMIN — BUDESONIDE AND FORMOTEROL FUMARATE DIHYDRATE 2 PUFF(S): 160; 4.5 AEROSOL RESPIRATORY (INHALATION) at 06:11

## 2021-06-09 RX ADMIN — AMIODARONE HYDROCHLORIDE 400 MILLIGRAM(S): 400 TABLET ORAL at 13:33

## 2021-06-09 RX ADMIN — SODIUM CHLORIDE 1000 MILLILITER(S): 9 INJECTION INTRAMUSCULAR; INTRAVENOUS; SUBCUTANEOUS at 23:56

## 2021-06-09 RX ADMIN — AMIODARONE HYDROCHLORIDE 400 MILLIGRAM(S): 400 TABLET ORAL at 06:11

## 2021-06-09 RX ADMIN — IRON SUCROSE 100 MILLIGRAM(S): 20 INJECTION, SOLUTION INTRAVENOUS at 14:35

## 2021-06-09 RX ADMIN — LISINOPRIL 20 MILLIGRAM(S): 2.5 TABLET ORAL at 06:11

## 2021-06-09 NOTE — DISCHARGE NOTE NURSING/CASE MANAGEMENT/SOCIAL WORK - PATIENT PORTAL LINK FT
You can access the FollowMyHealth Patient Portal offered by Health system by registering at the following website: [email or web address]. By joining "[employer]’s FollowMyHealth portal, you will also be able to view your health information using other applications (apps) compatible with our system.

## 2021-06-09 NOTE — ED ADULT NURSE NOTE - ED STAT RN HANDOFF DETAILS
Report given to LIANNA Glover.  Patient si still pending ride home from daughter.  Patient is still in NAD, is visible from nurse's station.  Discharge instructions still with patient.

## 2021-06-09 NOTE — PROGRESS NOTE ADULT - NUTRITIONAL ASSESSMENT
This patient has been assessed with a concern for Malnutrition and has been determined to have a diagnosis/diagnoses of Moderate protein-calorie malnutrition.    This patient is being managed with:   Diet Consistent Carbohydrate w/Evening Snack-  Low Sodium  Entered: Jun 2 2021  8:15PM    
This patient has been assessed with a concern for Malnutrition and has been determined to have a diagnosis/diagnoses of Moderate protein-calorie malnutrition.    This patient is being managed with:   Diet DASH/TLC-  Sodium & Cholesterol Restricted  Entered: Roger  3 2021  2:10PM    
This patient has been assessed with a concern for Malnutrition and has been determined to have a diagnosis/diagnoses of Moderate protein-calorie malnutrition.    This patient is being managed with:   Diet DASH/TLC-  Sodium & Cholesterol Restricted  Soft  Dysphagia 3 Soft Thin Liquids ([id number])  Entered: Jun 5 2021 12:06PM    
This patient has been assessed with a concern for Malnutrition and has been determined to have a diagnosis/diagnoses of Moderate protein-calorie malnutrition.    This patient is being managed with:   Diet DASH/TLC-  Sodium & Cholesterol Restricted  Soft  Dysphagia 3 Soft Thin Liquids ([id number])  Entered: Jun 5 2021 12:06PM    
This patient has been assessed with a concern for Malnutrition and has been determined to have a diagnosis/diagnoses of Moderate protein-calorie malnutrition.    This patient is being managed with:   Diet DASH/TLC-  Sodium & Cholesterol Restricted  Soft  Dysphagia 3 Soft Thin Liquids ([id number])  Entered: Jun 5 2021 12:06PM    
This patient has been assessed with a concern for Malnutrition and has been determined to have a diagnosis/diagnoses of Moderate protein-calorie malnutrition.    This patient is being managed with:   Diet DASH/TLC-  Sodium & Cholesterol Restricted  Entered: Roger  3 2021  2:10PM    
This patient has been assessed with a concern for Malnutrition and has been determined to have a diagnosis/diagnoses of Moderate protein-calorie malnutrition.    This patient is being managed with:   Diet DASH/TLC-  Sodium & Cholesterol Restricted  Soft  Dysphagia 3 Soft Thin Liquids ([id number])  Entered: Jun 5 2021 12:06PM    
This patient has been assessed with a concern for Malnutrition and has been determined to have a diagnosis/diagnoses of Moderate protein-calorie malnutrition.    This patient is being managed with:   Diet DASH/TLC-  Sodium & Cholesterol Restricted  Entered: Roger  3 2021  2:10PM    
This patient has been assessed with a concern for Malnutrition and has been determined to have a diagnosis/diagnoses of Moderate protein-calorie malnutrition.    This patient is being managed with:   Diet DASH/TLC-  Sodium & Cholesterol Restricted  Soft  Dysphagia 3 Soft Thin Liquids ([id number])  Entered: Jun 5 2021 12:06PM

## 2021-06-09 NOTE — PROGRESS NOTE ADULT - SUBJECTIVE AND OBJECTIVE BOX
Patient is a 80y old  Female who presents with a chief complaint of resp distress (08 Jun 2021 11:28)  feels well    INTERVAL HPI/OVERNIGHT EVENTS:  T(C): 36.4 (06-09-21 @ 08:06), Max: 36.8 (06-08-21 @ 18:57)  HR: 66 (06-09-21 @ 08:06) (63 - 76)  BP: 118/65 (06-09-21 @ 08:06) (118/65 - 166/63)  RR: 18 (06-09-21 @ 08:06) (18 - 18)  SpO2: 97% (06-09-21 @ 08:06) (96% - 98%)  Wt(kg): --  I&O's Summary    08 Jun 2021 07:01  -  09 Jun 2021 07:00  --------------------------------------------------------  IN: 240 mL / OUT: 0 mL / NET: 240 mL        LABS:                        10.5   4.74  )-----------( 190      ( 08 Jun 2021 12:14 )             32.5     06-08    138  |  101  |  35<H>  ----------------------------<  123<H>  5.1   |  31  |  1.30    Ca    9.3      08 Jun 2021 12:14    TPro  7.4  /  Alb  3.8  /  TBili  0.4  /  DBili  x   /  AST  16  /  ALT  19  /  AlkPhos  81  06-08        CAPILLARY BLOOD GLUCOSE                MEDICATIONS  (STANDING):  aMIOdarone    Tablet   Oral   aMIOdarone    Tablet 400 milliGRAM(s) Oral every 8 hours  budesonide  80 MICROgram(s)/formoterol 4.5 MICROgram(s) Inhaler 2 Puff(s) Inhalation two times a day  enoxaparin Injectable 60 milliGRAM(s) SubCutaneous every 12 hours  furosemide    Tablet 20 milliGRAM(s) Oral daily  iron sucrose Injectable 100 milliGRAM(s) IV Push every 24 hours  lisinopril 20 milliGRAM(s) Oral daily  melatonin 3 milliGRAM(s) Oral at bedtime  tiotropium 18 MICROgram(s) Capsule 1 Capsule(s) Inhalation daily    MEDICATIONS  (PRN):  ALPRAZolam 0.25 milliGRAM(s) Oral three times a day PRN anxiety      REVIEW OF SYSTEMS:  CONSTITUTIONAL: No fever, weight loss, or fatigue  EYES: No eye pain, visual disturbances, or discharge  ENMT:  No difficulty hearing, tinnitus, vertigo; No sinus or throat pain  NECK: No pain or stiffness  RESPIRATORY: No cough, wheezing, chills or hemoptysis; No shortness of breath  CARDIOVASCULAR: No chest pain, palpitations, dizziness, or leg swelling  GASTROINTESTINAL: No abdominal or epigastric pain. No nausea, vomiting, or hematemesis; No diarrhea or constipation. No melena or hematochezia.  GENITOURINARY: No dysuria, frequency, hematuria, or incontinence  NEUROLOGICAL: No headaches, memory loss, loss of strength, numbness, or tremors  SKIN: No itching, burning, rashes, or lesions   LYMPH NODES: No enlarged glands  ENDOCRINE: No heat or cold intolerance; No hair loss  MUSCULOSKELETAL: No joint pain or swelling; No muscle, back, or extremity pain  PSYCHIATRIC: No depression, anxiety, mood swings, or difficulty sleeping  HEME/LYMPH: No easy bruising, or bleeding gums  ALLERY AND IMMUNOLOGIC: No hives or eczema    RADIOLOGY & ADDITIONAL TESTS:    Imaging Personally Reviewed:  [ ] YES  [ ] NO    Consultant(s) Notes Reviewed:  [ ] YES  [ ] NO    PHYSICAL EXAM:  GENERAL: NAD, well-groomed, well-developed  HEAD:  Atraumatic, Normocephalic  EYES: EOMI, PERRLA, conjunctiva and sclera clear  ENMT: No tonsillar erythema, exudates, or enlargement; Moist mucous membranes, Good dentition, No lesions  NECK: Supple, No JVD, Normal thyroid  NERVOUS SYSTEM:  Alert & Oriented X3, Good concentration; Motor Strength 5/5 B/L upper and lower extremities; DTRs 2+ intact and symmetric  CHEST/LUNG: Clear to percussion bilaterally; No rales, rhonchi, wheezing, or rubs  HEART: Regular rate and rhythm; No murmurs, rubs, or gallops  ABDOMEN: Soft, Nontender, Nondistended; Bowel sounds present  EXTREMITIES:  2+ Peripheral Pulses, No clubbing, cyanosis, or edema  LYMPH: No lymphadenopathy noted  SKIN: No rashes or lesions    Care Discussed with Consultants/Other Providers [ ] YES  [ ] NO

## 2021-06-09 NOTE — CHART NOTE - NSCHARTNOTEFT_GEN_A_CORE
Assessment: patient seen for malnutrition follow up. patient seen in bed states wants to go home. states no appetite today. declining oral supplement or alternate menu selections. speech MBS regular thin liquids. 6/9 BM    Factors impacting intake: [ ] none [ ] nausea  [ ] vomiting [ ] diarrhea [ ] constipation  [ ]chewing problems [ ] swallowing issues  [x ] other: varying PO     Diet Presciption: Diet, DASH/TLC:   Sodium & Cholesterol Restricted  Soft  Dysphagia 3, Soft, Thin Liquids ([id number]) (06-05-21 @ 12:06)  0-75%    Current Weight: Weight 6/9 119#  % Weight Change    Pertinent Medications: MEDICATIONS  (STANDING):  aMIOdarone    Tablet   Oral   aMIOdarone    Tablet 400 milliGRAM(s) Oral every 8 hours  budesonide  80 MICROgram(s)/formoterol 4.5 MICROgram(s) Inhaler 2 Puff(s) Inhalation two times a day  enoxaparin Injectable 60 milliGRAM(s) SubCutaneous every 12 hours  furosemide    Tablet 20 milliGRAM(s) Oral daily  iron sucrose Injectable 100 milliGRAM(s) IV Push every 24 hours  lisinopril 20 milliGRAM(s) Oral daily  melatonin 3 milliGRAM(s) Oral at bedtime  tiotropium 18 MICROgram(s) Capsule 1 Capsule(s) Inhalation daily    MEDICATIONS  (PRN):  ALPRAZolam 0.25 milliGRAM(s) Oral three times a day PRN anxiety    Pertinent Labs: 06-08 Na138 mmol/L Glu 123 mg/dL<H> K+ 5.1 mmol/L Cr  1.30 mg/dL BUN 35 mg/dL<H> 06-05 Phos 3.1 mg/dL 06-08 Alb 3.8 g/dL        Skin: no pressure injury     Estimated Needs:   [x ] no change since previous assessment  [ ] recalculated:     Previous Nutrition Diagnosis:   [ ] Inadequate Energy Intake [ ]Inadequate Oral Intake [ ] Excessive Energy Intake   [ ] Underweight [ ] Increased Nutrient Needs [ ] Overweight/Obesity   [ ] Altered GI Function [ ] Unintended Weight Loss [ ] Food & Nutrition Related Knowledge Deficit [x ] Malnutrition     Nutrition Diagnosis is [x ] ongoing  [ ] resolved [ ] not applicable     New Nutrition Diagnosis: [x ] not applicable       Interventions:   Recommend  [ ] Change Diet To:  [ ] Nutrition Supplement  [ ] Nutrition Support  [x ] Other: continue diet as ordered. with food preferences provided. follow labs, wts    Monitoring and Evaluation:   [x ] PO intake [ x ] Tolerance to diet prescription [ x ] weights [ x ] labs[ x ] follow up per protocol  [ ] other:

## 2021-06-09 NOTE — PROGRESS NOTE ADULT - ASSESSMENT
80 year old female with PMH lymphoma (RCHOP 5 yrs ago) with RL Lobectomy, HTN, HLD, "borderline" DM (not on meds), here with an episode of dyspnea. She was found to be significantly hypertensive and dyspneic, and required IV diuresis and bipap    HTN   - BP: 118/65 (06-09-21 @ 08:06) (118/65 - 166/63)  - Continue lisinopril   - Continue po Lasix , laying flat with no orthopnea     A fib.   - Echo 6/3/2021 revealing lv ef 55%, mild tr, mild AS , small bl pleural effusions   - Patient noted to have paroxysmal episodes of Afib during the stay in tele  - Tele revealing SR with no A fib past 24 hours. Can d/c tele   - Continue Amiodarone load 5 gm to keep her out of A fib.   - Would trial with Lovenox FD during stay and can switch to NOAC on discharge     Anemia  - Hemoglobin <--10.5, <--9.5, <--9.6  - Hematocrit <--32.5, <--29.4, <--30.0  - Followed by Hem onc     Hyperlipidemia  - Resume Lipitor    - Monitor and replete lytes, keep K>4, Mg>2.  - All other medical needs as per primary team.  - Other cardiovascular workup will depend on clinical course.  - Will continue to follow.    Jocelyn Grant, MS PAMELAP, [facility]  Nurse Practitioner- Cardiology   Spectra #[phone]

## 2021-06-09 NOTE — ED ADULT TRIAGE NOTE - CHIEF COMPLAINT QUOTE
Pt. discharged from [facility] today feeling good, went out and a few hours later started vomiting and feeling weak.

## 2021-06-09 NOTE — ED ADULT TRIAGE NOTE - WEIGHT IN KG
"DIDIER OVIEDO MD             49.9

## 2021-06-09 NOTE — PROGRESS NOTE ADULT - SUBJECTIVE AND OBJECTIVE BOX
Date/Time Patient Seen:  		  Referring MD:   Data Reviewed	       Patient is a 80y old  Female who presents with a chief complaint of resp distress (08 Jun 2021 11:28)      Subjective/HPI     PAST MEDICAL & SURGICAL HISTORY:  HLD (hyperlipidemia)    Carotid artery obstruction, left  2016, had carotid surgery 2016    DM (diabetes mellitus)  2    Lung mass  RLL, 2016  lymphoma, had RL lobectomy &amp; chemotherapy    Hip fracture  left femur, had pins placed , 2016    Lumbar radiculopathy    Hyperlipidemia    Lymphoma    Former smoker    Hip fracture  &quot;left hip 2016 &amp; had surgery  pins placed&quot;    Right lower lobe lung mass  Right lower lobectomy 2016   &amp; chemotherapy  till 01/2017    Status post carotid surgery  carotid artery stenosis 2016, left    No significant past surgical history          Medication list         MEDICATIONS  (STANDING):  aMIOdarone    Tablet   Oral   aMIOdarone    Tablet 400 milliGRAM(s) Oral every 8 hours  budesonide  80 MICROgram(s)/formoterol 4.5 MICROgram(s) Inhaler 2 Puff(s) Inhalation two times a day  enoxaparin Injectable 60 milliGRAM(s) SubCutaneous every 12 hours  furosemide    Tablet 20 milliGRAM(s) Oral daily  iron sucrose Injectable 100 milliGRAM(s) IV Push every 24 hours  lisinopril 20 milliGRAM(s) Oral daily  melatonin 3 milliGRAM(s) Oral at bedtime  tiotropium 18 MICROgram(s) Capsule 1 Capsule(s) Inhalation daily    MEDICATIONS  (PRN):  ALPRAZolam 0.25 milliGRAM(s) Oral three times a day PRN anxiety         Vitals log        ICU Vital Signs Last 24 Hrs  T(C): 36.4 (09 Jun 2021 06:00), Max: 36.8 (08 Jun 2021 18:57)  T(F): 97.5 (09 Jun 2021 06:00), Max: 98.3 (08 Jun 2021 18:57)  HR: 63 (09 Jun 2021 06:00) (63 - 76)  BP: 136/65 (09 Jun 2021 06:00) (136/65 - 166/63)  BP(mean): --  ABP: --  ABP(mean): --  RR: 18 (09 Jun 2021 06:00) (18 - 18)  SpO2: 98% (09 Jun 2021 06:00) (96% - 98%)           Input and Output:  I&O's Detail    07 Jun 2021 07:01  -  08 Jun 2021 07:00  --------------------------------------------------------  IN:    Oral Fluid: 240 mL  Total IN: 240 mL    OUT:  Total OUT: 0 mL    Total NET: 240 mL      08 Jun 2021 07:01  -  09 Jun 2021 06:12  --------------------------------------------------------  IN:    Oral Fluid: 240 mL  Total IN: 240 mL    OUT:  Total OUT: 0 mL    Total NET: 240 mL          Lab Data                        10.5   4.74  )-----------( 190      ( 08 Jun 2021 12:14 )             32.5     06-08    138  |  101  |  35<H>  ----------------------------<  123<H>  5.1   |  31  |  1.30    Ca    9.3      08 Jun 2021 12:14    TPro  7.4  /  Alb  3.8  /  TBili  0.4  /  DBili  x   /  AST  16  /  ALT  19  /  AlkPhos  81  06-08            Review of Systems	      Objective     Physical Examination    heart s1s2  lung dec BS  abd soft  head nc      Pertinent Lab findings & Imaging      [place]:  NO   Adequate UO     I&O's Detail    07 Jun 2021 07:01  -  08 Jun 2021 07:00  --------------------------------------------------------  IN:    Oral Fluid: 240 mL  Total IN: 240 mL    OUT:  Total OUT: 0 mL    Total NET: 240 mL      08 Jun 2021 07:01  -  09 Jun 2021 06:12  --------------------------------------------------------  IN:    Oral Fluid: 240 mL  Total IN: 240 mL    OUT:  Total OUT: 0 mL    Total NET: 240 mL               Discussed with:     Cultures:	        Radiology

## 2021-06-09 NOTE — PROGRESS NOTE ADULT - ASSESSMENT
CT chest repeat shows improvement in GGO and Pulm edema -   on RA - feels and looks better - vs noted  TTE shows valv heart disease - pt is on PO Lasix - Cardio following  ID f/u noted - monitored off ABX  pt does not have COPD - pt is a remote ex smoker - would DC inhaler regimen  MBS noted    80 year old female with PMH lymphoma (RCHOP 5 yrs ago) with RLLobectomy, HTN, HLD, "borderline" DM (not on meds), right ankle fx, L ALEXANDRA, herniated disc surgery. Pt at usual well state of health until this afternoon. While at PT for "pulled muscle" in RLE pt developed sudden onset acute SOB, drove to [place], felt worse then was driven to [place] ED by daughter erlinda hoyt,patrica didn't take her to [facility]. ED reports pt on arrival in acute resp distress, hypoxemic (sat 80s), hypertensive urgency (200/100), diaphoretic, rales bilaterally. Failed NRB, given SL NTG x 2, lasix 40 IV x 1, solumedrol 125mg IV x 1, placed on BiPAP.  s/p BIPAP use on this admission and ICU stay  ct chest with pna and small eff, crazy paving seen on CT parenchyma -   eval for Acute Infectious Process - done - ID eval noted - monitored off ABX  I and O  cardio eval noted - on lasix - [facility] rx regimen - TTE reviewed -

## 2021-06-09 NOTE — ED ADULT NURSE NOTE - CHIEF COMPLAINT QUOTE
Pt. discharged from [facility] today feeling good, went out and a few hours later started vomiting and feeling weak.

## 2021-06-09 NOTE — PROGRESS NOTE ADULT - PROBLEM SELECTOR PLAN 5
lasix for diuresis  cardio pulm eval  TTE with normal systolic function  likely diastolic failure
lasix for diuresis  cardio pulm eval  TTE with normal systolic function  likely diastolic failure
lasix for diuresis  cardio pulm eval  TTE with normal systolic function  likely diastolic failure  medically stable for dc home

## 2021-06-09 NOTE — PROGRESS NOTE ADULT - PROVIDER SPECIALTY LIST ADULT
Cardiology
Critical Care
Infectious Disease
Pulmonology
Cardiology
Critical Care
Family Medicine
Family Medicine
Heme/Onc
Infectious Disease
Cardiology
Cardiology
Critical Care
Heme/Onc
Pulmonology
Family Medicine
Hospitalist
Family Medicine

## 2021-06-09 NOTE — ED ADULT NURSE REASSESSMENT NOTE - NS ED NURSE REASSESS COMMENT FT1
Patient is resting with eyes closed with IVF still in progress.  Patient in NAD, daughter at the bedside.  Pending phlebotomy for lab draw.

## 2021-06-09 NOTE — PROGRESS NOTE ADULT - PROBLEM SELECTOR PROBLEM 1
Atrial fibrillation
Atrial fibrillation
Respiratory distress
Respiratory distress
Atrial fibrillation
Respiratory distress
Respiratory distress

## 2021-06-09 NOTE — ED PROVIDER NOTE - OBJECTIVE STATEMENT
79yo female bib ems with nausea and vomiting. pt was just discharged today for respiratory distress and was feeling ok and then started to feel weak, dizzy, diaphoretic and nauseaous, vomiting, no abd pain no sob no other complaints

## 2021-06-09 NOTE — ED PROVIDER NOTE - PATIENT PORTAL LINK FT
You can access the FollowMyHealth Patient Portal offered by [facility] by registering at the following website: [email or web address]. By joining [email or web address]’s FollowMyHealth portal, you will also be able to view your health information using other applications (apps) compatible with our system.

## 2021-06-09 NOTE — PROGRESS NOTE ADULT - PROBLEM SELECTOR PROBLEM 2
Respiratory distress
Respiratory distress
PNA (pneumonia)
Respiratory distress

## 2021-06-09 NOTE — PROGRESS NOTE ADULT - PROBLEM SELECTOR PLAN 3
serial CBC  check iron profile
S/P IV ABX  ID eval with Dr. ELIZABETH appreciated  ruled out
serial CBC  check iron profile  Heme eval
serial CBC  check iron profile  Heme eval
S/P IV ABX  ID eval with Dr. ELIZABETH appreciated  ruled out
S/P IV ABX  ID eval with Dr. ELIZABETH appreciated  ruled out

## 2021-06-09 NOTE — PROGRESS NOTE ADULT - ATTENDING COMMENTS
Chart reviewed    Patient seen and examined    Agree with plan as outlined above    80 year old female with PMH lymphoma (RCHOP 5 yrs ago) with RL Lobectomy, HTN, HLD, "borderline" DM (not on meds), here with an episode of dyspnea. She was found to be significantly hypertensive and dyspneic, and required IV diuresis and bipap    HTN   - BP: 118/65 (06-09-21 @ 08:06) (118/65 - 166/63)  - Continue lisinopril   - Continue po Lasix , laying flat with no orthopnea     A fib.   - Echo 6/3/2021 revealing lv ef 55%, mild tr, mild AS , small bl pleural effusions   - Patient noted to have paroxysmal episodes of Afib during the stay in tele  - Tele revealing SR with no A fib past 24 hours. Can d/c tele   - Continue Amiodarone load 5 gm to keep her out of A fib.   - Would trial with Lovenox FD during stay and can switch to NOAC on discharge
Seen/examined. agree with above.   in sr, with amio load in progress  volume status better, cont with po diuretics
recurrent paf, now clearly demonstrated  given presentation with poorly tolerated af rvr will look to maintain sr  amio load  ac  discussed with patient at length
still having paf with accel vr  did not tolerate this well on admission, and it seems appropriate to start antiarrhythmic therapy  amio loading started
amio loading for poorly tolerated rapid af  has remained in sr over the past 24h  tolerating ac  can transition to doac upon dc
80F h/o lymphoma s/p RCHOP and R lower lobectomy, HLD a/w hypoxic respiratory failure likely from acute pulmonary edema in setting of HTN urgency and ?underlying AS.     Neuro: mental status improved today, no acute issues  CV: initial troponin leak likely from demand, now downtrending   - BP improved today, continue lisinopril   - official TTE pending  - continue diuresis with lasix IVP prn for goal net negative ~1L   Pulm: weaned from BiPAP overnight, now stable on NC  - CT chest with GGOs but Covid negative and no clear indication of infectious process   - continue home Triology  GI: DASH/TLC diet  Renal: sCr improved today, clsoe to baseline of 0.9  - monitor electrolytes BID while diuresing given episode of cramping overnight reportedly common to her when on diuretics   - strict I/Os, daily weights  ID: procal wnl, initial blood cultures pending  - monitor off abx  Endo: hyperglycemia improved today, HbA1c 5.8, if FS remain at goal today can dc   Heme: dvt ppx with hsq
80F h/o lymphoma s/p RCHOP and R lower lobectomy, HLD a/w hypoxic respiratory failure likely from acute pulmonary edema in setting of HTN urgency and ?underlying AS.     Neuro: no acute issues  CV: episodes of pAFib noted on tele, though none in previous 24hrs - will continue to monitor for now, may contritbute to initial presentation in combination with AS   - BP improved today, continue lisinopril   - official TTE pending  - continue diuresis with lasix 40mg IVP today for goal net negative ~1L   Pulm: POCUS showing improvement in B-lines, unchanged trace to small pleural effusions b/l, will monitor  - prn supplemental O2 for goal SpO2 > 90, currently off NC  - continue home Triology  GI: DASH/TLC diet  Renal: sCr remains close to baseline of 0.9, no acute issues  - strict I/Os, daily weights  ID: low suspicion for acute infectious process, monitor off abx  Heme: dvt ppx with hsq     Stable for transfer to floor

## 2021-06-09 NOTE — ED ADULT NURSE NOTE - OBJECTIVE STATEMENT
Pt was d/monroe from the floor this afternoon - pt was doing well - feeling good when she had sudden onset of nausea vomiting.  Daughter reports syncopal episode - pt appears dehydrated - daughter states pt had a small amount of pizza earlier today

## 2021-06-09 NOTE — PROGRESS NOTE ADULT - PROBLEM SELECTOR PLAN 4
lasix for diuresis  cardio pulm eval  TTE with normal systolic function  likely diastolic failure
lasix for diuresis  cardio pulm eval  TTE with normal systolic function  likely diastolic failure
serial CBC  check iron profile  Heme eval with dr. HULL  IV iron
serial CBC  check iron profile  Heme eval with dr. HULL  IV iron  outpt heme follow up
serial CBC  check iron profile  Heme eval with dr. HULL

## 2021-06-09 NOTE — ED PROVIDER NOTE - PROGRESS NOTE DETAILS
pt reevaluted, feeling much better, pt is awake and no longer nauseous, wants to go home, will be d/c home with zofran, follow up with pmd, return if any symptoms worsen

## 2021-06-09 NOTE — ED PROVIDER NOTE - PMH
Carotid artery obstruction, left  2016, had carotid surgery 2016  DM (diabetes mellitus)  2  Former smoker    Hip fracture  left femur, had pins placed , 2016  HLD (hyperlipidemia)    Hyperlipidemia    Lumbar radiculopathy    Lung mass  RLL, 2016  lymphoma, had RL lobectomy & chemotherapy  Lymphoma

## 2021-06-09 NOTE — ED PROVIDER NOTE - NSFOLLOWUPINSTRUCTIONS_ED_ALL_ED_FT
Acute Nausea and Vomiting    WHAT YOU NEED TO KNOW:    Acute nausea and vomiting start suddenly, worsen quickly, and last a short time.    DISCHARGE INSTRUCTIONS:    Return to the emergency department if:   •You see blood in your vomit or your bowel movements.      •You have sudden, severe pain in your chest and upper abdomen after hard vomiting or retching.      •You have swelling in your neck and chest.       •You are dizzy, cold, and thirsty and your eyes and mouth are dry.      •You are urinating very little or not at all.      •You have muscle weakness, leg cramps, and trouble breathing.       •Your heart is beating much faster than normal.       •You continue to vomit for more than 48 hours.       Contact your healthcare provider if:   •You have frequent dry heaves (vomiting but nothing comes out).      •Your nausea and vomiting does not get better or go away after you use medicine.      •You have questions or concerns about your condition or treatment.      Medicines: You may need any of the following:   •Medicines may be given to calm your stomach and stop your vomiting. You may also need medicines to help you feel more relaxed or to stop nausea and vomiting caused by motion sickness.      •Gastrointestinal stimulants are used to help empty your stomach and bowels. This may help decrease nausea and vomiting.      •Take your medicine as directed. Contact your healthcare provider if you think your medicine is not helping or if you have side effects. Tell him or her if you are allergic to any medicine. Keep a list of the medicines, vitamins, and herbs you take. Include the amounts, and when and why you take them. Bring the list or the pill bottles to follow-up visits. Carry your medicine list with you in case of an emergency.      Prevent or manage acute nausea and vomiting:   •Do not drink alcohol. Alcohol may upset or irritate your stomach. Too much alcohol can also cause acute nausea and vomiting.      •Control stress. Headaches due to stress may cause nausea and vomiting. Find ways to relax and manage your stress. Get more rest and sleep.      •Drink more liquids as directed. Vomiting can lead to dehydration. It is important to drink more liquids to help replace lost body fluids. Ask your healthcare provider how much liquid to drink each day and which liquids are best for you. Your provider may recommend that you drink an oral rehydration solution (ORS). ORS contains water, salts, and sugar that are needed to replace the lost body fluids. Ask what kind of ORS to use, how much to drink, and where to get it.      •Eat smaller meals, more often. Eat small amounts of food every 2 to 3 hours, even if you are not hungry. Food in your stomach may decrease your nausea.      •Talk to your healthcare provider before you take over-the-counter (OTC) medicines. These medicines can cause serious problems if you use certain other medicines, or you have a medical condition. You may have problems if you use too much or use them for longer than the label says. Follow directions on the label carefully.

## 2021-06-09 NOTE — PROGRESS NOTE ADULT - PROBLEM SELECTOR PLAN 1
dw cardio, cont amio on dc  dc home on [facility]  cardio follow up post dc
o2 support  VARUN terrell with dr. mckeon
add amiodarone  AC with lovenox  cardio eval
add amiodarone  AC with lovenox
o2 support  PULM eval
o2 support  VARUN terrell with dr. bartholomew
o2 support  VARUN terrell with dr. bartholomew

## 2021-06-09 NOTE — ED ADULT NURSE NOTE - NSIMPLEMENTINTERV_GEN_ALL_ED
Implemented All Fall Risk Interventions:  [place] to call system. Call bell, personal items and telephone within reach. Instruct patient to call for assistance. Room bathroom lighting operational. Non-slip footwear when patient is off stretcher. Physically safe environment: no spills, clutter or unnecessary equipment. Stretcher in lowest position, wheels locked, appropriate side rails in place. Provide visual cue, wrist band, yellow gown, etc. Monitor gait and stability. Monitor for mental status changes and reorient to person, place, and time. Review medications for side effects contributing to fall risk. Reinforce activity limits and safety measures with patient and family.

## 2021-06-09 NOTE — ED ADULT NURSE NOTE - PSH
Hip fracture  "left hip 2016 & had surgery  pins placed"  No significant past surgical history    Right lower lobe lung mass  Right lower lobectomy 2016   & chemotherapy  till 01/2017  Status post carotid surgery  carotid artery stenosis 2016, left

## 2021-06-09 NOTE — PROGRESS NOTE ADULT - PROBLEM SELECTOR PLAN 2
improved
S/P IV ABX  ID eval with Dr. ELIZABETH  ruled out
o2 support  PULNIKKI terrell with dr. puma rodriges
S/P IV ABX  ID eval  ruled out
IV ABX  ID eval
S/P IV ABX  ID eval with Dr. ELIZABETH  ruled out
o2 support PRN  PULM eval with dr. bartholomew appreciated  improved

## 2021-06-10 VITALS
SYSTOLIC BLOOD PRESSURE: 146 MMHG | DIASTOLIC BLOOD PRESSURE: 62 MMHG | HEART RATE: 74 BPM | OXYGEN SATURATION: 98 % | RESPIRATION RATE: 16 BRPM | TEMPERATURE: 98 F

## 2021-06-10 PROBLEM — Z87.891 PERSONAL HISTORY OF NICOTINE DEPENDENCE: Chronic | Status: ACTIVE | Noted: 2021-06-02

## 2021-06-10 PROBLEM — E78.5 HYPERLIPIDEMIA, UNSPECIFIED: Chronic | Status: ACTIVE | Noted: 2021-06-02

## 2021-06-10 PROBLEM — C85.90 NON-HODGKIN LYMPHOMA, UNSPECIFIED, UNSPECIFIED SITE: Chronic | Status: ACTIVE | Noted: 2021-06-02

## 2021-06-10 LAB
BASOPHILS # BLD AUTO: 0.04 K/UL — SIGNIFICANT CHANGE UP (ref 0–0.2)
BASOPHILS NFR BLD AUTO: 0.5 % — SIGNIFICANT CHANGE UP (ref 0–2)
EOSINOPHIL # BLD AUTO: 0.1 K/UL — SIGNIFICANT CHANGE UP (ref 0–0.5)
EOSINOPHIL NFR BLD AUTO: 1.4 % — SIGNIFICANT CHANGE UP (ref 0–6)
HCT VFR BLD CALC: 28.6 % — LOW (ref 34.5–45)
HGB BLD-MCNC: 9.4 G/DL — LOW (ref 11.5–15.5)
IMM GRANULOCYTES NFR BLD AUTO: 0.4 % — SIGNIFICANT CHANGE UP (ref 0–1.5)
LACTATE SERPL-SCNC: 0.6 MMOL/L — LOW (ref 0.7–2)
LYMPHOCYTES # BLD AUTO: 0.51 K/UL — LOW (ref 1–3.3)
LYMPHOCYTES # BLD AUTO: 7 % — LOW (ref 13–44)
MCHC RBC-ENTMCNC: 28.9 PG — SIGNIFICANT CHANGE UP (ref 27–34)
MCHC RBC-ENTMCNC: 32.9 GM/DL — SIGNIFICANT CHANGE UP (ref 32–36)
MCV RBC AUTO: 88 FL — SIGNIFICANT CHANGE UP (ref 80–100)
MONOCYTES # BLD AUTO: 0.42 K/UL — SIGNIFICANT CHANGE UP (ref 0–0.9)
MONOCYTES NFR BLD AUTO: 5.8 % — SIGNIFICANT CHANGE UP (ref 2–14)
NEUTROPHILS # BLD AUTO: 6.2 K/UL — SIGNIFICANT CHANGE UP (ref 1.8–7.4)
NEUTROPHILS NFR BLD AUTO: 84.9 % — HIGH (ref 43–77)
NRBC # BLD: 0 /100 WBCS — SIGNIFICANT CHANGE UP (ref 0–0)
PLATELET # BLD AUTO: 159 K/UL — SIGNIFICANT CHANGE UP (ref 150–400)
RAPID RVP RESULT: SIGNIFICANT CHANGE UP
RBC # BLD: 3.25 M/UL — LOW (ref 3.8–5.2)
RBC # FLD: 13.2 % — SIGNIFICANT CHANGE UP (ref 10.3–14.5)
SARS-COV-2 RNA SPEC QL NAA+PROBE: SIGNIFICANT CHANGE UP
WBC # BLD: 7.3 K/UL — SIGNIFICANT CHANGE UP (ref 3.8–10.5)
WBC # FLD AUTO: 7.3 K/UL — SIGNIFICANT CHANGE UP (ref 3.8–10.5)

## 2021-06-10 PROCEDURE — 93005 ELECTROCARDIOGRAM TRACING: CPT

## 2021-06-10 PROCEDURE — 83605 ASSAY OF LACTIC ACID: CPT

## 2021-06-10 PROCEDURE — 99284 EMERGENCY DEPT VISIT MOD MDM: CPT | Mod: 25

## 2021-06-10 PROCEDURE — 71045 X-RAY EXAM CHEST 1 VIEW: CPT

## 2021-06-10 PROCEDURE — 96375 TX/PRO/DX INJ NEW DRUG ADDON: CPT

## 2021-06-10 PROCEDURE — 96361 HYDRATE IV INFUSION ADD-ON: CPT

## 2021-06-10 PROCEDURE — 85025 COMPLETE CBC W/AUTO DIFF WBC: CPT

## 2021-06-10 PROCEDURE — 0225U NFCT DS DNA&RNA 21 SARSCOV2: CPT

## 2021-06-10 PROCEDURE — 83690 ASSAY OF LIPASE: CPT

## 2021-06-10 PROCEDURE — 36415 COLL VENOUS BLD VENIPUNCTURE: CPT

## 2021-06-10 PROCEDURE — 96374 THER/PROPH/DIAG INJ IV PUSH: CPT

## 2021-06-10 PROCEDURE — 80053 COMPREHEN METABOLIC PANEL: CPT

## 2021-06-10 RX ORDER — ONDANSETRON 8 MG/1
1 TABLET, FILM COATED ORAL
Qty: 15 | Refills: 0
Start: 2021-06-10 | End: 2021-06-14

## 2021-06-10 RX ADMIN — SODIUM CHLORIDE 1000 MILLILITER(S): 9 INJECTION INTRAMUSCULAR; INTRAVENOUS; SUBCUTANEOUS at 01:00

## 2021-06-10 NOTE — ED ADULT NURSE REASSESSMENT NOTE - NS ED NURSE REASSESS COMMENT FT1
Rec'd pt in stretcher AxO3 discharged & awaiting  from daughter. Family called @7:30 states she is on her way

## 2021-06-10 NOTE — ED ADULT NURSE REASSESSMENT NOTE - NS ED NURSE REASSESS COMMENT FT1
Patient is aware of pending lab draw by phlebotomy.  Daughter also up to date on plan of care; daughter to stay at bedside until labs result and disposition is in place.  Patient verbalizes she feels better already.

## 2021-06-10 NOTE — ED ADULT NURSE REASSESSMENT NOTE - NS ED NURSE REASSESS COMMENT FT1
Patient is awake, and verbalizes significant improvement and is ready to go home.  ED MD Hutchison at the bedside for reassessment.  Patient's daughter called; daughter verbalizes understanding of discharge; to come  patient.

## 2021-06-10 NOTE — ED ADULT NURSE REASSESSMENT NOTE - NS ED NURSE REASSESS COMMENT FT1
Patient assisted with getting dressed, was able to reposition herself on the stretcher appropriately.  Ginger ale given to patient.  Waiting on daughter to pick her up.  Discharge instructions provided for patient in a patient envelope and placed in patient's belonging bag.

## 2021-07-08 NOTE — H&P PST ADULT - HISTORY OF PRESENT ILLNESS
08-Jul-2021 12:51
78 year old female with lumbar radiculopathy from L2-5 spinal stenosis causing lower back pain radiating down to bilateral lower extremities more towards the lateral aspect of right leg for nearly a year, tingling & numbness in right hand, legs & feet,  s/p surgical consult, scheduled for L2-5 laminectomy on 10/24/18.

## 2021-07-21 ENCOUNTER — APPOINTMENT (OUTPATIENT)
Dept: ELECTROPHYSIOLOGY | Facility: CLINIC | Age: 81
End: 2021-07-21
Payer: MEDICARE

## 2021-07-21 VITALS
HEIGHT: 63 IN | WEIGHT: 119 LBS | HEART RATE: 88 BPM | BODY MASS INDEX: 21.09 KG/M2 | SYSTOLIC BLOOD PRESSURE: 130 MMHG | DIASTOLIC BLOOD PRESSURE: 64 MMHG

## 2021-07-21 DIAGNOSIS — R55 SYNCOPE AND COLLAPSE: ICD-10-CM

## 2021-07-21 DIAGNOSIS — I48.91 UNSPECIFIED ATRIAL FIBRILLATION: ICD-10-CM

## 2021-07-21 PROCEDURE — 93000 ELECTROCARDIOGRAM COMPLETE: CPT

## 2021-07-21 PROCEDURE — 99204 OFFICE O/P NEW MOD 45 MIN: CPT | Mod: 25

## 2021-10-04 DIAGNOSIS — Z01.818 ENCOUNTER FOR OTHER PREPROCEDURAL EXAMINATION: ICD-10-CM

## 2021-10-04 NOTE — H&P PST ADULT - HEIGHT IN INCHES
[de-identified] : The patient returns to the office with no new complaints.  She has no nausea, no vomit.  She has lost about 6 pounds since her last visit.  She still has occasional mild discomfort in the area of the prior hernia repair.  2

## 2021-10-05 ENCOUNTER — APPOINTMENT (OUTPATIENT)
Dept: DISASTER EMERGENCY | Facility: CLINIC | Age: 81
End: 2021-10-05

## 2021-10-06 LAB — SARS-COV-2 N GENE NPH QL NAA+PROBE: NOT DETECTED

## 2021-10-08 ENCOUNTER — OUTPATIENT (OUTPATIENT)
Dept: OUTPATIENT SERVICES | Facility: HOSPITAL | Age: 81
LOS: 1 days | Discharge: HOME | End: 2021-10-08
Payer: MEDICARE

## 2021-10-08 ENCOUNTER — TRANSCRIPTION ENCOUNTER (OUTPATIENT)
Age: 81
End: 2021-10-08

## 2021-10-08 VITALS
OXYGEN SATURATION: 99 % | HEART RATE: 78 BPM | RESPIRATION RATE: 18 BRPM | TEMPERATURE: 98 F | WEIGHT: 119.93 LBS | HEIGHT: 62 IN | SYSTOLIC BLOOD PRESSURE: 178 MMHG | DIASTOLIC BLOOD PRESSURE: 75 MMHG

## 2021-10-08 DIAGNOSIS — Z98.890 OTHER SPECIFIED POSTPROCEDURAL STATES: Chronic | ICD-10-CM

## 2021-10-08 DIAGNOSIS — I48.91 UNSPECIFIED ATRIAL FIBRILLATION: ICD-10-CM

## 2021-10-08 DIAGNOSIS — S72.009A FRACTURE OF UNSPECIFIED PART OF NECK OF UNSPECIFIED FEMUR, INITIAL ENCOUNTER FOR CLOSED FRACTURE: Chronic | ICD-10-CM

## 2021-10-08 DIAGNOSIS — R91.8 OTHER NONSPECIFIC ABNORMAL FINDING OF LUNG FIELD: Chronic | ICD-10-CM

## 2021-10-08 PROCEDURE — 33285 INSJ SUBQ CAR RHYTHM MNTR: CPT

## 2021-10-08 PROCEDURE — C1764: CPT

## 2021-10-08 RX ORDER — GLIMEPIRIDE 1 MG
1 TABLET ORAL
Qty: 0 | Refills: 0 | DISCHARGE

## 2021-10-08 RX ORDER — CEPHALEXIN 500 MG
500 CAPSULE ORAL ONCE
Refills: 0 | Status: COMPLETED | OUTPATIENT
Start: 2021-10-08 | End: 2021-10-08

## 2021-10-08 RX ADMIN — Medication 500 MILLIGRAM(S): at 07:59

## 2021-10-08 NOTE — ASU DISCHARGE PLAN (ADULT/PEDIATRIC) - ASU DC SPECIAL INSTRUCTIONSFT
Loop Recorder Incision Care:     - Do not touch the incision until it is completely healed.   - There is Dermabond (skin glue) on your incision, which will start to flake off on its own over the next 2-3 weeks. Do not pick at or peel off the Dermabond.   - Do not apply soaps, creams, lotions, ointments or powders to the incision until it is completely healed.  - You should call the doctor if you notice redness, drainage, swelling, increased tenderness, hot sensation around the  incision, bleeding or incision edges pulling apart.

## 2021-10-08 NOTE — ASU DISCHARGE PLAN (ADULT/PEDIATRIC) - COMMENTS
Follow up with Dr. Jim/Carey Arriola NP at [facility] in 2-4 weeks. Our office will contact you in 3-5 days to schedule this appointment. Please call [phone] with questions or concerns.

## 2021-10-08 NOTE — DISCHARGE NOTE NURSING/CASE MANAGEMENT/SOCIAL WORK - NSDCPETBCESMAN_GEN_ALL_CORE
If you are a smoker, it is important for your health to stop smoking. Please be aware that second hand smoke is also harmful.
English

## 2021-10-08 NOTE — H&P PST ADULT - NSICDXPASTSURGICALHX_GEN_ALL_CORE_FT
PAST SURGICAL HISTORY:  Hip fracture "left hip 2016 & had surgery  pins placed"    No significant past surgical history     Right lower lobe lung mass Right lower lobectomy 2016   & chemotherapy  till 01/2017    Status post carotid surgery carotid artery stenosis 2016, left

## 2021-10-08 NOTE — ASU DISCHARGE PLAN (ADULT/PEDIATRIC) - CARE PROVIDER_API CALL
Medardo Jim)  Cardiology; Internal Medicine  [address], [address]  [place] [address]  Phone: [phone]  Fax: [phone]  Follow Up Time:     Raleigh Everett)  Cardiovascular Disease; Internal Medicine  [facility], [address], [address]  [place] [address]  Phone: [phone]  Fax: [phone]  Follow Up Time:

## 2021-10-08 NOTE — H&P PST ADULT - NSICDXPASTMEDICALHX_GEN_ALL_CORE_FT
PAST MEDICAL HISTORY:  Carotid artery obstruction, left 2016, had carotid surgery 2016    DM (diabetes mellitus) 2    Former smoker     Hip fracture left femur, had pins placed , 2016    HLD (hyperlipidemia)     Hyperlipidemia     Lumbar radiculopathy     Lung mass RLL, 2016  lymphoma, had RL lobectomy & chemotherapy    Lymphoma

## 2021-10-08 NOTE — H&P PST ADULT - ASSESSMENT
80 year old woman with history of cirrhosis, PAD s/p right carotid endartectomy, non Hodgkins Lymphoma (pulmonary mass s/p removal, and chemo), COPD, bifasicular block, pAF in the setting of pulmonary edema and possible pneumonia, and syncope following hosptilization and initiation of amiodarone (now discontinued). Now presents for elective ILR to assess AF burden and monitor for bradyarrhythmias.     Plan:   The risks and benefits of, and alternatives to the planned procedure were discussed with the patient. Risks including, but not limited to: bleeding, infection, stroke, heart attack, needing emergency surgery and death, were reviewed. All questions were answered to the patient's expressed satisfaction and informed consent was obtained.   Chest prep & abx per protocol.   D/C teaching initiated.

## 2021-10-08 NOTE — DISCHARGE NOTE NURSING/CASE MANAGEMENT/SOCIAL WORK - PATIENT PORTAL LINK FT
You can access the FollowMyHealth Patient Portal offered by [facility] by registering at the following website: [email or web address]. By joining [employer]’s FollowMyHealth portal, you will also be able to view your health information using other applications (apps) compatible with our system.

## 2021-10-08 NOTE — PROGRESS NOTE ADULT - SUBJECTIVE AND OBJECTIVE BOX
Pt doing well s/p loop recorder implant. Denies complaint.     Incision: Dermabond C/D/I; no bleeding, hematoma, erythema, exudate or edema    Plan:   Resume PO intake.   Ambulate w/ assist, then progress as tolerated.     Pain control with PO analgesia PRN.   Resume home medications.   D/C home once all criteria met, with outpt f/up in 1-2 weeks.

## 2021-10-08 NOTE — H&P PST ADULT - HISTORY OF PRESENT ILLNESS
80 year old woman with history of cirrhosis, PAD s/p right carotid endartectomy, non Hodgkins Lymphoma (pulmonary mass s/p removal, and chemo), COPD, bifasicular block, pAF in the setting of pulmonary edema and possible pneumonia, and syncope following hosptilization and initiation of amiodarone (now discontinued). Now presents for elective ILR to assess AF burden and monitor for bradyarrhythmias. Patient currently denies chest pain, shortness of breath at rest, recurrent near/true syncope, fevers/chills, N/V/D, or other cardiac or constitutional symptoms.     Stress Test: Stress 2016- probably normal, perfusion defect likely due to artifact. LVEF 79%   Echo: TTE 6/4/21: LVEF 55%, LA 3.7cm, normal RV size & systolic function, calcified AV w/ mild AS, mild MR & TR   Echo: TTE 7/9/16- LVEF 62%, LA 3.7cm, mild LVH, mild AS, mod MR, mild pulm HTN

## 2021-10-19 DIAGNOSIS — I48.0 PAROXYSMAL ATRIAL FIBRILLATION: ICD-10-CM

## 2021-11-09 ENCOUNTER — NON-APPOINTMENT (OUTPATIENT)
Age: 81
End: 2021-11-09

## 2021-11-09 ENCOUNTER — APPOINTMENT (OUTPATIENT)
Dept: ELECTROPHYSIOLOGY | Facility: CLINIC | Age: 81
End: 2021-11-09
Payer: MEDICARE

## 2021-11-09 PROCEDURE — G2066: CPT

## 2021-11-09 PROCEDURE — 93298 REM INTERROG DEV EVAL SCRMS: CPT

## 2021-12-14 ENCOUNTER — APPOINTMENT (OUTPATIENT)
Dept: ELECTROPHYSIOLOGY | Facility: CLINIC | Age: 81
End: 2021-12-14

## 2022-01-28 DIAGNOSIS — I51.7 CARDIOMEGALY: ICD-10-CM

## 2022-01-28 DIAGNOSIS — I07.1 RHEUMATIC TRICUSPID INSUFFICIENCY: ICD-10-CM

## 2022-01-28 DIAGNOSIS — C85.90 NON-HODGKIN LYMPHOMA, UNSPECIFIED, UNSPECIFIED SITE: ICD-10-CM

## 2022-01-28 DIAGNOSIS — I35.0 NONRHEUMATIC AORTIC (VALVE) STENOSIS: ICD-10-CM

## 2022-01-28 DIAGNOSIS — Z87.19 PERSONAL HISTORY OF OTHER DISEASES OF THE DIGESTIVE SYSTEM: ICD-10-CM

## 2022-01-28 RX ORDER — GLIMEPIRIDE 1 MG/1
1 TABLET ORAL
Refills: 0 | Status: DISCONTINUED | COMMUNITY
End: 2022-01-28

## 2022-01-28 RX ORDER — ATORVASTATIN CALCIUM 20 MG/1
20 TABLET, FILM COATED ORAL
Qty: 1 | Refills: 2 | Status: ACTIVE | COMMUNITY

## 2022-01-28 RX ORDER — LISINOPRIL 20 MG/1
20 TABLET ORAL DAILY
Qty: 30 | Refills: 5 | Status: ACTIVE | COMMUNITY
Start: 2022-01-28

## 2022-01-28 RX ORDER — FUROSEMIDE 20 MG/1
20 TABLET ORAL DAILY
Qty: 7 | Refills: 0 | Status: ACTIVE | COMMUNITY
Start: 2022-01-28

## 2022-01-28 RX ORDER — NAPROXEN SODIUM 220 MG
TABLET ORAL
Refills: 0 | Status: DISCONTINUED | COMMUNITY
End: 2022-01-28

## 2022-01-28 NOTE — HISTORY OF PRESENT ILLNESS
[FreeTextEntry1] : 80 year old woman with history of cirrhosis, PAD s/p right carotid endartectomy, non Hodgkins Lymphoma (pulmonary mass s/p removal, and chemo), COPD, presenting for evaluation of atrial fibrillation. \par \par Recently she was admitted at [facility] with respiratory failure with patchy infiltrates and pleural effusions. She was noted to have paroxysmal atrial fibrillation  during evaluation and was started on Eliquis and amiodarone. Shortly after hospital discharge she had sudden syncope, and received a precordial thump by a family member, and subsequent awoke feeling well. She was briefly reevaluated in the hospital and discharged in stable condition.  \par \par On follow-up with Dr Everett she was in sinus rhythm (ECG 6/14/21 sinus rhythm with bifasicular block) and was recommended to stop amiodarone. She has remained on Eliquis, but feels poorly with this and wants to stop it. She denies any significant bleeding apart from bruising.  \par \par She has no prior known cardiac history. She denies palpitation, lightheadedness, or prior syncope.  \par \par ECG today, performed for evaluation of recurrent arrhythmia, reveals sinus rhythm 88 bpm, with RBBB and LAFB, QRS 120ms,  ms.  \par \par Current meds include Eliquis 5mg bid, lisinopril 20, lasix \par \par

## 2022-01-28 NOTE — DISCUSSION/SUMMARY
[FreeTextEntry1] : 80 year old woman with history of cirrhosis, PAD s/p right carotid endartectomy, non Hodgkins Lymphoma (pulmonary mass s/p removal, and chemo), COPD, presenting for evaluation of atrial fibrillation. She recently had pAF in the setting of pulmonary edema and possible pneumonia, but had no prior known history of AF or associated symptoms, and has been in sinus rhythm on follow-up. It is unclear if the observed AF occurred only in the setting of her acute illness. If she does continue to have AF in future she would warrant anticoagulation (CHADSVASc=4), however, she is concerned about adverse effects related to anticoagulation and is hopeful to stop it at this time. She has stopped amiodarone, which I agree should be avoided in the setting of her pulmonary disease and transient arrhythmia.  \par \par She did also have syncope after her recent hospitalization, with unclear etiology. She does have evidence of conduction disease on ECG with bifasicular block, and potentially could have had exacerbation of bradyarrhythmia or AV block while on amiodarone.  \par \par I have suggested further long-term monitoring to evaluate for recurrent atrial fibrillation, as well as other staci or tachy arrhythmias which may be associated with her syncope. An implantable loop recorder would be most useful for long-term monitoring, to clarify her diagnosis of AF, and guide need for further management including long-term anticoagulation as well as possible ppm implant if AV block is noted. The risks and benefits of ILR implant were discussed, and she expressed understanding and does want to proceed.  \par \par -ILR implant \par \par -continue eliquis until monitoring begins, and then can stop if no recurrent AF is noted.

## 2022-01-28 NOTE — REVIEW OF SYSTEMS
[Feeling Fatigued] : feeling fatigued [Dyspnea on exertion] : dyspnea during exertion [Syncope] : syncope [Cough] : cough [Easy Bruising] : a tendency for easy bruising [Negative] : Integumentary [Fever] : no fever [Chills] : no chills [SOB] : no shortness of breath [Chest Discomfort] : no chest discomfort [Leg Claudication] : no intermittent leg claudication [Palpitations] : no palpitations [Orthopnea] : no orthopnea [Dizziness] : no dizziness [Convulsions] : no convulsions [Confusion] : no confusion was observed [Easy Bleeding] : no tendency for easy bleeding

## 2022-01-28 NOTE — REASON FOR VISIT
[Arrhythmia/ECG Abnorrmalities] : arrhythmia/ECG abnormalities [Family Member] : family member [FreeTextEntry3] : Dr Everett [FreeTextEntry1] : Pt's daughter Kassandra should be called for scheduling [phone]

## 2022-01-28 NOTE — CARDIOLOGY SUMMARY
[de-identified] : 7/21/21 sinus rhythm 88 bpm, RBBB, LAFB.  ms,  ms [de-identified] : Stress 2016- probably normal, perfusion defect likely due to artifact. LVEF 79%  [de-identified] : TTE 7/9/16- LVEF 62%, LA 3.7cm, mild LVH, mild AS, mod MR, mild pulm HTN \par

## 2022-01-28 NOTE — PHYSICAL EXAM
[No Acute Distress] : no acute distress [Frail] : frail [Normal S1, S2] : normal S1, S2 [No Rub] : no rub [No Gallop] : no gallop [Clear Lung Fields] : clear lung fields [Good Air Entry] : good air entry [No Respiratory Distress] : no respiratory distress  [Soft] : abdomen soft [Non Tender] : non-tender [No Edema] : no edema [No Rash] : no rash [Normal] : moves all extremities, no focal deficits, normal speech [Alert and Oriented] : alert and oriented [de-identified] : thin [de-identified] : 3/6 KRISTEN [de-identified] : + skin scratches and ecchymoses

## 2022-02-02 ENCOUNTER — APPOINTMENT (OUTPATIENT)
Dept: ELECTROPHYSIOLOGY | Facility: CLINIC | Age: 82
End: 2022-02-02
Payer: MEDICARE

## 2022-02-02 VITALS
HEART RATE: 58 BPM | HEIGHT: 63 IN | BODY MASS INDEX: 21.79 KG/M2 | DIASTOLIC BLOOD PRESSURE: 70 MMHG | WEIGHT: 123 LBS | SYSTOLIC BLOOD PRESSURE: 140 MMHG

## 2022-02-02 PROCEDURE — 93000 ELECTROCARDIOGRAM COMPLETE: CPT

## 2022-02-02 PROCEDURE — 99215 OFFICE O/P EST HI 40 MIN: CPT

## 2022-02-03 ENCOUNTER — NON-APPOINTMENT (OUTPATIENT)
Age: 82
End: 2022-02-03

## 2022-02-03 ENCOUNTER — APPOINTMENT (OUTPATIENT)
Dept: SPINE | Facility: CLINIC | Age: 82
End: 2022-02-03
Payer: MEDICARE

## 2022-02-03 VITALS
HEIGHT: 63 IN | SYSTOLIC BLOOD PRESSURE: 150 MMHG | WEIGHT: 123 LBS | BODY MASS INDEX: 21.79 KG/M2 | HEART RATE: 57 BPM | DIASTOLIC BLOOD PRESSURE: 66 MMHG | OXYGEN SATURATION: 83 %

## 2022-02-03 DIAGNOSIS — Z83.3 FAMILY HISTORY OF DIABETES MELLITUS: ICD-10-CM

## 2022-02-03 DIAGNOSIS — Z78.9 OTHER SPECIFIED HEALTH STATUS: ICD-10-CM

## 2022-02-03 PROCEDURE — 99203 OFFICE O/P NEW LOW 30 MIN: CPT

## 2022-02-03 NOTE — END OF VISIT
[FreeTextEntry3] : I, Dr. Prakash De La Rosa, evaluated this patient with the Nurse Practitioner, Kim Lombardo, and established the plan of care.  I personally discussed this patient  during the key portions of the history and exam with the Nurse Practitioner at the time of the visit.  I agree with the assessment and plan as written, unless noted below.\par

## 2022-02-03 NOTE — ASSESSMENT
[FreeTextEntry1] : One year of right thigh  pain.  No back pain.  It is unclear what the etiology is for the thigh pain.  An EMG will be ordered.  Gabapentin will be trialed at 300 mg in the evening.  Follow up PRN. I evaluated and examined this patient along with the nurse practitioner and we agreed on her plan of care.

## 2022-02-03 NOTE — PHYSICAL EXAM
[Person] : oriented to person [Place] : oriented to place [Time] : oriented to time [Short Term Intact] : short term memory intact [Remote Intact] : remote memory intact [Span Intact] : the attention span was normal [Concentration Intact] : normal concentrating ability [Fluency] : fluency intact [Comprehension] : comprehension intact [Current Events] : adequate knowledge of current events [Past History] : adequate knowledge of personal past history [Vocabulary] : adequate range of vocabulary [Cranial Nerves Optic (II)] : visual acuity intact bilaterally,  pupils equal round and reactive to light [Cranial Nerves Oculomotor (III)] : extraocular motion intact [Cranial Nerves Trigeminal (V)] : facial sensation intact symmetrically [Cranial Nerves Facial (VII)] : face symmetrical [Cranial Nerves Vestibulocochlear (VIII)] : hearing was intact bilaterally [Cranial Nerves Glossopharyngeal (IX)] : tongue and palate midline [Cranial Nerves Accessory (XI - Cranial And Spinal)] : head turning and shoulder shrug symmetric [Cranial Nerves Hypoglossal (XII)] : there was no tongue deviation with protrusion [Motor Tone] : muscle tone was normal in all four extremities [Motor Strength] : muscle strength was normal in all four extremities [No Muscle Atrophy] : normal bulk in all four extremities [Sensation Tactile Decrease] : light touch was intact [Abnormal Walk] : normal gait [Balance] : balance was intact [2+] : Ankle jerk left 2+ [Past-pointing] : there was no past-pointing [Tremor] : no tremor present [FreeTextEntry1] : no significant tenderness right anterior lateral thigh

## 2022-02-03 NOTE — HISTORY OF PRESENT ILLNESS
[> 3 months] : more  than 3 months [FreeTextEntry1] : Right thigh pain  [de-identified] : \par Ms Rodriguez is an 81 year old woman who is here for a visit who was seen in 2018 for lumbar stenosis.  She had undergone L 2 to L 5 decompressive laminectomy and foraminotomy.   She reports a year of right anterior thigh pain for one year and associated burning pain.  No back pain.  No tingling and no numbness.  She has trouble walking when the pain occurs.  The pain is a 10/10. She has been to an orthopedist with no clear diagnosis.  Aleve and motrin are not effective. an MRI of the right side did not show any significant abnormalities. She claims the pain begins in the right thigh and radiates upward and downward.

## 2022-02-03 NOTE — REASON FOR VISIT
[New Patient Visit] : a new patient visit [Family Member] : family member [FreeTextEntry1] : Right thigh pain

## 2022-02-08 ENCOUNTER — OUTPATIENT (OUTPATIENT)
Dept: OUTPATIENT SERVICES | Facility: HOSPITAL | Age: 82
LOS: 1 days | End: 2022-02-08
Payer: MEDICARE

## 2022-02-08 VITALS
DIASTOLIC BLOOD PRESSURE: 68 MMHG | HEART RATE: 48 BPM | WEIGHT: 115.52 LBS | SYSTOLIC BLOOD PRESSURE: 153 MMHG | OXYGEN SATURATION: 98 % | TEMPERATURE: 98 F | HEIGHT: 63 IN | RESPIRATION RATE: 20 BRPM

## 2022-02-08 DIAGNOSIS — I48.91 UNSPECIFIED ATRIAL FIBRILLATION: ICD-10-CM

## 2022-02-08 DIAGNOSIS — S72.009A FRACTURE OF UNSPECIFIED PART OF NECK OF UNSPECIFIED FEMUR, INITIAL ENCOUNTER FOR CLOSED FRACTURE: Chronic | ICD-10-CM

## 2022-02-08 DIAGNOSIS — Z01.818 ENCOUNTER FOR OTHER PREPROCEDURAL EXAMINATION: ICD-10-CM

## 2022-02-08 DIAGNOSIS — Z98.890 OTHER SPECIFIED POSTPROCEDURAL STATES: Chronic | ICD-10-CM

## 2022-02-08 DIAGNOSIS — Z29.9 ENCOUNTER FOR PROPHYLACTIC MEASURES, UNSPECIFIED: ICD-10-CM

## 2022-02-08 DIAGNOSIS — R91.8 OTHER NONSPECIFIC ABNORMAL FINDING OF LUNG FIELD: Chronic | ICD-10-CM

## 2022-02-08 LAB
ALBUMIN SERPL ELPH-MCNC: 4.2 G/DL — SIGNIFICANT CHANGE UP (ref 3.3–5.2)
ALP SERPL-CCNC: 98 U/L — SIGNIFICANT CHANGE UP (ref 40–120)
ALT FLD-CCNC: 9 U/L — SIGNIFICANT CHANGE UP
ANION GAP SERPL CALC-SCNC: 14 MMOL/L — SIGNIFICANT CHANGE UP (ref 5–17)
ANISOCYTOSIS BLD QL: SLIGHT — SIGNIFICANT CHANGE UP
APTT BLD: 62.4 SEC — HIGH (ref 27.5–35.5)
AST SERPL-CCNC: 14 U/L — SIGNIFICANT CHANGE UP
BASOPHILS # BLD AUTO: 0 K/UL — SIGNIFICANT CHANGE UP (ref 0–0.2)
BASOPHILS NFR BLD AUTO: 0 % — SIGNIFICANT CHANGE UP (ref 0–2)
BILIRUB SERPL-MCNC: 0.6 MG/DL — SIGNIFICANT CHANGE UP (ref 0.4–2)
BLD GP AB SCN SERPL QL: SIGNIFICANT CHANGE UP
BUN SERPL-MCNC: 24.8 MG/DL — HIGH (ref 8–20)
CALCIUM SERPL-MCNC: 9.4 MG/DL — SIGNIFICANT CHANGE UP (ref 8.6–10.2)
CHLORIDE SERPL-SCNC: 100 MMOL/L — SIGNIFICANT CHANGE UP (ref 98–107)
CO2 SERPL-SCNC: 25 MMOL/L — SIGNIFICANT CHANGE UP (ref 22–29)
CREAT SERPL-MCNC: 1.13 MG/DL — SIGNIFICANT CHANGE UP (ref 0.5–1.3)
EOSINOPHIL # BLD AUTO: 0.05 K/UL — SIGNIFICANT CHANGE UP (ref 0–0.5)
EOSINOPHIL NFR BLD AUTO: 1.7 % — SIGNIFICANT CHANGE UP (ref 0–6)
GIANT PLATELETS BLD QL SMEAR: PRESENT — SIGNIFICANT CHANGE UP
GLUCOSE SERPL-MCNC: 186 MG/DL — HIGH (ref 70–99)
HCT VFR BLD CALC: 28.6 % — LOW (ref 34.5–45)
HGB BLD-MCNC: 9 G/DL — LOW (ref 11.5–15.5)
INR BLD: 1.18 RATIO — HIGH (ref 0.88–1.16)
LYMPHOCYTES # BLD AUTO: 0.52 K/UL — LOW (ref 1–3.3)
LYMPHOCYTES # BLD AUTO: 16.7 % — SIGNIFICANT CHANGE UP (ref 13–44)
MAGNESIUM SERPL-MCNC: 1.9 MG/DL — SIGNIFICANT CHANGE UP (ref 1.6–2.6)
MANUAL SMEAR VERIFICATION: SIGNIFICANT CHANGE UP
MCHC RBC-ENTMCNC: 30.8 PG — SIGNIFICANT CHANGE UP (ref 27–34)
MCHC RBC-ENTMCNC: 31.5 GM/DL — LOW (ref 32–36)
MCV RBC AUTO: 97.9 FL — SIGNIFICANT CHANGE UP (ref 80–100)
MICROCYTES BLD QL: SLIGHT — SIGNIFICANT CHANGE UP
MONOCYTES # BLD AUTO: 0.22 K/UL — SIGNIFICANT CHANGE UP (ref 0–0.9)
MONOCYTES NFR BLD AUTO: 7 % — SIGNIFICANT CHANGE UP (ref 2–14)
NEUTROPHILS # BLD AUTO: 2.28 K/UL — SIGNIFICANT CHANGE UP (ref 1.8–7.4)
NEUTROPHILS NFR BLD AUTO: 72.8 % — SIGNIFICANT CHANGE UP (ref 43–77)
OVALOCYTES BLD QL SMEAR: SLIGHT — SIGNIFICANT CHANGE UP
PLAT MORPH BLD: NORMAL — SIGNIFICANT CHANGE UP
PLATELET # BLD AUTO: 110 K/UL — LOW (ref 150–400)
POIKILOCYTOSIS BLD QL AUTO: SLIGHT — SIGNIFICANT CHANGE UP
POTASSIUM SERPL-MCNC: 4.3 MMOL/L — SIGNIFICANT CHANGE UP (ref 3.5–5.3)
POTASSIUM SERPL-SCNC: 4.3 MMOL/L — SIGNIFICANT CHANGE UP (ref 3.5–5.3)
PROT SERPL-MCNC: 6.8 G/DL — SIGNIFICANT CHANGE UP (ref 6.6–8.7)
PROTHROM AB SERPL-ACNC: 13.6 SEC — SIGNIFICANT CHANGE UP (ref 10.6–13.6)
RBC # BLD: 2.92 M/UL — LOW (ref 3.8–5.2)
RBC # FLD: 14 % — SIGNIFICANT CHANGE UP (ref 10.3–14.5)
RBC BLD AUTO: ABNORMAL
SARS-COV-2 RNA SPEC QL NAA+PROBE: SIGNIFICANT CHANGE UP
SODIUM SERPL-SCNC: 139 MMOL/L — SIGNIFICANT CHANGE UP (ref 135–145)
VARIANT LYMPHS # BLD: 1.8 % — SIGNIFICANT CHANGE UP (ref 0–6)
WBC # BLD: 3.13 K/UL — LOW (ref 3.8–10.5)
WBC # FLD AUTO: 3.13 K/UL — LOW (ref 3.8–10.5)

## 2022-02-08 PROCEDURE — 93010 ELECTROCARDIOGRAM REPORT: CPT

## 2022-02-08 RX ORDER — FLUTICASONE FUROATE, UMECLIDINIUM BROMIDE AND VILANTEROL TRIFENATATE 200; 62.5; 25 UG/1; UG/1; UG/1
1 POWDER RESPIRATORY (INHALATION)
Qty: 0 | Refills: 0 | DISCHARGE

## 2022-02-08 NOTE — H&P PST ADULT - NSICDXPASTSURGICALHX_GEN_ALL_CORE_FT
PAST SURGICAL HISTORY:  Hip fracture "left hip 2016 & had surgery  pins placed"    Right lower lobe lung mass Right lower lobectomy 2016, chemotherapy  till 01/2017    Status post carotid surgery left carotid artery stenosis 2016     PAST SURGICAL HISTORY:  Hip fracture 2016, left hip    History of loop recorder placed 9/2021    Right lower lobe lung mass Right lower lobectomy 2016, chemo till 01/2017    Status post carotid surgery left carotid artery stenosis 2016

## 2022-02-08 NOTE — H&P PST ADULT - NSICDXPASTMEDICALHX_GEN_ALL_CORE_FT
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

## 2022-02-08 NOTE — ASU PATIENT PROFILE, ADULT - FALL HARM RISK - UNIVERSAL INTERVENTIONS
Bed in lowest position, wheels locked, appropriate side rails in place/Call bell, personal items and telephone in reach/Instruct patient to call for assistance before getting out of bed or chair/Non-slip footwear when patient is out of bed/[place] to call system/Physically safe environment - no spills, clutter or unnecessary equipment/Purposeful Proactive Rounding/Room/bathroom lighting operational, light cord in reach

## 2022-02-08 NOTE — H&P PST ADULT - HISTORY OF PRESENT ILLNESS
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 She is s/p ILR implant on 10/8/21. She has had paroxysmal AF, which has not been symptomatic. Last year she was admitted at [facility] with respiratory failure with patchy infiltrates and pleural effusions. She was noted to have paroxysmal atrial fibrillation during evaluation and was started on Eliquis and amiodarone. Shortly after hospital discharge she had sudden syncope, and received a precordial thump by a family member, and subsequent awoke feeling well. She was briefly reevaluated in the hospital and discharged in stable condition. Amiodarone has been stopped, and she underwent ILR implant 10/8/21 for further monitoring. On ILR she has had infrequent episodes of pAF, last 22 lasting nearly 3 hours with controlled rates. The overall AF burden has been <1%. There was one pause of 3 seconds that was asymptomatic. After starting Eliquis, she developed progressive anemia. She did not have overt bleeding, but did report black and tarry stools. She has been on iron replacement, and has required multiple blood transfusions. GI workup with colonoscopy and endoscopy were done and were unremarkable apart from polyps. She currently is undergoing capsule endoscopy. She has stopped Eliquis since 2021, and reports her stools are now brown again. She has remained on Rituxan treatment for her Lymphoma, and its likely that these factors are also contributing to her anemia. Patient now presents in anticipation of elective left atrial appendage occlusion with Watchman device/LELAND w/Dr Jim scheduled for 2022    Cardiology Summary:    EC21 sinus rhythm 88 bpm, RBBB, LAFB.  ms,  ms   Stress Test: Stress - probably normal, perfusion defect likely due to artifact. 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 Last year she was admitted at [facility] with respiratory failure, patchy infiltrates and pleural effusions. She was noted to have paroxysmal atrial fibrillation during evaluation and was started on Eliquis and amiodarone. Shortly after hospital discharge she had sudden syncope, received a precordial thump by a family member, and subsequent awoke feeling well. She was briefly reevaluated in the hospital and discharged in stable condition. Amiodarone has been stopped, and she underwent ILR implant 10/8/21 for further monitoring. On ILR she has had infrequent episodes of pAF, last 22 lasting nearly 3 hours with controlled rates. The overall AF burden has been <1%. There was one pause of 3 seconds that was asymptomatic. After starting Eliquis, she developed progressive anemia. She did not have overt bleeding, but did report black and tarry stools. She has been on iron replacement, and has required multiple blood transfusions. GI workup with colonoscopy and endoscopy were unremarkable apart from polyps. She has stopped Eliquis since 2021, and reports her stools are now brown again. Patient now presents in anticipation of elective left atrial appendage occlusion with Watchman device/LELAND w/Dr Jim scheduled for 2022    Cardiology Summary:  EC/2/22 sinus rhythm 58 bpm, IVCD and LAFB  ms  21 sinus rhythm 88 bpm, RBBB, LAFB.  ms,  ms   Stress Test , probably normal, perfusion defect likely due to artifact. LVEF 79%   Echo: TTE 16, LVEF 62%, LA 3.7cm, mild LVH, mild AS, mod MR, mild pulm HTN

## 2022-02-08 NOTE — H&P PST ADULT - NEGATIVE CARDIOVASCULAR SYMPTOMS
Statement Selected no chest pain/no palpitations/no orthopnea/no paroxysmal nocturnal dyspnea/no peripheral edema/no claudication

## 2022-02-08 NOTE — H&P PST ADULT - RS GEN PE MLT RESP DETAILS PC
breath sounds equal/respirations non-labored/clear to auscultation bilaterally/no rales/no rhonchi/no wheezes respirations non-labored/no rales/no rhonchi/no wheezes/diminished breath sounds, L/diminished breath sounds, R

## 2022-02-08 NOTE — H&P PST ADULT - ASSESSMENT
82 yo F PMH of cirrhosis, PAD s/p right carotid endartectomy, non Hodgkins Lymphoma (pulmonary mass s/p removal, and chemo), COPD, presenting for follow-up regarding atrial fibrillation s/p ILR implant 10/8/21. She underwent ILR implant, and has had noted episodes of paroxysmal AF with overall low arrhythmia burden. However, she is at high risk for AF related thromboembolic complications, with CHADSVASc score 4. She had been on anticoagulation with Eliquis, but developed progressive and severe anemia requiring multiple transfusions. The anemia was suspected to be due to GI bleeding, but no source of bleeding has been noted on workup thus far. Given her anemia and suspected bleeding, she is now considered high risk for long-term anticoagulation, and she has stopped Eliquis. Patient now presents in anticipation of elective left atrial appendage occlusion with Watchman device/LELAND w/Dr Jim scheduled for 2022    Pt was educated on preop preparation with written and verbal instructions. Pt was educated on covid testing and covid prevention, i.e. social distancing, handwashing, mask wearing. Pt verbalized understanding of the above.     OPIOID RISK TOOL    VICKY EACH BOX THAT APPLIES AND ADD TOTALS AT THE END    FAMILY HISTORY OF SUBSTANCE ABUSE                 FEMALE         MALE                                                Alcohol                             [  ]1 pt          [  ]3pts                                               Illegal Durgs                     [  ]2 pts        [  ]3pts                                               Rx Drugs                           [  ]4 pts        [  ]4 pts    PERSONAL HISTORY OF SUBSTANCE ABUSE                                                                                          Alcohol                             [  ]3 pts       [  ]3 pts                                               Illegal Drugs                     [  ]4 pts        [  ]4 pts                                               Rx Drugs                           [  ]5 pts        [  ]5 pts    AGE BETWEEN 16-45 YEARS                                      [  ]1 pt         [  ]1 pt    HISTORY OF PREADOLESCENT   SEXUAL ABUSE                                                             [  ]3 pts        [  ]0pts    PSYCHOLOGICAL DISEASE                     ADD, OCD, Bipolar, Schizophrenia        [  ]2 pts         [  ]2 pts                      Depression                                               [  ]1 pt           [  ]1 pt           SCORING TOTAL   (add numbers and type here)              ( 0 )                                     A score of 3 or lower indicated LOW risk for future opioid abuse  A score of 4 to 7 indicated moderate risk for future opioid abuse  A score of 8 or higher indicates a high risk for opioid abuse    CAPRINI VTE 2.0 SCORE [CLOT updated 2019]    AGE RELATED RISK FACTORS                                                       MOBILITY RELATED FACTORS  [ ] Age 41-60 years                                            (1 Point)                    [ ] Bed rest                                                        (1 Point)  [ ] Age: 61-74 years                                           (2 Points)                  [ ] Plaster cast                                                   (2 Points)  [x ] Age= 75 years                                              (3 Points)                    [ ] Bed bound for more than 72 hours                 (2 Points)    DISEASE RELATED RISK FACTORS                                               GENDER SPECIFIC FACTORS  [ ] Edema in the lower extremities                       (1 Point)              [ ] Pregnancy                                                     (1 Point)  [ ] Varicose veins                                               (1 Point)                     [ ] Post-partum < 6 weeks                                   (1 Point)             [ ] BMI > 25 Kg/m2                                            (1 Point)                     [ ] Hormonal therapy  or oral contraception          (1 Point)                 [ ] Sepsis (in the previous month)                        (1 Point)               [ ] History of pregnancy complications                 (1 point)  [ ] Pneumonia or serious lung disease                                               [ ] Unexplained or recurrent                     (1 Point)           (in the previous month)                               (1 Point)  [ ] Abnormal pulmonary function test                     (1 Point)                 SURGERY RELATED RISK FACTORS  [ ] Acute myocardial infarction                              (1 Point)               [ ]  Section                                             (1 Point)  [ ] Congestive heart failure (in the previous month)  (1 Point)      [ ] Minor surgery                                                  (1 Point)   [ ] Inflammatory bowel disease                             (1 Point)               [ ] Arthroscopic surgery                                        (2 Points)  [ ] Central venous access                                      (2 Points)                [ x] General surgery lasting more than 45 minutes (2 points)  [x ] Malignancy- Present or previous                   (2 Points)                [ ] Elective arthroplasty                                         (5 points)    [ ] Stroke (in the previous month)                          (5 Points)                                                                                                                                                           HEMATOLOGY RELATED FACTORS                                                 TRAUMA RELATED RISK FACTORS  [ ] Prior episodes of VTE                                     (3 Points)                [ ] Fracture of the hip, pelvis, or leg                       (5 Points)  [ ] Positive family history for VTE                         (3 Points)             [ ] Acute spinal cord injury (in the previous month)  (5 Points)  [ ] Prothrombin 62085 A                                     (3 Points)               [ ] Paralysis  (less than 1 month)                             (5 Points)  [ ] Factor V Leiden                                             (3 Points)                  [ ] Multiple Trauma within 1 month                        (5 Points)  [ ] Lupus anticoagulants                                     (3 Points)                                                           [ ] Anticardiolipin antibodies                               (3 Points)                                                       [ ] High homocysteine in the blood                      (3 Points)                                             [ ] Other congenital or acquired thrombophilia      (3 Points)                                                [ ] Heparin induced thrombocytopenia                  (3 Points)                                     Total Score [    7      ]   80 yo F PMH of cirrhosis, PAD s/p right carotid endarterectomy, non Hodgkins Lymphoma (pulmonary mass s/p removal and chemo), COPD, presents with c/o atrial fibrillation. She underwent ILR implant on 10/8/2021, and had episodes of paroxysmal AF with overall low arrhythmia burden. She is at high risk for AF related thromboembolic complications with CHADSVASc score 4. She had been on anticoagulation with Eliquis, but developed progressive and severe anemia requiring multiple transfusions, anemia was suspected to be due to GI bleed, but no source of bleeding has been noted on GI workup. She is now considered high risk for long-term anticoagulation, and she has stopped Eliquis. Patient now presents in anticipation of elective left atrial appendage occlusion with Watchman device/LELAND w/Dr Jim scheduled for 2022    Pt was educated on preop preparation with written and verbal instructions. Pt was educated on covid testing and covid prevention, i.e. social distancing, handwashing, mask wearing. Pt verbalized understanding of the above.     OPIOID RISK TOOL    VICKY EACH BOX THAT APPLIES AND ADD TOTALS AT THE END    FAMILY HISTORY OF SUBSTANCE ABUSE                 FEMALE         MALE                                                Alcohol                             [  ]1 pt          [  ]3pts                                               Illegal Durgs                     [  ]2 pts        [  ]3pts                                               Rx Drugs                           [  ]4 pts        [  ]4 pts    PERSONAL HISTORY OF SUBSTANCE ABUSE                                                                                          Alcohol                             [  ]3 pts       [  ]3 pts                                               Illegal Drugs                     [  ]4 pts        [  ]4 pts                                               Rx Drugs                           [  ]5 pts        [  ]5 pts    AGE BETWEEN 16-45 YEARS                                      [  ]1 pt         [  ]1 pt    HISTORY OF PREADOLESCENT   SEXUAL ABUSE                                                             [  ]3 pts        [  ]0pts    PSYCHOLOGICAL DISEASE                     ADD, OCD, Bipolar, Schizophrenia        [  ]2 pts         [  ]2 pts                      Depression                                               [  ]1 pt           [  ]1 pt           SCORING TOTAL   (add numbers and type here)              ( 0 )                                     A score of 3 or lower indicated LOW risk for future opioid abuse  A score of 4 to 7 indicated moderate risk for future opioid abuse  A score of 8 or higher indicates a high risk for opioid abuse    CAPRINI VTE 2.0 SCORE [CLOT updated 2019]    AGE RELATED RISK FACTORS                                                       MOBILITY RELATED FACTORS  [ ] Age 41-60 years                                            (1 Point)                    [ ] Bed rest                                                        (1 Point)  [ ] Age: 61-74 years                                           (2 Points)                  [ ] Plaster cast                                                   (2 Points)  [x ] Age= 75 years                                              (3 Points)                    [ ] Bed bound for more than 72 hours                 (2 Points)    DISEASE RELATED RISK FACTORS                                               GENDER SPECIFIC FACTORS  [ ] Edema in the lower extremities                       (1 Point)              [ ] Pregnancy                                                     (1 Point)  [ ] Varicose veins                                               (1 Point)                     [ ] Post-partum < 6 weeks                                   (1 Point)             [ ] BMI > 25 Kg/m2                                            (1 Point)                     [ ] Hormonal therapy  or oral contraception          (1 Point)                 [ ] Sepsis (in the previous month)                        (1 Point)               [ ] History of pregnancy complications                 (1 point)  [ ] Pneumonia or serious lung disease                                               [ ] Unexplained or recurrent                     (1 Point)           (in the previous month)                               (1 Point)  [ ] Abnormal pulmonary function test                     (1 Point)                 SURGERY RELATED RISK FACTORS  [ ] Acute myocardial infarction                              (1 Point)               [ ]  Section                                             (1 Point)  [ ] Congestive heart failure (in the previous month)  (1 Point)      [ ] Minor surgery                                                  (1 Point)   [ ] Inflammatory bowel disease                             (1 Point)               [ ] Arthroscopic surgery                                        (2 Points)  [ ] Central venous access                                      (2 Points)                [ x] General surgery lasting more than 45 minutes (2 points)  [x ] Malignancy- Present or previous                   (2 Points)                [ ] Elective arthroplasty                                         (5 points)    [ ] Stroke (in the previous month)                          (5 Points)                                                                                                                                                           HEMATOLOGY RELATED FACTORS                                                 TRAUMA RELATED RISK FACTORS  [ ] Prior episodes of VTE                                     (3 Points)                [ ] Fracture of the hip, pelvis, or leg                       (5 Points)  [ ] Positive family history for VTE                         (3 Points)             [ ] Acute spinal cord injury (in the previous month)  (5 Points)  [ ] Prothrombin 67356 A                                     (3 Points)               [ ] Paralysis  (less than 1 month)                             (5 Points)  [ ] Factor V Leiden                                             (3 Points)                  [ ] Multiple Trauma within 1 month                        (5 Points)  [ ] Lupus anticoagulants                                     (3 Points)                                                           [ ] Anticardiolipin antibodies                               (3 Points)                                                       [ ] High homocysteine in the blood                      (3 Points)                                             [ ] Other congenital or acquired thrombophilia      (3 Points)                                                [ ] Heparin induced thrombocytopenia                  (3 Points)                                     Total Score [    7      ]

## 2022-02-11 ENCOUNTER — TRANSCRIPTION ENCOUNTER (OUTPATIENT)
Age: 82
End: 2022-02-11

## 2022-02-11 ENCOUNTER — INPATIENT (INPATIENT)
Facility: HOSPITAL | Age: 82
LOS: 1 days | Discharge: HOME | DRG: 274 | End: 2022-02-13
Attending: HOSPITALIST | Admitting: STUDENT IN AN ORGANIZED HEALTH CARE EDUCATION/TRAINING PROGRAM
Payer: MEDICARE

## 2022-02-11 VITALS
HEART RATE: 72 BPM | OXYGEN SATURATION: 100 % | RESPIRATION RATE: 14 BRPM | SYSTOLIC BLOOD PRESSURE: 134 MMHG | DIASTOLIC BLOOD PRESSURE: 63 MMHG | TEMPERATURE: 98 F

## 2022-02-11 DIAGNOSIS — R91.8 OTHER NONSPECIFIC ABNORMAL FINDING OF LUNG FIELD: Chronic | ICD-10-CM

## 2022-02-11 DIAGNOSIS — Z98.890 OTHER SPECIFIED POSTPROCEDURAL STATES: Chronic | ICD-10-CM

## 2022-02-11 DIAGNOSIS — S72.009A FRACTURE OF UNSPECIFIED PART OF NECK OF UNSPECIFIED FEMUR, INITIAL ENCOUNTER FOR CLOSED FRACTURE: Chronic | ICD-10-CM

## 2022-02-11 DIAGNOSIS — I48.91 UNSPECIFIED ATRIAL FIBRILLATION: ICD-10-CM

## 2022-02-11 LAB
ABO RH CONFIRMATION: SIGNIFICANT CHANGE UP
HCT VFR BLD CALC: 28.3 % — LOW (ref 34.5–45)
HGB BLD-MCNC: 8.7 G/DL — LOW (ref 11.5–15.5)
MCHC RBC-ENTMCNC: 30.1 PG — SIGNIFICANT CHANGE UP (ref 27–34)
MCHC RBC-ENTMCNC: 30.7 GM/DL — LOW (ref 32–36)
MCV RBC AUTO: 97.9 FL — SIGNIFICANT CHANGE UP (ref 80–100)
PLATELET # BLD AUTO: 126 K/UL — LOW (ref 150–400)
RBC # BLD: 2.89 M/UL — LOW (ref 3.8–5.2)
RBC # FLD: 13.6 % — SIGNIFICANT CHANGE UP (ref 10.3–14.5)
WBC # BLD: 2.07 K/UL — LOW (ref 3.8–10.5)
WBC # FLD AUTO: 2.07 K/UL — LOW (ref 3.8–10.5)

## 2022-02-11 PROCEDURE — 93355 ECHO TRANSESOPHAGEAL (TEE): CPT

## 2022-02-11 PROCEDURE — 93010 ELECTROCARDIOGRAM REPORT: CPT

## 2022-02-11 PROCEDURE — 33340 PERQ CLSR TCAT L ATR APNDGE: CPT | Mod: Q0

## 2022-02-11 RX ORDER — APIXABAN 2.5 MG/1
2.5 TABLET, FILM COATED ORAL
Refills: 0 | Status: DISCONTINUED | OUTPATIENT
Start: 2022-02-12 | End: 2022-02-13

## 2022-02-11 RX ORDER — OXYCODONE AND ACETAMINOPHEN 5; 325 MG/1; MG/1
1 TABLET ORAL EVERY 6 HOURS
Refills: 0 | Status: DISCONTINUED | OUTPATIENT
Start: 2022-02-11 | End: 2022-02-13

## 2022-02-11 RX ORDER — BENZOCAINE AND MENTHOL 5; 1 G/100ML; G/100ML
1 LIQUID ORAL
Refills: 0 | Status: DISCONTINUED | OUTPATIENT
Start: 2022-02-11 | End: 2022-02-13

## 2022-02-11 RX ORDER — FERROUS SULFATE 325(65) MG
325 TABLET ORAL DAILY
Refills: 0 | Status: DISCONTINUED | OUTPATIENT
Start: 2022-02-11 | End: 2022-02-13

## 2022-02-11 RX ORDER — LISINOPRIL 2.5 MG/1
20 TABLET ORAL DAILY
Refills: 0 | Status: DISCONTINUED | OUTPATIENT
Start: 2022-02-11 | End: 2022-02-13

## 2022-02-11 RX ORDER — ATORVASTATIN CALCIUM 80 MG/1
20 TABLET, FILM COATED ORAL AT BEDTIME
Refills: 0 | Status: DISCONTINUED | OUTPATIENT
Start: 2022-02-11 | End: 2022-02-13

## 2022-02-11 RX ORDER — FUROSEMIDE 40 MG
20 TABLET ORAL DAILY
Refills: 0 | Status: DISCONTINUED | OUTPATIENT
Start: 2022-02-11 | End: 2022-02-13

## 2022-02-11 RX ORDER — ACETAMINOPHEN 500 MG
650 TABLET ORAL EVERY 6 HOURS
Refills: 0 | Status: DISCONTINUED | OUTPATIENT
Start: 2022-02-11 | End: 2022-02-13

## 2022-02-11 RX ORDER — TIOTROPIUM BROMIDE 18 UG/1
1 CAPSULE ORAL; RESPIRATORY (INHALATION) DAILY
Refills: 0 | Status: DISCONTINUED | OUTPATIENT
Start: 2022-02-11 | End: 2022-02-13

## 2022-02-11 RX ORDER — ALPRAZOLAM 0.25 MG
0.25 TABLET ORAL EVERY 6 HOURS
Refills: 0 | Status: DISCONTINUED | OUTPATIENT
Start: 2022-02-11 | End: 2022-02-13

## 2022-02-11 RX ADMIN — Medication 650 MILLIGRAM(S): at 23:00

## 2022-02-11 RX ADMIN — ATORVASTATIN CALCIUM 20 MILLIGRAM(S): 80 TABLET, FILM COATED ORAL at 21:45

## 2022-02-11 RX ADMIN — LISINOPRIL 20 MILLIGRAM(S): 2.5 TABLET ORAL at 16:36

## 2022-02-11 RX ADMIN — Medication 650 MILLIGRAM(S): at 21:50

## 2022-02-11 RX ADMIN — Medication 325 MILLIGRAM(S): at 16:35

## 2022-02-11 RX ADMIN — Medication 0.25 MILLIGRAM(S): at 21:50

## 2022-02-11 NOTE — DISCHARGE NOTE PROVIDER - PROVIDER TOKENS
PROVIDER:[TOKEN:[[id number]]] PROVIDER:[TOKEN:[[id number]]],PROVIDER:[TOKEN:[[id number]]]

## 2022-02-11 NOTE — DISCHARGE NOTE PROVIDER - NSDCACTIVITY_GEN_ALL_CORE
Follow Instructions Provided by your Surgical Team No heavy lifting/straining/Follow Instructions Provided by your Surgical Team

## 2022-02-11 NOTE — DISCHARGE NOTE PROVIDER - HOSPITAL COURSE
81 year old female with PMH of HTN, DM, liver cirrhosis, PAD s/p right carotid endarterectomy, non Hodgkins Lymphoma (pulmonary mass s/p removal, chemo; last infusion fall 2021), COPD, atrial fibrillation and syncope s/p ILR implant 10/8/21. Last year she was admitted at [facility] with respiratory failure and pleural effusion. She was noted to have pAF during hospitalization and was started on Eliquis and amiodarone. While on Eliquis, she developed progressive anemia and reported black, tarry stools requiring multiple blood transfusion. GI workup with colonoscopy and endoscopy were unremarkable aside from polyps, yet, her anticoagulation was discontinued. She is at high risk for bleeding complications however, she remains at high risk for cardioembolic events. She presented for and is now POD #1 status post DANIE occlusion with Watchman device implant (20mm).        81F with a history of paroxysmal atrial fibrillation who was noted to have black tarry stools while on apixaban with endoscopy noting only polyps presented for elective atrial appendage closure. Transesophageal echocardiogram noted normal global left ventricular systolic function, ejection fraction of 60 to 65%, no left atrial appendage thrombus and normal left atrial appendage velocities, intact intra atrial septum, no pericardial effusion, mild to moderate mitral valve regurgitation, The patient underwent left atrial appendage occlusion with Watchman device. Repeat laboratory studies noted worsened anemia and hypotension requiring transfusion of packed red blood cells. Echocardiogram noted grade II diastolic dysfunction, mildly enlarged left atrium, no pericardial effusion, mild to moderate mitral valve regurgitation, moderate aortic stenosis. CT abdomen and pelvis noted right lung fibrosis, cirrhosis, splenomegaly, no hydronephrosis, noted atrophic left kidney, no bowel obstruction or bowel inflammation, noted trace ascites, no retroperitoneal adenopathy or hematoma, no abdominal wall hematoma or contusion. Left upper extremity doppler noted no deep venous thrombosis. The patient was transferred to the Medicine service for further management. Repeat studies noted improvement in the anemia after transfusion. The patient was discharged with instructions to follow up with her hematologist and Cardiology for further management.      35 minutes total time

## 2022-02-11 NOTE — PROGRESS NOTE ADULT - SUBJECTIVE AND OBJECTIVE BOX
PROCEDURE(S): DANIE Occlusion via Watchman Device    ELECTROPHYSIOLOGIST(S): Medardo Jim MD         COMPLICATIONS:  none        DISPOSITION:  observation     CONDITION: stable    Pt doing well s/p DANIE occlusion with Watchman device implant (20mm, 2 deployments) via right femoral vein access. Pt denies complaint post procedure.     PAST MEDICAL & SURGICAL HISTORY:  Carotid artery obstruction, left  DM (diabetes mellitus)  Lung mass s/p lobectomy  Hip fracture  Lumbar radiculopathy  Hyperlipidemia  Lymphoma  Former smoker  Unspecified atrial fibrillation  Hypertension  Hip fracture  Right lower lobe lung mass  Right lower lobectomy 2016, chemo till 01/2017  Status post carotid surgery  History of loop recorder    MEDICATIONS  (STANDING):  atorvastatin 20 milliGRAM(s) Oral at bedtime  ferrous    sulfate 325 milliGRAM(s) Oral daily  furosemide    Tablet 20 milliGRAM(s) Oral daily  lisinopril 20 milliGRAM(s) Oral daily  tiotropium 18 MICROgram(s) Capsule 1 Capsule(s) Inhalation daily    MEDICATIONS  (PRN):  acetaminophen     Tablet .. 650 milliGRAM(s) Oral every 6 hours PRN Mild Pain (1 - 3), Moderate Pain (4 - 6)  ALPRAZolam 0.25 milliGRAM(s) Oral every 6 hours PRN anxiety/insomnia  aluminum hydroxide/magnesium hydroxide/simethicone Suspension 30 milliLiter(s) Oral every 4 hours PRN Dyspepsia  benzocaine 15 mG/menthol 3.6 mG Lozenge 1 Lozenge Oral every 2 hours PRN Sore Throat  oxycodone    5 mG/acetaminophen 325 mG 1 Tablet(s) Oral every 6 hours PRN Severe Pain (7 - 10)    Allergies:  latex (Rash)  No Known Drug Allergies    Intolerances:  aspirin (Stomach Upset)    VS:   T(C): 36.6 (02-11-22 @ 07:10), Max: 36.6 (02-11-22 @ 07:10)  HR: 74 (02-11-22 @ 11:30) (72 - 76)  BP: 142/66 (02-11-22 @ 11:30) (134/63 - 142/66)  RR: 17 (02-11-22 @ 11:30) (14 - 17)  SpO2: 99% (02-11-22 @ 11:30) (99% - 100%)  Post-procedure VS: /61 HR 80 O2 sat 98%     Physical exam:   awake, alert, no obvious distress  Card: S1/S2, RRR, no m/g/r  Resp: lungs CTA b/l  Abd: S/NT/ND  Groin (right only): hemostatic suture in place; site C/D/I; no bleeding, hematoma, erythema, exudate or edema  Ext: no edema; distal pulses intact    ECG: NSR 80 bpm LAFB     Assessment:   81 year old female with PMH of HTN, DM, liver cirrhosis, PAD s/p right carotid endarterectomy, non Hodgkins Lymphoma (pulmonary mass s/p removal, chemo; last infusion fall 2021), COPD, atrial fibrillation and syncope s/p ILR implant 10/8/21. Last year she was admitted at [facility] with respiratory failure and pleural effusion. She was noted to have pAF during hospitalization and was started on Eliquis and amiodarone. While on Eliquis, she developed progressive anemia and reported black, tarry stools requiring multiple blood transfusion. GI workup with colonoscopy and endoscopy were unremarkable aside from polyps, yet, her anticoagulation was discontinued. She is at high risk for bleeding complications however, she remains at high risk for cardioembolic events. She presented for and is now status post DANIE occlusion with Watchman device implant (20mm).     Plan:   Bedrest x 6 hours, then OOB with assistance and progress as tolerated.   Groin suture to be removed by EP service in AM.   Pending groin status: start Eliquis 2.5mg Q 12 hrs in AM   Continue other home medications.   Strict I/Os.  Please encourage incentive spirometry and ambulation once able.  Observation and monitoring on telemetry overnight with anticipated discharge in the AM and outpt follow up in 1 month.

## 2022-02-11 NOTE — DISCHARGE NOTE PROVIDER - NSDCCPTREATMENT_GEN_ALL_CORE_FT
PRINCIPAL PROCEDURE  Procedure: Insertion, Watchman left atrial appendage closure device  Findings and Treatment: - Bruising at the groin, sometimes extending down the leg, and/or a small lump under the skin at the groin access site is normal and will resolve within 2 – 3 weeks.   - Occasional skipped beats or palpitations that last for a few beats are common and generally resolve within 1-2 months.   - You may walk and take stairs at a regular pace.   - Do not perform any exercise more strenuous than walking for 1 week.   - Do not strain or lift heavy objects for 1 week.  - You may shower the day after the procedure.  - Do not soak in water (such as tub baths, hot tubs, swimming, etc.) for 1 week.   - You may resume all other activities the day after the procedure.  Call your doctor if:   - you notice bleeding, redness, drainage, swelling, increased tenderness or a hot sensation around the catheter insertion site.   - your temperature is greater than 100 degrees F for more than 24 hours.  - your rapid heart rhythm returns.  - you have any questions or concerns regarding the procedure.  If significant bleeding and/or a large lump (the size of a golf ball or bigger) occurs:  - Lie flat and apply continuous direct pressure just above the puncture site for at least 10 minutes  - If the issue resolves, notify your physician immediately.    - If the bleeding cannot be controlled, please seek immediate medical attention.  If you experience increased difficulty breathing or chest pain, or if you faint or have dizzy spells, please seek immediate medical attention.

## 2022-02-11 NOTE — DISCHARGE NOTE PROVIDER - NSDCFUADDINST_GEN_ALL_CORE_FT
Follow up with Dr. Jim in 2-3 weeks. The office will call you to schedule an appointment.  Follow up with Dr. Jim in 2-3 weeks. The office will call you to schedule an appointment.   Start eliquis 2.5mg twice daily

## 2022-02-11 NOTE — DISCHARGE NOTE PROVIDER - CARE PROVIDERS DIRECT ADDRESSES
,[email or web address]

## 2022-02-11 NOTE — DISCHARGE NOTE PROVIDER - CARE PROVIDER_API CALL
Medardo Jim)  Cardiology; Internal Medicine  [address] [facility], [address] [place] [address]  Phone: [phone]  Fax: [phone]  Follow Up Time:    Medardo Jim)  Cardiology; Internal Medicine  [address] [facility], [address]  [place] [address]  Phone: [phone]  Fax: [phone]  Follow Up Time:     Prerna Gonzales)  Hematology; Medical Oncology  [address] [facility], [address]  [place] [address]  Phone: [phone]  Fax: [phone]  Follow Up Time:

## 2022-02-11 NOTE — PROGRESS NOTE ADULT - SUBJECTIVE AND OBJECTIVE BOX
Admission Criteria  Please admit the patient to the following service: CARDIOLOGY    Minor Criteria:  - Age >80 years    Major Criteria:  - Continuous EKG monitoring is required for condition causing arrhythmia (hyperkalemia, etc)  - Significant volume load > 200 ml    Admit to: 3GUL     Patient is being admitted to the inpatient service due to high risk characteristics and need for further management/monitoring and is considered to be at a significantly increased risk of major adverse cardiac and vascular events if discharged.

## 2022-02-11 NOTE — DISCHARGE NOTE PROVIDER - NSDCCPCAREPLAN_GEN_ALL_CORE_FT
PRINCIPAL DISCHARGE DIAGNOSIS  Diagnosis: Paroxysmal atrial fibrillation  Assessment and Plan of Treatment:        PRINCIPAL DISCHARGE DIAGNOSIS  Diagnosis: Paroxysmal atrial fibrillation  Assessment and Plan of Treatment: Atrial appendage occlusion completed. Continued on apixaban and follow up with Cardiology for further management.      SECONDARY DISCHARGE DIAGNOSES  Diagnosis: Anemia  Assessment and Plan of Treatment: Follow up with your hematologist for further management. Continued on iron supplementation for now.

## 2022-02-11 NOTE — PROGRESS NOTE ADULT - SUBJECTIVE AND OBJECTIVE BOX
Pt arrived for scheduled LELAND and Watchman procedure with Dr. Jim. PST performed on 2022. Labs reviewed. NPO ***     Pt is an 82 yo F with PMH of cirrhosis, PAD s/p right carotid endarterectomy, non Hodgkins Lymphoma (pulmonary mass s/p removal, chemo), COPD, atrial fibrillation and syncope s/p ILR implant 10/8/21. Last year she was admitted at [facility] with respiratory failure, patchy infiltrates and pleural effusions. She was noted to have paroxysmal atrial fibrillation during evaluation and was started on Eliquis and amiodarone. On follow-up with Dr Everett she was noted to be in sinus rhythm with bifasicular block and amiodarone was discontinued. While on Eliquis she developed progressive anemia and reported black and tarry stools requiring multiple blood transfusions. GI workup with colonoscopy and endoscopy were unremarkable apart from polyps. She has stopped Eliquis since 2021. She now presents electively for left atrial appendage occlusion with Watchman device/LELAND.    Cardiologist: Dr Everett    Cardiology Summary  EC21 sinus rhythm 88 bpm, RBBB, LAFB.  ms,  ms   Stress Test: Stress - probably normal, perfusion defect likely due to artifact. LVEF 79%   Echo: TTE 16- LVEF 62%, LA 3.7cm, mild LVH, mild AS, mod MR, mild pulm HTN   ILR interrogation: infrequent episodes of pAF, last 22 lasting nearly 3 hours with controlled rates. The overall AF burden has been <1%. There was one pause of 3 seconds that was asymptomatic.                        Pt arrived for scheduled LELAND and Watchman procedure with Dr. Jim. PST performed on 2022. Labs reviewed. NPO confirmed.    Pt is an 80 yo F with PMH of cirrhosis, aortic stenosis, PAD s/p right carotid endarterectomy, non Hodgkins Lymphoma (pulmonary mass s/p removal, chemo), COPD, atrial fibrillation and syncope s/p ILR implant 10/8/21. Last year she was admitted at [facility] with respiratory failure, patchy infiltrates and pleural effusions. She was noted to have paroxysmal atrial fibrillation during evaluation and was started on Eliquis and amiodarone. On follow-up with Dr Everett she was noted to be in sinus rhythm with bifasicular block and amiodarone was discontinued. While on Eliquis she developed progressive anemia and reported black and tarry stools requiring multiple blood transfusions. GI workup with colonoscopy and endoscopy were unremarkable apart from polyps. She has stopped Eliquis since 2021. She now presents electively for left atrial appendage occlusion with Watchman device/LELAND.    Cardiologist: Dr Everett    Cardiology Summary  EC21 sinus rhythm 88 bpm, RBBB, LAFB.  ms,  ms   Stress Test: Stress - probably normal, perfusion defect likely due to artifact. LVEF 79%   Echo: TTE 16- LVEF 62%, LA 3.7cm, mild LVH, mild AS, mod MR, mild pulm HTN   ILR interrogation: infrequent episodes of pAF, last 22 lasting nearly 3 hours with controlled rates. The overall AF burden has been <1%. There was one pause of 3 seconds that was asymptomatic.                        Pt arrived for scheduled LELAND and Watchman procedure with Dr. Jim. PST performed on 2022. Labs reviewed. NPO confirmed.    Pt is an 80 yo F with PMH of cirrhosis, aortic stenosis, PAD s/p right carotid endarterectomy, non Hodgkins Lymphoma (pulmonary mass s/p removal, chemo), COPD, atrial fibrillation and syncope s/p ILR implant 10/8/21. Last year she was admitted at [facility] with respiratory failure, patchy infiltrates and pleural effusions. She was noted to have paroxysmal atrial fibrillation during evaluation and was started on Eliquis and amiodarone. On follow-up with Dr Everett she was noted to be in sinus rhythm with bifasicular block and amiodarone was discontinued. While on Eliquis she developed progressive anemia and reported black and tarry stools requiring multiple blood transfusions. GI workup with colonoscopy and endoscopy were unremarkable apart from polyps. She has stopped Eliquis since 2021. She now presents electively for left atrial appendage occlusion with Watchman device/LELAND.    Cardiologist: Dr Everett    Cardiology Summary  EC21 sinus rhythm 88 bpm, RBBB, LAFB.  ms,  ms   Stress Test: Stress - probably normal, perfusion defect likely due to artifact. LVEF 79%   Echo: TTE 16- LVEF 62%, LA 3.7cm, mild LVH, mild AS, mod MR, mild pulm HTN             TTE 2021;  Mild MR. Mild aortic stenosis. Mild TR. Normal LV size and systolic function, estimated LVEF of 55%.  ILR interrogation: infrequent episodes of pAF, last 22 lasting nearly 3 hours with controlled rate. AF burden <1 %                               Pt arrived for scheduled LELAND and Watchman procedure with Dr. Jim. PST performed on 2022. Labs reviewed. WBC, Hg and Platelets noted to be low and repeated. NPO confirmed.    Pt is an 80 yo F with PMH of cirrhosis, aortic stenosis, PAD s/p right carotid endarterectomy, non Hodgkins Lymphoma (pulmonary mass s/p removal, chemo; last infusion fall ), COPD, atrial fibrillation and syncope s/p ILR implant 10/8/21. Last year she was admitted at [facility] with respiratory failure, patchy infiltrates and pleural effusions. She was noted to have paroxysmal atrial fibrillation during evaluation and was started on Eliquis and amiodarone. On follow-up with Dr Everett she was noted to be in sinus rhythm with bifasicular block and amiodarone was discontinued. While on Eliquis she developed progressive anemia and reported black and tarry stools requiring multiple blood transfusions. GI workup with colonoscopy and endoscopy were unremarkable apart from polyps. She has stopped Eliquis since 2021. She now presents electively for left atrial appendage occlusion with Watchman device/LELAND.    Cardiologist: Dr Everett  Daughter: Florinda [phone]    Cardiology Summary  EC21 sinus rhythm 88 bpm, RBBB, LAFB.  ms,  ms   Stress Test: Stress - probably normal, perfusion defect likely due to artifact. LVEF 79%   Echo: TTE 16- LVEF 62%, LA 3.7cm, mild LVH, mild AS, mod MR, mild pulm HTN             TTE 2021;  Mild MR. Mild aortic stenosis. Mild TR. Normal LV size and systolic function, estimated LVEF of 55%.  ILR interrogation: infrequent episodes of pAF, last 22 lasting nearly 3 hours with controlled rate. AF burden <1 %

## 2022-02-11 NOTE — DISCHARGE NOTE PROVIDER - NSDCMRMEDTOKEN_GEN_ALL_CORE_FT
atorvastatin 20 mg oral tablet: 1 tab(s) orally once a day (at bedtime)  FeroSul 325 mg (65 mg elemental iron) oral tablet: 1 tab(s) orally once a day   furosemide 20 mg oral tablet: 1 tab(s) orally once a day  Incruse Ellipta 62.5 mcg/inh inhalation powder: 1 puff(s) inhaled every 24 hours  lisinopril 20 mg oral tablet: 1 tab(s) orally once a day  ritaximab iv  in mdoffice will receive total of 4 doses first dose  10-7-21:    atorvastatin 20 mg oral tablet: 1 tab(s) orally once a day (at bedtime)  Eliquis 2.5 mg oral tablet: 1 tab(s) orally 2 times a day   FeroSul 325 mg (65 mg elemental iron) oral tablet: 1 tab(s) orally once a day   furosemide 20 mg oral tablet: 1 tab(s) orally once a day  Incruse Ellipta 62.5 mcg/inh inhalation powder: 1 puff(s) inhaled every 24 hours  lisinopril 20 mg oral tablet: 1 tab(s) orally once a day  ritaximab iv  in mdoffice will receive total of 4 doses first dose  10-7-21:    apixaban 2.5 mg oral tablet: 1 tab(s) orally 2 times a day  atorvastatin 20 mg oral tablet: 1 tab(s) orally once a day (at bedtime)  FeroSul 325 mg (65 mg elemental iron) oral tablet: 1 tab(s) orally once a day   furosemide 20 mg oral tablet: 1 tab(s) orally once a day  Incruse Ellipta 62.5 mcg/inh inhalation powder: 1 puff(s) inhaled every 24 hours  lisinopril 20 mg oral tablet: 1 tab(s) orally once a day

## 2022-02-12 LAB
ANION GAP SERPL CALC-SCNC: 9 MMOL/L — SIGNIFICANT CHANGE UP (ref 5–17)
BUN SERPL-MCNC: 21.6 MG/DL — HIGH (ref 8–20)
CALCIUM SERPL-MCNC: 8 MG/DL — LOW (ref 8.6–10.2)
CHLORIDE SERPL-SCNC: 107 MMOL/L — SIGNIFICANT CHANGE UP (ref 98–107)
CO2 SERPL-SCNC: 25 MMOL/L — SIGNIFICANT CHANGE UP (ref 22–29)
CREAT SERPL-MCNC: 0.84 MG/DL — SIGNIFICANT CHANGE UP (ref 0.5–1.3)
GLUCOSE SERPL-MCNC: 158 MG/DL — HIGH (ref 70–99)
HCT VFR BLD CALC: 21.8 % — LOW (ref 34.5–45)
HCT VFR BLD CALC: 23.5 % — LOW (ref 34.5–45)
HGB BLD-MCNC: 6.8 G/DL — CRITICAL LOW (ref 11.5–15.5)
HGB BLD-MCNC: 7.2 G/DL — LOW (ref 11.5–15.5)
MAGNESIUM SERPL-MCNC: 2 MG/DL — SIGNIFICANT CHANGE UP (ref 1.6–2.6)
MCHC RBC-ENTMCNC: 30 PG — SIGNIFICANT CHANGE UP (ref 27–34)
MCHC RBC-ENTMCNC: 30.6 GM/DL — LOW (ref 32–36)
MCHC RBC-ENTMCNC: 30.9 PG — SIGNIFICANT CHANGE UP (ref 27–34)
MCHC RBC-ENTMCNC: 31.2 GM/DL — LOW (ref 32–36)
MCV RBC AUTO: 97.9 FL — SIGNIFICANT CHANGE UP (ref 80–100)
MCV RBC AUTO: 99.1 FL — SIGNIFICANT CHANGE UP (ref 80–100)
PLATELET # BLD AUTO: 123 K/UL — LOW (ref 150–400)
PLATELET # BLD AUTO: 134 K/UL — LOW (ref 150–400)
POTASSIUM SERPL-MCNC: 4.3 MMOL/L — SIGNIFICANT CHANGE UP (ref 3.5–5.3)
POTASSIUM SERPL-SCNC: 4.3 MMOL/L — SIGNIFICANT CHANGE UP (ref 3.5–5.3)
RBC # BLD: 2.2 M/UL — LOW (ref 3.8–5.2)
RBC # BLD: 2.4 M/UL — LOW (ref 3.8–5.2)
RBC # FLD: 13.7 % — SIGNIFICANT CHANGE UP (ref 10.3–14.5)
RBC # FLD: 13.7 % — SIGNIFICANT CHANGE UP (ref 10.3–14.5)
SODIUM SERPL-SCNC: 141 MMOL/L — SIGNIFICANT CHANGE UP (ref 135–145)
WBC # BLD: 1.94 K/UL — LOW (ref 3.8–10.5)
WBC # BLD: 2.14 K/UL — LOW (ref 3.8–10.5)
WBC # FLD AUTO: 1.94 K/UL — LOW (ref 3.8–10.5)
WBC # FLD AUTO: 2.14 K/UL — LOW (ref 3.8–10.5)

## 2022-02-12 PROCEDURE — 36000 PLACE NEEDLE IN VEIN: CPT

## 2022-02-12 PROCEDURE — 93010 ELECTROCARDIOGRAM REPORT: CPT

## 2022-02-12 PROCEDURE — 99497 ADVNCD CARE PLAN 30 MIN: CPT

## 2022-02-12 PROCEDURE — 93306 TTE W/DOPPLER COMPLETE: CPT | Mod: 26

## 2022-02-12 PROCEDURE — 74176 CT ABD & PELVIS W/O CONTRAST: CPT | Mod: 26

## 2022-02-12 PROCEDURE — 76937 US GUIDE VASCULAR ACCESS: CPT | Mod: 26

## 2022-02-12 PROCEDURE — 99233 SBSQ HOSP IP/OBS HIGH 50: CPT

## 2022-02-12 PROCEDURE — 93971 EXTREMITY STUDY: CPT | Mod: 26,LT

## 2022-02-12 RX ORDER — APIXABAN 2.5 MG/1
1 TABLET, FILM COATED ORAL
Qty: 60 | Refills: 2
Start: 2022-02-12 | End: 2022-05-12

## 2022-02-12 RX ADMIN — APIXABAN 2.5 MILLIGRAM(S): 2.5 TABLET, FILM COATED ORAL at 10:58

## 2022-02-12 RX ADMIN — Medication 325 MILLIGRAM(S): at 11:00

## 2022-02-12 RX ADMIN — Medication 650 MILLIGRAM(S): at 22:58

## 2022-02-12 RX ADMIN — Medication 0.25 MILLIGRAM(S): at 22:58

## 2022-02-12 RX ADMIN — ATORVASTATIN CALCIUM 20 MILLIGRAM(S): 80 TABLET, FILM COATED ORAL at 22:58

## 2022-02-12 RX ADMIN — APIXABAN 2.5 MILLIGRAM(S): 2.5 TABLET, FILM COATED ORAL at 18:39

## 2022-02-12 NOTE — CONSULT NOTE ADULT - ASSESSMENT
82 yo F PMH of cirrhosis, PAD s/p right carotid endarterectomy, non Hodgkins Lymphoma (pulmonary mass s/p removal, chemo), COPD, presents with c/o atrial fibrillation.    #anemia  - transfer to medicine   - acute on chronic  - check iron panel b12 and folate   - s/p watchman   - has had multiple prbc and iron transfusion in past   - prbc x 2   - follow up outpatient with gi and heme/onc  - po iron   - monitor h and h   - tele     #ml   - eliquis   - s/p watchman   - ep cardio consult appreciated    #chronic diastolic heart failure  - lasix, lisinopril   - echo 2/22- lvef 60% grade 1 diastolic dysfunction moderate as     #htn- essential   - lisinopril and lasix   - monitor blood pressure     #HLD  - statin     #DVT Prophylaxis  - eliquis    spoke to patient daughter [phone]. all questions answered

## 2022-02-12 NOTE — CHART NOTE - NSCHARTNOTEFT_GEN_A_CORE
Rx Eliquis 2.5 mg BID sent to pharmacy. Pt will need to be on anticoagulation for at least 6 weeks post Watchman implant.

## 2022-02-12 NOTE — PROCEDURE NOTE - NSICDXPROCEDURE_GEN_ALL_CORE_FT
PROCEDURES:  Midline catheter insertion 12-Feb-2022 15:13:34  Jama Roldan  Ultrasound guided venous access 12-Feb-2022 15:13:38  Jama Roldan

## 2022-02-12 NOTE — PROCEDURE NOTE - ADDITIONAL PROCEDURE DETAILS
Bard PowerMidline 4fr 10cm midline catheter inserted [R] brachial vein. Good flash, easily flushes, sharps disposed of. Tourniquet removed.

## 2022-02-12 NOTE — CONSULT NOTE ADULT - SUBJECTIVE AND OBJECTIVE BOX
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 Last year she was admitted at [facility] with respiratory failure, patchy infiltrates and pleural effusions. She was noted to have paroxysmal atrial fibrillation during evaluation and was started on Eliquis and amiodarone. Shortly after hospital discharge she had sudden syncope, received a precordial thump by a family member, and subsequent awoke feeling well. She was briefly reevaluated in the hospital and discharged in stable condition. Amiodarone has been stopped, and she underwent ILR implant 10/8/21 for further monitoring. On ILR she has had infrequent episodes of pAF, last 1/21/22 lasting nearly 3 hours with controlled rates. The overall AF burden has been <1%. There was one pause of 3 seconds that was asymptomatic. After starting Eliquis, she developed progressive anemia. She did not have overt bleeding, but did report black and tarry stools. She has been on iron replacement, and has required multiple blood transfusions. GI workup with colonoscopy and endoscopy were unremarkable apart from polyps. She has stopped Eliquis since 11/2021, and reports her stools are now brown again. Patient now presented for watchman placement via [facility]    2/12 patient seen and eval bedside s/p watch placement on 2/11 patient being transferred to medicine for anemia. per patient and daughter hx of multiple prbc and iron transfusions. also s/p capsule endoscopy 6 days prior outpatient (Dr Wells). Follows with heme/onc Dr Gonzales.    Social History  Lives at home with Friend   Former smoker quit >20 years prior   No illicit drug use  no alcohol use    Past Medical History  Carotid stenosis  Diabetes   Former Smoker  HLD  HTN  Lung Cancer   Lymphoma  Afib    Past Surgical History  - hip surgery   - loop recorder  - lung surgery (cancer w/ lobectomy and chemo)  - CEA  - Watchman Device Placement    Family History  - Diabetes- father    MEDICATIONS  (STANDING):  apixaban 2.5 milliGRAM(s) Oral two times a day  atorvastatin 20 milliGRAM(s) Oral at bedtime  ferrous    sulfate 325 milliGRAM(s) Oral daily  furosemide    Tablet 20 milliGRAM(s) Oral daily  lisinopril 20 milliGRAM(s) Oral daily  tiotropium 18 MICROgram(s) Capsule 1 Capsule(s) Inhalation daily    MEDICATIONS  (PRN):  acetaminophen     Tablet .. 650 milliGRAM(s) Oral every 6 hours PRN Mild Pain (1 - 3), Moderate Pain (4 - 6)  ALPRAZolam 0.25 milliGRAM(s) Oral every 6 hours PRN anxiety/insomnia  aluminum hydroxide/magnesium hydroxide/simethicone Suspension 30 milliLiter(s) Oral every 4 hours PRN Dyspepsia  benzocaine 15 mG/menthol 3.6 mG Lozenge 1 Lozenge Oral every 2 hours PRN Sore Throat  oxycodone    5 mG/acetaminophen 325 mG 1 Tablet(s) Oral every 6 hours PRN Severe Pain (7 - 10)    Review of Systems:  CONSTITUTIONAL: No weakness, fevers or chills  EYES/ENT: No visual changes;  No vertigo or throat pain   NECK: No pain or stiffness  RESPIRATORY: No cough, wheezing, hemoptysis; No shortness of breath,   CARDIOVASCULAR: No chest pain or palpitations  GASTROINTESTINAL: No abdominal or epigastric pain. No nausea, vomiting, or hematemesis; No diarrhea or constipation.   GENITOURINARY: No dysuria, frequency or hematuria  NEUROLOGICAL: No numbness or weakness  SKIN: No itching, burning, rashes, or lesions     Vital Signs Last 24 Hrs  T(F): 98.2 (12 Feb 2022 15:40), Max: 98.2 (12 Feb 2022 15:40)  HR: 81 (12 Feb 2022 15:40) (77 - 93)  BP: 110/71 (12 Feb 2022 15:40) (88/43 - 119/56)  RR: 17 (12 Feb 2022 15:40) (15 - 20)  SpO2: 95% (12 Feb 2022 15:40) (95% - 100%)    Physical Exam:  Constitutional: NAD, awake and alert, well-developed  Neck: Soft and supple, No LAD, No JVD  Respiratory: cta b/l no wheezing no rhonchi  Cardiovascular: +s1/s2 no edema b/l le  Gastrointestinal: soft nt nd bs+  Vascular: 2+ peripheral pulses  Neurological: A/O x 3, no focal deficits  Musculoskeletal: 5/5 strength b/l upper and lower extremities  : Contraindicated  Breasts: Contraindicated  Rectal: Contraindicated    LABS:                        7.2    2.14  )-----------( 134      ( 12 Feb 2022 13:13 )             23.5     02-12    141  |  107  |  21.6  ----------------------------<  158  4.3   |  25.0  |  0.84    Ca    8.0      12 Feb 2022 06:32  Mg     2.0     02-12    eGFR if Non African American: 66 mL/min/1.73M2 (02-12-22 @ 06:32)  eGFR if : 76 mL/min/1.73M2 (02-12-22 @ 06:32)    COVID-19 PCR: NotDetec (02-08-22 @ 19:52)    RADIOLOGY & ADDITIONAL TESTS:  < from: US Duplex Venous Upper Ext Ltd, Left (02.12.22 @ 09:28) >  IMPRESSION:  No evidence of left upper extremity deep venous thrombosis.  < end of copied text >    < from: CT Abdomen and Pelvis No Cont (02.12.22 @ 09:02) >  IMPRESSION:  *  No hematoma  < end of copied text >    < from: TTE Echo Limited or F/U (02.12.22 @ 08:06) >  Summary:   1. Left ventricular ejection fraction, by visual estimation, is 60 to   65%.   2. Spectral Doppler shows pseudonormal pattern of left ventricular   myocardial filling (Grade II diastolic dysfunction).   3. Mildly enlarged left atrium.   4. Mild to moderate mitral valve regurgitation.   5. Thickening and calcification of the anterior and posterior mitral   valve leaflets.   6. Mitral valve mean gradient is 3.7 mmHg consistentwith mild mitral   stenosis.   7. Peak transaortic gradient equals 14.2 mmHg, mean transaortic gradient   equals 8.9 mmHg, the calculated aortic valve area equals 1.18 cm² by the   continuity equation consistent with moderate aortic stenosis.   8. There is a significant pericardial fat pad present.   9. There is no evidence of pericardial effusion.  < end of copied text >    < from: LELAND Echo Doppler (02.11.22 @ 09:30) >  Summary:   1. Left ventricular ejection fraction, by visual estimation, is 60 to   65%.   2. Normal global left ventricular systolic function.   3. Normal right ventricular size and function, inadequate estimation of   RVSP.   4. Color flow doppler demonstrates the presence of an intact intra   atrial septum.   5. Lipomatous hypertrophy of the intra-atrial septum.   6. Mildly enlarged left atrium.   7. No left atrial appendage thrombus and normal left atrial appendage   velocities.   8. DANIE shape is short and stuby, size measured in width and depth:      at 0degree: 1.7 cm x 2.3 cm      at 45 degree: 1.7 cm x 1.8 cm      at 90 degree: 1.7 cm 1.7 cm      at 135 degree: 1.7 cm x 1.9 cm.   9. There is a significant pericardial fat pad present.  10. There is no evidence of pericardial effusion.  11. Moderateaortic valve stenosis, POORNIMA (by 2D-planimetry 1.35 cm2).  12. Mild to moderate mitral valve regurgitation.  13. Mild to moderate mitral annular calcification.  14. Moderate complex atheroma at the aortic arch and descending aorta.  15. There is moderate aortic root calcification.  16. Under continuous transesophageal echocardiogram monitoring,   trans-septal wire/catheter were placed into the left atrium. A 20 mm   Watchman left atrial appendage closure device was placed within the left   atrial appendage. Following placement, a stability tug test was preformed   to confirm the device moves with the left atrial appendage. The device   appears well-seated. THere is a smal gap measured 0.19 cm but no color   Doppler flow with vena contracta approximately 0.1 cm is seen around the   device. The maximum diameter of the compress device post placement is 18   mm at 0°, 18 mm at 45°, 18 mm at 90°, and 17 mm at 135°. LV and RV   systolic function were unchanged. No pericardial effusion. The left upper   pulmonary vein is normal. Small interatrial shunt visualized with   left-to-right flow.  < end of copied text >    Care Discussed with Consultants/Other Providers:  [facility]

## 2022-02-12 NOTE — PROGRESS NOTE ADULT - SUBJECTIVE AND OBJECTIVE BOX
Pt doing well POD #1 s/p DANIE occlusion with Watchman device implant (20mm, 2 deployments) via right femoral vein access. Pt seen and examined, reports feeling well but does report left sided upper arm pain where A line was placed. Labs reviewed and Hg noted to be 6.8 which is a drop from Hg 8.7 yesterday. Pt also noted to be hypotensive this with SBP upper 80 to low 90s. 500cc fluid bolus given with improvement. SBP ~100. No tachycardia. No CP, palp or SOB. No abdominal pain or groin pain/swelling. STAT TTE negative for effusion.     EKG: SR, LAFB  TELE: SR, no events     MEDICATIONS  (STANDING):  apixaban 2.5 milliGRAM(s) Oral two times a day  atorvastatin 20 milliGRAM(s) Oral at bedtime  ferrous    sulfate 325 milliGRAM(s) Oral daily  furosemide    Tablet 20 milliGRAM(s) Oral daily  lisinopril 20 milliGRAM(s) Oral daily  tiotropium 18 MICROgram(s) Capsule 1 Capsule(s) Inhalation daily    MEDICATIONS  (PRN):  acetaminophen     Tablet .. 650 milliGRAM(s) Oral every 6 hours PRN Mild Pain (1 - 3), Moderate Pain (4 - 6)  ALPRAZolam 0.25 milliGRAM(s) Oral every 6 hours PRN anxiety/insomnia  aluminum hydroxide/magnesium hydroxide/simethicone Suspension 30 milliLiter(s) Oral every 4 hours PRN Dyspepsia  benzocaine 15 mG/menthol 3.6 mG Lozenge 1 Lozenge Oral every 2 hours PRN Sore Throat  oxycodone    5 mG/acetaminophen 325 mG 1 Tablet(s) Oral every 6 hours PRN Severe Pain (7 - 10)      Allergies  latex (Rash)  No Known Drug Allergies  Intolerances  aspirin (Stomach Upset)    PAST MEDICAL & SURGICAL HISTORY:  Carotid artery obstruction, left  had carotid surgery 2016  DM (diabetes mellitus)  Lung mass  RLL, 2016, had RL lobectomy &amp; chemotherapy  Hip fracture  left femur, had pins placed in 2016  Lumbar radiculopathy  Hyperlipidemia  Lymphoma  Former smoker  Unspecified atrial fibrillation  Hypertension  Hip fracture  2016, left hip  Right lower lobe lung mass  Right lower lobectomy 2016, chemo till 01/2017  Status post carotid surgery  left carotid artery stenosis 2016  History of loop recorder  placed 9/2021      Vital Signs Last 24 Hrs  T(C): 36.6 (12 Feb 2022 06:30), Max: 36.6 (12 Feb 2022 06:30)  T(F): 97.8 (12 Feb 2022 06:30), Max: 97.8 (12 Feb 2022 06:30)  HR: 77 (12 Feb 2022 07:28) (71 - 101)  BP: 94/47 (12 Feb 2022 07:28) (88/43 - 142/66)  RR: 15 (12 Feb 2022 07:28) (15 - 20)  SpO2: 99% (12 Feb 2022 07:28) (97% - 100%)    Physical Exam:  Constitutional: NAD, AAOx3  Cardiovascular: +S1S2 RRR + systolic murmur  Pulmonary: CTA b/l, unlabored  Abd: soft NTND +BS  Groin (right): C/D/I bilaterally; no bleeding, hematoma, edema  Extremities: no pedal edema, +distal pulses b/l  Neuro: non focal, WEBER x4    LABS:                        6.8    1.94  )-----------( 123      ( 12 Feb 2022 06:31 )             21.8     02-12    141  |  107  |  21.6<H>  ----------------------------<  158<H>  4.3   |  25.0  |  0.84    Ca    8.0<L>      12 Feb 2022 06:32  Mg     2.0     02-12            Assessment:   81 year old female with PMH of HTN, DM, liver cirrhosis, PAD s/p right carotid endarterectomy, non Hodgkins Lymphoma (pulmonary mass s/p removal, chemo; last infusion fall 2021), COPD, atrial fibrillation and syncope s/p ILR implant 10/8/21. Last year she was admitted at [facility] with respiratory failure and pleural effusion. She was noted to have pAF during hospitalization and was started on Eliquis and amiodarone. While on Eliquis, she developed progressive anemia and reported black, tarry stools requiring multiple blood transfusion. GI workup with colonoscopy and endoscopy were unremarkable aside from polyps, yet, her anticoagulation was discontinued. She is at high risk for bleeding complications however, she remains at high risk for cardioembolic events. She presented for and is now POD #1 status post DANIE occlusion with Watchman device implant (20mm).        1. Hypotension/ Anemia  - Hypotension improved with IVF bolus  - Anemia likely secondary to intraoperative blood loss. Pt with low baseline to begin with. STAT TTE negative for effusion. Will obtain CT Abd/pelvis r/o RP bleed, although clinically low suspicion   - Transfuse 2u PRBC  - Continue telemetry monitoring  - transfer care to hospitalist service     2. Atrial Fibrillation s/p LAAO with Watchman device   - Currently in SR  - On RP bleed ruled out would start Eliquis 2.5mg Q 12 hrs which she will need to take for 6 weeks post Watchman implant.  - Access site care and activity limitations reviewed w/ pt.   - Will follow along     3. LUE swelling/pain   - US to r/o DVT/thrombophlebitis     - Above d/w Dr. Jim  Pt doing well POD #1 s/p DANIE occlusion with Watchman device implant (20mm, 2 deployments) via right femoral vein access. Pt seen and examined, reports feeling well but does report left sided upper arm pain where A line was placed. Labs reviewed and Hg noted to be 6.8 which is a drop from Hg 8.7 yesterday. Pt also noted to be hypotensive this with SBP upper 80 to low 90s. 500cc fluid bolus given with improvement. SBP ~100. No tachycardia. No CP, palp or SOB. No abdominal pain or groin pain/swelling. STAT TTE negative for effusion.     EKG: SR, LAFB  TELE: SR, no events     MEDICATIONS  (STANDING):  apixaban 2.5 milliGRAM(s) Oral two times a day  atorvastatin 20 milliGRAM(s) Oral at bedtime  ferrous    sulfate 325 milliGRAM(s) Oral daily  furosemide    Tablet 20 milliGRAM(s) Oral daily  lisinopril 20 milliGRAM(s) Oral daily  tiotropium 18 MICROgram(s) Capsule 1 Capsule(s) Inhalation daily    MEDICATIONS  (PRN):  acetaminophen     Tablet .. 650 milliGRAM(s) Oral every 6 hours PRN Mild Pain (1 - 3), Moderate Pain (4 - 6)  ALPRAZolam 0.25 milliGRAM(s) Oral every 6 hours PRN anxiety/insomnia  aluminum hydroxide/magnesium hydroxide/simethicone Suspension 30 milliLiter(s) Oral every 4 hours PRN Dyspepsia  benzocaine 15 mG/menthol 3.6 mG Lozenge 1 Lozenge Oral every 2 hours PRN Sore Throat  oxycodone    5 mG/acetaminophen 325 mG 1 Tablet(s) Oral every 6 hours PRN Severe Pain (7 - 10)      Allergies  latex (Rash)  No Known Drug Allergies  Intolerances  aspirin (Stomach Upset)    PAST MEDICAL & SURGICAL HISTORY:  Carotid artery obstruction, left  had carotid surgery 2016  DM (diabetes mellitus)  Lung mass  RLL, 2016, had RL lobectomy &amp; chemotherapy  Hip fracture  left femur, had pins placed in 2016  Lumbar radiculopathy  Hyperlipidemia  Lymphoma  Former smoker  Unspecified atrial fibrillation  Hypertension  Hip fracture  2016, left hip  Right lower lobe lung mass  Right lower lobectomy 2016, chemo till 01/2017  Status post carotid surgery  left carotid artery stenosis 2016  History of loop recorder  placed 9/2021      Vital Signs Last 24 Hrs  T(C): 36.6 (12 Feb 2022 06:30), Max: 36.6 (12 Feb 2022 06:30)  T(F): 97.8 (12 Feb 2022 06:30), Max: 97.8 (12 Feb 2022 06:30)  HR: 77 (12 Feb 2022 07:28) (71 - 101)  BP: 94/47 (12 Feb 2022 07:28) (88/43 - 142/66)  RR: 15 (12 Feb 2022 07:28) (15 - 20)  SpO2: 99% (12 Feb 2022 07:28) (97% - 100%)    Physical Exam:  Constitutional: NAD, AAOx3  Cardiovascular: +S1S2 RRR + systolic murmur  Pulmonary: CTA b/l, unlabored  Abd: soft NTND +BS  Groin (right): C/D/I bilaterally; no bleeding, hematoma, edema  Extremities: no pedal edema, +distal pulses b/l  Neuro: non focal, WEBER x4    LABS:                        6.8    1.94  )-----------( 123      ( 12 Feb 2022 06:31 )             21.8     02-12    141  |  107  |  21.6<H>  ----------------------------<  158<H>  4.3   |  25.0  |  0.84    Ca    8.0<L>      12 Feb 2022 06:32  Mg     2.0     02-12            Assessment:   81 year old female with PMH of HTN, DM, liver cirrhosis, PAD s/p right carotid endarterectomy, non Hodgkins Lymphoma (pulmonary mass s/p removal, chemo; last infusion fall 2021), COPD, atrial fibrillation and syncope s/p ILR implant 10/8/21. Last year she was admitted at [facility] with respiratory failure and pleural effusion. She was noted to have pAF during hospitalization and was started on Eliquis and amiodarone. While on Eliquis, she developed progressive anemia and reported black, tarry stools requiring multiple blood transfusion. GI workup with colonoscopy and endoscopy were unremarkable aside from polyps, yet, her anticoagulation was discontinued. She is at high risk for bleeding complications however, she remains at high risk for cardioembolic events. She presented for and is now POD #1 status post DANIE occlusion with Watchman device implant (20mm).        1. Hypotension/ Anemia  - Hypotension improved with IVF bolus  - Anemia likely secondary to intraoperative blood loss. Pt with low baseline to begin with. STAT TTE negative for effusion. Will obtain CT Abd/pelvis r/o RP bleed, although clinically low suspicion   - Transfuse 2u PRBC  - Continue telemetry monitoring  - Transfer care to hospitalist service; D/w Dr Moshe Stahl     2. Atrial Fibrillation s/p LAAO with Watchman device   - Currently in SR  - Once RP bleed ruled out would start Eliquis 2.5mg Q 12 hrs which she will need to take for 6 weeks post Watchman implant.  - Access site care and activity limitations reviewed w/ pt.   - Will follow along     3. LUE swelling/pain   - US to r/o DVT/thrombophlebitis     - Above d/w Dr. Jim and Hospitalist Dr. Stahl  Pt doing well POD #1 s/p DANIE occlusion with Watchman device implant (20mm, 2 deployments) via right femoral vein access. Pt seen and examined, reports feeling well but does report left sided upper arm pain where A line was placed. Denies paresthesias. Labs reviewed and Hg noted to be 6.8 which is a drop from Hg 8.7 yesterday. Pt also noted to be hypotensive this with SBP upper 80 to low 90s. 500cc fluid bolus given with improvement. SBP ~100. No tachycardia. No CP, palp or SOB. No abdominal pain or groin pain/swelling. STAT TTE negative for effusion.     EKG: SR, LAFB  TELE: SR, no events     MEDICATIONS  (STANDING):  apixaban 2.5 milliGRAM(s) Oral two times a day  atorvastatin 20 milliGRAM(s) Oral at bedtime  ferrous    sulfate 325 milliGRAM(s) Oral daily  furosemide    Tablet 20 milliGRAM(s) Oral daily  lisinopril 20 milliGRAM(s) Oral daily  tiotropium 18 MICROgram(s) Capsule 1 Capsule(s) Inhalation daily    MEDICATIONS  (PRN):  acetaminophen     Tablet .. 650 milliGRAM(s) Oral every 6 hours PRN Mild Pain (1 - 3), Moderate Pain (4 - 6)  ALPRAZolam 0.25 milliGRAM(s) Oral every 6 hours PRN anxiety/insomnia  aluminum hydroxide/magnesium hydroxide/simethicone Suspension 30 milliLiter(s) Oral every 4 hours PRN Dyspepsia  benzocaine 15 mG/menthol 3.6 mG Lozenge 1 Lozenge Oral every 2 hours PRN Sore Throat  oxycodone    5 mG/acetaminophen 325 mG 1 Tablet(s) Oral every 6 hours PRN Severe Pain (7 - 10)      Allergies  latex (Rash)  No Known Drug Allergies  Intolerances  aspirin (Stomach Upset)    PAST MEDICAL & SURGICAL HISTORY:  Carotid artery obstruction, left  had carotid surgery 2016  DM (diabetes mellitus)  Lung mass  RLL, 2016, had RL lobectomy &amp; chemotherapy  Hip fracture  left femur, had pins placed in 2016  Lumbar radiculopathy  Hyperlipidemia  Lymphoma  Former smoker  Unspecified atrial fibrillation  Hypertension  Hip fracture  2016, left hip  Right lower lobe lung mass  Right lower lobectomy 2016, chemo till 01/2017  Status post carotid surgery  left carotid artery stenosis 2016  History of loop recorder  placed 9/2021      Vital Signs Last 24 Hrs  T(C): 36.6 (12 Feb 2022 06:30), Max: 36.6 (12 Feb 2022 06:30)  T(F): 97.8 (12 Feb 2022 06:30), Max: 97.8 (12 Feb 2022 06:30)  HR: 77 (12 Feb 2022 07:28) (71 - 101)  BP: 94/47 (12 Feb 2022 07:28) (88/43 - 142/66)  RR: 15 (12 Feb 2022 07:28) (15 - 20)  SpO2: 99% (12 Feb 2022 07:28) (97% - 100%)    Physical Exam:  Constitutional: NAD, AAOx3  Cardiovascular: +S1S2 RRR + systolic murmur  Pulmonary: CTA b/l, unlabored  Abd: soft NTND +BS  Groin (right): C/D/I bilaterally; no bleeding, hematoma, edema  Extremities: no pedal edema, +distal pulses b/l. LUE with some sts and ttp. Compartments soft, radial and ulna pulses intact. Fingers warm and mobile   Neuro: non focal, WEBER x4    LABS:                        6.8    1.94  )-----------( 123      ( 12 Feb 2022 06:31 )             21.8     02-12    141  |  107  |  21.6<H>  ----------------------------<  158<H>  4.3   |  25.0  |  0.84    Ca    8.0<L>      12 Feb 2022 06:32  Mg     2.0     02-12            Assessment:   81 year old female with PMH of HTN, DM, liver cirrhosis, PAD s/p right carotid endarterectomy, non Hodgkins Lymphoma (pulmonary mass s/p removal, chemo; last infusion fall 2021), COPD, atrial fibrillation and syncope s/p ILR implant 10/8/21. Last year she was admitted at [facility] with respiratory failure and pleural effusion. She was noted to have pAF during hospitalization and was started on Eliquis and amiodarone. While on Eliquis, she developed progressive anemia and reported black, tarry stools requiring multiple blood transfusion. GI workup with colonoscopy and endoscopy were unremarkable aside from polyps, yet, her anticoagulation was discontinued. She is at high risk for bleeding complications however, she remains at high risk for cardioembolic events. She presented for and is now POD #1 status post DANIE occlusion with Watchman device implant (20mm).        1. Hypotension/ Anemia  - Hypotension improved with IVF bolus  - Anemia likely secondary to intraoperative blood loss. Pt with low baseline to begin with. STAT TTE negative for effusion. Will obtain CT Abd/pelvis r/o RP bleed, although clinically low suspicion   - Transfuse 2u PRBC  - Continue telemetry monitoring  - Transfer care to hospitalist service; D/w Dr Moshe Stahl     2. Atrial Fibrillation s/p LAAO with Watchman device   - Currently in SR  - Once RP bleed ruled out would start Eliquis 2.5mg Q 12 hrs which she will need to take for 6 weeks post Watchman implant.  - Access site care and activity limitations reviewed w/ pt.   - Will follow along     3. LUE swelling/pain   - US to r/o hematoma/thrombophlebitis     - Above d/w Dr. Jim and Hospitalist Dr. Stahl  Pt doing well POD #1 s/p DANIE occlusion with Watchman device implant (20mm, 2 deployments) via right femoral vein access. Pt seen and examined, reports feeling well but does report left sided upper arm pain where A line was placed. Denies paresthesias. Labs reviewed and Hg noted to be 6.8 which is a drop from Hg 8.7 yesterday. Pt also noted to be hypotensive this with SBP upper 80 to low 90s. 500cc fluid bolus given with improvement. SBP ~100. No tachycardia. No CP, palp or SOB. No abdominal pain or groin pain/swelling. STAT TTE negative for effusion.     EKG: SR, LAFB  TELE: SR, no events     MEDICATIONS  (STANDING):  apixaban 2.5 milliGRAM(s) Oral two times a day  atorvastatin 20 milliGRAM(s) Oral at bedtime  ferrous    sulfate 325 milliGRAM(s) Oral daily  furosemide    Tablet 20 milliGRAM(s) Oral daily  lisinopril 20 milliGRAM(s) Oral daily  tiotropium 18 MICROgram(s) Capsule 1 Capsule(s) Inhalation daily    MEDICATIONS  (PRN):  acetaminophen     Tablet .. 650 milliGRAM(s) Oral every 6 hours PRN Mild Pain (1 - 3), Moderate Pain (4 - 6)  ALPRAZolam 0.25 milliGRAM(s) Oral every 6 hours PRN anxiety/insomnia  aluminum hydroxide/magnesium hydroxide/simethicone Suspension 30 milliLiter(s) Oral every 4 hours PRN Dyspepsia  benzocaine 15 mG/menthol 3.6 mG Lozenge 1 Lozenge Oral every 2 hours PRN Sore Throat  oxycodone    5 mG/acetaminophen 325 mG 1 Tablet(s) Oral every 6 hours PRN Severe Pain (7 - 10)      Allergies  latex (Rash)  No Known Drug Allergies  Intolerances  aspirin (Stomach Upset)    PAST MEDICAL & SURGICAL HISTORY:  Carotid artery obstruction, left  had carotid surgery 2016  DM (diabetes mellitus)  Lung mass  RLL, 2016, had RL lobectomy &amp; chemotherapy  Hip fracture  left femur, had pins placed in 2016  Lumbar radiculopathy  Hyperlipidemia  Lymphoma  Former smoker  Unspecified atrial fibrillation  Hypertension  Hip fracture  2016, left hip  Right lower lobe lung mass  Right lower lobectomy 2016, chemo till 01/2017  Status post carotid surgery  left carotid artery stenosis 2016  History of loop recorder  placed 9/2021      Vital Signs Last 24 Hrs  T(C): 36.6 (12 Feb 2022 06:30), Max: 36.6 (12 Feb 2022 06:30)  T(F): 97.8 (12 Feb 2022 06:30), Max: 97.8 (12 Feb 2022 06:30)  HR: 77 (12 Feb 2022 07:28) (71 - 101)  BP: 94/47 (12 Feb 2022 07:28) (88/43 - 142/66)  RR: 15 (12 Feb 2022 07:28) (15 - 20)  SpO2: 99% (12 Feb 2022 07:28) (97% - 100%)    Physical Exam:  Constitutional: NAD, AAOx3  Cardiovascular: +S1S2 RRR + systolic murmur  Pulmonary: CTA b/l, unlabored  Abd: soft NTND +BS  Groin (right): C/D/I bilaterally; no bleeding, hematoma, edema  Extremities: no pedal edema, +distal pulses b/l. LUE with some sts and ttp. Compartments soft, radial and ulna pulses intact. Fingers warm and mobile. No palpable thrill or bruits.  Neuro: non focal, WEBER x4    LABS:                        6.8    1.94  )-----------( 123      ( 12 Feb 2022 06:31 )             21.8     02-12    141  |  107  |  21.6<H>  ----------------------------<  158<H>  4.3   |  25.0  |  0.84    Ca    8.0<L>      12 Feb 2022 06:32  Mg     2.0     02-12            Assessment:   81 year old female with PMH of HTN, DM, liver cirrhosis, PAD s/p right carotid endarterectomy, non Hodgkins Lymphoma (pulmonary mass s/p removal, chemo; last infusion fall 2021), COPD, atrial fibrillation and syncope s/p ILR implant 10/8/21. Last year she was admitted at [facility] with respiratory failure and pleural effusion. She was noted to have pAF during hospitalization and was started on Eliquis and amiodarone. While on Eliquis, she developed progressive anemia and reported black, tarry stools requiring multiple blood transfusion. GI workup with colonoscopy and endoscopy were unremarkable aside from polyps, yet, her anticoagulation was discontinued. She is at high risk for bleeding complications however, she remains at high risk for cardioembolic events. She presented for and is now POD #1 status post DANIE occlusion with Watchman device implant (20mm).        1. Hypotension/ Anemia  - Hypotension improved with IVF bolus  - Anemia likely secondary to intraoperative blood loss. Pt with low baseline to begin with. STAT TTE negative for effusion. Will obtain CT Abd/pelvis r/o RP bleed, although clinically low suspicion   - Transfuse 2u PRBC  - Continue telemetry monitoring  - Transfer care to hospitalist service; D/w Dr Moshe Stahl     2. Atrial Fibrillation s/p LAAO with Watchman device   - Currently in SR  - Once RP bleed ruled out would start Eliquis 2.5mg Q 12 hrs which she will need to take for 6 weeks post Watchman implant.  - Access site care and activity limitations reviewed w/ pt.   - Will follow along     3. LUE swelling/pain   - US to r/o hematoma/thrombophlebitis     - Above d/w Dr. Jim and Hospitalist Dr. Stahl

## 2022-02-12 NOTE — PROCEDURE NOTE - NSPROCDETAILS_GEN_ALL_CORE
location identified, draped/prepped, sterile technique used/sterile technique, catheter placed/ultrasound guidance

## 2022-02-13 ENCOUNTER — TRANSCRIPTION ENCOUNTER (OUTPATIENT)
Age: 82
End: 2022-02-13

## 2022-02-13 VITALS
TEMPERATURE: 98 F | OXYGEN SATURATION: 99 % | HEART RATE: 80 BPM | RESPIRATION RATE: 18 BRPM | DIASTOLIC BLOOD PRESSURE: 74 MMHG | SYSTOLIC BLOOD PRESSURE: 145 MMHG

## 2022-02-13 LAB
ANION GAP SERPL CALC-SCNC: 11 MMOL/L — SIGNIFICANT CHANGE UP (ref 5–17)
BUN SERPL-MCNC: 16.2 MG/DL — SIGNIFICANT CHANGE UP (ref 8–20)
CALCIUM SERPL-MCNC: 8.8 MG/DL — SIGNIFICANT CHANGE UP (ref 8.6–10.2)
CHLORIDE SERPL-SCNC: 107 MMOL/L — SIGNIFICANT CHANGE UP (ref 98–107)
CO2 SERPL-SCNC: 22 MMOL/L — SIGNIFICANT CHANGE UP (ref 22–29)
CREAT SERPL-MCNC: 0.82 MG/DL — SIGNIFICANT CHANGE UP (ref 0.5–1.3)
FERRITIN SERPL-MCNC: 188 NG/ML — HIGH (ref 15–150)
GLUCOSE SERPL-MCNC: 158 MG/DL — HIGH (ref 70–99)
HCT VFR BLD CALC: 30.9 % — LOW (ref 34.5–45)
HGB BLD-MCNC: 10.1 G/DL — LOW (ref 11.5–15.5)
IRON SATN MFR SERPL: 30 % — SIGNIFICANT CHANGE UP (ref 14–50)
IRON SATN MFR SERPL: 71 UG/DL — SIGNIFICANT CHANGE UP (ref 37–145)
MCHC RBC-ENTMCNC: 30.1 PG — SIGNIFICANT CHANGE UP (ref 27–34)
MCHC RBC-ENTMCNC: 32.7 GM/DL — SIGNIFICANT CHANGE UP (ref 32–36)
MCV RBC AUTO: 92.2 FL — SIGNIFICANT CHANGE UP (ref 80–100)
PLATELET # BLD AUTO: 121 K/UL — LOW (ref 150–400)
POTASSIUM SERPL-MCNC: 4.2 MMOL/L — SIGNIFICANT CHANGE UP (ref 3.5–5.3)
POTASSIUM SERPL-SCNC: 4.2 MMOL/L — SIGNIFICANT CHANGE UP (ref 3.5–5.3)
RBC # BLD: 3.35 M/UL — LOW (ref 3.8–5.2)
RBC # FLD: 15.1 % — HIGH (ref 10.3–14.5)
SODIUM SERPL-SCNC: 140 MMOL/L — SIGNIFICANT CHANGE UP (ref 135–145)
TIBC SERPL-MCNC: 236 UG/DL — SIGNIFICANT CHANGE UP (ref 220–430)
TRANSFERRIN SERPL-MCNC: 165 MG/DL — LOW (ref 192–382)
WBC # BLD: 2.06 K/UL — LOW (ref 3.8–10.5)
WBC # FLD AUTO: 2.06 K/UL — LOW (ref 3.8–10.5)

## 2022-02-13 PROCEDURE — 99239 HOSP IP/OBS DSCHRG MGMT >30: CPT

## 2022-02-13 RX ORDER — APIXABAN 2.5 MG/1
1 TABLET, FILM COATED ORAL
Qty: 60 | Refills: 0
Start: 2022-02-13 | End: 2022-03-14

## 2022-02-13 RX ADMIN — Medication 20 MILLIGRAM(S): at 05:24

## 2022-02-13 RX ADMIN — TIOTROPIUM BROMIDE 1 CAPSULE(S): 18 CAPSULE ORAL; RESPIRATORY (INHALATION) at 09:33

## 2022-02-13 RX ADMIN — APIXABAN 2.5 MILLIGRAM(S): 2.5 TABLET, FILM COATED ORAL at 05:24

## 2022-02-13 RX ADMIN — LISINOPRIL 20 MILLIGRAM(S): 2.5 TABLET ORAL at 05:24

## 2022-02-13 RX ADMIN — Medication 325 MILLIGRAM(S): at 10:26

## 2022-02-13 NOTE — DISCHARGE NOTE NURSING/CASE MANAGEMENT/SOCIAL WORK - NSDCPEFALRISK_GEN_ALL_CORE
For information on Fall & Injury Prevention, visit: [email or web address] OR  [email or web address] OR  https://www.cdc.gov/steadi/patient.html

## 2022-02-13 NOTE — DISCHARGE NOTE NURSING/CASE MANAGEMENT/SOCIAL WORK - PATIENT PORTAL LINK FT
You can access the FollowMyHealth Patient Portal offered by [facility] by registering at the following website: [email or web address]. By joining [employer]’s FollowMyHealth portal, you will also be able to view your health information using other applications (apps) compatible with our system.

## 2022-02-13 NOTE — PROGRESS NOTE ADULT - SUBJECTIVE AND OBJECTIVE BOX
CAROL ALEXANDRE  ----------------------------------------  The patient was seen at bedside. Patient with anemia. Offers no complaints. Tolerated transfusion.    Vital Signs Last 24 Hrs  T(C): 36.8 (13 Feb 2022 05:22), Max: 36.9 (12 Feb 2022 20:15)  T(F): 98.2 (13 Feb 2022 05:22), Max: 98.4 (12 Feb 2022 20:15)  HR: 72 (13 Feb 2022 09:54) (69 - 98)  BP: 143/73 (13 Feb 2022 05:22) (110/71 - 143/73)  BP(mean): --  RR: 18 (13 Feb 2022 05:22) (17 - 18)  SpO2: 98% (13 Feb 2022 09:54) (95% - 98%)    PHYSICAL EXAMINATION:  ----------------------------------------  General appearance: No acute distress, Awake, Alert  HEENT: Normocephalic, Atraumatic, Conjunctiva clear, EOMI  Neck: Supple, No JVD, No tenderness  Lungs: Breath sound equal bilaterally, No wheezes, No rales  Cardiovascular: S1S2, Regular rhythm  Abdomen: Soft, Nontender, Nondistended, No guarding/rebound, Positive bowel sounds  Extremities: No clubbing, No cyanosis, No edema, No calf tenderness  Neuro: Strength equal bilaterally, No tremors  Psychiatric: Appropriate mood, Normal affect    LABORATORY STUDIES:  ----------------------------------------             10.1   2.06  )-----------( 121      ( 13 Feb 2022 07:31 )             30.9     02-13    140  |  107  |  16.2  ----------------------------<  158<H>  4.2   |  22.0  |  0.82    Ca    8.8      13 Feb 2022 07:31  Mg     2.0     02-12    MEDICATIONS  (STANDING):  apixaban 2.5 milliGRAM(s) Oral two times a day  atorvastatin 20 milliGRAM(s) Oral at bedtime  ferrous    sulfate 325 milliGRAM(s) Oral daily  furosemide    Tablet 20 milliGRAM(s) Oral daily  lisinopril 20 milliGRAM(s) Oral daily  tiotropium 18 MICROgram(s) Capsule 1 Capsule(s) Inhalation daily    MEDICATIONS  (PRN):  acetaminophen     Tablet .. 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      Anemia - Status post transfusion of packed red blood cells. Hemoglobin level increased. Prior upper endoscopy, colonoscopy, and capsule endoscopy was without obvious source of bleeding. To follow up with Hematology for further management on an outpatient basis. To continue on iron supplements.    Paroxysmal atrial fibrillation - Status post atrial appendage occlusion with Watchman. On apixaban, planned for a six week course.    Chronic diastolic heart failure - On furosemide and lisinopril.    Hypertension - Close blood pressure monitoring. CAROL ALEXANDRE  ----------------------------------------  The patient was seen at bedside. Patient with anemia. Offers no complaints. Tolerated transfusion.    Vital Signs Last 24 Hrs  T(C): 36.8 (13 Feb 2022 05:22), Max: 36.9 (12 Feb 2022 20:15)  T(F): 98.2 (13 Feb 2022 05:22), Max: 98.4 (12 Feb 2022 20:15)  HR: 72 (13 Feb 2022 09:54) (69 - 98)  BP: 143/73 (13 Feb 2022 05:22) (110/71 - 143/73)  BP(mean): --  RR: 18 (13 Feb 2022 05:22) (17 - 18)  SpO2: 98% (13 Feb 2022 09:54) (95% - 98%)    PHYSICAL EXAMINATION:  ----------------------------------------  General appearance: No acute distress, Awake, Alert  HEENT: Normocephalic, Atraumatic, Conjunctiva clear, EOMI  Neck: Supple, No JVD, No tenderness  Lungs: Breath sound equal bilaterally, No wheezes, No rales  Cardiovascular: S1S2, Regular rhythm  Abdomen: Soft, Nontender, Nondistended, No guarding/rebound, Positive bowel sounds  Extremities: No clubbing, No cyanosis, No edema, No calf tenderness  Neuro: Strength equal bilaterally, No tremors  Psychiatric: Appropriate mood, Normal affect    LABORATORY STUDIES:  ----------------------------------------             10.1   2.06  )-----------( 121      ( 13 Feb 2022 07:31 )             30.9     02-13    140  |  107  |  16.2  ----------------------------<  158<H>  4.2   |  22.0  |  0.82    Ca    8.8      13 Feb 2022 07:31  Mg     2.0     02-12    MEDICATIONS  (STANDING):  apixaban 2.5 milliGRAM(s) Oral two times a day  atorvastatin 20 milliGRAM(s) Oral at bedtime  ferrous    sulfate 325 milliGRAM(s) Oral daily  furosemide    Tablet 20 milliGRAM(s) Oral daily  lisinopril 20 milliGRAM(s) Oral daily  tiotropium 18 MICROgram(s) Capsule 1 Capsule(s) Inhalation daily    MEDICATIONS  (PRN):  acetaminophen     Tablet .. 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      Anemia - Status post transfusion of packed red blood cells. Hemoglobin level increased. CT was without intra-abdominal hematoma. Prior upper endoscopy, colonoscopy, and capsule endoscopy was without obvious source of bleeding. To follow up with Hematology for further management on an outpatient basis. To continue on iron supplements.    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    Leukopenia - Noted on prior studies. Possibly related to Non-Hodgkin's lymphoma. To follow up with her Hematologist for further management.    Paroxysmal atrial fibrillation - Status post atrial appendage occlusion with Watchman. On apixaban, planned for a six week course.    Chronic diastolic heart failure - On furosemide and lisinopril.    Hypertension - Close blood pressure monitoring.

## 2022-03-07 PROBLEM — R91.8 OTHER NONSPECIFIC ABNORMAL FINDING OF LUNG FIELD: Chronic | Status: ACTIVE | Noted: 2018-10-17

## 2022-03-07 PROBLEM — I65.22 OCCLUSION AND STENOSIS OF LEFT CAROTID ARTERY: Chronic | Status: ACTIVE | Noted: 2018-10-17

## 2022-03-07 PROBLEM — I48.91 UNSPECIFIED ATRIAL FIBRILLATION: Chronic | Status: ACTIVE | Noted: 2022-02-08

## 2022-03-07 PROBLEM — S72.009A FRACTURE OF UNSPECIFIED PART OF NECK OF UNSPECIFIED FEMUR, INITIAL ENCOUNTER FOR CLOSED FRACTURE: Chronic | Status: ACTIVE | Noted: 2018-10-17

## 2022-03-07 PROBLEM — I10 ESSENTIAL (PRIMARY) HYPERTENSION: Chronic | Status: ACTIVE | Noted: 2022-02-08

## 2022-03-11 NOTE — REVIEW OF SYSTEMS
[Dyspnea on exertion] : dyspnea during exertion [Easy Bleeding] : a tendency for easy bleeding [Easy Bruising] : a tendency for easy bruising [Negative] : Integumentary [Fever] : no fever [Chills] : no chills [Feeling Fatigued] : not feeling fatigued [SOB] : no shortness of breath [Chest Discomfort] : no chest discomfort [Leg Claudication] : no intermittent leg claudication [Palpitations] : no palpitations [Orthopnea] : no orthopnea [Dizziness] : no dizziness [Convulsions] : no convulsions [Confusion] : no confusion was observed

## 2022-03-11 NOTE — PHYSICAL EXAM
[Well Nourished] : well nourished [No Acute Distress] : no acute distress [Frail] : frail [Normal S1, S2] : normal S1, S2 [Clear Lung Fields] : clear lung fields [Good Air Entry] : good air entry [No Respiratory Distress] : no respiratory distress  [Soft] : abdomen soft [Non Tender] : non-tender [No Edema] : no edema [No Rash] : no rash [Normal] : moves all extremities, no focal deficits, normal speech [Moves all extremities] : moves all extremities [No Focal Deficits] : no focal deficits [Normal Speech] : normal speech [Alert and Oriented] : alert and oriented [de-identified] : thin [de-identified] : 3/6 KRISTEN [de-identified] : +  ecchymoses

## 2022-03-11 NOTE — REASON FOR VISIT
[Arrhythmia/ECG Abnorrmalities] : arrhythmia/ECG abnormalities [Family Member] : family member [FreeTextEntry3] : Dr Everett [FreeTextEntry1] : Pt's daughter Kassandra should be called for scheduling [phone]

## 2022-03-11 NOTE — CARDIOLOGY SUMMARY
[de-identified] : Stress 2016- probably normal, perfusion defect likely due to artifact. LVEF 79%  [de-identified] : 2/2/22 sinus rhythm 58 bpm, IVCD and LAFB  ms\par 7/21/21 sinus rhythm 88 bpm, RBBB, LAFB.  ms,  ms [de-identified] : TTE 7/9/16- LVEF 62%, LA 3.7cm, mild LVH, mild AS, mod MR, mild pulm HTN \par

## 2022-03-11 NOTE — HISTORY OF PRESENT ILLNESS
[FreeTextEntry1] : 81 year old woman with history of cirrhosis, PAD s/p right carotid endartectomy, non Hodgkins Lymphoma (pulmonary mass s/p removal, and chemo), COPD, presenting for follow-up regarding atrial fibrillation s/p ILR implant 10/8/21. \par \par She has had paroxysmal AF, which has not been symptomatic. Last year she was admitted at [facility] with respiratory failure with patchy infiltrates and pleural effusions. She was noted to have paroxysmal atrial fibrillation  during evaluation and was started on Eliquis and amiodarone. Shortly after hospital discharge she had sudden syncope, and received a precordial thump by a family member, and subsequent awoke feeling well. She was briefly reevaluated in the hospital and discharged in stable condition. Amiodarone has been stopped, and she underwent ILR implant 10/8/21 for further monitoring. On ILR she has had infrequent episodes of pAF, last 1/21/22 lasting nearly 3 hours with controlled rates. The overall AF burden has been <1%. There was one pause of 3 seconds that was asymptomatic.  \par \par After starting Eliquis, she developed progressive anemia. She did not have overt bleeding, but did report black and tarry stools. She has been on iron replacement, and has required multiple blood transfusions. GI workup with colonoscopy and endoscopy were done and were unremarkable apart from polyps. She currently is undergoing capsule endoscopy. She has stopped Eliquis since 11/2021, and reports her stools are now brown again.  \par \par She ahs remained on Rituxan treatment for her Lymphoma, and its likely that these factors are also contributing to her anemia. She has been referred for consideration of left atrial appendage occlusion.  \par \par Of note she reports an allergy to ASA, but this was really an intolerance due to GI distress when she took it in the past. \par \par ECG today reveals sinus rhythm 58 bpm, with RBBB and LAFB,  ms,  ms.

## 2022-03-11 NOTE — DISCUSSION/SUMMARY
[FreeTextEntry1] :  81 year old woman with history of cirrhosis, PAD s/p right carotid endartectomy, non Hodgkins Lymphoma (pulmonary mass s/p removal, and chemo), COPD, presenting for follow-up regarding atrial fibrillation s/p ILR implant 10/8/21. She underwent ILR implant, and has had noted episodes of paroxysmal AF with overall low arrhythmia burden. However, she is at high risk for AF related thromboembolic complications, with CHADSVASc score 4. She had been on anticoagulation with Eliquis, but developed progressive and severe anemia requiring multiple transfusions. The anemia was suspected to be due to GI bleeding, but no source of bleeding has been noted on workup thus far. It is possible that her Lymphoma and chemotherapy is also contributing to this. Regardless, given her anemia and suspected bleeding, she is now considered high risk for long-term anticoagulation, and she has stopped Eliquis. We therefore discussed the option for left atrial appendage occlusion- the risks and benefits of this procedure, including procedure related risks including bleeding, vascular injury, cardiac perforation and stroke were discussed. She expressed understanding, and after a shared decision making process involving the pt, Dr. Everett and her family, she does want to proceed with this.  \par \par -DANIE occlusion procedure. Will get Cardiac CT prior for pre-procedure evaluation if possible \par \par -plan to restart Eliquis (possibly low dose given age and frailty/weight) following the procedure for about 6 weeks, then reassess. She reports intolerance to ASA, and therefore may use Plavix after stopping Eliquis if she can tolerate this.  \par \par -continue ILR monitoring and symptom correlation. At this time no indication for ppm, but will reconsider if progressive bradycardia/pauses noted.  \par \par  \par \par

## 2022-03-16 ENCOUNTER — APPOINTMENT (OUTPATIENT)
Dept: ELECTROPHYSIOLOGY | Facility: CLINIC | Age: 82
End: 2022-03-16
Payer: MEDICARE

## 2022-03-16 VITALS
HEART RATE: 73 BPM | BODY MASS INDEX: 21.44 KG/M2 | WEIGHT: 121 LBS | SYSTOLIC BLOOD PRESSURE: 126 MMHG | HEIGHT: 63 IN | DIASTOLIC BLOOD PRESSURE: 60 MMHG

## 2022-03-16 PROCEDURE — 99215 OFFICE O/P EST HI 40 MIN: CPT

## 2022-03-16 PROCEDURE — 93000 ELECTROCARDIOGRAM COMPLETE: CPT

## 2022-03-16 NOTE — PHYSICAL EXAM
[Well Nourished] : well nourished [No Acute Distress] : no acute distress [Frail] : frail [Normal S1, S2] : normal S1, S2 [Good Air Entry] : good air entry [No Respiratory Distress] : no respiratory distress  [Non Tender] : non-tender [Abnormal Gait] : abnormal gait [No Edema] : no edema [No Rash] : no rash [Normal] : moves all extremities, no focal deficits, normal speech [Moves all extremities] : moves all extremities [No Focal Deficits] : no focal deficits [Normal Speech] : normal speech [Alert and Oriented] : alert and oriented [de-identified] : thin [de-identified] : 3/6 KRISTEN [de-identified] : +  ecchymoses

## 2022-03-16 NOTE — DISCUSSION/SUMMARY
[FreeTextEntry1] : 81 year old woman with history of cirrhosis, PAD s/p right carotid endartectomy, non Hodgkins Lymphoma (pulmonary mass s/p removal, and chemo), COPD, presenting for follow-up regarding atrial fibrillation s/p ILR implant 10/8/21, and recent Watchman procedure for DANIE occlusion on 2/11/22. \par \par She is doing well s/p recent Watchman procedure, and remains on post-procedure anticoagulation with Eliquis.  \par \par -will plan LELAND around 6 weeks post procedure to followup on Watchman device. If the device is well seated without thrombus, will stop Eliquis at that time. Given her history of intolerance to ASA, will plan to continue Plavix alone after stopping Eliquis, both for thromboembolic protection and given her history of PAD.  \par \par  -continue ILR monitoring. Will set up symptom recording and if significant bradyarrhythmia noted on followup, would consider ppm implant

## 2022-03-16 NOTE — HISTORY OF PRESENT ILLNESS
[FreeTextEntry1] : 81 year old woman with history of cirrhosis, PAD s/p right carotid endartectomy, non Hodgkins Lymphoma (pulmonary mass s/p removal, and chemo), COPD, presenting for follow-up regarding atrial fibrillation s/p ILR implant 10/8/21, and recent Watchman procedure for DANIE occlusion on 2/11/22. \par \par She has had paroxysmal AF, which has not been symptomatic. Last year she was admitted at [facility] with respiratory failure with patchy infiltrates and pleural effusions. She was noted to have paroxysmal atrial fibrillation  during evaluation and was started on Eliquis and amiodarone. Shortly after hospital discharge she had sudden syncope, and received a precordial thump by a family member, and subsequent awoke feeling well. She was briefly reevaluated in the hospital and discharged in stable condition. Amiodarone has been stopped, and she underwent ILR implant 10/8/21 for further monitoring. On ILR she has had infrequent episodes of pAF, last 1/21/22 lasting nearly 3 hours with controlled rates. The overall AF burden has been <1%.   \par \par After starting Eliquis, she developed progressive anemia. She did not have overt bleeding, but did report black and tarry stools. She has been on iron replacement, and has required multiple blood transfusions. GI workup with colonoscopy and endoscopy were done and were unremarkable apart from polyps. \par \par She elected to undergo DANIE occlusion, which was performed with Watchman procedure successfully on 2/11/22 with a 20mm Watchman FLX device. She was continued on low dose Eliquis after the procedure. \par \par Of note she reports an allergy to ASA, but this was really an intolerance due to GI distress when she took it in the past. \par \par  On followup today she is feeling well. She tolerated the procedure well, and denies recurrent bleeding.  \par \par She reports balance difficulty, but denies dizziness or syncope. Of note on ILR she has had brief episodes of pAF lasting up to 16 minutes, and infrequent sinus pauses up to 3 seconds.  \par \par ECG today reveals sinus rhythm 73 bpm, with RBBB and LAFB,  ms,  ms.

## 2022-03-16 NOTE — CARDIOLOGY SUMMARY
[de-identified] : 3/16/22 sinus rhythm 73 bpm, with RBBB and LAFB,  ms,  ms. \par 2/2/22 sinus rhythm 58 bpm, IVCD and LAFB  ms\par 7/21/21 sinus rhythm 88 bpm, RBBB, LAFB.  ms,  ms [de-identified] : Stress 2016- probably normal, perfusion defect likely due to artifact. LVEF 79%  [de-identified] : TTE 7/9/16- LVEF 62%, LA 3.7cm, mild LVH, mild AS, mod MR, mild pulm HTN \par

## 2022-03-16 NOTE — REASON FOR VISIT
[Arrhythmia/ECG Abnorrmalities] : arrhythmia/ECG abnormalities [Family Member] : family member [FreeTextEntry3] : Dr Everett [FreeTextEntry1] : Pt's daughter Kassandra should be called for scheduling [phone]

## 2022-03-16 NOTE — REVIEW OF SYSTEMS
[Fever] : no fever [Chills] : no chills [Feeling Fatigued] : not feeling fatigued [SOB] : no shortness of breath [Dyspnea on exertion] : not dyspnea during exertion [Chest Discomfort] : no chest discomfort [Leg Claudication] : no intermittent leg claudication [Palpitations] : no palpitations [Orthopnea] : no orthopnea [Dizziness] : no dizziness [Convulsions] : no convulsions [Confusion] : no confusion was observed [Easy Bleeding] : a tendency for easy bleeding [Easy Bruising] : a tendency for easy bruising [Negative] : Integumentary [FreeTextEntry9] : balance off

## 2022-03-17 ENCOUNTER — APPOINTMENT (OUTPATIENT)
Dept: SPINE | Facility: CLINIC | Age: 82
End: 2022-03-17
Payer: MEDICARE

## 2022-03-17 VITALS
OXYGEN SATURATION: 95 % | WEIGHT: 121 LBS | HEIGHT: 63 IN | DIASTOLIC BLOOD PRESSURE: 66 MMHG | BODY MASS INDEX: 21.44 KG/M2 | HEART RATE: 69 BPM | SYSTOLIC BLOOD PRESSURE: 133 MMHG

## 2022-03-17 DIAGNOSIS — M79.651 PAIN IN RIGHT THIGH: ICD-10-CM

## 2022-03-17 DIAGNOSIS — Z98.890 OTHER SPECIFIED POSTPROCEDURAL STATES: ICD-10-CM

## 2022-03-17 PROCEDURE — 99212 OFFICE O/P EST SF 10 MIN: CPT

## 2022-03-17 NOTE — REASON FOR VISIT
[Follow-Up: _____] : a [unfilled] follow-up visit [Family Member] : family member [FreeTextEntry1] : Ms Rodriguez is an 81 year old woman is here today with right thigh pain and burning for one year. Pain today is 10/10. Pain is not relieved by NSAIIDs. Gabapentin helped relieve the pain until she had EMG performed. Pain became worse. She had undergone L 2 to L 5 decompressive laminectomy and foraminotomy in 2018. Denies back pain.  EMG reviewed today shows evidence of a peripheral neuropathy along with chronic lower lumbar radiculopathy.\par

## 2022-03-17 NOTE — ASSESSMENT
[FreeTextEntry1] : One year history of right thigh pain and  burning. No surgical interventions. Recommend pain management specialist Dr. Stewart. Gabapentin dose increased from 300mg to 600mg at night. RTO PRN.

## 2022-03-18 RX ORDER — GABAPENTIN 600 MG/1
600 TABLET, COATED ORAL AT BEDTIME
Qty: 30 | Refills: 1 | Status: ACTIVE | COMMUNITY
Start: 2022-03-18 | End: 1900-01-01

## 2022-04-13 ENCOUNTER — FORM ENCOUNTER (OUTPATIENT)
Age: 82
End: 2022-04-13

## 2022-04-14 ENCOUNTER — TRANSCRIPTION ENCOUNTER (OUTPATIENT)
Age: 82
End: 2022-04-14

## 2022-04-14 ENCOUNTER — OUTPATIENT (OUTPATIENT)
Dept: OUTPATIENT SERVICES | Facility: HOSPITAL | Age: 82
LOS: 1 days | End: 2022-04-14
Payer: MEDICARE

## 2022-04-14 VITALS
RESPIRATION RATE: 18 BRPM | SYSTOLIC BLOOD PRESSURE: 155 MMHG | DIASTOLIC BLOOD PRESSURE: 70 MMHG | HEART RATE: 82 BPM | OXYGEN SATURATION: 97 %

## 2022-04-14 VITALS
TEMPERATURE: 98 F | WEIGHT: 119.93 LBS | SYSTOLIC BLOOD PRESSURE: 179 MMHG | RESPIRATION RATE: 18 BRPM | OXYGEN SATURATION: 98 % | HEART RATE: 56 BPM | DIASTOLIC BLOOD PRESSURE: 72 MMHG | HEIGHT: 63 IN

## 2022-04-14 DIAGNOSIS — S72.009A FRACTURE OF UNSPECIFIED PART OF NECK OF UNSPECIFIED FEMUR, INITIAL ENCOUNTER FOR CLOSED FRACTURE: Chronic | ICD-10-CM

## 2022-04-14 DIAGNOSIS — Z98.890 OTHER SPECIFIED POSTPROCEDURAL STATES: Chronic | ICD-10-CM

## 2022-04-14 DIAGNOSIS — Z95.818 PRESENCE OF OTHER CARDIAC IMPLANTS AND GRAFTS: ICD-10-CM

## 2022-04-14 DIAGNOSIS — R91.8 OTHER NONSPECIFIC ABNORMAL FINDING OF LUNG FIELD: Chronic | ICD-10-CM

## 2022-04-14 DIAGNOSIS — I48.0 PAROXYSMAL ATRIAL FIBRILLATION: ICD-10-CM

## 2022-04-14 LAB
ANION GAP SERPL CALC-SCNC: 13 MMOL/L — SIGNIFICANT CHANGE UP (ref 5–17)
APTT BLD: 65.2 SEC — HIGH (ref 27.5–35.5)
BUN SERPL-MCNC: 42.9 MG/DL — HIGH (ref 8–20)
CALCIUM SERPL-MCNC: 9.6 MG/DL — SIGNIFICANT CHANGE UP (ref 8.6–10.2)
CHLORIDE SERPL-SCNC: 103 MMOL/L — SIGNIFICANT CHANGE UP (ref 98–107)
CO2 SERPL-SCNC: 26 MMOL/L — SIGNIFICANT CHANGE UP (ref 22–29)
CREAT SERPL-MCNC: 1.25 MG/DL — SIGNIFICANT CHANGE UP (ref 0.5–1.3)
EGFR: 44 ML/MIN/1.73M2 — LOW
GLUCOSE SERPL-MCNC: 137 MG/DL — HIGH (ref 70–99)
HCT VFR BLD CALC: 28 % — LOW (ref 34.5–45)
HGB BLD-MCNC: 9 G/DL — LOW (ref 11.5–15.5)
INR BLD: 1.13 RATIO — SIGNIFICANT CHANGE UP (ref 0.88–1.16)
MCHC RBC-ENTMCNC: 31.8 PG — SIGNIFICANT CHANGE UP (ref 27–34)
MCHC RBC-ENTMCNC: 32.1 GM/DL — SIGNIFICANT CHANGE UP (ref 32–36)
MCV RBC AUTO: 98.9 FL — SIGNIFICANT CHANGE UP (ref 80–100)
PLATELET # BLD AUTO: 130 K/UL — LOW (ref 150–400)
POTASSIUM SERPL-MCNC: 4.7 MMOL/L — SIGNIFICANT CHANGE UP (ref 3.5–5.3)
POTASSIUM SERPL-SCNC: 4.7 MMOL/L — SIGNIFICANT CHANGE UP (ref 3.5–5.3)
PROTHROM AB SERPL-ACNC: 13.1 SEC — SIGNIFICANT CHANGE UP (ref 10.5–13.4)
RBC # BLD: 2.83 M/UL — LOW (ref 3.8–5.2)
RBC # FLD: 14.5 % — SIGNIFICANT CHANGE UP (ref 10.3–14.5)
SODIUM SERPL-SCNC: 142 MMOL/L — SIGNIFICANT CHANGE UP (ref 135–145)
WBC # BLD: 2.87 K/UL — LOW (ref 3.8–10.5)
WBC # FLD AUTO: 2.87 K/UL — LOW (ref 3.8–10.5)

## 2022-04-14 PROCEDURE — 83550 IRON BINDING TEST: CPT

## 2022-04-14 PROCEDURE — C1769: CPT

## 2022-04-14 PROCEDURE — 93005 ELECTROCARDIOGRAM TRACING: CPT

## 2022-04-14 PROCEDURE — 85610 PROTHROMBIN TIME: CPT

## 2022-04-14 PROCEDURE — 36415 COLL VENOUS BLD VENIPUNCTURE: CPT

## 2022-04-14 PROCEDURE — 83540 ASSAY OF IRON: CPT

## 2022-04-14 PROCEDURE — 93325 DOPPLER ECHO COLOR FLOW MAPG: CPT

## 2022-04-14 PROCEDURE — 93325 DOPPLER ECHO COLOR FLOW MAPG: CPT | Mod: 26

## 2022-04-14 PROCEDURE — 80048 BASIC METABOLIC PNL TOTAL CA: CPT

## 2022-04-14 PROCEDURE — 85730 THROMBOPLASTIN TIME PARTIAL: CPT

## 2022-04-14 PROCEDURE — 85027 COMPLETE CBC AUTOMATED: CPT

## 2022-04-14 PROCEDURE — 83735 ASSAY OF MAGNESIUM: CPT

## 2022-04-14 PROCEDURE — 84466 ASSAY OF TRANSFERRIN: CPT

## 2022-04-14 PROCEDURE — 93320 DOPPLER ECHO COMPLETE: CPT | Mod: 26

## 2022-04-14 PROCEDURE — P9016: CPT

## 2022-04-14 PROCEDURE — 93312 ECHO TRANSESOPHAGEAL: CPT | Mod: 26

## 2022-04-14 PROCEDURE — 74176 CT ABD & PELVIS W/O CONTRAST: CPT

## 2022-04-14 PROCEDURE — 93320 DOPPLER ECHO COMPLETE: CPT

## 2022-04-14 PROCEDURE — 93312 ECHO TRANSESOPHAGEAL: CPT

## 2022-04-14 PROCEDURE — 82728 ASSAY OF FERRITIN: CPT

## 2022-04-14 PROCEDURE — 36430 TRANSFUSION BLD/BLD COMPNT: CPT

## 2022-04-14 PROCEDURE — 93971 EXTREMITY STUDY: CPT

## 2022-04-14 PROCEDURE — 94640 AIRWAY INHALATION TREATMENT: CPT

## 2022-04-14 PROCEDURE — 33340 PERQ CLSR TCAT L ATR APNDGE: CPT

## 2022-04-14 PROCEDURE — 76376 3D RENDER W/INTRP POSTPROCES: CPT | Mod: 26

## 2022-04-14 PROCEDURE — C1889: CPT

## 2022-04-14 PROCEDURE — 93308 TTE F-UP OR LMTD: CPT

## 2022-04-14 PROCEDURE — G0463: CPT

## 2022-04-14 PROCEDURE — 93010 ELECTROCARDIOGRAM REPORT: CPT

## 2022-04-14 RX ORDER — CLOPIDOGREL BISULFATE 75 MG/1
1 TABLET, FILM COATED ORAL
Qty: 30 | Refills: 3
Start: 2022-04-14 | End: 2022-08-11

## 2022-04-14 RX ORDER — CLOPIDOGREL BISULFATE 75 MG/1
75 TABLET, FILM COATED ORAL
Qty: 30 | Refills: 0 | Status: ACTIVE | COMMUNITY
Start: 2022-04-14

## 2022-04-14 NOTE — H&P PST ADULT - HISTORY OF PRESENT ILLNESS
82 y/o F with PMHx of cirrhosis, aortic stenosis, PAD s/p right carotid endarterectomy, non Hodgkins Lymphoma (pulmonary mass s/p removal, chemo; last infusion fall 2021), COPD, atrial fibrillation and syncope s/p ILR implant 10/8/21. Last year she was admitted at [facility] with respiratory failure, patchy infiltrates and pleural effusions. She was noted to have paroxysmal atrial fibrillation during evaluation and was started on Eliquis and amiodarone. On follow-up with Dr Everett she was noted to be in sinus rhythm with bifasicular block and amiodarone was discontinued. While on Eliquis she developed progressive anemia and reported black and tarry stools requiring multiple blood transfusions. GI workup with colonoscopy and endoscopy were unremarkable apart from polyps. Pt is s/p left atrial appendage occlusion with Watchman device on 2/11/22 and was prescribed Eliquis 2.5mg BID. Pt presents today for 45 day post Watchman LELAND. Pt reports last PO intake was yesterday evening as well as last Eliquis dose. Pt reports only taking Eliquis 2.5mg once a day since device placement. COVID-19 PCR negative 4/11/22. Pt Reports no complaints at this time.   Cardiology Summary:    ECG: 3/16/22 sinus rhythm 73 bpm, with RBBB and LAFB,  ms,  ms.   2/2/22 sinus rhythm 58 bpm, IVCD and LAFB  ms  7/21/21 sinus rhythm 88 bpm, RBBB, LAFB.  ms,  ms   Stress Test: Stress 2016- probably normal, perfusion defect likely due to artifact. LVEF 79%   Echo: TTE 7/9/16- LVEF 62%, LA 3.7cm, mild LVH, mild AS, mod MR, mild pulm HTN            TTE 2/12/22 - LVEF 60-65%, mild to moderate mitral valve regurgitation.

## 2022-04-14 NOTE — H&P PST ADULT - ASSESSMENT
82 y/o F with PMHx of cirrhosis, aortic stenosis, PAD s/p right carotid endarterectomy, non Hodgkins Lymphoma (pulmonary mass s/p removal, chemo; last infusion fall 2021), COPD, atrial fibrillation and syncope s/p ILR implant 10/8/21. Last year she was admitted at [facility] with respiratory failure, patchy infiltrates and pleural effusions. She was noted to have paroxysmal atrial fibrillation during evaluation and was started on Eliquis and amiodarone. On follow-up with Dr Everett she was noted to be in sinus rhythm with bifasicular block and amiodarone was discontinued. While on Eliquis she developed progressive anemia and reported black and tarry stools requiring multiple blood transfusions. GI workup with colonoscopy and endoscopy were unremarkable apart from polyps. Pt is s/p left atrial appendage occlusion with Watchman device on 2/11/22 and was prescribed Eliquis 2.5mg BID. Pt presents today for 45 day post Watchman LELAND. Pt reports last PO intake was yesterday evening as well as last Eliquis dose. Pt reports only taking Eliquis 2.5mg once a day since device placement. COVID-19 PCR negative 4/11/22. Pt Reports no complaints at this time.    Plan:  1) 45 day post Watchman LELAND  - consent with attending  - labs / EKG  - Maintain NPO status pending procedure

## 2022-04-14 NOTE — DISCHARGE NOTE NURSING/CASE MANAGEMENT/SOCIAL WORK - NSDCPEFALRISK_GEN_ALL_CORE
For information on Fall & Injury Prevention, visit: [email or web address] OR  [email or web address] OR  https://www.cdc.gov/steadi/patient.html

## 2022-04-14 NOTE — ASU PATIENT PROFILE, ADULT - FALL HARM RISK - UNIVERSAL INTERVENTIONS
Bed in lowest position, wheels locked, appropriate side rails in place/Call bell, personal items and telephone in reach/Instruct patient to call for assistance before getting out of bed or chair/Non-slip footwear when patient is out of bed/[place] to call system/Physically safe environment - no spills, clutter or unnecessary equipment/Purposeful Proactive Rounding/Room/bathroom lighting operational, light cord in reach

## 2022-04-14 NOTE — DISCHARGE NOTE PROVIDER - HOSPITAL COURSE
81y Female PMHx of cirrhosis, aortic stenosis, PAD s/p right carotid endarterectomy, non Hodgkins Lymphoma (pulmonary mass s/p removal, chemo; last infusion fall 2021), COPD, atrial fibrillation and syncope s/p ILR implant 10/8/21, DANIE occlusion device (Watchman) 2/22, now status post LELAND (45 day post Watchman). LELAND showing no leak, No DANIE thrombus, and device is well seated.

## 2022-04-14 NOTE — H&P PST ADULT - NSICDXPASTSURGICALHX_GEN_ALL_CORE_FT
PAST SURGICAL HISTORY:  Hip fracture 2016, left hip    History of loop recorder placed 9/2021    Right lower lobe lung mass Right lower lobectomy 2016, chemo till 01/2017    Status post carotid surgery left carotid artery stenosis 2016

## 2022-04-14 NOTE — H&P PST ADULT - PATIENT'S GENDER IDENTITY
Female normal... Well appearing, awake, alert, oriented to person, place, time/situation and in no apparent distress.

## 2022-04-14 NOTE — DISCHARGE NOTE PROVIDER - NSDCFUADDINST_GEN_ALL_CORE_FT
Follow up with Dr. Jim upon discharge to arrange follow up for repeat Transesophageal echocardiogram. Our office will contact you in 3-5 days to schedule this appointment. Please call [phone] with questions or concerns. Follow up with Dr. Jim upon discharge to arrange follow up for repeat Transesophageal echocardiogram. Our office will contact you in 3-5 days to schedule this appointment. Please call [phone] with questions or concerns. Stop taking Eliquis. Start taking Plavix 75mg daily for 6 months, follow up with Dr. Jim and your primary cardiologist before stopping Plavix.

## 2022-04-14 NOTE — DISCHARGE NOTE PROVIDER - NSDCMRMEDTOKEN_GEN_ALL_CORE_FT
apixaban 2.5 mg oral tablet: 1 tab(s) orally 2 times a day  atorvastatin 20 mg oral tablet: 1 tab(s) orally once a day (at bedtime)  FeroSul 325 mg (65 mg elemental iron) oral tablet: 1 tab(s) orally once a day   furosemide 20 mg oral tablet: 1 tab(s) orally once a day  Incruse Ellipta 62.5 mcg/inh inhalation powder: 1 puff(s) inhaled every 24 hours  lisinopril 20 mg oral tablet: 1 tab(s) orally once a day   atorvastatin 20 mg oral tablet: 1 tab(s) orally once a day (at bedtime)  FeroSul 325 mg (65 mg elemental iron) oral tablet: 1 tab(s) orally once a day   furosemide 20 mg oral tablet: 1 tab(s) orally once a day  Incruse Ellipta 62.5 mcg/inh inhalation powder: 1 puff(s) inhaled every 24 hours  lisinopril 20 mg oral tablet: 1 tab(s) orally once a day  Plavix 75 mg oral tablet: 1 tab(s) orally once a day x 30 days

## 2022-04-14 NOTE — DISCHARGE NOTE PROVIDER - CARE PROVIDER_API CALL
Raleigh Everett)  Cardiovascular Disease; Internal Medicine  [facility], [address], [address]  [place] [address]  Phone: [phone]  Fax: [phone]  Follow Up Time:     Medardo Jim)  Cardiology; Internal Medicine  [address], [address]  [place] [address]  Phone: [phone]  Fax: [phone]  Follow Up Time:

## 2022-04-14 NOTE — H&P PST ADULT - NSICDXPASTMEDICALHX_GEN_ALL_CORE_FT
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 carotid surgery 2016    DM (diabetes mellitus)     Former smoker     Hip fracture left femur, had pins placed in 2016    Hyperlipidemia     Hypertension     Lumbar radiculopathy     Lung mass RLL, 2016, had RL lobectomy & chemotherapy    Lymphoma     Unspecified atrial fibrillation

## 2022-04-14 NOTE — ASU PATIENT PROFILE, ADULT - MEDICATION HERBAL REMEDIES, PROFILE
0257 - Spoke to Dr. Olivier about pt's high temperature despite having administered tylenol almost 3 hours ago. States to give the pt a one time dose of Tylenol 650 mg.    0649 - Dr. Ashton at the bedside. Aware of pt's hemoglobin and hematocrit level, as well as lowered calcium. Also aware of pt's lowered calcium level. Updated on pt becoming febrile over night, and that pt is still hot to the touch. States okay for cooling blanket on the pt.   no

## 2022-04-14 NOTE — DISCHARGE NOTE PROVIDER - NSDCCPTREATMENT_GEN_ALL_CORE_FT
PRINCIPAL PROCEDURE  Procedure: Transesophageal echocardiogram (LELAND)  Findings and Treatment: -Take each dose of your anticoagulation medication (blood thinner) exactly as prescribed.   -Resume your diet with soft foods first.  - Do not drive, operate heavy machinery or make important decisions for 24 hours following the procedure.  - You may resume all other activities the day after the procedure.  Call your doctor if:   -you develop a fever, cough up blood, have any chest pain, palpitations or difficulty breathing. You may take a throat lozenge if you develop a sore throat or try warm salt water gargle.   - you have any questions or concerns regarding the procedure.  If you experience increased difficulty breathing or chest pain, or if you faint, have dizzy spells, or for any other distressing symptom, please seek immediate medical attention.

## 2022-04-14 NOTE — PROGRESS NOTE ADULT - SUBJECTIVE AND OBJECTIVE BOX
Pt doing well s/p uncomplicated LELAND. Denies any complaints post procedure.     Exam:   VSS, NAD, A&O x 3  Skin: no erythema/edema   Card: S1/S2, RRR, no m/g/r  Resp: lungs CTA b/l  Abd: S/NT/ND  Ext: no edema, distal pulses intact    LELAND: Well seated, No leak, No DANIE thrombus. 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 ELLAND showing no leak, No DANIE thrombus, and device is well seated.   Plan:   Observation on telemetry per post op protocol.    Resume PO intake.   Ambulate w/ assist once fully awake & back to baseline mental status w/ VSS.  Stop Eliquis 2.5mg Q12hr  Start Plavix 75mg QD   Resume other home medications.   Anticipate d/c home once all criteria met, with outpt f/up in 1 month to arrange 1 year LELAND     Pt doing well s/p uncomplicated LELAND. Denies any complaints post procedure.     Exam:   VSS, NAD, A&O x 3  Skin: no erythema/edema   Card: S1/S2, RRR, no m/g/r  Resp: lungs CTA b/l  Abd: S/NT/ND  Ext: no edema, distal pulses intact    LELAND: Well seated, No leak, No DANIE thrombus. 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 LELAND showing no leak, No DANIE thrombus, and device is well seated.     Plan:   Observation on telemetry per post op protocol.    Resume PO intake.   Ambulate w/ assist once fully awake & back to baseline mental status w/ VSS.  Stop Eliquis 2.5mg Q12hr  Start Plavix 75mg QD  Resume other home medications.   Anticipate d/c home once all criteria met, with outpt f/up in 1 month to arrange 1 year LELAND

## 2022-04-14 NOTE — DISCHARGE NOTE NURSING/CASE MANAGEMENT/SOCIAL WORK - PATIENT PORTAL LINK FT
You can access the FollowMyHealth Patient Portal offered by [facility] by registering at the following website: [email or web address]. By joining [facility]’s FollowMyHealth portal, you will also be able to view your health information using other applications (apps) compatible with our system.

## 2022-04-15 ENCOUNTER — NON-APPOINTMENT (OUTPATIENT)
Age: 82
End: 2022-04-15

## 2022-06-20 ENCOUNTER — APPOINTMENT (OUTPATIENT)
Dept: SPINE | Facility: CLINIC | Age: 82
End: 2022-06-20
Payer: MEDICARE

## 2022-06-20 VITALS
HEART RATE: 61 BPM | WEIGHT: 122 LBS | SYSTOLIC BLOOD PRESSURE: 148 MMHG | DIASTOLIC BLOOD PRESSURE: 68 MMHG | BODY MASS INDEX: 21.62 KG/M2 | HEIGHT: 63 IN | OXYGEN SATURATION: 99 %

## 2022-06-20 DIAGNOSIS — M48.00 SPINAL STENOSIS, SITE UNSPECIFIED: ICD-10-CM

## 2022-06-20 PROCEDURE — 99213 OFFICE O/P EST LOW 20 MIN: CPT

## 2022-06-20 RX ORDER — GABAPENTIN 300 MG/1
300 CAPSULE ORAL
Qty: 90 | Refills: 3 | Status: DISCONTINUED | COMMUNITY
Start: 2022-02-03 | End: 2022-06-20

## 2022-06-20 NOTE — REASON FOR VISIT
[Follow-Up: _____] : a [unfilled] follow-up visit [Other: _____] : [unfilled] [FreeTextEntry1] : Ms Rodriguez is here today for a follow up. She was last seen on 3/17/2022 with right thigh pain and burning for over one year.  She underwent L 2 to L 5 decompressive laminectomy and foraminotomy in 2018. EMG (3/1/2022) showed evidence of a peripheral neuropathy along with bilateral L4 L5 S1 radiculopathy. Today she reports right thigh pain is ongoing. Tales gabapentin 300 mg BID with no relief.  CT scan of Lumbar spine reviewed today. There is a right-sided disc herniation at L2-3 with significant stenosis. In addition there is retrolisthesis with severe foraminal narrowing on the right at L2-3. She has an unexplained anemia which causes her to have transfusions.

## 2022-06-20 NOTE — ASSESSMENT
[FreeTextEntry1] : 81 year old with lumbar stenosis and instability. A possible lumbar fusion was discussed.  Daughter reports patient has unexplained chronic anemia. I explained to patient her chronic anemia has to be addressed before moving forward with any surgery.

## 2023-01-10 ENCOUNTER — APPOINTMENT (OUTPATIENT)
Dept: CARDIOLOGY | Facility: CLINIC | Age: 83
End: 2023-01-10
Payer: MEDICARE

## 2023-01-10 DIAGNOSIS — I10 ESSENTIAL (PRIMARY) HYPERTENSION: ICD-10-CM

## 2023-01-10 PROCEDURE — 99214 OFFICE O/P EST MOD 30 MIN: CPT

## 2023-01-12 PROBLEM — I10 BENIGN ESSENTIAL HYPERTENSION: Status: ACTIVE | Noted: 2022-01-28

## 2023-01-12 NOTE — ASSESSMENT
[FreeTextEntry1] : Assessment:\par 1.  Carotid stenosis - mild bilaterally\par -  CEA\par 2.  No Claudication \par 3.  Afib with Watchman and ILR\par \par Plan\par 1.  Continue statin therapy\par 2.  Cant be on antiplatelet due to history of anemia with this\par 3.  Repeat carotid duplex in 6 months paired with office visit same day\par 4.  No claudication symptoms, L DP is palp.  No arterial testing at this time\par \par d/w daughter at bedside\par

## 2023-01-12 NOTE — PHYSICAL EXAM
[General Appearance - Well Developed] : well developed [Normal Appearance] : normal appearance [Well Groomed] : well groomed [General Appearance - Well Nourished] : well nourished [No Deformities] : no deformities [General Appearance - In No Acute Distress] : no acute distress [Normal Conjunctiva] : the conjunctiva exhibited no abnormalities [Eyelids - No Xanthelasma] : the eyelids demonstrated no xanthelasmas [Normal Oral Mucosa] : normal oral mucosa [No Oral Pallor] : no oral pallor [No Oral Cyanosis] : no oral cyanosis [Nail Clubbing] : no clubbing of the fingernails [Cyanosis, Localized] : no localized cyanosis [Petechial Hemorrhages (___cm)] : no petechial hemorrhages [Skin Color & Pigmentation] : normal skin color and pigmentation [] : no rash [No Venous Stasis] : no venous stasis [Skin Lesions] : no skin lesions [No Skin Ulcers] : no skin ulcer [No Xanthoma] : no  xanthoma was observed [FreeTextEntry1] : L DP is palp

## 2023-01-12 NOTE — REASON FOR VISIT
[FreeTextEntry1] : 82 F\par here for vascular eval\par Had carotid duplex that showd 16-49%\par Prior CEA she states LEFT sided, with Dr Christina Crump  at [facility]\par No CVA\par \par Afib - Watchman and loop recorder- Dr. Jim\par NO  claudication\par No PPost prandial pains\par no wounds\par \par She is not on antiplatelet therapy due to severe anemia\par She is on statin therapy \par

## 2023-01-25 ENCOUNTER — APPOINTMENT (OUTPATIENT)
Dept: ELECTROPHYSIOLOGY | Facility: CLINIC | Age: 83
End: 2023-01-25
Payer: MEDICARE

## 2023-01-25 PROCEDURE — 99215 OFFICE O/P EST HI 40 MIN: CPT

## 2023-01-25 PROCEDURE — 93000 ELECTROCARDIOGRAM COMPLETE: CPT

## 2023-01-26 VITALS
DIASTOLIC BLOOD PRESSURE: 78 MMHG | BODY MASS INDEX: 21.26 KG/M2 | HEIGHT: 63 IN | WEIGHT: 120 LBS | HEART RATE: 71 BPM | SYSTOLIC BLOOD PRESSURE: 156 MMHG

## 2023-01-26 NOTE — REASON FOR VISIT
[Arrhythmia/ECG Abnorrmalities] : arrhythmia/ECG abnormalities [Family Member] : family member [FreeTextEntry3] : Dr Everett [FreeTextEntry1] : Pt's daughter Kassandra should be called for scheduling [phone]

## 2023-01-26 NOTE — PHYSICAL EXAM
[Well Nourished] : well nourished [No Acute Distress] : no acute distress [Frail] : frail [Normal S1, S2] : normal S1, S2 [Good Air Entry] : good air entry [No Respiratory Distress] : no respiratory distress  [Non Tender] : non-tender [Abnormal Gait] : abnormal gait [No Edema] : no edema [Normal] : moves all extremities, no focal deficits, normal speech [Moves all extremities] : moves all extremities [No Focal Deficits] : no focal deficits [Normal Speech] : normal speech [Alert and Oriented] : alert and oriented [de-identified] : thin [de-identified] : 3/6 KRISTEN [de-identified] : +  ecchymoses

## 2023-01-26 NOTE — DISCUSSION/SUMMARY
[FreeTextEntry1] :  82 year old woman with history of cirrhosis, PAD s/p right carotid endartectomy, non Hodgkins Lymphoma (pulmonary mass s/p removal, and chemo), COPD, presenting for follow-up regarding atrial fibrillation s/p ILR implant 10/8/21, and DANIE occlusion with Watchman on 2/11/22.  She has generally been doing well with very low burden of pAF noted on ILR. She has had brief episodes of sinus pauses and second degree AVB, which is often preceded by brief sinus bradycardia and likely of vagal etiology- she denies any symptoms with these. At this point, will continue to monitor her ILR and for associated symptoms with bradycardia events- if she does have symptomatic bradycardia, a pacemaker may ultimately be indicated, however, will defer this in the absence of symptoms or more high grade AV block or prolonged pauses.  \par \par She is s/p successful LAAO with Watchman device. Typically single antiplatelet therapy would be continued indefinitely at this point, but she does not believe she can tolerate this given ongoing GI bleeding. She does also have an indication for antiplatelet therapy for her PAD. I have suggested repeat LELAND to reassess the Watchman device at 1 yr post implant- if the device remains well seated without thrombus, will continue to defer antiplatelet therapy given ongoing risks of this.  \par \par -repeat LELAND. To reconsider need for OAC/antiplatelet pending this.  \par \par -continue ILR monitoring and symptom correlation as above.  \par \par  \par \par   [EKG obtained to assist in diagnosis and management of assessed problem(s)] : EKG obtained to assist in diagnosis and management of assessed problem(s)

## 2023-01-26 NOTE — HISTORY OF PRESENT ILLNESS
[FreeTextEntry1] : 82 year old woman with history of cirrhosis, PAD s/p right carotid endartectomy, non Hodgkins Lymphoma (pulmonary mass s/p removal, and chemo), COPD, presenting for follow-up regarding atrial fibrillation s/p ILR implant 10/8/21, and DANIE occlusion with Watchman on 2/11/22.   \par \par She has had paroxysmal AF, which has been largely asymptomatic and initially occurred while she was hospitalized with an acute respiratory illness. She was started on Eliquis and amiodarone. Shortly after hospital discharge, she had sudden syncope, and received a precordial thump by a family member, and subsequent awoke feeling well. Amiodarone was been stopped, and she underwent ILR implant 10/8/21 for further monitoring.   \par \par After starting Eliquis, she developed progressive anemia. She did not have overt bleeding, but did report black and tarry stools. She started iron replacement, and required multiple blood transfusions. GI workup with colonoscopy and endoscopy were unremarkable apart from polyps.   \par \par She ultimately underwent DANIE occlusion 2/11/22 with a 20mm Watchman FLX device. She was continued on low dose Eliquis after the procedure. Repeat LELAND 4/14/22 demonstrated a well seated Watchman device with no binh-device leak or device related thrombus. Eliquis was stopped in favor of Plavix only at that time, given the patient’s intolerance to aspirin (GI upset).  She has stopped Plavix as well given ongoing anemia requiring blood transfusions. She strongly desires to avoid any blood thinner at this time given significant anemia. She reports having been much more stable without need for transfusion since stopping plavix.  \par \par On ILR she has had infrequent episodes of pAF, with an overall AF burden <0.1%.  Preet has had multiple brief pause episodes episodes noted on ILR, lasting up to 3-4 seconds, consistent with sinus pauses, and single dropped p-waves (second degree AVB); these events are preceded or followed by brief sinus bradycardia. She denies any dizziness, lightheadedness, or syncope recently.  \par \par ECG today reveals sinus rhythm 71 bpm, with atypical RBBB and LAFB,  ms,  ms.  \par \par Current medications include furosemide, atorvastatin, lisinopril

## 2023-01-26 NOTE — CARDIOLOGY SUMMARY
[de-identified] : 1/26/23 sinus rhythm 71 bpm, atypical RBBB ( ms) and LAFB\par 3/16/22 sinus rhythm 73 bpm, with RBBB and LAFB,  ms,  ms. \par 2/2/22 sinus rhythm 58 bpm, IVCD and LAFB  ms\par 7/21/21 sinus rhythm 88 bpm, RBBB, LAFB.  ms,  ms [de-identified] : Stress 2016- probably normal, perfusion defect likely due to artifact. LVEF 79%  [de-identified] : Echo 5/16/22: LA 4cm; LVEF 58%, normal LV size, wall thickness and concentric wall motion; mild AS, mild MR, mild TR  \par TTE 7/9/16- LVEF 62%, LA 3.7cm, mild LVH, mild AS, mod MR, mild pulm HTN \par

## 2023-02-08 ENCOUNTER — FORM ENCOUNTER (OUTPATIENT)
Age: 83
End: 2023-02-08

## 2023-02-09 ENCOUNTER — TRANSCRIPTION ENCOUNTER (OUTPATIENT)
Age: 83
End: 2023-02-09

## 2023-02-09 ENCOUNTER — OUTPATIENT (OUTPATIENT)
Dept: OUTPATIENT SERVICES | Facility: HOSPITAL | Age: 83
LOS: 1 days | End: 2023-02-09
Payer: MEDICARE

## 2023-02-09 VITALS
RESPIRATION RATE: 16 BRPM | HEART RATE: 88 BPM | DIASTOLIC BLOOD PRESSURE: 63 MMHG | OXYGEN SATURATION: 99 % | SYSTOLIC BLOOD PRESSURE: 152 MMHG | TEMPERATURE: 98 F

## 2023-02-09 VITALS
HEART RATE: 79 BPM | RESPIRATION RATE: 16 BRPM | SYSTOLIC BLOOD PRESSURE: 182 MMHG | DIASTOLIC BLOOD PRESSURE: 80 MMHG | OXYGEN SATURATION: 99 %

## 2023-02-09 DIAGNOSIS — I48.0 PAROXYSMAL ATRIAL FIBRILLATION: ICD-10-CM

## 2023-02-09 DIAGNOSIS — Z98.890 OTHER SPECIFIED POSTPROCEDURAL STATES: Chronic | ICD-10-CM

## 2023-02-09 DIAGNOSIS — S72.009A FRACTURE OF UNSPECIFIED PART OF NECK OF UNSPECIFIED FEMUR, INITIAL ENCOUNTER FOR CLOSED FRACTURE: Chronic | ICD-10-CM

## 2023-02-09 DIAGNOSIS — R91.8 OTHER NONSPECIFIC ABNORMAL FINDING OF LUNG FIELD: Chronic | ICD-10-CM

## 2023-02-09 LAB
ANION GAP SERPL CALC-SCNC: 12 MMOL/L — SIGNIFICANT CHANGE UP (ref 5–17)
BUN SERPL-MCNC: 40.7 MG/DL — HIGH (ref 8–20)
CALCIUM SERPL-MCNC: 9.7 MG/DL — SIGNIFICANT CHANGE UP (ref 8.4–10.5)
CHLORIDE SERPL-SCNC: 101 MMOL/L — SIGNIFICANT CHANGE UP (ref 96–108)
CO2 SERPL-SCNC: 27 MMOL/L — SIGNIFICANT CHANGE UP (ref 22–29)
CREAT SERPL-MCNC: 1.23 MG/DL — SIGNIFICANT CHANGE UP (ref 0.5–1.3)
EGFR: 44 ML/MIN/1.73M2 — LOW
GLUCOSE SERPL-MCNC: 133 MG/DL — HIGH (ref 70–99)
HCT VFR BLD CALC: 32.1 % — LOW (ref 34.5–45)
HGB BLD-MCNC: 10.2 G/DL — LOW (ref 11.5–15.5)
MCHC RBC-ENTMCNC: 30 PG — SIGNIFICANT CHANGE UP (ref 27–34)
MCHC RBC-ENTMCNC: 31.8 GM/DL — LOW (ref 32–36)
MCV RBC AUTO: 94.4 FL — SIGNIFICANT CHANGE UP (ref 80–100)
PLATELET # BLD AUTO: 103 K/UL — LOW (ref 150–400)
POTASSIUM SERPL-MCNC: 4.2 MMOL/L — SIGNIFICANT CHANGE UP (ref 3.5–5.3)
POTASSIUM SERPL-SCNC: 4.2 MMOL/L — SIGNIFICANT CHANGE UP (ref 3.5–5.3)
RBC # BLD: 3.4 M/UL — LOW (ref 3.8–5.2)
RBC # FLD: 14.6 % — HIGH (ref 10.3–14.5)
SODIUM SERPL-SCNC: 140 MMOL/L — SIGNIFICANT CHANGE UP (ref 135–145)
WBC # BLD: 3.52 K/UL — LOW (ref 3.8–10.5)
WBC # FLD AUTO: 3.52 K/UL — LOW (ref 3.8–10.5)

## 2023-02-09 PROCEDURE — 76376 3D RENDER W/INTRP POSTPROCES: CPT | Mod: 26

## 2023-02-09 PROCEDURE — 93325 DOPPLER ECHO COLOR FLOW MAPG: CPT

## 2023-02-09 PROCEDURE — 93010 ELECTROCARDIOGRAM REPORT: CPT

## 2023-02-09 PROCEDURE — 93312 ECHO TRANSESOPHAGEAL: CPT | Mod: 26

## 2023-02-09 PROCEDURE — 85027 COMPLETE CBC AUTOMATED: CPT

## 2023-02-09 PROCEDURE — 36415 COLL VENOUS BLD VENIPUNCTURE: CPT

## 2023-02-09 PROCEDURE — 93320 DOPPLER ECHO COMPLETE: CPT

## 2023-02-09 PROCEDURE — 93312 ECHO TRANSESOPHAGEAL: CPT

## 2023-02-09 PROCEDURE — 80048 BASIC METABOLIC PNL TOTAL CA: CPT

## 2023-02-09 PROCEDURE — 93005 ELECTROCARDIOGRAM TRACING: CPT

## 2023-02-09 PROCEDURE — 93325 DOPPLER ECHO COLOR FLOW MAPG: CPT | Mod: 26

## 2023-02-09 PROCEDURE — 93320 DOPPLER ECHO COMPLETE: CPT | Mod: 26

## 2023-02-09 NOTE — DISCHARGE NOTE PROVIDER - NSDCCPTREATMENT_GEN_ALL_CORE_FT
PRINCIPAL PROCEDURE  Procedure: Transesophageal echocardiogram (LELAND)  Findings and Treatment: -Resume your diet with soft foods first.  - Do not drive, operate heavy machinery or make important decisions for 24 hours following the procedure.  - You may resume all other activities the day after the procedure.  Call your doctor if:   -you develop a fever, cough up blood, have any chest pain, palpitations or difficulty breathing. You may take a throat lozenge if you develop a sore throat or try warm salt water gargle.   - you have any questions or concerns regarding the procedure.  If you experience increased difficulty breathing or chest pain, or if you faint, have dizzy spells, or for any other distressing symptom, please seek immediate medical attention.

## 2023-02-09 NOTE — DISCHARGE NOTE NURSING/CASE MANAGEMENT/SOCIAL WORK - NSDCPEFALRISK_GEN_ALL_CORE
For information on Fall & Injury Prevention, visit: [email or web address] OR  [email or web address] OR  https://www.cdc.gov/steadi/patient.html

## 2023-02-09 NOTE — DISCHARGE NOTE PROVIDER - PROVIDER TOKENS
PROVIDER:[TOKEN:[[id number]],FOLLOWUP:[Routine],ESTABLISHEDPATIENT:[T]],PROVIDER:[TOKEN:[[id number]],FOLLOWUP:[Routine],ESTABLISHEDPATIENT:[T]]

## 2023-02-09 NOTE — DISCHARGE NOTE NURSING/CASE MANAGEMENT/SOCIAL WORK - PATIENT PORTAL LINK FT
You can access the FollowMyHealth Patient Portal offered by [facility] by registering at the following website: [email or web address]. By joining [employer]’s FollowMyHealth portal, you will also be able to view your health information using other applications (apps) compatible with our system.

## 2023-02-09 NOTE — DISCHARGE NOTE PROVIDER - HOSPITAL COURSE
82 year old female patient with a history of cirrhosis, aortic stenosis, PAD s/p right carotid endarterectomy, non Hodgkin's Lymphoma (pulmonary mass s/p removal, chemo; last infusion fall 2021), COPD, atrial fibrillation, syncope s/p ILR implant 10/8/21, and Watchman device on 2/11/22 who presents for one year post Watchman LELAND.   LELAND revealed 82 year old female patient with a history of cirrhosis, aortic stenosis, PAD s/p right carotid endarterectomy, non Hodgkin's Lymphoma (pulmonary mass s/p removal, chemo; last infusion fall 2021), COPD, atrial fibrillation, syncope s/p ILR implant 10/8/21, and Watchman device on 2/11/22 who presents for one year post Watchman LELAND.   LELAND revealed no clot or thrombus on Watchman device.

## 2023-02-09 NOTE — PROGRESS NOTE ADULT - SUBJECTIVE AND OBJECTIVE BOX
ELECTROPHYSIOLOGY BRIEF POST-OP NOTE    I have personally seen and examined the patient today in cath lab holding area.     PRE-OP DIAGNOSIS: Watchman device    POST-OP DIAGNOSIS: same    PROCEDURE: LELAND    Physician: Dr. Lainez    ESTIMATED BLOOD LOSS: none    ANESTHESIA TYPE:  [  ]General Anesthesia  [ x ]Sedation  [  ]Local/Regional    CONDITION:  [  ]Critical  [  ]Serious  [  ]Stable  [ x ]Good    FINDINGS: No thrombus or lead on Watchman device    Vital Signs  HR: 86 (09 Feb 2023 12:40) (84 - 89)  BP: 174/86 (09 Feb 2023 12:40) (134/78 - 177/73)  RR: 15 (09 Feb 2023 12:40) (14 - 16)  SpO2: 98% (09 Feb 2023 12:40) (97% - 99%)    A/P  82 year old female patient with a history of cirrhosis, aortic stenosis, PAD s/p right carotid endarterectomy, non Hodgkin's Lymphoma (pulmonary mass s/p removal, chemo; last infusion fall 2021), COPD, atrial fibrillation, syncope s/p ILR implant 10/8/21, and Watchman device on 2/11/22 who is now s/p 1 year post Watchman LELAND.    -Bedrest x 1 hours  -Discharge home today

## 2023-02-09 NOTE — H&P PST ADULT - ASSESSMENT
82 year old female patient with a history of cirrhosis, aortic stenosis, PAD s/p right carotid endarterectomy, non Hodgkin's Lymphoma (pulmonary mass s/p removal, chemo; last infusion fall 2021), COPD, atrial fibrillation, syncope s/p ILR implant 10/8/21, and Watchman device on 2/11/22 who presents for one year post Watchman LELAND.     - Arrived in fasting state  - COVID negative 2/8/23  - PST labs ordered

## 2023-02-09 NOTE — DISCHARGE NOTE PROVIDER - CARE PROVIDER_API CALL
Medardo Jim)  Cardiology; Internal Medicine  [address], [address]  [place] [address]  Phone: [phone]  Fax: [phone]  Established Patient  Follow Up Time: Routine    Raleigh Everett)  Cardiovascular Disease; Internal Medicine  [facility], [address], [address]  [place] [address]  Phone: [phone]  Fax: [phone]  Established Patient  Follow Up Time: Routine

## 2023-02-09 NOTE — DISCHARGE NOTE PROVIDER - CARE PROVIDERS DIRECT ADDRESSES
[email or web address]

## 2023-02-09 NOTE — H&P PST ADULT - HISTORY OF PRESENT ILLNESS
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 While on Eliquis she developed progressive anemia and reported black and tarry stools requiring multiple blood transfusions. GI workup with colonoscopy and endoscopy were unremarkable apart from polyps. Pt is s/p left atrial appendage occlusion with Watchman device on 2/11/22 and presents today for 1 year post Watchman LELAND. Reports no complaints at this time.   Cardiology Summary:  Stress Test: Stress 2016- probably normal, perfusion defect likely due to artifact. LVEF 79%   Echo: TTE 7/9/16- LVEF 62%, LA 3.7cm, mild LVH, mild AS, mod MR, mild pulm HTN            TTE 2/12/22 - LVEF 60-65%, mild to moderate mitral valve regurgitation.

## 2023-03-16 NOTE — PROGRESS NOTE ADULT - ASSESSMENT
80 year old female with PMH lymphoma (RCHOP 5 yrs ago) with RLLobectomy, HTN, HLD, "borderline" DM (not on meds), right ankle fx, L ALEXANDRA, herniated disc surgery. adm after acuute onset of SOB.    RECOMMENDATIONS  history is much more consistent with hemodynamic issue despite CT findings.   No sig leukocytosis, no fever, lower suspicion for an acute infectious    Infectious w/u, including uPNA Ag, negative    Thank you for consulting us and involving us in the management of this most interesting and challenging case.  Please call us at [phone] or text me directly on my cell#[phone] with any concerns or further questions.       [FreeTextEntry1] : Patient was hospitalized at [facility] December 2022 with episode of tachycardia.\par \par She has been doing well recently but has short episodes of fluttering.\par \par She is currently on aspirin and metoprolol 50 mg twice daily.\par \par Her implantable loop monitor was interrogated and she has not had any episodes since hospital discharge.  She does have short fluttering episodes which is not recorded on the monitor.\par The patient had a nuclear stress test performed at [facility] on March 7/2023.  She exercised to 95% of her MPHR with 9 minutes had no symptoms and nondiagnostic EKG.  She had a normal stress test.\par \par status post recent ILR replacement.  She has a prior history of SVT/palpitation.  \par The ILR was interrogated today and there were no arrhythmias since December 2022.  \par \par She has no chest pain, shortness of breath, dizziness, lightheadedness, syncope or presyncope.\par Previous ILR interrogation: 2021: Patient had 3 very short episodes she had to 1 episodes she will 19 around the same time of 23: 29 lasting 9 seconds and 31 seconds respectively with mean ventricular rate of 180 bpm.  She had another similar episode on April 8 at 1: 48 less than 20 seconds with a similar heart rate of 180 bpm.  These episodes appear to be in SVT/atrial tachycardia.  \par She had prior left hip replacements last year and recently had a right hip replacement.\par She has an implantable loop monitor and previous recordings had shown nonsustained SVT with rates varying between 150 to 180 bpm and prior wide-complex tachycardia 4 beats.  \par \par The patient has been on metoprolol 25 mg a day time she takes it twice daily.  She has been under recent increased stress.\par \par History of asthma - not requiring treatment\par No HTN or DM\par ? Thyroid - hypo\par No sleep issues\par She is on metoprolol 25 mg qd .\par Evaluation:\par Exercise stress test 2/6/18: 10 METS, no ST abnormalities, no arrhythmia seen. 98% MPHR\par Echo done - verbal report : nl\par \par

## 2023-04-19 ENCOUNTER — APPOINTMENT (OUTPATIENT)
Dept: ELECTROPHYSIOLOGY | Facility: CLINIC | Age: 83
End: 2023-04-19
Payer: MEDICARE

## 2023-04-19 VITALS — DIASTOLIC BLOOD PRESSURE: 70 MMHG | HEIGHT: 64 IN | HEART RATE: 69 BPM | SYSTOLIC BLOOD PRESSURE: 160 MMHG

## 2023-04-19 PROCEDURE — 93000 ELECTROCARDIOGRAM COMPLETE: CPT

## 2023-04-19 PROCEDURE — 99215 OFFICE O/P EST HI 40 MIN: CPT

## 2023-04-19 NOTE — REASON FOR VISIT
0 [Arrhythmia/ECG Abnorrmalities] : arrhythmia/ECG abnormalities [Family Member] : family member [FreeTextEntry3] : Dr Keene [FreeTextEntry1] : Pt's daughter Kassandra present for evalation ([phone])

## 2023-04-19 NOTE — HISTORY OF PRESENT ILLNESS
[FreeTextEntry1] : 82 year old woman with history of cirrhosis, PAD s/p right carotid enterectomy, non-Hodgkins Lymphoma (pulmonary mass s/p removal, and chemo), COPD, presenting for follow-up regarding atrial fibrillation s/p ILR implant 10/8/21, and DANIE occlusion with Watchman on 2/11/22. \par \par   \par \par She has had paroxysmal AF, which has been largely asymptomatic and recently low burden, and initially occurred while she was hospitalized with an acute respiratory illness. She started on Eliquis and amiodarone. Shortly after hospital discharge, she had sudden syncope, received a precordial thump by a family member, and subsequently awoke feeling well. Amiodarone was stopped, and she underwent ILR implant 10/8/21 for further monitoring. \par \par After starting Eliquis, she developed progressive anemia. She did not have overt bleeding, but did report black and tarry stools. She started iron replacement and required multiple blood transfusions. GI workup with colonoscopy and endoscopy were unremarkable apart from polyps. \par \par She ultimately underwent DANIE occlusion 2/11/22 with a 20mm Watchman FLX device. She was continued on low dose Eliquis after the procedure. Repeat LELAND 4/14/22 demonstrated a well seated Watchman device with no binh-device leak or device related thrombus. Eliquis was stopped in favor of Plavix only at that time, given the patient’s intolerance to aspirin (GI upset). She then stopped Plavix as well given ongoing anemia requiring blood transfusions. She strongly desires to avoid any blood thinner at this time given significant anemia. She reports having been much more stable without need for transfusion since stopping plavix. \par \par LELAND was performed 2/9/23 (1 year post Watchman implant) and demonstrated a well seated Watchman device with no peridevice leak or device related thrombus. \par \par  On followup today she is feeling well. She denies palpitation, or syncope. She has had very brief episodes of transient lightheadedness.  \par \par On ILR she has had infrequent episodes of pAF, with an overall AF burden <0.1%.A recent episode was more consistent with brief pAT.  \par \par Previously she has had multiple brief pause episodes noted on ILR, lasting up to 3-4 seconds, consistent with sinus pauses, and single dropped p-waves (second degree AVB); these events were preceded or followed by brief sinus bradycardia and had been asymptomatic.   \par \par ECG today reveals sinus rhythm 69 bpm, with atypical RBBB and LAFB,  ms,  ms. \par \par \par Current medications include furosemide, atorvastatin, lisinopril and gabapentin

## 2023-04-19 NOTE — DISCUSSION/SUMMARY
[FreeTextEntry1] :  82 year old woman with history of cirrhosis, PAD s/p right carotid endartectomy, non Hodgkins Lymphoma (pulmonary mass s/p removal, and chemo), COPD, presenting for follow-up regarding atrial fibrillation s/p ILR implant 10/8/21, and DANIE occlusion with Watchman on 2/11/22.  She has generally been doing well with very low burden of pAF noted on ILR.\par \par She is s/p successful LAAO with Watchman device. A recent LELAND 1 yr post Watchman implant revealed a well seated device with no thrombus, despite absence of any further antiplatelet or antithrombotic medication. \par  Typically single antiplatelet therapy would be continued indefinitely at this point, but she does not believe she can tolerate this given ongoing GI bleeding, and refuses antiplatelet therapy.  Fortunately, LELAND performed after she had been off all meds did not show any further thrombus, and its reasonable for her to continue off all anticoagulation. 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 At this point, will continue to monitor her ILR and for associated symptoms with bradycardia events- if she does have symptomatic bradycardia, a pacemaker may ultimately be indicated, however, will defer this in the absence of symptoms or more high grade AV block or prolonged pauses.  \par \par \par -cardiology followup with Dr Keene, including BP management. \par -continue ILR monitoring and symptom correlation as above.  t/c pacemaker implant if symptomatic bradycardia or significant AV block noted\par -EP followup as needed\par \par  \par \par   [EKG obtained to assist in diagnosis and management of assessed problem(s)] : EKG obtained to assist in diagnosis and management of assessed problem(s)

## 2023-04-19 NOTE — PHYSICAL EXAM
[Well Nourished] : well nourished [No Acute Distress] : no acute distress [Frail] : frail [Normal S1, S2] : normal S1, S2 [Good Air Entry] : good air entry [No Respiratory Distress] : no respiratory distress  [Non Tender] : non-tender [Abnormal Gait] : abnormal gait [No Edema] : no edema [Normal] : moves all extremities, no focal deficits, normal speech [Moves all extremities] : moves all extremities [No Focal Deficits] : no focal deficits [Normal Speech] : normal speech [Alert and Oriented] : alert and oriented [de-identified] : thin [de-identified] : 3/6 KRISTEN [de-identified] : +  ecchymoses

## 2023-04-19 NOTE — CARDIOLOGY SUMMARY
[de-identified] : 4/19/23 sinus rhythm 69 bpm, RBBB, LAFB\par 1/26/23 sinus rhythm 71 bpm, atypical RBBB ( ms) and LAFB\par 3/16/22 sinus rhythm 73 bpm, with RBBB and LAFB,  ms,  ms. \par 2/2/22 sinus rhythm 58 bpm, IVCD and LAFB  ms\par 7/21/21 sinus rhythm 88 bpm, RBBB, LAFB.  ms,  ms [de-identified] : Stress 2016- probably normal, perfusion defect likely due to artifact. LVEF 79%  [de-identified] : LELAND 2/9/23 LVEF 55-60%, mod LA enlargement. Watchman device well-seated with no leak or thrombus. no PFO, late bubbles suggestive of pulmonary venous shunt, mild AS, mod MR, mild TR, RVSP 62 c/w severe pulm HTN, mod aortic root calcification\par \par Echo 5/16/22: LA 4cm; LVEF 58%, normal LV size, wall thickness and concentric wall motion; mild AS, mild MR, mild TR  \par TTE 7/9/16- LVEF 62%, LA 3.7cm, mild LVH, mild AS, mod MR, mild pulm HTN \par

## 2023-07-27 NOTE — SWALLOW BEDSIDE ASSESSMENT ADULT - PHARYNGEAL PHASE
Here for hormone therapy injection, no complaints at this time. Injection given as ordered, tolerated well no reports of pain prior to or post injection. Advised to return to clinic as scheduled      Site:[facility]    Testerone:50 mg    CLINIC SUPPLIED MEDICATION          Within functional limits

## 2023-08-08 ENCOUNTER — APPOINTMENT (OUTPATIENT)
Dept: CARDIOLOGY | Facility: CLINIC | Age: 83
End: 2023-08-08
Payer: MEDICARE

## 2023-08-08 PROCEDURE — 99214 OFFICE O/P EST MOD 30 MIN: CPT

## 2023-08-08 NOTE — PHYSICAL EXAM
[General Appearance - Well Developed] : well developed [Normal Appearance] : normal appearance [Well Groomed] : well groomed [General Appearance - Well Nourished] : well nourished [No Deformities] : no deformities [General Appearance - In No Acute Distress] : no acute distress [Normal Conjunctiva] : the conjunctiva exhibited no abnormalities [Eyelids - No Xanthelasma] : the eyelids demonstrated no xanthelasmas [Normal Oral Mucosa] : normal oral mucosa [No Oral Pallor] : no oral pallor [No Oral Cyanosis] : no oral cyanosis [Nail Clubbing] : no clubbing of the fingernails [Cyanosis, Localized] : no localized cyanosis [Petechial Hemorrhages (___cm)] : no petechial hemorrhages [FreeTextEntry1] : L DP is palp [Skin Color & Pigmentation] : normal skin color and pigmentation [] : no rash [No Venous Stasis] : no venous stasis [Skin Lesions] : no skin lesions [No Skin Ulcers] : no skin ulcer [No Xanthoma] : no  xanthoma was observed

## 2023-08-08 NOTE — ASSESSMENT
[FreeTextEntry1] : Assessment: 1.  Carotid stenosis - mild bilaterally -  CEA 2.  No Claudication  3.  Afib with Watchman and ILR  Plan 1.  Continue statin therapy 2.  Cant be on antiplatelet due to history of anemia with this 3.  Repeat carotid duplex in 12 months paired with office visit same day 4.  No claudication symptoms, L DP is palp.  No arterial testing at this time 5.  Discussed signs and symptoms of stroke   d/w daughter at bedside

## 2023-08-08 NOTE — REASON FOR VISIT
[Follow-Up - Clinic] : a clinic follow-up of [FreeTextEntry1] : 8/8/2023 Doing well no CVA, stroke, TIA or amaurosis Remains on statin  Duplex today shows patent L ICA and 16-49% R ICA not on antiplatlet therapy    82 F here for vascular eval Had carotid duplex that showd 16-49% Prior CEA she states LEFT sided, with Dr Christina Crump  at [place] No CVA  Afib - Watchman and loop recorder- Dr. Jim NO  claudication No PPost prandial pains no wounds  She is not on antiplatelet therapy due to severe anemia She is on statin therapy

## 2023-08-16 NOTE — ED ADULT NURSE NOTE - HIV OFFER
Spoke with patient. Patient had concerns regarding her fingers swelling. Informed patient that pain and swelling is normal after the type of procedure she had done but that swelling will go down as the days go by. Patient verbalized understanding.    Opt out

## 2024-02-25 ENCOUNTER — INPATIENT (INPATIENT)
Facility: HOSPITAL | Age: 84
LOS: 5 days | Discharge: HOME | DRG: 177 | End: 2024-03-02
Attending: HOSPITALIST | Admitting: INTERNAL MEDICINE
Payer: MEDICARE

## 2024-02-25 VITALS
HEIGHT: 63 IN | TEMPERATURE: 98 F | RESPIRATION RATE: 18 BRPM | WEIGHT: 119.93 LBS | DIASTOLIC BLOOD PRESSURE: 83 MMHG | HEART RATE: 110 BPM | SYSTOLIC BLOOD PRESSURE: 147 MMHG | OXYGEN SATURATION: 97 %

## 2024-02-25 DIAGNOSIS — Z29.9 ENCOUNTER FOR PROPHYLACTIC MEASURES, UNSPECIFIED: ICD-10-CM

## 2024-02-25 DIAGNOSIS — J18.9 PNEUMONIA, UNSPECIFIED ORGANISM: ICD-10-CM

## 2024-02-25 DIAGNOSIS — D64.9 ANEMIA, UNSPECIFIED: ICD-10-CM

## 2024-02-25 DIAGNOSIS — R79.89 OTHER SPECIFIED ABNORMAL FINDINGS OF BLOOD CHEMISTRY: ICD-10-CM

## 2024-02-25 DIAGNOSIS — Z98.890 OTHER SPECIFIED POSTPROCEDURAL STATES: Chronic | ICD-10-CM

## 2024-02-25 DIAGNOSIS — S72.009A FRACTURE OF UNSPECIFIED PART OF NECK OF UNSPECIFIED FEMUR, INITIAL ENCOUNTER FOR CLOSED FRACTURE: Chronic | ICD-10-CM

## 2024-02-25 DIAGNOSIS — K92.2 GASTROINTESTINAL HEMORRHAGE, UNSPECIFIED: ICD-10-CM

## 2024-02-25 DIAGNOSIS — I21.4 NON-ST ELEVATION (NSTEMI) MYOCARDIAL INFARCTION: ICD-10-CM

## 2024-02-25 DIAGNOSIS — I10 ESSENTIAL (PRIMARY) HYPERTENSION: ICD-10-CM

## 2024-02-25 DIAGNOSIS — J44.9 CHRONIC OBSTRUCTIVE PULMONARY DISEASE, UNSPECIFIED: ICD-10-CM

## 2024-02-25 DIAGNOSIS — I48.0 PAROXYSMAL ATRIAL FIBRILLATION: ICD-10-CM

## 2024-02-25 DIAGNOSIS — R91.8 OTHER NONSPECIFIC ABNORMAL FINDING OF LUNG FIELD: Chronic | ICD-10-CM

## 2024-02-25 DIAGNOSIS — I47.10 SUPRAVENTRICULAR TACHYCARDIA, UNSPECIFIED: ICD-10-CM

## 2024-02-25 DIAGNOSIS — R09.89 OTHER SPECIFIED SYMPTOMS AND SIGNS INVOLVING THE CIRCULATORY AND RESPIRATORY SYSTEMS: ICD-10-CM

## 2024-02-25 LAB
ALBUMIN SERPL ELPH-MCNC: 3.5 G/DL — SIGNIFICANT CHANGE UP (ref 3.3–5)
ALBUMIN SERPL ELPH-MCNC: 3.6 G/DL — SIGNIFICANT CHANGE UP (ref 3.3–5)
ALP SERPL-CCNC: 105 U/L — SIGNIFICANT CHANGE UP (ref 40–120)
ALP SERPL-CCNC: 94 U/L — SIGNIFICANT CHANGE UP (ref 40–120)
ALT FLD-CCNC: 16 U/L — SIGNIFICANT CHANGE UP (ref 12–78)
ALT FLD-CCNC: 16 U/L — SIGNIFICANT CHANGE UP (ref 12–78)
ANION GAP SERPL CALC-SCNC: 6 MMOL/L — SIGNIFICANT CHANGE UP (ref 5–17)
ANION GAP SERPL CALC-SCNC: 9 MMOL/L — SIGNIFICANT CHANGE UP (ref 5–17)
APTT BLD: 50 SEC — HIGH (ref 24.5–35.6)
AST SERPL-CCNC: 14 U/L — LOW (ref 15–37)
AST SERPL-CCNC: 19 U/L — SIGNIFICANT CHANGE UP (ref 15–37)
BASE EXCESS BLDA CALC-SCNC: -2.6 MMOL/L — LOW (ref -2–3)
BASOPHILS # BLD AUTO: 0.06 K/UL — SIGNIFICANT CHANGE UP (ref 0–0.2)
BASOPHILS NFR BLD AUTO: 0.7 % — SIGNIFICANT CHANGE UP (ref 0–2)
BILIRUB SERPL-MCNC: 0.4 MG/DL — SIGNIFICANT CHANGE UP (ref 0.2–1.2)
BILIRUB SERPL-MCNC: 0.5 MG/DL — SIGNIFICANT CHANGE UP (ref 0.2–1.2)
BLOOD GAS COMMENTS ARTERIAL: SIGNIFICANT CHANGE UP
BUN SERPL-MCNC: 33 MG/DL — HIGH (ref 7–23)
BUN SERPL-MCNC: 35 MG/DL — HIGH (ref 7–23)
CALCIUM SERPL-MCNC: 9.2 MG/DL — SIGNIFICANT CHANGE UP (ref 8.5–10.1)
CALCIUM SERPL-MCNC: 9.6 MG/DL — SIGNIFICANT CHANGE UP (ref 8.5–10.1)
CHLORIDE SERPL-SCNC: 106 MMOL/L — SIGNIFICANT CHANGE UP (ref 96–108)
CHLORIDE SERPL-SCNC: 107 MMOL/L — SIGNIFICANT CHANGE UP (ref 96–108)
CO2 SERPL-SCNC: 22 MMOL/L — SIGNIFICANT CHANGE UP (ref 22–31)
CO2 SERPL-SCNC: 27 MMOL/L — SIGNIFICANT CHANGE UP (ref 22–31)
CREAT SERPL-MCNC: 1.3 MG/DL — SIGNIFICANT CHANGE UP (ref 0.5–1.3)
CREAT SERPL-MCNC: 1.4 MG/DL — HIGH (ref 0.5–1.3)
D DIMER BLD IA.RAPID-MCNC: 360 NG/ML DDU — HIGH
EGFR: 38 ML/MIN/1.73M2 — LOW
EGFR: 41 ML/MIN/1.73M2 — LOW
EOSINOPHIL # BLD AUTO: 0.35 K/UL — SIGNIFICANT CHANGE UP (ref 0–0.5)
EOSINOPHIL NFR BLD AUTO: 4 % — SIGNIFICANT CHANGE UP (ref 0–6)
FLUAV AG NPH QL: SIGNIFICANT CHANGE UP
FLUBV AG NPH QL: SIGNIFICANT CHANGE UP
GAS PNL BLDA: SIGNIFICANT CHANGE UP
GLUCOSE SERPL-MCNC: 184 MG/DL — HIGH (ref 70–99)
GLUCOSE SERPL-MCNC: 240 MG/DL — HIGH (ref 70–99)
HCO3 BLDA-SCNC: 21 MMOL/L — SIGNIFICANT CHANGE UP (ref 21–28)
HCT VFR BLD CALC: 29.5 % — LOW (ref 34.5–45)
HCT VFR BLD CALC: 34.8 % — SIGNIFICANT CHANGE UP (ref 34.5–45)
HCT VFR BLD CALC: 35.4 % — SIGNIFICANT CHANGE UP (ref 34.5–45)
HGB BLD-MCNC: 11.3 G/DL — LOW (ref 11.5–15.5)
HGB BLD-MCNC: 11.3 G/DL — LOW (ref 11.5–15.5)
HGB BLD-MCNC: 9.5 G/DL — LOW (ref 11.5–15.5)
HOROWITZ INDEX BLDA+IHG-RTO: 70 — SIGNIFICANT CHANGE UP
IMM GRANULOCYTES NFR BLD AUTO: 0.6 % — SIGNIFICANT CHANGE UP (ref 0–0.9)
INR BLD: 1.04 RATIO — SIGNIFICANT CHANGE UP (ref 0.85–1.18)
LYMPHOCYTES # BLD AUTO: 0.77 K/UL — LOW (ref 1–3.3)
LYMPHOCYTES # BLD AUTO: 8.9 % — LOW (ref 13–44)
MAGNESIUM SERPL-MCNC: 2 MG/DL — SIGNIFICANT CHANGE UP (ref 1.6–2.6)
MCHC RBC-ENTMCNC: 29.1 PG — SIGNIFICANT CHANGE UP (ref 27–34)
MCHC RBC-ENTMCNC: 29.2 PG — SIGNIFICANT CHANGE UP (ref 27–34)
MCHC RBC-ENTMCNC: 29.3 PG — SIGNIFICANT CHANGE UP (ref 27–34)
MCHC RBC-ENTMCNC: 31.9 GM/DL — LOW (ref 32–36)
MCHC RBC-ENTMCNC: 32.2 GM/DL — SIGNIFICANT CHANGE UP (ref 32–36)
MCHC RBC-ENTMCNC: 32.5 GM/DL — SIGNIFICANT CHANGE UP (ref 32–36)
MCV RBC AUTO: 90.2 FL — SIGNIFICANT CHANGE UP (ref 80–100)
MCV RBC AUTO: 90.5 FL — SIGNIFICANT CHANGE UP (ref 80–100)
MCV RBC AUTO: 91.5 FL — SIGNIFICANT CHANGE UP (ref 80–100)
MONOCYTES # BLD AUTO: 0.4 K/UL — SIGNIFICANT CHANGE UP (ref 0–0.9)
MONOCYTES NFR BLD AUTO: 4.6 % — SIGNIFICANT CHANGE UP (ref 2–14)
NEUTROPHILS # BLD AUTO: 7.04 K/UL — SIGNIFICANT CHANGE UP (ref 1.8–7.4)
NEUTROPHILS NFR BLD AUTO: 81.2 % — HIGH (ref 43–77)
NRBC # BLD: 0 /100 WBCS — SIGNIFICANT CHANGE UP (ref 0–0)
NT-PROBNP SERPL-SCNC: 571 PG/ML — HIGH (ref 0–450)
OB PNL STL: POSITIVE
PCO2 BLDA: 28 MMHG — LOW (ref 32–35)
PH BLDA: 7.48 — HIGH (ref 7.35–7.45)
PHOSPHATE SERPL-MCNC: 3.7 MG/DL — SIGNIFICANT CHANGE UP (ref 2.5–4.5)
PLATELET # BLD AUTO: 204 K/UL — SIGNIFICANT CHANGE UP (ref 150–400)
PLATELET # BLD AUTO: 287 K/UL — SIGNIFICANT CHANGE UP (ref 150–400)
PLATELET # BLD AUTO: 343 K/UL — SIGNIFICANT CHANGE UP (ref 150–400)
PO2 BLDA: 177 MMHG — HIGH (ref 83–108)
POTASSIUM SERPL-MCNC: 5 MMOL/L — SIGNIFICANT CHANGE UP (ref 3.5–5.3)
POTASSIUM SERPL-MCNC: 5.3 MMOL/L — SIGNIFICANT CHANGE UP (ref 3.5–5.3)
POTASSIUM SERPL-SCNC: 5 MMOL/L — SIGNIFICANT CHANGE UP (ref 3.5–5.3)
POTASSIUM SERPL-SCNC: 5.3 MMOL/L — SIGNIFICANT CHANGE UP (ref 3.5–5.3)
PROT SERPL-MCNC: 6.9 G/DL — SIGNIFICANT CHANGE UP (ref 6–8.3)
PROT SERPL-MCNC: 7.2 G/DL — SIGNIFICANT CHANGE UP (ref 6–8.3)
PROTHROM AB SERPL-ACNC: 12.2 SEC — SIGNIFICANT CHANGE UP (ref 9.5–13)
RBC # BLD: 3.26 M/UL — LOW (ref 3.8–5.2)
RBC # BLD: 3.86 M/UL — SIGNIFICANT CHANGE UP (ref 3.8–5.2)
RBC # BLD: 3.87 M/UL — SIGNIFICANT CHANGE UP (ref 3.8–5.2)
RBC # FLD: 14.4 % — SIGNIFICANT CHANGE UP (ref 10.3–14.5)
RBC # FLD: 14.5 % — SIGNIFICANT CHANGE UP (ref 10.3–14.5)
RBC # FLD: 14.6 % — HIGH (ref 10.3–14.5)
RSV RNA NPH QL NAA+NON-PROBE: SIGNIFICANT CHANGE UP
SAO2 % BLDA: 99.8 % — HIGH (ref 94–98)
SARS-COV-2 RNA SPEC QL NAA+PROBE: SIGNIFICANT CHANGE UP
SODIUM SERPL-SCNC: 137 MMOL/L — SIGNIFICANT CHANGE UP (ref 135–145)
SODIUM SERPL-SCNC: 140 MMOL/L — SIGNIFICANT CHANGE UP (ref 135–145)
TROPONIN I, HIGH SENSITIVITY RESULT: 1738.9 NG/L — HIGH
TROPONIN I, HIGH SENSITIVITY RESULT: 694.1 NG/L — HIGH
WBC # BLD: 13.19 K/UL — HIGH (ref 3.8–10.5)
WBC # BLD: 19.36 K/UL — HIGH (ref 3.8–10.5)
WBC # BLD: 8.67 K/UL — SIGNIFICANT CHANGE UP (ref 3.8–10.5)
WBC # FLD AUTO: 13.19 K/UL — HIGH (ref 3.8–10.5)
WBC # FLD AUTO: 19.36 K/UL — HIGH (ref 3.8–10.5)
WBC # FLD AUTO: 8.67 K/UL — SIGNIFICANT CHANGE UP (ref 3.8–10.5)

## 2024-02-25 PROCEDURE — 93010 ELECTROCARDIOGRAM REPORT: CPT | Mod: 77

## 2024-02-25 PROCEDURE — 99223 1ST HOSP IP/OBS HIGH 75: CPT | Mod: GC,25

## 2024-02-25 PROCEDURE — 99285 EMERGENCY DEPT VISIT HI MDM: CPT | Mod: FS

## 2024-02-25 PROCEDURE — 71045 X-RAY EXAM CHEST 1 VIEW: CPT | Mod: 26,77

## 2024-02-25 PROCEDURE — 99497 ADVNCD CARE PLAN 30 MIN: CPT | Mod: 25

## 2024-02-25 PROCEDURE — 71045 X-RAY EXAM CHEST 1 VIEW: CPT | Mod: 26

## 2024-02-25 PROCEDURE — 71275 CT ANGIOGRAPHY CHEST: CPT | Mod: 26,MC

## 2024-02-25 PROCEDURE — 93010 ELECTROCARDIOGRAM REPORT: CPT

## 2024-02-25 RX ORDER — FUROSEMIDE 40 MG
20 TABLET ORAL DAILY
Refills: 0 | Status: DISCONTINUED | OUTPATIENT
Start: 2024-02-25 | End: 2024-02-25

## 2024-02-25 RX ORDER — FERROUS SULFATE 325(65) MG
325 TABLET ORAL DAILY
Refills: 0 | Status: DISCONTINUED | OUTPATIENT
Start: 2024-02-25 | End: 2024-03-02

## 2024-02-25 RX ORDER — ADENOSINE 3 MG/ML
6 INJECTION INTRAVENOUS ONCE
Refills: 0 | Status: COMPLETED | OUTPATIENT
Start: 2024-02-25 | End: 2024-02-25

## 2024-02-25 RX ORDER — ONDANSETRON 8 MG/1
4 TABLET, FILM COATED ORAL ONCE
Refills: 0 | Status: COMPLETED | OUTPATIENT
Start: 2024-02-25 | End: 2024-02-25

## 2024-02-25 RX ORDER — CEFTRIAXONE 500 MG/1
1000 INJECTION, POWDER, FOR SOLUTION INTRAMUSCULAR; INTRAVENOUS ONCE
Refills: 0 | Status: COMPLETED | OUTPATIENT
Start: 2024-02-25 | End: 2024-02-25

## 2024-02-25 RX ORDER — PANTOPRAZOLE SODIUM 20 MG/1
40 TABLET, DELAYED RELEASE ORAL
Refills: 0 | Status: DISCONTINUED | OUTPATIENT
Start: 2024-02-25 | End: 2024-03-02

## 2024-02-25 RX ORDER — FOLIC ACID 0.8 MG
1 TABLET ORAL DAILY
Refills: 0 | Status: DISCONTINUED | OUTPATIENT
Start: 2024-02-25 | End: 2024-03-02

## 2024-02-25 RX ORDER — TIOTROPIUM BROMIDE 18 UG/1
2 CAPSULE ORAL; RESPIRATORY (INHALATION) DAILY
Refills: 0 | Status: DISCONTINUED | OUTPATIENT
Start: 2024-02-25 | End: 2024-02-26

## 2024-02-25 RX ORDER — PIPERACILLIN AND TAZOBACTAM 4; .5 G/20ML; G/20ML
3.38 INJECTION, POWDER, LYOPHILIZED, FOR SOLUTION INTRAVENOUS ONCE
Refills: 0 | Status: COMPLETED | OUTPATIENT
Start: 2024-02-26 | End: 2024-02-26

## 2024-02-25 RX ORDER — SODIUM CHLORIDE 9 MG/ML
1000 INJECTION INTRAMUSCULAR; INTRAVENOUS; SUBCUTANEOUS
Refills: 0 | Status: DISCONTINUED | OUTPATIENT
Start: 2024-02-25 | End: 2024-02-25

## 2024-02-25 RX ORDER — DILTIAZEM HCL 120 MG
5 CAPSULE, EXT RELEASE 24 HR ORAL
Qty: 125 | Refills: 0 | Status: DISCONTINUED | OUTPATIENT
Start: 2024-02-25 | End: 2024-02-26

## 2024-02-25 RX ORDER — AZITHROMYCIN 500 MG/1
500 TABLET, FILM COATED ORAL ONCE
Refills: 0 | Status: COMPLETED | OUTPATIENT
Start: 2024-02-25 | End: 2024-02-25

## 2024-02-25 RX ORDER — AMIODARONE HYDROCHLORIDE 400 MG/1
150 TABLET ORAL ONCE
Refills: 0 | Status: COMPLETED | OUTPATIENT
Start: 2024-02-25 | End: 2024-02-25

## 2024-02-25 RX ORDER — PIPERACILLIN AND TAZOBACTAM 4; .5 G/20ML; G/20ML
3.38 INJECTION, POWDER, LYOPHILIZED, FOR SOLUTION INTRAVENOUS EVERY 8 HOURS
Refills: 0 | Status: DISCONTINUED | OUTPATIENT
Start: 2024-02-26 | End: 2024-02-27

## 2024-02-25 RX ORDER — FUROSEMIDE 40 MG
20 TABLET ORAL
Refills: 0 | Status: DISCONTINUED | OUTPATIENT
Start: 2024-02-25 | End: 2024-02-25

## 2024-02-25 RX ORDER — PIPERACILLIN AND TAZOBACTAM 4; .5 G/20ML; G/20ML
3.38 INJECTION, POWDER, LYOPHILIZED, FOR SOLUTION INTRAVENOUS ONCE
Refills: 0 | Status: COMPLETED | OUTPATIENT
Start: 2024-02-25 | End: 2024-02-25

## 2024-02-25 RX ORDER — ATORVASTATIN CALCIUM 80 MG/1
20 TABLET, FILM COATED ORAL AT BEDTIME
Refills: 0 | Status: DISCONTINUED | OUTPATIENT
Start: 2024-02-25 | End: 2024-02-29

## 2024-02-25 RX ORDER — SODIUM CHLORIDE 9 MG/ML
1000 INJECTION INTRAMUSCULAR; INTRAVENOUS; SUBCUTANEOUS ONCE
Refills: 0 | Status: COMPLETED | OUTPATIENT
Start: 2024-02-25 | End: 2024-02-25

## 2024-02-25 RX ORDER — ADENOSINE 3 MG/ML
12 INJECTION INTRAVENOUS ONCE
Refills: 0 | Status: COMPLETED | OUTPATIENT
Start: 2024-02-25 | End: 2024-02-25

## 2024-02-25 RX ORDER — AMLODIPINE BESYLATE 2.5 MG/1
5 TABLET ORAL DAILY
Refills: 0 | Status: DISCONTINUED | OUTPATIENT
Start: 2024-02-25 | End: 2024-02-25

## 2024-02-25 RX ORDER — LISINOPRIL 2.5 MG/1
20 TABLET ORAL DAILY
Refills: 0 | Status: DISCONTINUED | OUTPATIENT
Start: 2024-02-25 | End: 2024-02-25

## 2024-02-25 RX ORDER — IPRATROPIUM/ALBUTEROL SULFATE 18-103MCG
3 AEROSOL WITH ADAPTER (GRAM) INHALATION EVERY 6 HOURS
Refills: 0 | Status: DISCONTINUED | OUTPATIENT
Start: 2024-02-25 | End: 2024-02-27

## 2024-02-25 RX ORDER — DILTIAZEM HCL 120 MG
20 CAPSULE, EXT RELEASE 24 HR ORAL ONCE
Refills: 0 | Status: COMPLETED | OUTPATIENT
Start: 2024-02-25 | End: 2024-02-25

## 2024-02-25 RX ADMIN — SODIUM CHLORIDE 1000 MILLILITER(S): 9 INJECTION INTRAMUSCULAR; INTRAVENOUS; SUBCUTANEOUS at 18:16

## 2024-02-25 RX ADMIN — ADENOSINE 12 MILLIGRAM(S): 3 INJECTION INTRAVENOUS at 20:29

## 2024-02-25 RX ADMIN — ADENOSINE 12 MILLIGRAM(S): 3 INJECTION INTRAVENOUS at 20:27

## 2024-02-25 RX ADMIN — PIPERACILLIN AND TAZOBACTAM 200 GRAM(S): 4; .5 INJECTION, POWDER, LYOPHILIZED, FOR SOLUTION INTRAVENOUS at 23:36

## 2024-02-25 RX ADMIN — Medication 20 MILLIGRAM(S): at 20:40

## 2024-02-25 RX ADMIN — CEFTRIAXONE 100 MILLIGRAM(S): 500 INJECTION, POWDER, FOR SOLUTION INTRAMUSCULAR; INTRAVENOUS at 22:47

## 2024-02-25 RX ADMIN — ADENOSINE 6 MILLIGRAM(S): 3 INJECTION INTRAVENOUS at 20:25

## 2024-02-25 RX ADMIN — Medication 5 MG/HR: at 21:06

## 2024-02-25 RX ADMIN — AZITHROMYCIN 255 MILLIGRAM(S): 500 TABLET, FILM COATED ORAL at 23:36

## 2024-02-25 NOTE — H&P ADULT - PROBLEM SELECTOR PLAN 8
Chronic, has required multiple transfusions  - c/w home iron Chronic  - Start spiriva as home incruse ellipta is not on formulary

## 2024-02-25 NOTE — CHART NOTE - NSCHARTNOTEFT_GEN_A_CORE
Resident Rapid Response Note    Rapid response was called at _____ for SVT    Events leading up to Rapid Response: Pt was being evaluated by resident when pt began desaturating, resident raised O2 to 4L. Pt later had an episode of emesis and went into SVT.    Patient was seen and examined at the bedside by the rapid response team. ICU PA at bedside.    Rapid Response Vital Signs:  BP: 172/82  HR: 153  RR: 30  SpO2: 98% on NRB  Temp:  FS: 193      Physical Exam:  Gen: ill-appearing, mild distress  HEENT: NCAT, PEERLA b/l, EOMI b/l  Cardio: +tachycardic. Regular rhythm. +s1s2, no murmurs  Pulm: +Decreased BS at b/l lung bases. No wheezes or crackles  Abdomen: soft, NT/ND, +BS x4 quadrants, no guarding  Extremities: no cyanosis or edema, +2 pedal pulses  Neuro: AAOx3, sensation intact, no focal deficits  Skin: warm and dry        Assessment/Plan:  83F PMH cirrhosis, aortic stenosis, PAD s/p right carotid endarterectomy, non Hodgkins Lymphoma (pulmonary mass s/p removal, chemo; last infusion fall 2021), COPD, syncope s/p ILR implant 10/8/21, s/p watchman (2/11/22), hx of prior GI bleed requiring blood transfusions, admitted for PNA, r/o GIB. Rapid response called for SVT.      - EKG obtained at bedside  - Spoke to Dr. Lara who reviewed EKG, no ST elevations appreciated  - Pt placed on NRB, saturating 93%  - Adenosine 6mg, 12mgx2 IV given w/ little improvement in HR  - Cardizem 20mg IV given  - Repeat EKG ordered showing likely aflutter  - Cardizem Drip started @ 5mL/hr  - STAT troponin ordered, f/u results  - STAT CXR ordered    -RN to call if any changes.  -Discussed with  ICU PA (Sho) aware  -Family updated at bedside Resident Rapid Response Note    Rapid response was called at 8:10 for SVT.    Events leading up to Rapid Response: Pt was being evaluated by resident when pt began desaturating, resident raised O2 to 4L. Pt later had an episode of emesis and went into SVT.    Patient was seen and examined at the bedside by the rapid response team. ICU PA at bedside.    Rapid Response Vital Signs:  BP: 172/82  HR: 153  RR: 30  SpO2: 98% on NRB  Temp:  FS: 193      Physical Exam:  Gen: ill-appearing, mild distress  HEENT: NCAT, PEERLA b/l, EOMI b/l  Cardio: +tachycardic. Regular rhythm. +s1s2, no murmurs  Pulm: +Decreased BS at b/l lung bases. No wheezes or crackles  Abdomen: soft, NT/ND, +BS x4 quadrants, no guarding  Extremities: no cyanosis or edema, +2 pedal pulses  Neuro: AAOx3, sensation intact, no focal deficits  Skin: warm and dry        Assessment/Plan:  83F PMH cirrhosis, aortic stenosis, PAD s/p right carotid endarterectomy, non Hodgkins Lymphoma (pulmonary mass s/p removal, chemo; last infusion fall 2021), COPD, syncope s/p ILR implant 10/8/21, s/p watchman (2/11/22), hx of prior GI bleed requiring blood transfusions, admitted for PNA, r/o GIB. Rapid response called for SVT.      - EKG obtained at bedside  - Spoke to Dr. Lara who reviewed EKG, no ST elevations appreciated  - Pt placed on NRB, saturating 93%  - Adenosine 6mg, 12mgx2 IV given w/ little improvement in HR  - Cardizem 20mg IV given  - Repeat EKG ordered showing likely aflutter  - Discussed with Dr. Elias, cardizem Drip started @ 5mL/hr  - STAT troponin ordered, f/u results  - STAT CXR ordered  - H/H ordered for 00:15    -RN to call if any changes.  -Discussed with  ICU PA (Sho) aware, pending ICU evaluation  -Family updated at bedside-- discussed pt is at high risk for decompensation and prognosis guarded. Pts daughter not ready to speak about GOC  and remains full code as of now.

## 2024-02-25 NOTE — H&P ADULT - PROBLEM SELECTOR PLAN 6
- SCDs given hx and r/o GIB Chronic  - c/w home amlodipine 5mg qd, lisinopril 40qd, and lasix 20mg every other day (next dose tomorrow) 2021 was noted to have pAF during admission at [facility], started on Eliquis and amiodarone. After d/c had episode of syncope, reevaluated and amiodarone hasd been stopped. Underwent ILR implant 10/8/21, had infrequent episodes of pAF, last 1/21/22 lasting nearly 3 hours with controlled rates.   - Per daughter, reevaluated by cardiology and found not to have atrial fibrillation and stopped medications  - EKG in ED w/ sinus tachy  - Repeat EKG in rapid w/ likely aflutter  - Discussed with Dr. Elias, cardizem Drip started @ 5mL/hr  - Cardiology (Dr. Elias) consulted, recs appreciated  - Continue to monitor on telemetry  - ICU eval pending Chronic  - Home amlodipine 5mg qd, lisinopril 40qd, and lasix 20mg every other day (next dose tomorrow) HELD as pt is on cardizem drip  - continue to monitor BPs

## 2024-02-25 NOTE — ED ADULT NURSE NOTE - NS ED NURSE LEVEL OF CONSCIOUSNESS ORIENTATION
Addended by: JAKE DOUGHERTY on: 5/30/2017 02:42 PM     Modules accepted: Orders     Oriented - self; Oriented - place; Oriented - time

## 2024-02-25 NOTE — H&P ADULT - PROBLEM SELECTOR PLAN 4
- Hx of GIB needing iron replacement and multiple blood transfusions, previously on eliquis., GI workup with colonoscopy and endoscopy were unremarkable apart from polyps.  - Baseline Hg 9-10 per daughter  - Pt w/ +FOBT  - Hg 9.5 on admission, repeat 11.3  - s/p 1000cc bolus  - c/w IVF  - GI consulted, recs appreciated - Hx of GIB needing iron replacement and multiple blood transfusions, previously on eliquis., GI workup with colonoscopy and endoscopy were unremarkable apart from polyps.  - Baseline Hg 9-10 per daughter  - Hg 9.5 on admission, repeat 11.3  - FOBT ordered given hx in setting of r/o PE  - CTA negative for PE, however FOBT+  - s/p 1000cc bolus, c/w IVF  - GI consulted, recs appreciated  - ICU eval pending Pt p/w chest pain, SOB  - EKG in ED w/ sinus tachy, diffuse Q waves, no ST elevations  - Initial troponin 694.1  - Repeat troponin 1738.9  - Repeat trop ordered in 3h  - Cardiology (Dr. Elias) consulted, recs appreciated  - ICU eval pending Pt p/w chest pain, SOB, found to have NSTEMI  - EKG in ED w/ sinus tachy, diffuse Q waves, no ST elevations  - Initial troponin 694.1  - Repeat troponin 1738.9  - Repeat trop ordered in 3h  - Cardiology (Dr. Elias) consulted, recs appreciated  - ICU eval pending Pt p/w chest pain, SOB, found to have NSTEMI  - EKG in ED w/ sinus tachy, diffuse Q waves, no ST elevations  - Initial troponin 694.1  - Repeat troponin 1738.9  - Repeat trop ordered in 3h  -check TTE  - Cardiology (Dr. Elias) consulted, recs appreciated  - ICU eval

## 2024-02-25 NOTE — H&P ADULT - NSHPPHYSICALEXAM_GEN_ALL_CORE
VITALS:   T(C): 36.4 (02-25-24 @ 15:03), Max: 36.4 (02-25-24 @ 15:03)  HR: 110 (02-25-24 @ 15:03) (110 - 110)  BP: 147/83 (02-25-24 @ 15:03) (147/83 - 147/83)  RR: 18 (02-25-24 @ 15:03) (18 - 18)  SpO2: 97% (02-25-24 @ 15:03) (97% - 97%)    GENERAL: NAD, lying in bed comfortably  HEAD:  Atraumatic, normocephalic  EYES: EOMI, PERRLA, conjunctiva and sclera clear  ENT: Moist mucous membranes  NECK: Supple, no JVD  HEART: Regular rate and rhythm, no murmurs, rubs, or gallops  LUNGS: Unlabored respirations.  Clear to auscultation bilaterally, no crackles, wheezing, or rhonchi  ABDOMEN: Soft, nontender, nondistended, +BS  EXTREMITIES: 2+ peripheral pulses bilaterally. No clubbing, cyanosis, or edema  NERVOUS SYSTEM:  A&Ox3, no focal deficits   SKIN: Warm and dry

## 2024-02-25 NOTE — H&P ADULT - PROBLEM/PLAN-6
ASSESSMENT and PLAN    Last seen by Jennifer Dorsey, Advanced Practice Nurse (APN) 3/2019. All new problems to me requiring additional work up.     Allergic rhinoconjunctivitis (allergies affecting the nose and eyes)  Plan: SPT the the full peds aeroallergen panel was positive to tree/grass/ragweed pollen, dog, dust mites, equivocal (2+) to cat and cockroach 6/2023. Skin prick testing positive to dog, negative to cat 3/2019. No animals at home.     Itchy eyes, nasal congestion/dripping in the spring and fall. Has been reactive around some dogs. Mild nasal inflammation noted on exam.      Start Zyrtec 5 mg (5 mL) once daily as needed. Use consistently prior to known dog exposure. Available over the counter.   May use Children's Flonase (fluticasone) 1 spray each nostril once daily as needed. Available over the counter.   Relevant aeroallergen avoidance/prevention recommendations given.    Atopic dermatitis (eczema)  Plan: History since early childhood of mild-moderate eczema affecting the arms, legs, torso. Worst in the spring months. Tacrolimus ointment and Eucerin used daily with occasional step up to triamcinolone 0.1% ointment as needed. Itching seemed to resolve after removal of cow milk from her diet per recommendation from her pediatrician.      Atopic dermatitis (eczema), its origin, prevention, possible food association, and treatment were discussed in detail. This included bathing regularly, moisturizing, nature and course of disease, itch-scratch cycle, use of topical treatments, and minimizing common triggers such as scented detergents and soaps.     Bathe (quickly with warm, not hot water and fragrance/dye-free gentle soap such as Dove Sensitive Skin) and moisturize with Eucerin or Cetaphil immediately afterwards on a daily basis.  Start Zyrtec 5 mg (5 mL) once daily as needed for skin itching. Available over the counter.   May use tacrolimus 0.03% ointment twice daily as needed in any affected skin areas.    Use triamcinolone 0.1% ointment for active skin inflammation twice daily as needed up to 2 weeks consecutively if necessary in non-sensitive, affected skin areas. Refilled today.   Follow up with Dr. Tuttle, dermatology.      Allergy to peanut  Plan: SPT was positive to peanut, soy, and negative remaining basic food (egg, cow milk, wheat, almond, walnut, cashew, and codfish) panel 6/2023. RAST was moderately elevated to wheat, mildly elevated to peanut; peanut protein profile was negative (<0.35); Skin prick testing was positive to peanut, negative to almond, cashew, cow milk and wheat on 3/2019. Elevation to wheat is formal false positive. Positivity to soy is related to cross reactivity with peanut (both are legumes); Benito has consumed soy milk without issues but they don't eat it often.     Facial hives with consumption of peanut butter in 2019. More recent accidental exposures have led to vomiting ~ 1 hour afterwards. Tolerates tree nuts without issues.     Wheat and gluten were limited early on due to possible impact on eczema. She tolerates wheat/gluten without dramatic, life threatening symptoms. Recently avoiding purer forms of cow milk due to vaginal itching/irritation which has helped.     The https://www.foodallergy.org internet site is a useful resource.    We discussed oral immunotherapy to peanut and the FDA approved product (Palforzia). The following web site provides more information about Palforzia: https://www.palforzia.com/     Continue strict avoidance of peanut.   Limit/avoid purer forms of cow milk pending RAST. Continue to intentionally ingest baked/processed cow milk.  Check RAST to peanut with protein profile, cow milk, and total IgE 6/2023-9/2023.   Epinephrine jr device (Epipen 0.15 mg) is up to date. Refilled today.    Call for any accidental exposures.   School forms completed today.     Counseled patient and/or family on:  Allergen control measures, Atopic dermatitis, Food allergy  and  Healthy skin care.     Follow up 6 to 9 months     Avoidance/Prevention measures for plant pollen:    Trees - Early spring      Grass - Late spring/Early summer       Ragweed/weeds - Fall     Use air conditioning   Limit exposure during season by staying indoors with windows closed, especially when pollen levels are elevated  Pollen counts are highest in the mid-day and afternoon  When coming in from outdoors, remove clothing immediately and take a shower to wash off any excess pollen on the body or hair.    Avoidance/Prevention measures for animal dander:    Remove animal from home or, at minimum, keep animal out of patient’s bedroom  Seal (or cover with a filter) air ducts that lead to bedroom  Install room HEPA rated air filters    Avoidance/Prevention measures for Dust Mites:    Reduce indoor humidity to <50%  Encase mattress, pillow and box spring in an allergen-impermeable cover ([employer] and [employer] brands are high quality)  Wash bedding weekly in hot water >130 degrees F  Minimize upholstered furniture   Consider removal of carpets from bedroom and from other rooms where they are laid on concrete    Avoidance/Prevention measures for Cockroaches:    Do not leave food or garbage exposed  Use poison bait or traps   DISPLAY PLAN FREE TEXT

## 2024-02-25 NOTE — ED ADULT NURSE NOTE - OBJECTIVE STATEMENT
Patient received complaining of SOB, chest pain, nausea and weakness that started the past few hours, states was taking a walk and started feeling more SOB with intermittent midsternal non radiating chest pain, that has since improved since coming to ER. Denies h/x of asthma or COPD at this time, [facility] has a loop recorder and Watchman to left chest wall, and [place] to right chest wall. Patient is AOx4, interventions implemented, safety precautions in place, awaiting evaluation

## 2024-02-25 NOTE — ED ADULT NURSE REASSESSMENT NOTE - NS ED NURSE REASSESS COMMENT FT1
RRT called @ 2010 for patient in SVT and respiratory distress. See paper charting in PT chart for medications given and intervention    @2100 started on cardizem drip HR improved, will continue to monitor  @2230 placed on Bipap, HR improved, will continue to monitor

## 2024-02-25 NOTE — ED PROVIDER NOTE - CARE PLAN
Principal Discharge DX:	NSTEMI (non-ST elevation myocardial infarction)   1 Principal Discharge DX:	NSTEMI (non-ST elevation myocardial infarction)  Secondary Diagnosis:	GIB (gastrointestinal bleeding)  Secondary Diagnosis:	Pneumonia

## 2024-02-25 NOTE — ED PROVIDER NOTE - PHYSICAL EXAMINATION
Gen: Well appearing in NAD.   Head: atraumatic  Heart: s1/s2, RRR  Lung: +diminished breath sounds on the RLL. +scattered wheezes.  No tachypnea or hypoxia noted   Abd: soft, NT/ND, no rebound or guarding, NCVAT  Msk: no pedal edema   Neuro: AAO x3, patient moving all extremity equally,  Skin: Normal for race.   Psych: Alert and oriented

## 2024-02-25 NOTE — H&P ADULT - NSHPOUTPATIENTPROVIDERS_GEN_ALL_CORE
PCP: Dr. Raheem Juarez  cardiologist: Shandra Best  Cardiology: Dr. Everett PCP: Dr. Raheem Juarez  cardiologist: Shandra Best

## 2024-02-25 NOTE — H&P ADULT - PROBLEM SELECTOR PLAN 5
2021 was noted to have pAF during admission at \[facility]", started on Eliquis and amiodarone. After d/c had episode of syncope, reevaluated and amiodarone hasd been stopped. Underwent ILR implant 10/8/21, had infrequent episodes of pAF, last 1/21/22 lasting nearly 3 hours with controlled rates.   - Per daughter, reevaluated by cardiology and found not to have atrial fibrillation and stopped medications  - EKG 2021 was noted to have pAF during admission at [facility], started on Eliquis and amiodarone. After d/c had episode of syncope, reevaluated and amiodarone hasd been stopped. Underwent ILR implant 10/8/21, had infrequent episodes of pAF, last 1/21/22 lasting nearly 3 hours with controlled rates.   - Per daughter, reevaluated by cardiology and found not to have atrial fibrillation and stopped medications  - EKG in ED w/ sinus tachy  - Repeat EKG in rapid w/ likely aflutter  - Continue to monitor on telemetry  - ICU eval pending 2021 was noted to have pAF during admission at [facility], started on Eliquis and amiodarone. After d/c had episode of syncope, reevaluated and amiodarone hasd been stopped. Underwent ILR implant 10/8/21, had infrequent episodes of pAF, last 1/21/22 lasting nearly 3 hours with controlled rates.   - Per daughter, reevaluated by cardiology and found not to have atrial fibrillation and stopped medications  - EKG in ED w/ sinus tachy  - Repeat EKG in rapid w/ likely aflutter  - Discussed with Dr. Elias, cardizem Drip started @ 5mL/hr  - Cardiology (Dr. Elias) consulted, recs appreciated  - Continue to monitor on telemetry  - ICU eval pending Pt p/w sob, CTA ordered to r/o PE  - Heparin drip NOT started given extensive GI hx  - CTA negative for PE 2021 was noted to have pAF during admission at [facility], started on Eliquis and amiodarone. After d/c had episode of syncope, reevaluated and amiodarone hasd been stopped. Underwent ILR implant 10/8/21, had infrequent episodes of pAF, last 1/21/22 lasting nearly 3 hours with controlled rates.   - Per daughter, reevaluated by cardiology and found not to have atrial fibrillation and stopped medications  - EKG in ED w/ sinus tachy  - Repeat EKG in rapid w/ likely aflutter  - Discussed with Dr. Elias, cardizem Drip started @ 5mL/hr  - Cardiology (Dr. Elias) consulted, recs appreciated  - Continue to monitor on telemetry  - ICU eval pending 2021 was noted to have pAF during admission at [facility], started on Eliquis and amiodarone. After d/c had episode of syncope, reevaluated and amiodarone hasd been stopped. Underwent ILR implant 10/8/21, had infrequent episodes of pAF, last 1/21/22 lasting nearly 3 hours with controlled rates.   - Per daughter, reevaluated by cardiology and found not to have atrial fibrillation and stopped medications  - EKG in ED w/ sinus tachy  - Repeat EKG in rapid w/ likely aflutter  - Discussed with Dr. Elias, cardizem Drip started @ 5mL/hr  - Cardiology (Dr. Elias) consulted, recs appreciated  - Continue to monitor on telemetry

## 2024-02-25 NOTE — H&P ADULT - ATTENDING COMMENTS
83F PMH cirrhosis, aortic stenosis, PAD s/p right carotid endarterectomy, non Hodgkins Lymphoma (pulmonary mass s/p removal, chemo; last infusion fall 2021), COPD, pAF s/p ILR implant 10/8/21, s/p watchman (2/11/22), hx of prior GI bleed requiring blood transfusions presents to the ED with complaints of shortness of breath, nausea, generalized weakness and chest pain, admitted for PNA, r/o GIB.     HPI as above.     Plan:  Patient critically ill with pneumonia and NSTEMI which precipitated an unstable tachycardia, A flutter [originally believed to be SVT, however more consistent with A flutter]. Did not respond to  vagal manuvers or adenosine 6/12/12mg, then given IV cardizem which helped to improve HR to 120's. Subsequently started on Cardizem gtt.   Transitioned from NRB to BiPAP due to increased WOB.   -tranfer to ICU level of care  FOBT positive, GI consult, trend H/H.     Patients code status was discussed with patients daughter at bedside. Goals of care were discussed with daughter including the importance of having an advance directive and Health Care agent. Goals of care discussed included need for possible intubation and CPR if patient clinically deteriorates. Family was informed about the role of a MOLST form.   Decision maker is daughter as patient in respiratory distress and unable to answer question   17 minutes were spent discussing advanced care planning and this was separate from management of patients medical problems. 83F PMH cirrhosis, aortic stenosis, PAD s/p right carotid endarterectomy, non Hodgkins Lymphoma (pulmonary mass s/p removal, chemo; last infusion fall 2021), COPD, pAF s/p ILR implant 10/8/21, s/p watchman (2/11/22), hx of prior GI bleed requiring blood transfusions presents to the ED with complaints of shortness of breath, nausea, generalized weakness and chest pain, admitted for PNA, r/o GIB.     HPI as above.     Plan:  Patient critically ill with pneumonia and NSTEMI which precipitated an stable tachycardia [did not require cardioversion], A flutter [originally believed to be SVT, however more consistent with A flutter]. Did not respond to  vagal manuvers or adenosine 6/12/12mg, then given IV cardizem which helped to improve HR to 120's. Subsequently started on Cardizem gtt.   Transitioned from NRB to BiPAP due to increased WOB.   -tranfer to ICU level of care  FOBT positive, GI consult, trend H/H.     Patients code status was discussed with patients daughter at bedside. Goals of care were discussed with daughter including the importance of having an advance directive and Health Care agent. Goals of care discussed included need for possible intubation and CPR if patient clinically deteriorates. Family was informed about the role of a MOLST form.   Decision maker is daughter as patient in respiratory distress and unable to answer question   17 minutes were spent discussing advanced care planning and this was separate from management of patients medical problems.

## 2024-02-25 NOTE — H&P ADULT - PROBLEM SELECTOR PLAN 1
Pt p/w sob, CTA ordered to r/o PE, showed unloculated R pleural effusion c/w PNA  - Ceftriaxone and zithromax given in ED  - Initially w/ no leukocytosis, repeat WBC at 19.36  - c/w ceftriaxone  - c/w zithromax  - f/u AM CBC Pt p/w sob, CTA ordered to r/o PE, showed unloculated R pleural effusion c/w PNA  - Ceftriaxone and zithromax given in ED  - Initially w/ no leukocytosis, repeat WBC at 19.36  - c/w ceftriaxone and zithromax  - Pt requiring escalating oxygen requirements, now on BiPAP  - ABG ordered, f/u results  - f/u AM CBC  - ID consulted, recs appreciated  - ICU eval pending Pt p/w sob, CTA ordered to r/o PE, showed unloculated R pleural effusion c/w PNA  - Ceftriaxone and zithromax given in ED  - Initially w/ no leukocytosis, repeat WBC at 19.36  - c/w ceftriaxone 1g qd and zithromax 500mg qd  - Pt requiring escalating oxygen requirements, now on BiPAP  - ABG ordered, f/u results  - f/u AM CBC  - ID consulted, recs appreciated  - ICU eval pending Pt p/w sob, CTA ordered to r/o PE, showed unloculated R pleural effusion c/w PNA  - Ceftriaxone and zithromax given in ED  - Initially w/ no leukocytosis, repeat WBC at 19.36  - c/w Zosyn  - Pt requiring escalating oxygen requirements, now on BiPAP  - ABG ordered, f/u results  - f/u AM CBC  - ID consulted, recs appreciated  - ICU eval appreciated

## 2024-02-25 NOTE — H&P ADULT - NSHPREVIEWOFSYSTEMS_GEN_ALL_CORE
CONSTITUTIONAL: denies fever, chills, diaphoresis, fatigue, weakness, dizziness, lightheadedness  HEENT: denies blurred vision, sore throat, cough  SKIN: denies new lesions, rash, hives, itching  CARDIOVASCULAR: denies chest pain, chest pressure, palpitations  RESPIRATORY: +SOB. Denies sputum production  GASTROINTESTINAL: denies nausea, vomiting, diarrhea, constipation, abdominal pain, hematochezia, melena  GENITOURINARY: denies dysuria, polyuria, hematuria, discharge  NEUROLOGICAL: denies syncope, LOC, numbness, headache, focal weakness  MUSCULOSKELETAL: denies new joint pain, joint swelling, muscle aches  HEMATOLOGIC: denies gross bleeding, bruising  LYMPHATICS: denies enlarged lymph nodes, extremity swelling  PSYCHIATRIC: denies recent changes in anxiety, depression  ENDOCRINOLOGIC: denies sweating, cold or heat intolerance CONSTITUTIONAL: denies fever, +chills, +diaphoresis, fatigue, +weakness, dizziness, lightheadedness  HEENT: denies blurred vision, sore throat, cough  SKIN: denies new lesions, rash, hives, itching  CARDIOVASCULAR: +chest pain. Denies chest pressure, palpitations  RESPIRATORY: +SOB. Denies sputum production  GASTROINTESTINAL: +nausea, vomiting,. Denies diarrhea, constipation, abdominal pain, hematochezia, melena  GENITOURINARY: denies dysuria, polyuria, hematuria, discharge  NEUROLOGICAL: denies syncope, LOC, numbness, headache, focal weakness  MUSCULOSKELETAL: denies new joint pain, joint swelling, muscle aches  HEMATOLOGIC: denies gross bleeding, bruising  LYMPHATICS: denies enlarged lymph nodes, extremity swelling  PSYCHIATRIC: denies recent changes in anxiety, depression  ENDOCRINOLOGIC: denies sweating, cold or heat intolerance

## 2024-02-25 NOTE — CONSULT NOTE ADULT - SUBJECTIVE AND OBJECTIVE BOX
Date of Service is equal to the date of entry    HPI: 84 y/o F w/ pmh of cirrhosis, AA, pad s/p R. carotid endarterectomy, NHL (chemo last on 2/22), copd, Afib (not on AC 2/2 to hx of GIB) s/p watchman, presented to [facility] for SOB, nasuea, generalized weakness and CP. In the ED pt was found to have NSTEMI, being worked up for GIB and Pna pt was admitted to [facility]. Hospital course c/b RRT for SVT tonight      Review of Systems:  Constitutional: No fever, chills, fatigue  Neuro: No headache, numbness, weakness  Resp: No cough, wheezing, shortness of breath  CVS: No chest pain, palpitations, leg swelling  GI: No abdominal pain, nausea, vomiting, diarrhea   : No dysuria, frequency, incontinence  Skin: No itching, burning, rashes, or lesions   Msk: No joint pain or swelling  Psych: No depression, anxiety, mood swings    T(F): 97.5 (02-25-24 @ 15:03), Max: 97.5 (02-25-24 @ 15:03)  HR: 110 (02-25-24 @ 15:03) (110 - 110)  BP: 147/83 (02-25-24 @ 15:03) (147/83 - 147/83)  RR: 18 (02-25-24 @ 15:03) (18 - 18)  SpO2: 97% (02-25-24 @ 15:03) (97% - 97%)  Wt(kg): --        CAPILLARY BLOOD GLUCOSE      POCT Blood Glucose.: 193 mg/dL (25 Feb 2024 20:39)      I&O's Summary      Physical Exam:   Gen:  Neuro:  HEENT:  Resp:  CVS:  Abd:  Ext:  Skin:    Meds:  azithromycin  IVPB IV Intermittent  cefTRIAXone   IVPB IV Intermittent    aDENosine Injectable (ADENOCARD) IV Push  aDENosine Injectable (ADENOCARD) IV Push  aDENosine Injectable (ADENOCARD) IV Push  aMIOdarone IVPB IV Intermittent  diltiazem Infusion IV Continuous  diltiazem Injectable IV Push        ondansetron Injectable IV Push                          11.3   19.36 )-----------( 343      ( 25 Feb 2024 20:18 )             35.4       02-25    140  |  107  |  35<H>  ----------------------------<  184<H>  5.3   |  27  |  1.40<H>    Ca    9.6      25 Feb 2024 16:40  Phos  3.7     02-25  Mg     2.0     02-25    TPro  6.9  /  Alb  3.5  /  TBili  0.4  /  DBili  x   /  AST  14<L>  /  ALT  16  /  AlkPhos  94  02-25          PT/INR - ( 25 Feb 2024 16:40 )   PT: 12.2 sec;   INR: 1.04 ratio         PTT - ( 25 Feb 2024 16:40 )  PTT:50.0 sec  Urinalysis Basic - ( 25 Feb 2024 16:40 )    Color: x / Appearance: x / SG: x / pH: x  Gluc: 184 mg/dL / Ketone: x  / Bili: x / Urobili: x   Blood: x / Protein: x / Nitrite: x   Leuk Esterase: x / RBC: x / WBC x   Sq Epi: x / Non Sq Epi: x / Bacteria: x      Radiology:     < from: CT Angio Chest PE Protocol w/ IV Cont (02.25.24 @ 18:46) >  ACC: [id number] EXAM:  CT ANGIO CHEST PULM [facility]   ORDERED BY: JAYJAY KAYE     PROCEDURE DATE:  02/25/2024          INTERPRETATION:  CLINICAL INFORMATION: Elevated d-dimer.    COMPARISON: June 6, 2021.    CONTRAST/COMPLICATIONS:  IV Contrast: Omnipaque 350  70 cc administered   30 cc discarded  Oral Contrast: NONE  Complications: None reported at time of study completion    PROCEDURE:  CT Angiography of the Chest.  Sagittal and coronal reformats were performed as well as 3D (MIP)   reconstructions.    FINDINGS:    LUNGS AND AIRWAYS: Right lower lobectomy. New patchy and groundglass   opacities in both lungs. Emphysema.  PLEURA: Diffuse right pleural thickening. Unchanged loculated right   pleural effusion.  MEDIASTINUM AND MIS:No lymphadenopathy.  VESSELS: Right chest wall port with catheter tip terminating in the   superior vena cava.  HEART: Heart size is normal. No pericardial effusion.  CHEST WALL AND LOWER NECK: Right chest wall port. Hypodense left thyroid   lesion, measuring 0.9 cm.  VISUALIZED UPPER ABDOMEN: Cholelithiasis. Atrophic left kidney.  BONES: Degenerative changes. Unchanged L1 compression deformity.    IMPRESSION:  No pulmonary embolism.    New bilateral patchy and groundglass opacities, consistent with pneumonia.    --- End of Report ---        MANISH WILLIS MD; Attending Radiologist  This document has been electronically signed. Feb 25 2024  7:25PM    < end of copied text >        CODE STATUS: FULL CODE    Time spent on this patient encounter, which includes documenting this note in the electronic medical record, was 42 minutes including assessing the presenting problems with associated risk, reviewing the medical record to prepare for the encounter, and meeting face to face with patient to obtain additional history. I have also performed an appropriate physical exam, made interventions listed and ordered and interpreted appropriate diagnostic studies as documented. To improve communication and patient safety. I have coordinated care with the multidisciplinary team including the bedside nurse, appropriate attending of record and consultant as needed.         Date of Service is equal to the date of entry    HPI: 84 y/o F w/ pmh of cirrhosis, AA, pad s/p R. carotid endarterectomy, NHL (chemo last on 2/22), copd, Afib (not on AC 2/2 to hx of GIB) s/p watchman, presented to [facility] for SOB, nausea generalized weakness and CP. In the ED pt was found to have NSTEMI, being worked up for GIB and Pna pt was admitted to [facility]. Hospital course c/b RRT for SVT tonight pt was given adenosine 6mg x1, 12mg x2 with no improvement followed up by 20mg of IV Cardizem w/ improvement in HR. Repeat EKG finding showing possible Afib/flutter pt was started on Cardizem gtt w/ improvement in HR. MICU consult requested for SVT. Pt seen and examined now with improvement in HR to 120s. Pt reports feeling SOB feels like she is not getting enough air and working very hard to breath on NRB. Pt placed on bipap 12/5 and will upgrade pt to MICU for Acute hypoxic resp failure 2/2 to pna, elevated trops likely 2/2 to demand ischemia in the setting of rapid HR, and r/o GIB.      Review of Systems:  Constitutional: No fever, chills, fatigue  Neuro: No headache, numbness, weakness  Resp: No cough, no wheezing, +shortness of breath  CVS: No chest pain, palpitations, leg swelling  GI: No abdominal pain, nausea, vomiting, diarrhea   : No dysuria, frequency, incontinence  Skin: No itching, burning, rashes, or lesions   Msk: No joint pain or swelling  Psych: No depression, anxiety, mood swings    T(F): 97.5 (02-25-24 @ 15:03), Max: 97.5 (02-25-24 @ 15:03)  HR: 110 (02-25-24 @ 15:03) (110 - 110)  BP: 147/83 (02-25-24 @ 15:03) (147/83 - 147/83)  RR: 18 (02-25-24 @ 15:03) (18 - 18)  SpO2: 97% (02-25-24 @ 15:03) (97% - 97%)  Wt(kg): --        CAPILLARY BLOOD GLUCOSE      POCT Blood Glucose.: 193 mg/dL (25 Feb 2024 20:39)      I&O's Summary      Physical Exam:   Gen: in acute resp distress  Neuro: awake, alert and following commands  HEENT: NC/AT  Resp: +course b/s b/l, +tachypenia w/ accessory muscle usage  CVS: +tachycardic  Abd: BSx4, soft, NT/ND  Ext: no edema   Skin: warm/dry    Meds:  azithromycin  IVPB IV Intermittent  cefTRIAXone   IVPB IV Intermittent    aDENosine Injectable (ADENOCARD) IV Push  aDENosine Injectable (ADENOCARD) IV Push  aDENosine Injectable (ADENOCARD) IV Push  aMIOdarone IVPB IV Intermittent  diltiazem Infusion IV Continuous  diltiazem Injectable IV Push        ondansetron Injectable IV Push                          11.3   19.36 )-----------( 343      ( 25 Feb 2024 20:18 )             35.4       02-25    140  |  107  |  35<H>  ----------------------------<  184<H>  5.3   |  27  |  1.40<H>    Ca    9.6      25 Feb 2024 16:40  Phos  3.7     02-25  Mg     2.0     02-25    TPro  6.9  /  Alb  3.5  /  TBili  0.4  /  DBili  x   /  AST  14<L>  /  ALT  16  /  AlkPhos  94  02-25          PT/INR - ( 25 Feb 2024 16:40 )   PT: 12.2 sec;   INR: 1.04 ratio         PTT - ( 25 Feb 2024 16:40 )  PTT:50.0 sec  Urinalysis Basic - ( 25 Feb 2024 16:40 )    Color: x / Appearance: x / SG: x / pH: x  Gluc: 184 mg/dL / Ketone: x  / Bili: x / Urobili: x   Blood: x / Protein: x / Nitrite: x   Leuk Esterase: x / RBC: x / WBC x   Sq Epi: x / Non Sq Epi: x / Bacteria: x      Radiology:     < from: CT Angio Chest PE Protocol w/ IV Cont (02.25.24 @ 18:46) >  ACC: [id number] EXAM:  CT ANGIO CHEST PULM [facility]   ORDERED BY: JAYJAY   S BISSESSAR     PROCEDURE DATE:  02/25/2024          INTERPRETATION:  CLINICAL INFORMATION: Elevated d-dimer.    COMPARISON: June 6, 2021.    CONTRAST/COMPLICATIONS:  IV Contrast: Omnipaque 350  70 cc administered   30 cc discarded  Oral Contrast: NONE  Complications: None reported at time of study completion    PROCEDURE:  CT Angiography of the Chest.  Sagittal and coronal reformats were performed as well as 3D (MIP)   reconstructions.    FINDINGS:    LUNGS AND AIRWAYS: Right lower lobectomy. New patchy and groundglass   opacities in both lungs. Emphysema.  PLEURA: Diffuse right pleural thickening. Unchanged loculated right   pleural effusion.  MEDIASTINUM AND MIS:No lymphadenopathy.  VESSELS: Right chest wall port with catheter tip terminating in the   superior vena cava.  HEART: Heart size is normal. No pericardial effusion.  CHEST WALL AND LOWER NECK: Right chest wall port. Hypodense left thyroid   lesion, measuring 0.9 cm.  VISUALIZED UPPER ABDOMEN: Cholelithiasis. Atrophic left kidney.  BONES: Degenerative changes. Unchanged L1 compression deformity.    IMPRESSION:  No pulmonary embolism.    New bilateral patchy and groundglass opacities, consistent with pneumonia.    --- End of Report ---        MANISH WILLIS MD; Attending Radiologist  This document has been electronically signed. Feb 25 2024  7:25PM    < end of copied text >        CODE STATUS: FULL CODE      CRITICAL CARE TIME SPENT: 55 mins  (Assessing presenting problems of acute illness, which pose high probability of life threatening deterioration or end organ damage/dysfunction, as well as medical decision making including initiating plan of care, reviewing data, reviewing radiologic exams, discussing with multidisciplinary team,  discussing goals of care with patient/family, and writing this note.  Non-inclusive of procedures performed)

## 2024-02-25 NOTE — H&P ADULT - PROBLEM SELECTOR PLAN 2
Pt converted to SVT, rapid response called  - Initial EKG at rapid showing SVT  - Adenosine 6mg, 12mgx2 IV given w/ little improvement in HR  - Cardizem 20mg IV given  - Repeat EKG ordered showing likely aflutter  - Discussed with Dr. Elias, cardizem Drip started @ 5mL/hr Pt went into to SVT, rapid response called  - EKG in ED with sinus tachycardia rate 105  - Initial EKG at rapid showing SVT  - Adenosine 6mg, 12mgx2 IV given w/ little improvement in HR  - Cardizem 20mg IV given  - Repeat EKG ordered showing likely aflutter  - Discussed with Dr. Elias, cardizem Drip started @ 5mL/hr  - Cardiology (Dr. Elias) consulted, recs appreciated  - ICU eval pending - Hx of GIB needing iron replacement and multiple blood transfusions, previously on eliquis., GI workup with colonoscopy and endoscopy were unremarkable apart from polyps.  - Baseline Hg 9-10 per daughter  - Hg 9.5 on admission, repeat 11.3  - FOBT ordered given hx in setting of r/o PE  - CTA negative for PE, however FOBT+  - s/p 1000cc bolus  - c/w IVF @ 65mL/hr  - Start Protonix 40mg IV BID  - GI consulted, recs appreciated  - ICU eval pending - Hx of GIB needing iron replacement and multiple blood transfusions, previously on eliquis., GI workup with colonoscopy and endoscopy were unremarkable apart from polyps.  - Baseline Hg 9-10 per daughter  - Hg 9.5 on admission, repeat 11.3  - FOBT ordered given hx in setting of r/o PE  - CTA negative for PE, however FOBT+  - s/p 1000cc bolus  - c/w IVF @ 65mL/hr  - Start Protonix 40mg IV BID  - c/w home iron  - GI consulted, recs appreciated  - ICU eval pending - Hx of GIB needing iron replacement and multiple blood transfusions, previously on eliquis., GI workup with colonoscopy and endoscopy were unremarkable apart from polyps.  - Baseline Hg 9-10 per daughter  - Hg 9.5 on admission, repeat 11.3  - FOBT ordered given hx in setting of r/o PE  - CTA negative for PE, however FOBT+  - s/p 1000cc bolus  - c/w IVF @ 65mL/hr  - Start Protonix 40mg IV BID  - c/w home iron  - GI consulted, recs appreciated  - ICU eval appreciated

## 2024-02-25 NOTE — ED PROVIDER NOTE - ATTENDING APP SHARED VISIT CONTRIBUTION OF CARE
Patient is an 83-year-old female with a history of liver cirrhosis, aortic stenosis, PAD, CVA, non-Hodgkin's lymphoma, lung mass with resection, COPD, cardiac disease with a Watchman device.  Who presents to the emergency room with general somatic symptoms including shortness of breath nausea weakness and chest pain.    On evaluation is an elderly female sick appearing in no apparent respiratory distress.  HEENT is unremarkable without G/F/R.  Neck is supple.  Mucous membranes are moist.  No pallor or jaundice.  No JVD.  Lungs clear to auscultation.  Cardiac exam is regular rate and rhythm with murmur.  Abdomen is soft with mild epigastric abdominal discomfort.  No guarding or rebound.  No CVA tenderness.  Musculoskeletal exam is normal.  Skin exam is normal.  There is no diaphoresis or rash.  No ecchymosis or cyanosis.  Neurologic exam patient is hard of hearing otherwise unremarkable.  Plan of care includes EKG which demonstrates sinus tachycardia.  No ST elevations but there are Q waves diffusely.  Laboratory studies including cardiac enzymes D-dimer and troponin.  The troponin is markedly elevated.  Will consult cardiology Dr. Elias.  Cardiology recommends admission.  Patient is anemic guaiac is positive.  She has a history of heavy bleeding with anticoagulation.  If the CTA is negative for dissection pulmonary embolism or intrathoracic vascular pathology will admit to this hospital for the and NSTEMI.  If the CTA of the chest is positive we will transfer for urgent vascular and cardiothoracic.  This chart was made with dictation software and may contain typographical errors.

## 2024-02-25 NOTE — PATIENT PROFILE ADULT - FALL HARM RISK - RISK INTERVENTIONS

## 2024-02-25 NOTE — H&P ADULT - PROBLEM SELECTOR PLAN 7
Chronic  - Start spiriva as home incruse ellipta is not on formulary Chronic  - c/w home amlodipine 5mg qd, lisinopril 40qd, and lasix 20mg every other day (next dose tomorrow)

## 2024-02-25 NOTE — H&P ADULT - NSICDXPASTMEDICALHX_GEN_ALL_CORE_FT
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

## 2024-02-25 NOTE — H&P ADULT - ASSESSMENT
83F PMH cirrhosis, aortic stenosis, PAD s/p right carotid endarterectomy, non Hodgkins Lymphoma (pulmonary mass s/p removal, chemo; last infusion fall 2021), COPD, pAF s/p ILR implant 10/8/21, s/p watchman (2/11/22), hx of prior GI bleed requiring blood transfusions presents to the ED with complaints of shortness of breath, nausea, generalized weakness and chest pain, admitted for PNA, r/o GIB.

## 2024-02-25 NOTE — H&P ADULT - CONVERSATION DETAILS
Discussed GOC with daughter at bedside. Daughter not ready to discuss resuscitative measures including chest compressions, intubation, and pressure support. Discussed GOC with daughter at bedside. Daughter not ready to discuss resuscitative measures including chest compressions, intubation, and pressure support. Remains full code as of now. Discussed GOC with daughter at bedside. Daughter not ready to discuss resuscitative measures including chest compressions, intubation, and pressure support. Remains full code as of now until she decides further.

## 2024-02-25 NOTE — ED ADULT NURSE NOTE - NS ED NURSE REPORT GIVEN DT
25-Feb-2024 23:29 Modified Advancement Flap Text: The defect edges were debeveled with a #15 scalpel blade.  Given the location of the defect, shape of the defect and the proximity to free margins a modified advancement flap was deemed most appropriate.  Using a sterile surgical marker, an appropriate advancement flap was drawn incorporating the defect and placing the expected incisions within the relaxed skin tension lines where possible.    The area thus outlined was incised deep to adipose tissue with a #15 scalpel blade.  The skin margins were undermined to an appropriate distance in all directions utilizing iris scissors.

## 2024-02-25 NOTE — H&P ADULT - HISTORY OF PRESENT ILLNESS
83F PMH cirrhosis, aortic stenosis, PAD s/p right carotid endarterectomy, non Hodgkins Lymphoma (pulmonary mass s/p removal, chemo; last infusion fall 2021), COPD, atrial fibrillation and syncope s/p ILR implant 10/8/21 on eliquis, s/p watchman (2/11/22), hx of prior GI bleed requiring blood transfusions presents to the ED with complaints of shortness of breath, nausea, generalized weakness and chest pain that started at 2:20 PM.  Patient reports she was hanging up some [employer] decorations on her friends outside when the symptoms started.  She denies any abdominal pain, fever chills, cough or recent URI symptoms.  Reports she felt fine all morning.    ED course:  Vital Signs T(F): 97.5F HR: 110  BP: 147/83  RR: 18  SpO2: 97% on RA  Labs significant for: Troponin 694.1, Hgb 9.5, hct 29.5, PTT 50, D-dimer 360, Cr 1.4,  BUN 35, Glu 184, eGFR 38. pBNP 571   FOBT+  ECG:   In ED given rocephin 1gx1, azithromycin 500mg x1, NS bolus 1L x1, zofran 4mg x1    Imaging  CXR: Slight increase left lung interstitial pattern and left loop recorder are presently seen. Otherwise stable findings.  CTA chest: No pulmonary embolism. New bilateral patchy and groundglass opacities, consistent with pneumonia. Hypodense left thyroid   lesion, measuring 0.9 cm.   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  Patient reports she was hanging up some [employer] decorations  when the symptoms started. Daughter at bedside reported decreased PO intake, and has been vomiting intermittently all day long.  She denies any abdominal pain, fever chills, cough or recent URI symptoms.  Reports she felt fine all morning.     While taking pts history and conducting the physical exam, pt began feeling unwell, had one episode of emesis and rapid response was called.    ED course:  Vital Signs T(F): 97.5F HR: 110  BP: 147/83  RR: 18  SpO2: 97% on RA  Labs significant for: Troponin 694.1, Hgb 9.5, hct 29.5, PTT 50, D-dimer 360, Cr 1.4,  BUN 35, Glu 184, eGFR 38. pBNP 571   FOBT+  ECG:   In ED given rocephin 1gx1, azithromycin 500mg x1, NS bolus 1L x1, zofran 4mg x1    Imaging  CXR: Slight increase left lung interstitial pattern and left loop recorder are presently seen. Otherwise stable findings.  CTA chest: No pulmonary embolism. New bilateral patchy and groundglass opacities, consistent with pneumonia. Hypodense left thyroid   lesion, measuring 0.9 cm.

## 2024-02-25 NOTE — H&P ADULT - PROBLEM SELECTOR PLAN 3
Pt p/w chest pain, SOB  - EKG in ED w/ sinus tachy, diffuse Q waves, no ST elevations  - Initial troponin 694.1  - Repeat troponin 1738.9  - Repeat trop ordered in 3h  - Cardiology (Dr. Elias) consulted, recs appreciated  - ICU eval pending Pt went into to SVT, rapid response called  - EKG in ED with sinus tachycardia rate 105  - Initial EKG at rapid showing SVT  - Adenosine 6mg, 12mgx2 IV given w/ little improvement in HR  - Cardizem 20mg IV given  - Repeat EKG ordered showing likely aflutter  - Discussed with Dr. Elias, cardizem Drip started @ 5mL/hr  - Cardiology (Dr. Elias) consulted, recs appreciated  - ICU eval pending Pt went into to SVT, rapid response called  - EKG in ED with sinus tachycardia rate 105  - Initial EKG at rapid showing SVT  - Adenosine 6mg, 12mgx2 IV given w/ little improvement in HR  - Cardizem 20mg IV given  - Repeat EKG ordered showing likely aflutter  - Discussed with Dr. Elias, cardizem Drip started @ 5mL/hr  - Cardiology (Dr. Elias) consulted, recs appreciated  - ICU eval

## 2024-02-25 NOTE — ED PROVIDER NOTE - OBJECTIVE STATEMENT
83 year old female patient with a history of cirrhosis, aortic stenosis, PAD s/p right carotid endarterectomy, non Hodgkin's Lymphoma (pulmonary mass s/p removal, chemo; last infusion fall 2021), COPD, syncope s/p ILR implant 10/8/21, and Watchman device on 2/11/22 Presents to the ED with complaints of shortness of breath, nausea, generalized weakness and chest pain that started at 2:20 PM.  Patient reports she was hanging up some [employer] decorations on her friends outside when the symptoms started.  She denies any abdominal pain, fever chills, cough or recent URI symptoms.  Reports she felt fine all morning.  cardiologist Shandra Best,

## 2024-02-25 NOTE — H&P ADULT - PROBLEM SELECTOR PLAN 9
- SCDs given hx and r/o GIB Chronic, has required multiple transfusions  - c/w home iron - SCDs given hx and r/o GIB    Incidental imaging finding: thyroid lesion noted on CT chest- please inform patient of abnormality when she is clinically stable. -check AM TSH

## 2024-02-26 ENCOUNTER — RESULT REVIEW (OUTPATIENT)
Age: 84
End: 2024-02-26

## 2024-02-26 LAB
ALBUMIN SERPL ELPH-MCNC: 2.9 G/DL — LOW (ref 3.3–5)
ALP SERPL-CCNC: 82 U/L — SIGNIFICANT CHANGE UP (ref 40–120)
ALT FLD-CCNC: 16 U/L — SIGNIFICANT CHANGE UP (ref 12–78)
ANION GAP SERPL CALC-SCNC: 5 MMOL/L — SIGNIFICANT CHANGE UP (ref 5–17)
ANION GAP SERPL CALC-SCNC: 6 MMOL/L — SIGNIFICANT CHANGE UP (ref 5–17)
APPEARANCE UR: CLEAR — SIGNIFICANT CHANGE UP
AST SERPL-CCNC: 20 U/L — SIGNIFICANT CHANGE UP (ref 15–37)
BASOPHILS # BLD AUTO: 0.03 K/UL — SIGNIFICANT CHANGE UP (ref 0–0.2)
BASOPHILS NFR BLD AUTO: 0.4 % — SIGNIFICANT CHANGE UP (ref 0–2)
BILIRUB SERPL-MCNC: 0.4 MG/DL — SIGNIFICANT CHANGE UP (ref 0.2–1.2)
BILIRUB UR-MCNC: NEGATIVE — SIGNIFICANT CHANGE UP
BUN SERPL-MCNC: 27 MG/DL — HIGH (ref 7–23)
BUN SERPL-MCNC: 30 MG/DL — HIGH (ref 7–23)
CALCIUM SERPL-MCNC: 8.8 MG/DL — SIGNIFICANT CHANGE UP (ref 8.5–10.1)
CALCIUM SERPL-MCNC: 9.1 MG/DL — SIGNIFICANT CHANGE UP (ref 8.5–10.1)
CHLORIDE SERPL-SCNC: 107 MMOL/L — SIGNIFICANT CHANGE UP (ref 96–108)
CHLORIDE SERPL-SCNC: 108 MMOL/L — SIGNIFICANT CHANGE UP (ref 96–108)
CO2 SERPL-SCNC: 26 MMOL/L — SIGNIFICANT CHANGE UP (ref 22–31)
CO2 SERPL-SCNC: 28 MMOL/L — SIGNIFICANT CHANGE UP (ref 22–31)
COLOR SPEC: YELLOW — SIGNIFICANT CHANGE UP
CREAT SERPL-MCNC: 1.4 MG/DL — HIGH (ref 0.5–1.3)
CREAT SERPL-MCNC: 1.4 MG/DL — HIGH (ref 0.5–1.3)
DIFF PNL FLD: NEGATIVE — SIGNIFICANT CHANGE UP
EGFR: 38 ML/MIN/1.73M2 — LOW
EGFR: 38 ML/MIN/1.73M2 — LOW
EOSINOPHIL # BLD AUTO: 0 K/UL — SIGNIFICANT CHANGE UP (ref 0–0.5)
EOSINOPHIL NFR BLD AUTO: 0 % — SIGNIFICANT CHANGE UP (ref 0–6)
GLUCOSE SERPL-MCNC: 183 MG/DL — HIGH (ref 70–99)
GLUCOSE SERPL-MCNC: 206 MG/DL — HIGH (ref 70–99)
GLUCOSE UR QL: 100 MG/DL
HCT VFR BLD CALC: 29.8 % — LOW (ref 34.5–45)
HGB BLD-MCNC: 9.7 G/DL — LOW (ref 11.5–15.5)
IMM GRANULOCYTES NFR BLD AUTO: 0.5 % — SIGNIFICANT CHANGE UP (ref 0–0.9)
KETONES UR-MCNC: ABNORMAL MG/DL
LEUKOCYTE ESTERASE UR-ACNC: NEGATIVE — SIGNIFICANT CHANGE UP
LYMPHOCYTES # BLD AUTO: 0.88 K/UL — LOW (ref 1–3.3)
LYMPHOCYTES # BLD AUTO: 11.4 % — LOW (ref 13–44)
MAGNESIUM SERPL-MCNC: 1.8 MG/DL — SIGNIFICANT CHANGE UP (ref 1.6–2.6)
MCHC RBC-ENTMCNC: 29.7 PG — SIGNIFICANT CHANGE UP (ref 27–34)
MCHC RBC-ENTMCNC: 32.6 GM/DL — SIGNIFICANT CHANGE UP (ref 32–36)
MCV RBC AUTO: 91.1 FL — SIGNIFICANT CHANGE UP (ref 80–100)
MONOCYTES # BLD AUTO: 0.55 K/UL — SIGNIFICANT CHANGE UP (ref 0–0.9)
MONOCYTES NFR BLD AUTO: 7.1 % — SIGNIFICANT CHANGE UP (ref 2–14)
MRSA PCR RESULT.: SIGNIFICANT CHANGE UP
NEUTROPHILS # BLD AUTO: 6.21 K/UL — SIGNIFICANT CHANGE UP (ref 1.8–7.4)
NEUTROPHILS NFR BLD AUTO: 80.6 % — HIGH (ref 43–77)
NITRITE UR-MCNC: NEGATIVE — SIGNIFICANT CHANGE UP
NRBC # BLD: 0 /100 WBCS — SIGNIFICANT CHANGE UP (ref 0–0)
PH UR: 5.5 — SIGNIFICANT CHANGE UP (ref 5–8)
PHOSPHATE SERPL-MCNC: 3.7 MG/DL — SIGNIFICANT CHANGE UP (ref 2.5–4.5)
PLATELET # BLD AUTO: 199 K/UL — SIGNIFICANT CHANGE UP (ref 150–400)
POTASSIUM SERPL-MCNC: 4.7 MMOL/L — SIGNIFICANT CHANGE UP (ref 3.5–5.3)
POTASSIUM SERPL-MCNC: 5.7 MMOL/L — HIGH (ref 3.5–5.3)
POTASSIUM SERPL-SCNC: 4.7 MMOL/L — SIGNIFICANT CHANGE UP (ref 3.5–5.3)
POTASSIUM SERPL-SCNC: 5.7 MMOL/L — HIGH (ref 3.5–5.3)
PROCALCITONIN SERPL-MCNC: 0.13 NG/ML — HIGH
PROT SERPL-MCNC: 6.1 G/DL — SIGNIFICANT CHANGE UP (ref 6–8.3)
PROT UR-MCNC: NEGATIVE MG/DL — SIGNIFICANT CHANGE UP
RBC # BLD: 3.27 M/UL — LOW (ref 3.8–5.2)
RBC # FLD: 14.5 % — SIGNIFICANT CHANGE UP (ref 10.3–14.5)
S AUREUS DNA NOSE QL NAA+PROBE: SIGNIFICANT CHANGE UP
SODIUM SERPL-SCNC: 140 MMOL/L — SIGNIFICANT CHANGE UP (ref 135–145)
SODIUM SERPL-SCNC: 140 MMOL/L — SIGNIFICANT CHANGE UP (ref 135–145)
SP GR SPEC: 1.04 — HIGH (ref 1–1.03)
TROPONIN I, HIGH SENSITIVITY RESULT: 1438.2 NG/L — HIGH
TROPONIN I, HIGH SENSITIVITY RESULT: 1732.3 NG/L — HIGH
TSH SERPL-MCNC: 2.44 UIU/ML — SIGNIFICANT CHANGE UP (ref 0.36–3.74)
UROBILINOGEN FLD QL: 0.2 MG/DL — SIGNIFICANT CHANGE UP (ref 0.2–1)
WBC # BLD: 7.71 K/UL — SIGNIFICANT CHANGE UP (ref 3.8–10.5)
WBC # FLD AUTO: 7.71 K/UL — SIGNIFICANT CHANGE UP (ref 3.8–10.5)

## 2024-02-26 PROCEDURE — 99233 SBSQ HOSP IP/OBS HIGH 50: CPT

## 2024-02-26 PROCEDURE — 93308 TTE F-UP OR LMTD: CPT | Mod: 26

## 2024-02-26 PROCEDURE — 93306 TTE W/DOPPLER COMPLETE: CPT | Mod: 26

## 2024-02-26 PROCEDURE — 99291 CRITICAL CARE FIRST HOUR: CPT

## 2024-02-26 PROCEDURE — 99233 SBSQ HOSP IP/OBS HIGH 50: CPT | Mod: GC

## 2024-02-26 PROCEDURE — 76604 US EXAM CHEST: CPT | Mod: 26

## 2024-02-26 RX ORDER — UMECLIDINIUM 62.5 UG/1
1 AEROSOL, POWDER ORAL
Qty: 0 | Refills: 0 | DISCHARGE

## 2024-02-26 RX ORDER — ONDANSETRON 8 MG/1
4 TABLET, FILM COATED ORAL ONCE
Refills: 0 | Status: COMPLETED | OUTPATIENT
Start: 2024-02-26 | End: 2024-02-26

## 2024-02-26 RX ORDER — ACETAMINOPHEN 500 MG
1000 TABLET ORAL ONCE
Refills: 0 | Status: COMPLETED | OUTPATIENT
Start: 2024-02-26 | End: 2024-02-26

## 2024-02-26 RX ORDER — LISINOPRIL 2.5 MG/1
1 TABLET ORAL
Qty: 0 | Refills: 0 | DISCHARGE

## 2024-02-26 RX ORDER — DEXTROSE 50 % IN WATER 50 %
15 SYRINGE (ML) INTRAVENOUS ONCE
Refills: 0 | Status: DISCONTINUED | OUTPATIENT
Start: 2024-02-26 | End: 2024-03-02

## 2024-02-26 RX ORDER — INSULIN LISPRO 100/ML
VIAL (ML) SUBCUTANEOUS
Refills: 0 | Status: DISCONTINUED | OUTPATIENT
Start: 2024-02-26 | End: 2024-03-02

## 2024-02-26 RX ORDER — DILTIAZEM HCL 120 MG
5 CAPSULE, EXT RELEASE 24 HR ORAL
Qty: 125 | Refills: 0 | Status: DISCONTINUED | OUTPATIENT
Start: 2024-02-26 | End: 2024-02-26

## 2024-02-26 RX ORDER — METOPROLOL TARTRATE 50 MG
12.5 TABLET ORAL EVERY 12 HOURS
Refills: 0 | Status: DISCONTINUED | OUTPATIENT
Start: 2024-02-26 | End: 2024-02-27

## 2024-02-26 RX ORDER — INSULIN LISPRO 100/ML
VIAL (ML) SUBCUTANEOUS AT BEDTIME
Refills: 0 | Status: DISCONTINUED | OUTPATIENT
Start: 2024-02-26 | End: 2024-03-02

## 2024-02-26 RX ORDER — SODIUM CHLORIDE 9 MG/ML
1000 INJECTION, SOLUTION INTRAVENOUS
Refills: 0 | Status: DISCONTINUED | OUTPATIENT
Start: 2024-02-26 | End: 2024-03-02

## 2024-02-26 RX ORDER — DEXTROSE 50 % IN WATER 50 %
12.5 SYRINGE (ML) INTRAVENOUS ONCE
Refills: 0 | Status: DISCONTINUED | OUTPATIENT
Start: 2024-02-26 | End: 2024-03-02

## 2024-02-26 RX ORDER — GLUCAGON INJECTION, SOLUTION 0.5 MG/.1ML
1 INJECTION, SOLUTION SUBCUTANEOUS ONCE
Refills: 0 | Status: DISCONTINUED | OUTPATIENT
Start: 2024-02-26 | End: 2024-03-02

## 2024-02-26 RX ORDER — DEXTROSE 50 % IN WATER 50 %
25 SYRINGE (ML) INTRAVENOUS ONCE
Refills: 0 | Status: DISCONTINUED | OUTPATIENT
Start: 2024-02-26 | End: 2024-03-02

## 2024-02-26 RX ORDER — FOLIC ACID 0.8 MG
1 TABLET ORAL
Qty: 0 | Refills: 0 | DISCHARGE

## 2024-02-26 RX ADMIN — Medication 1: at 11:43

## 2024-02-26 RX ADMIN — Medication 1000 MILLIGRAM(S): at 22:30

## 2024-02-26 RX ADMIN — Medication 3 MILLILITER(S): at 15:31

## 2024-02-26 RX ADMIN — Medication 3 MILLILITER(S): at 00:29

## 2024-02-26 RX ADMIN — PIPERACILLIN AND TAZOBACTAM 25 GRAM(S): 4; .5 INJECTION, POWDER, LYOPHILIZED, FOR SOLUTION INTRAVENOUS at 17:17

## 2024-02-26 RX ADMIN — PIPERACILLIN AND TAZOBACTAM 25 GRAM(S): 4; .5 INJECTION, POWDER, LYOPHILIZED, FOR SOLUTION INTRAVENOUS at 04:09

## 2024-02-26 RX ADMIN — ATORVASTATIN CALCIUM 20 MILLIGRAM(S): 80 TABLET, FILM COATED ORAL at 21:22

## 2024-02-26 RX ADMIN — Medication 400 MILLIGRAM(S): at 22:07

## 2024-02-26 RX ADMIN — ONDANSETRON 4 MILLIGRAM(S): 8 TABLET, FILM COATED ORAL at 19:21

## 2024-02-26 RX ADMIN — PIPERACILLIN AND TAZOBACTAM 25 GRAM(S): 4; .5 INJECTION, POWDER, LYOPHILIZED, FOR SOLUTION INTRAVENOUS at 10:20

## 2024-02-26 RX ADMIN — Medication 325 MILLIGRAM(S): at 11:44

## 2024-02-26 RX ADMIN — PANTOPRAZOLE SODIUM 40 MILLIGRAM(S): 20 TABLET, DELAYED RELEASE ORAL at 05:04

## 2024-02-26 RX ADMIN — Medication 1 MILLIGRAM(S): at 11:44

## 2024-02-26 RX ADMIN — PANTOPRAZOLE SODIUM 40 MILLIGRAM(S): 20 TABLET, DELAYED RELEASE ORAL at 17:18

## 2024-02-26 RX ADMIN — Medication 3 MILLILITER(S): at 08:13

## 2024-02-26 RX ADMIN — ONDANSETRON 4 MILLIGRAM(S): 8 TABLET, FILM COATED ORAL at 02:26

## 2024-02-26 RX ADMIN — Medication 1: at 17:27

## 2024-02-26 RX ADMIN — Medication 12.5 MILLIGRAM(S): at 17:17

## 2024-02-26 RX ADMIN — ONDANSETRON 4 MILLIGRAM(S): 8 TABLET, FILM COATED ORAL at 12:19

## 2024-02-26 RX ADMIN — PIPERACILLIN AND TAZOBACTAM 25 GRAM(S): 4; .5 INJECTION, POWDER, LYOPHILIZED, FOR SOLUTION INTRAVENOUS at 21:29

## 2024-02-26 NOTE — PROGRESS NOTE ADULT - ASSESSMENT
84 y/o F w/ pmh of cirrhosis, AA, pad s/p R. carotid endarterectomy, NHL (chemo last on 2/22), copd, Afib (not on AC 2/2 to hx of GIB) s/p watchman, presented to [facility] for SOB, nausea generalized weakness and CP. In the ED pt was found to have NSTEMI, being worked up for GIB and Pna pt was admitted to [facility]. Hospital course c/b RRT for SVT tonight pt was given adenosine 6mg x1, 12mg x2 with no improvement followed up by 20mg of IV Cardizem w/ improvement in HR. Repeat EKG finding showing possible Afib/flutter pt was started on Cardizem gtt w/ improvement in HR. MICU consult requested for SVT. Pt seen and examined will admit to MICU for Acute hypoxic resp failure 2/2 to pna, elevated trops likely 2/2 to demand ischemia in the setting of rapid HR, and r/o GIB.      NEURO  -MS stable cont to monitor    CARDIAC  - SVT likely 2/2 to Afib/flutter now with improvement in HR   - will hold Cardizem gtt now s/p HR <100 and lower trending blood pressures  - Elevated trops likely in the setting of demand, will trend to r/o NSTEMI/ACS will hold off on AC for now given concern for GIB    RESP  - Acute hypoxic resp failure  - Patient on 3L O2 NC managing patient's dyspnea, duonebs q6h started     GI  - Dash diet started  - GIB? being ruled out h/h stable cont to trend  - GI ppx w/ protonix     RENAL  - Renal function stable cont to monitor along w/ lytes, d/c IVF    ID  - Pna cont IV zosyn check blood cx, UA, MRSA swap, full RVP   - Procal slightly elevated at 0.13    ENDO  - monitor FS following diet initiation and hold of IVF at the moment    HEME  - DVT ppx w/ SCD holding chemical AC now till GIB ruled out    DISPO  - Pt is full code 82 y/o F w/ pmh of cirrhosis, AA, pad s/p R. carotid endarterectomy, NHL (chemo last on 2/22), copd, Afib (not on AC 2/2 to hx of GIB) s/p watchman, presented to [facility] for SOB, nausea generalized weakness and CP. In the ED pt was found to have NSTEMI, being worked up for GIB and Pna pt was admitted to [facility]. Hospital course c/b RRT for SVT tonight pt was given adenosine 6mg x1, 12mg x2 with no improvement followed up by 20mg of IV Cardizem w/ improvement in HR. Repeat EKG finding showing possible Afib/flutter pt was started on Cardizem gtt w/ improvement in HR. MICU consult requested for SVT. Pt seen and examined will admit to MICU for Acute hypoxic resp failure 2/2 to pna, elevated trops likely 2/2 to demand ischemia in the setting of rapid HR, and r/o GIB.      NEURO  -MS stable cont to monitor    CARDIAC  - SVT likely 2/2 to Afib/flutter now with improvement in HR   - dc Cardizem gtt now s/p HR <100 and lower trending blood pressures  - Elevated trops likely in the setting of demand, will trend to r/o NSTEMI/ACS will hold off on AC for now given concern for GIB    RESP  - Acute hypoxic resp failure  - Patient on 3L O2 NC managing patient's dyspnea   - duonebs q6h started     GI  - FOBT +. Hx of prior GI bleed.  - Dash diet started  - GIB? being ruled out h/h decreased, but likely dilutional given all cell lines decreased.   - GI ppx w/ protonix     RENAL  - Renal function stable   - cont to monitor along w/ lytes, replete PRN   - d/c IVF    ID  - PNA cont IV zosyn   - check blood cx, UA - negative , MRSA - negative, full RVP - negative   - Procal slightly elevated at 0.13    ENDO  - monitor FS   - started diet   - on ADAM    HEME  - DVT ppx w/ SCD holding chemical AC now till GIB ruled out    DISPO  - Pt is full code 84 y/o F w/ pmh of cirrhosis, AA, pad s/p R. carotid endarterectomy, NHL (chemo last on 2/22), copd, Afib (not on AC 2/2 to hx of GIB) s/p watchman, presented to [facility] for SOB, nausea generalized weakness and CP. In the ED pt was found to have NSTEMI, being worked up for GIB and Pna pt was admitted to [facility]. Hospital course c/b RRT for SVT tonight pt was given adenosine 6mg x1, 12mg x2 with no improvement followed up by 20mg of IV Cardizem w/ improvement in HR. Repeat EKG finding showing possible Afib/flutter pt was started on Cardizem gtt w/ improvement in HR. MICU consult requested for SVT. Pt seen and examined will admit to MICU for Acute hypoxic resp failure 2/2 to pna, elevated trops likely 2/2 to demand ischemia in the setting of rapid HR, and r/o GIB.    NEURO  -no acute interventions at this time  -no active issues    CARDIAC  - Paroxysmal Afib/Aflutter yesterday with an episode of SVT, Patent has returned to NSR   - will hold Cardizem gtt now s/p HR <100 and lower trending blood pressures  - Elevated trops likely in the setting of demand, trops downtrending and patient denies chest pain, will trend AM Trop to r/o NSTEMI/ACS  - will hold off on AC for now given concern for GIB  - Pt is not on AC but has Watchman    RESP  - Acute hypoxic resp failure 2/2 PNA  - CTA: pleural effusion appears to be chronic, -PE   - Patient on 3L O2 NC managing patient's dyspnea, duonebs q6h started     GI  - Dash diet started  - GIB? +FOBT, h/h stable cont to trend, baseline Hemoglobin: 9-10  - GI ppx w/ protonix     RENAL  - Renal function stable cont to monitor along w/ lytes, d/c IVF    ID  - Pna cont IV zosyn check blood cx, UCX, MRSA swap, [place] and strep urine antigen, full RVP   - Procal slightly elevated at 0.13    ENDO  - monitor FS following diet initiation and hold of IVF at the moment  - elevated BG checks, sliding scale initiated, f/u A1C tomorrow    HEME  - DVT ppx w/ SCD holding chemical AC now till GIB ruled out    DISPO  - Pt is full code

## 2024-02-26 NOTE — PHARMACOTHERAPY INTERVENTION NOTE - COMMENTS
Patient is an 83 year old female currently ordered for a diltiazem drip. Discussed with ICU resident Bebo. Recommended titrating to a goal heart rate rehan of 85-90 rather than just a goal of 90 bpm. Accepted and order updated.  Patient is an 83 year old female currently ordered for a diltiazem drip. Discussed with ICU resident Bebo. Recommended titrating to a goal heart rate range of 85-90 rather than just a goal of 90 bpm. Accepted and order updated.

## 2024-02-26 NOTE — CONSULT NOTE ADULT - SUBJECTIVE AND OBJECTIVE BOX
[facility]         Mg Win, Marguerite, June, Faby, Jada Avery        [phone] (office)    HPI:  83 y.o. 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 Patient went into SVT after an episode of emesis and rapid response was called. Patient was given adenosine IV 6mg [id number] x2, with no improvement and then cardizem 20mg IV with improvement in HR. Repeat EKG showed afib/aflutter and started on cardizem gtt. Patient converted back to sinus rhythm, and cardizem gtt was stopped. Today patient reports that her SOB has improved. Denies headache, chest pain, palpitations, lightheadedness, dizziness.       PAST MEDICAL & SURGICAL HISTORY:  Carotid artery obstruction, left  had carotid surgery 2016    Lung mass  RLL, 2016, had RL lobectomy & chemotherapy    Hip fracture  left femur, had pins placed in 2016    Lumbar radiculopathy    Hyperlipidemia    Lymphoma    Former smoker    Unspecified atrial fibrillation    Hypertension    Hip fracture  2016, left hip    Right lower lobe lung mass  Right lower lobectomy 2016, chemo till 01/2017    Status post carotid surgery  left carotid artery stenosis 2016    History of loop recorder  placed 9/2021      SOCIAL HISTORY: No active tobacco, alcohol or illicit drug use    FAMILY HISTORY:  FH: diabetes mellitus (Father)        Home Medications:  amLODIPine 5 mg oral tablet: 1 tab(s) orally once a day (26 Feb 2024 11:56)  atorvastatin 20 mg oral tablet: 1 tab(s) orally once a day (at bedtime) (26 Feb 2024 11:56)  furosemide 20 mg oral tablet: 1 tab(s) orally every other day (26 Feb 2024 11:56)  ipratropium-albuterol 0.5 mg-2.5 mg/3 mL inhalation solution: 3 milliliter(s) by nebulizer once a day *Patient not compliant - takes 5x/ week (26 Feb 2024 11:56)  lisinopril 40 mg oral tablet: 1 tab(s) orally once a day (26 Feb 2024 11:56)  potassium chloride 10 mEq oral tablet, extended release: 1 tab(s) orally once a day (26 Feb 2024 11:56)  Slow Fe (as elemental iron) 45 mg oral tablet, extended release: 1 tab(s) orally once a day *Patient unsure of strength- States takes Slow Iron (26 Feb 2024 11:56)      MEDICATIONS  (STANDING):  albuterol/ipratropium for Nebulization 3 milliLiter(s) Nebulizer every 6 hours  atorvastatin 20 milliGRAM(s) Oral at bedtime  dextrose 5%. 1000 milliLiter(s) (100 mL/Hr) IV Continuous <Continuous>  dextrose 5%. 1000 milliLiter(s) (50 mL/Hr) IV Continuous <Continuous>  dextrose 50% Injectable 12.5 Gram(s) IV Push once  dextrose 50% Injectable 25 Gram(s) IV Push once  dextrose 50% Injectable 25 Gram(s) IV Push once  ferrous    sulfate 325 milliGRAM(s) Oral daily  folic acid 1 milliGRAM(s) Oral daily  glucagon  Injectable 1 milliGRAM(s) IntraMuscular once  insulin lispro (ADMELOG) corrective regimen sliding scale   SubCutaneous at bedtime  insulin lispro (ADMELOG) corrective regimen sliding scale   SubCutaneous three times a day before meals  pantoprazole  Injectable 40 milliGRAM(s) IV Push two times a day  piperacillin/tazobactam IVPB.- 3.375 Gram(s) IV Intermittent once  piperacillin/tazobactam IVPB.. 3.375 Gram(s) IV Intermittent every 8 hours    MEDICATIONS  (PRN):  dextrose Oral Gel 15 Gram(s) Oral once PRN Blood Glucose LESS THAN 70 milliGRAM(s)/deciliter      Allergies    No Known Drug Allergies  latex (Rash)    Intolerances    aspirin (Stomach Upset)      REVIEW OF SYSTEMS: Negative except as per HPI.    VITAL SIGNS:   Vital Signs Last 24 Hrs  T(C): 36.7 (26 Feb 2024 11:56), Max: 36.7 (26 Feb 2024 11:56)  T(F): 98.1 (26 Feb 2024 11:56), Max: 98.1 (26 Feb 2024 11:56)  HR: 95 (26 Feb 2024 13:00) (82 - 110)  BP: 136/63 (26 Feb 2024 13:00) (85/50 - 147/83)  BP(mean): 90 (26 Feb 2024 13:00) (63 - 93)  RR: 23 (26 Feb 2024 13:00) (16 - 36)  SpO2: 98% (26 Feb 2024 13:00) (91% - 100%)    Parameters below as of 26 Feb 2024 08:00  Patient On (Oxygen Delivery Method): nasal cannula  O2 Flow (L/min): 3      I&O's Summary    25 Feb 2024 07:01  -  26 Feb 2024 07:00  --------------------------------------------------------  IN: 470 mL / OUT: 560 mL / NET: -90 mL    26 Feb 2024 07:01  -  26 Feb 2024 14:32  --------------------------------------------------------  IN: 150 mL / OUT: 500 mL / NET: -350 mL        PHYSICAL EXAM:  Constitutional: NAD, well-developed  HEENT NC/AT, moist mucous membranes  Pulmonary: + inspiratory crackles B/L, 3L NC in place   Cardiovascular: +S1, S2, tachycardic, + systolic murmur   Gastrointestinal: Soft, nontender, nondistended, normoactive bowel sounds  Extremities: No peripheral edema   Neurological: Alert, strength and sensitivity are grossly intact  Skin: No obvious lesions/rashes  Psych: Mood & affect appropriate    LABS: All Labs Reviewed:                        9.7    7.71  )-----------( 199      ( 26 Feb 2024 05:43 )             29.8                         11.3   13.19 )-----------( 287      ( 25 Feb 2024 23:40 )             34.8                         11.3   19.36 )-----------( 343      ( 25 Feb 2024 20:18 )             35.4     26 Feb 2024 05:43    140    |  107    |  30     ----------------------------<  206    5.7     |  28     |  1.40   25 Feb 2024 20:40    137    |  106    |  33     ----------------------------<  240    5.0     |  22     |  1.30   25 Feb 2024 16:40    140    |  107    |  35     ----------------------------<  184    5.3     |  27     |  1.40     Ca    8.8        26 Feb 2024 05:43  Ca    9.2        25 Feb 2024 20:40  Ca    9.6        25 Feb 2024 16:40  Phos  3.7       26 Feb 2024 05:43  Phos  3.7       25 Feb 2024 20:40  Mg     1.8       26 Feb 2024 05:43  Mg     2.0       25 Feb 2024 20:40    TPro  6.1    /  Alb  2.9    /  TBili  0.4    /  DBili  x      /  AST  20     /  ALT  16     /  AlkPhos  82     26 Feb 2024 05:43  TPro  7.2    /  Alb  3.6    /  TBili  0.5    /  DBili  x      /  AST  19     /  ALT  16     /  AlkPhos  105    25 Feb 2024 20:40  TPro  6.9    /  Alb  3.5    /  TBili  0.4    /  DBili  x      /  AST  14     /  ALT  16     /  AlkPhos  94     25 Feb 2024 16:40    PT/INR - ( 25 Feb 2024 16:40 )   PT: 12.2 sec;   INR: 1.04 ratio         PTT - ( 25 Feb 2024 16:40 )  PTT:50.0 sec      Blood Culture:     02-26 @ 05:43  TSH: 2.44

## 2024-02-26 NOTE — PHARMACOTHERAPY INTERVENTION NOTE - COMMENTS
Med rec completed - confirmed meds with pharmacy and patient. Patient w/ PMH COPD is ordered for Spiriva inhaler (therapeutic interchange for Incruse inhaler - home med per past med rec). Per discussion w/ patient, patient does not endorse any inhaler use,  Patient states taking duoneb but non-compliant (takes it 5x/week - when remembered).  Discussed findings w/ ICU attending, Dr. Morrow and Spiriva inhaler was discontinued.

## 2024-02-26 NOTE — CHART NOTE - NSCHARTNOTEFT_GEN_A_CORE
: Odalys    INDICATION: SVT    PROCEDURE:  [x] LIMITED ECHO  [x] LIMITED CHEST  [ ] LIMITED RETROPERITONEAL  [ ] LIMITED ABDOMINAL  [ ] LIMITED DVT  [ ] NEEDLE GUIDANCE VASCULAR  [ ] NEEDLE GUIDANCE THORACENTESIS  [ ] NEEDLE GUIDANCE PARACENTESIS  [ ] NEEDLE GUIDANCE PERICARDIOCENTESIS  [ ] OTHER    FINDINGS:  - scattered B-lines anteriorly  - b/l L > R lower lobe consolidations  - small loculated R pleural effusion  - grossly preserved LV systolic function without focal wall motion abnormalities  - IVC ~1.3cm    INTERPRETATION:  - findings c/w multifocal PNA  - R loculated effusion as seen on CTA chest : Odalys    INDICATION: SVT    PROCEDURE:  [x] LIMITED ECHO  [x] LIMITED CHEST  [ ] LIMITED RETROPERITONEAL  [ ] LIMITED ABDOMINAL  [ ] LIMITED DVT  [ ] NEEDLE GUIDANCE VASCULAR  [ ] NEEDLE GUIDANCE THORACENTESIS  [ ] NEEDLE GUIDANCE PARACENTESIS  [ ] NEEDLE GUIDANCE PERICARDIOCENTESIS  [ ] OTHER    FINDINGS:  - scattered B-lines anteriorly  - b/l L > R lower lobe consolidations  - small loculated R pleural effusion  - at least mild LV systolic dysfunction without focal wall motion abnormalities  - IVC ~1.3cm    INTERPRETATION:  - findings c/w multifocal PNA  - R loculated effusion as seen on CTA chest

## 2024-02-26 NOTE — CONSULT NOTE ADULT - NS ATTEND AMEND GEN_ALL_CORE FT
83 y.o. 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     - Troponins 694.1 --> 1738.9 --> 1732.3 --> 1438.2. Elevated likely due to demand ischemia in the setting of tachyarrhythmia.   - Now sinus tachy and off cardizem gtt  - Start Lopressor 12.5 mg PO q12h   - No meaningful evidence of volume overload.  - F/u TTE   - Continue ASA and monitor H/H in the setting of GIB    - Monitor and replete lytes, keep K>4, Mg>2.

## 2024-02-26 NOTE — CONSULT NOTE ADULT - ASSESSMENT
83 y.o. 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     SVT   - Patient is not complaining of any cardiac symptoms at this time.  - Troponins 694.1 --> 1738.9 --> 1732.3 --> 1438.2. Elevated likely due to demand ischemia in the setting of tachyarrhythmia.   - EKG showing SVT 149bpm   - Now sinus tachy and off cardizem gtt  - Start Lopressor 12.5 mg PO q12h   - No meaningful evidence of volume overload.  - F/u TTE   - Continue ASA and monitor H/H in the setting of GIB    - BP well controlled, monitor routine hemodynamics  - Monitor and replete lytes, keep K>4, Mg>2.  - Strict I/Os, daily weights.      - Other cardiovascular workup will depend on clinical course.  - All other workup per primary team.  - Will continue to follow.

## 2024-02-26 NOTE — PROGRESS NOTE ADULT - SUBJECTIVE AND OBJECTIVE BOX
Patient is a 83y old  Female who presents with a chief complaint of ACS, PNA (26 Feb 2024 00:31)    24 hour events: ***    REVIEW OF SYSTEMS  Constitutional: No fever, chills, fatigue  Neuro: No headache, numbness, weakness  Resp: No cough, wheezing, shortness of breath  CVS: No chest pain, palpitations, leg swelling  GI: No abdominal pain, nausea, vomiting, diarrhea   : No dysuria, frequency, incontinence  Skin: No itching, burning, rashes, or lesions   Msk: No joint pain or swelling  Psych: No depression, anxiety, mood swings  Heme: No bleeding    T(F): 98.1 (02-26-24 @ 11:56), Max: 98.1 (02-26-24 @ 11:56)  HR: 100 (02-26-24 @ 12:00) (82 - 110)  BP: 121/58 (02-26-24 @ 12:00) (85/50 - 147/83)  RR: 35 (02-26-24 @ 12:00) (16 - 36)  SpO2: 98% (02-26-24 @ 12:00) (91% - 100%)  Wt(kg): --            I&O's Summary    02-25 @ 07:01  -  02-26 @ 07:00  --------------------------------------------------------  IN: 470 mL / OUT: 560 mL / NET: -90 mL    02-26 @ 07:01  -  02-26 @ 13:11  --------------------------------------------------------  IN: 100 mL / OUT: 500 mL / NET: -400 mL      PHYSICAL EXAM  General:   CNS:   HEENT:   Resp:   CVS:   Abd:   Ext:   Skin:     MEDICATIONS  piperacillin/tazobactam IVPB.- IV Intermittent  piperacillin/tazobactam IVPB.. IV Intermittent      atorvastatin Oral  dextrose 50% Injectable IV Push  dextrose 50% Injectable IV Push  dextrose 50% Injectable IV Push  dextrose Oral Gel Oral PRN  glucagon  Injectable IntraMuscular  insulin lispro (ADMELOG) corrective regimen sliding scale SubCutaneous  insulin lispro (ADMELOG) corrective regimen sliding scale SubCutaneous    albuterol/ipratropium for Nebulization Nebulizer          pantoprazole  Injectable IV Push      dextrose 5%. IV Continuous  dextrose 5%. IV Continuous  ferrous    sulfate Oral  folic acid Oral                                9.7    7.71  )-----------( 199      ( 26 Feb 2024 05:43 )             29.8       02-26    140  |  107  |  30<H>  ----------------------------<  206<H>  5.7<H>   |  28  |  1.40<H>    Ca    8.8      26 Feb 2024 05:43  Phos  3.7     02-26  Mg     1.8     02-26    TPro  6.1  /  Alb  2.9<L>  /  TBili  0.4  /  DBili  x   /  AST  20  /  ALT  16  /  AlkPhos  82  02-26          PT/INR - ( 25 Feb 2024 16:40 )   PT: 12.2 sec;   INR: 1.04 ratio         PTT - ( 25 Feb 2024 16:40 )  PTT:50.0 sec  Urinalysis Basic - ( 26 Feb 2024 05:43 )    Color: x / Appearance: x / SG: x / pH: x  Gluc: 206 mg/dL / Ketone: x  / Bili: x / Urobili: x   Blood: x / Protein: x / Nitrite: x   Leuk Esterase: x / RBC: x / WBC x   Sq Epi: x / Non Sq Epi: x / Bacteria: x            Radiology: ***  Bedside lung ultrasound: ***  Bedside ECHO: ***    CENTRAL LINE: Y/N          DATE INSERTED:              REMOVE: Y/N  BRANTLEY: Y/N                        DATE INSERTED:              REMOVE: Y/N  A-LINE: Y/N                       DATE INSERTED:              REMOVE: Y/N    GLOBAL ISSUE/BEST PRACTICE  Analgesia:   Sedation:   CAM-ICU:   HOB elevation: yes  Stress ulcer prophylaxis:   VTE prophylaxis:   Glycemic control:   Nutrition:     CODE STATUS: ***  [facility] discussion: Y       Patient is a 83y old  Female who presents with a chief complaint of ACS, PNA (26 Feb 2024 00:31)    24 hour events: Patient had a rapid response called yesterday at 9:14pm after she entered SVT after an episode of emesis. Patient was given 6mg,12mgx2 IV of adenosine which did not slow down her heart rate. Patient was then placed in Cardizem drip which improved her heart rate and decreased her blood pressure. Patient this morning stated her dyspnea has improved and denies the presence of chest pain or nausea at this time. Patient was concerned that she had the feeling of bladder fullness but was unable to empty her bladder.    REVIEW OF SYSTEMS  Constitutional: No fever, chills, fatigue  Neuro: No headache, numbness, weakness  Resp: + cough, no wheezing or shortness of breath  CVS: No chest pain, palpitations, leg swelling  GI: No abdominal pain, nausea, vomiting, diarrhea   : No dysuria, frequency, incontinence  Skin: No itching, burning, rashes, or lesions   Msk: No joint pain or swelling  Psych: No depression, anxiety, mood swings  Heme: No bleeding    T(F): 98.1 (02-26-24 @ 11:56), Max: 98.1 (02-26-24 @ 11:56)  HR: 100 (02-26-24 @ 12:00) (82 - 110)  BP: 121/58 (02-26-24 @ 12:00) (85/50 - 147/83)  RR: 35 (02-26-24 @ 12:00) (16 - 36)  SpO2: 98% (02-26-24 @ 12:00) (91% - 100%)  Wt(kg): --    I&O's Summary    02-25 @ 07:01  -  02-26 @ 07:00  --------------------------------------------------------  IN: 470 mL / OUT: 560 mL / NET: -90 mL    02-26 @ 07:01  -  02-26 @ 13:11  --------------------------------------------------------  IN: 100 mL / OUT: 500 mL / NET: -400 mL    Physical Exam:   Gen: somnolent NAD, lying down in bed on 3L  Neuro: awake, alert and following commands  HEENT: NC/AT  Resp: +coarse b/s b/l  CVS: +tachycardic, normal s1/s2, -M/R/G  Abd: BSx4, soft, NT/ND  Ext: no edema   Skin: warm/dry    MEDICATIONS  piperacillin/tazobactam IVPB.- IV Intermittent  piperacillin/tazobactam IVPB.. IV Intermittent  atorvastatin Oral  dextrose 50% Injectable IV Push  dextrose 50% Injectable IV Push  dextrose 50% Injectable IV Push  dextrose Oral Gel Oral PRN  glucagon  Injectable IntraMuscular  insulin lispro (ADMELOG) corrective regimen sliding scale SubCutaneous  insulin lispro (ADMELOG) corrective regimen sliding scale SubCutaneous  albuterol/ipratropium for Nebulization Nebulizer    pantoprazole  Injectable IV Push    dextrose 5%. IV Continuous  dextrose 5%. IV Continuous  ferrous    sulfate Oral  folic acid Oral                9.7    7.71  )-----------( 199      ( 26 Feb 2024 05:43 )             29.8       02-26    140  |  107  |  30<H>  ----------------------------<  206<H>  5.7<H>   |  28  |  1.40<H>    Ca    8.8      26 Feb 2024 05:43  Phos  3.7     02-26  Mg     1.8     02-26    TPro  6.1  /  Alb  2.9<L>  /  TBili  0.4  /  DBili  x   /  AST  20  /  ALT  16  /  AlkPhos  82  02-26  PT/INR - ( 25 Feb 2024 16:40 )   PT: 12.2 sec;   INR: 1.04 ratio    PTT - ( 25 Feb 2024 16:40 )  PTT:50.0 sec    Urinalysis Basic - ( 26 Feb 2024 05:43 )  Color: x / Appearance: x / SG: x / pH: x  Gluc: 206 mg/dL / Ketone: x  / Bili: x / Urobili: x   Blood: x / Protein: x / Nitrite: x   Leuk Esterase: x / RBC: x / WBC x   Sq Epi: x / Non Sq Epi: x / Bacteria: x      Radiology: ***  Bedside ECHO/lung ultrasound:   - scattered B-lines anteriorly  - b/l L > R lower lobe consolidations  - small loculated R pleural effusion  - grossly preserved LV systolic function without focal wall motion abnormalities  - IVC ~1.3cm    CENTRAL LINE: N           BRANTLEY: N                         A-LINE: N                       E: N    GLOBAL ISSUE/BEST PRACTICE  Analgesia: N  Sedation: N  CAM-ICU:   HOB elevation: yes  Stress ulcer prophylaxis:   VTE prophylaxis: SCDs  Glycemic control: Y  Nutrition: Y    CODE STATUS: Full Code  GOC discussion: Y       Patient is a 83y old  Female who presents with a chief complaint of ACS, PNA (26 Feb 2024 00:31)    24 hour events: Patient had a rapid response called yesterday at 9:14pm after she entered SVT after an episode of emesis. Patient was given 6mg,12mgx2 IV of adenosine which did not slow down her heart rate. Given cardizem 20 IV. Patient was then placed in Cardizem drip which improved her heart rate and decreased her blood pressure. Patient this morning stated her dyspnea has improved and denies the presence of chest pain or nausea at this time. Patient was concerned that she had the feeling of bladder fullness but was unable to empty her bladder.    REVIEW OF SYSTEMS  Constitutional: No fever, chills, fatigue  Neuro: No headache, numbness, weakness  Resp: + cough, no wheezing or shortness of breath  CVS: No chest pain, palpitations, leg swelling  GI: No abdominal pain, nausea, vomiting, diarrhea   : No dysuria, frequency, incontinence  Skin: No itching, burning, rashes, or lesions   Msk: No joint pain or swelling  Psych: No depression, anxiety, mood swings  Heme: No bleeding    T(F): 98.1 (02-26-24 @ 11:56), Max: 98.1 (02-26-24 @ 11:56)  HR: 100 (02-26-24 @ 12:00) (82 - 110)  BP: 121/58 (02-26-24 @ 12:00) (85/50 - 147/83)  RR: 35 (02-26-24 @ 12:00) (16 - 36)  SpO2: 98% (02-26-24 @ 12:00) (91% - 100%)  Wt(kg): --    I&O's Summary    02-25 @ 07:01  -  02-26 @ 07:00  --------------------------------------------------------  IN: 470 mL / OUT: 560 mL / NET: -90 mL    02-26 @ 07:01  -  02-26 @ 13:11  --------------------------------------------------------  IN: 100 mL / OUT: 500 mL / NET: -400 mL    Physical Exam:   Gen: NAD, lying down in bed on 3L  Neuro: awake, alert and following commands  HEENT: NC/AT  Resp: +coarse b/s b/l  CVS: +tachycardic, normal s1/s2, -M/R/G  Abd: BSx4, soft, NT/ND  Ext: no edema   Skin: warm/dry    MEDICATIONS  piperacillin/tazobactam IVPB.- IV Intermittent  piperacillin/tazobactam IVPB.. IV Intermittent  atorvastatin Oral  dextrose 50% Injectable IV Push  dextrose 50% Injectable IV Push  dextrose 50% Injectable IV Push  dextrose Oral Gel Oral PRN  glucagon  Injectable IntraMuscular  insulin lispro (ADMELOG) corrective regimen sliding scale SubCutaneous  insulin lispro (ADMELOG) corrective regimen sliding scale SubCutaneous  albuterol/ipratropium for Nebulization Nebulizer    pantoprazole  Injectable IV Push    dextrose 5%. IV Continuous  dextrose 5%. IV Continuous  ferrous    sulfate Oral  folic acid Oral                9.7    7.71  )-----------( 199      ( 26 Feb 2024 05:43 )             29.8       02-26    140  |  107  |  30<H>  ----------------------------<  206<H>  5.7<H>   |  28  |  1.40<H>    Ca    8.8      26 Feb 2024 05:43  Phos  3.7     02-26  Mg     1.8     02-26    TPro  6.1  /  Alb  2.9<L>  /  TBili  0.4  /  DBili  x   /  AST  20  /  ALT  16  /  AlkPhos  82  02-26  PT/INR - ( 25 Feb 2024 16:40 )   PT: 12.2 sec;   INR: 1.04 ratio    PTT - ( 25 Feb 2024 16:40 )  PTT:50.0 sec    Urinalysis Basic - ( 26 Feb 2024 05:43 )  Color: x / Appearance: x / SG: x / pH: x  Gluc: 206 mg/dL / Ketone: x  / Bili: x / Urobili: x   Blood: x / Protein: x / Nitrite: x   Leuk Esterase: x / RBC: x / WBC x   Sq Epi: x / Non Sq Epi: x / Bacteria: x      Radiology: none  Bedside ECHO/lung ultrasound:   - scattered B-lines anteriorly  - b/l L > R lower lobe consolidations  - small loculated R pleural effusion  - grossly preserved LV systolic function without focal wall motion abnormalities  - IVC ~1.3cm    CENTRAL LINE: N           BRANTLEY: N                         A-LINE: N                       E: N    GLOBAL ISSUE/BEST PRACTICE  Analgesia: N  Sedation: N  CAM-ICU:   HOB elevation: yes  Stress ulcer prophylaxis:   VTE prophylaxis: SCDs  Glycemic control: Y  Nutrition: Y    CODE STATUS: Full Code  GOC discussion: Y

## 2024-02-26 NOTE — CONSULT NOTE ADULT - SUBJECTIVE AND OBJECTIVE BOX
84 y/o F with acute hypoxemic respiratory failure, requires NIPPV;   suspected BL PNA; increased chronic R pl effusion (contribution of parapneumonic?).  SVT/Afluttter, on Cardizem ggt now. Troponin elevation, plateaued now (broad differential, most likely due to non-ACS cause - tachyarrhythmia, infection/SIRS, renal impairment).  GERSON vs worsening CKD.  PMH of pAF not on AC (h/o GIB, no source identified) s/p Watchman, atherosclerotic disease s/o R CEA, HFpEF (TTE from 2022 noted), COPD, NHL (s/p chemo last on 2/22).    Recommendations:  - maintain MAP>65, HR control, Cardizem ggt for now   - trend troponins, EKG, obtain TTE; Cardiology involved; consider AC if worsening troponenemia   - maintain Osats>92%, BiPAP - wean as tolerated; duonebs  - keep NPO for now; started on BID PPI (concern for GIB)  - monitor e-lytes, UOP; cautious hydration as needed  - on empiric Zosyn; cultures in progress, check urine Lagionella, Strep Ags  - monitor Hgb  - case discussed with onsite ICU team

## 2024-02-26 NOTE — CONSULT NOTE ADULT - ASSESSMENT
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   Vital Signs T(F): 97.5F HR: 110  BP: 147/83  RR: 18  SpO2: 97% on RA  Troponin 694.1,  CTA chest: No pulmonary embolism. New bilateral patchy and groundglass opacities, consistent with pneumonia. Hypodense left thyroid   lesion, measuring 0.9 cm.    RECOMMENDATIONS  1-Dyspnea noted leukocytosis with eosinopenia, hypoxemia, dyspnea, imaging concerning for airspace disease, but pt is afebrile and leukocytosis quickly resolved, so rec  Zosyn-started late 2/25 - continue for now with recs to follow based on result sof other testing    2-Elevated Troponins may be demand ischemia but appreciate eval for NSTEMI    recs to follow based on results of other tests.    Thank you for consulting us and involving us in the management of this most interesting and challenging case.  We will follow along in the care of this patient. Please call us at [phone] or text me directly on my cell# at [phone] with any concerns.

## 2024-02-26 NOTE — PROGRESS NOTE ADULT - SUBJECTIVE AND OBJECTIVE BOX
Name: CAROL ALEXANDRE  MRN: [id number]  LOCATION: [facility] ICU1 17A    ----  Patient is a 83y old  Female who presents with a chief complaint of ACS, PNA (26 Feb 2024 13:23)      FROM ADMISSION H+P:   HPI:  83F PMH cirrhosis, aortic stenosis, PAD s/p right carotid endarterectomy, non Hodgkins Lymphoma (pulmonary mass s/p removal, chemo; last infusion fall 2021), COPD, pAF s/p ILR implant 10/8/21, s/p watchman (2/11/22), hx of prior GI bleed requiring blood transfusions presents to the ED with complaints of shortness of breath, nausea, generalized weakness and chest pain that started at 2:20 PM.      ----  INTERVAL HPI/OVERNIGHT EVENTS: Pt seen and evaluated at the bedside. No acute overnight events occurred. Pt has been weaned off bipap and is currently on nasal cannula.   The patient was seen and evaluated at bedside. She reports feeling nauseous and experiencing several episodes of vomiting non-bloody emesis. The patient denies abdominal pain. Denies fever or chills. Denies abd pain. No chest pain or palpitaitons. No other focal complaints noted.     ----  PAST MEDICAL & SURGICAL HISTORY:  Carotid artery obstruction, left  had carotid surgery 2016      Lung mass  RLL, 2016, had RL lobectomy & chemotherapy      Hip fracture  left femur, had pins placed in 2016      Lumbar radiculopathy      Hyperlipidemia      Lymphoma      Former smoker      Unspecified atrial fibrillation      Hypertension      Hip fracture  2016, left hip      Right lower lobe lung mass  Right lower lobectomy 2016, chemo till 01/2017      Status post carotid surgery  left carotid artery stenosis 2016      History of loop recorder  placed 9/2021          FAMILY HISTORY:  FH: diabetes mellitus (Father)        Allergies    No Known Drug Allergies  latex (Rash)    Intolerances    aspirin (Stomach Upset)      ----  REVIEW OF SYSTEMS:  CONSTITUTIONAL: denies fever, chills   HEENT: denies blurred vision, sore throat  CARDIOVASCULAR: denies chest pain   RESPIRATORY: denies shortness of breath, sputum production  GASTROINTESTINAL: as per hpi  NEUROLOGICAL: denies numbness, headache, focal weakness  MUSCULOSKELETAL: denies new joint pain, muscle aches    ----  PHYSICAL EXAM:  Appears frail and weak    Mucous membranes are moist. No oral lesions visualized.   Clear to auscultation bilaterally. No wheezing or rales   S1-S2 mildly elevated heart rates, no murmurs noted   Abdomen is soft non-tender non-distended.   No peripheral edema visualized   Cap refill is less than 2 seconds in bilateral hands   Skin is warm, no pallor noted   No clubbing, no gross deformities   Sleepy but responsive to verbal stimuli. Good muscle tone throughout     T(C): 36.7 (02-26-24 @ 11:56), Max: 36.7 (02-26-24 @ 11:56)  HR: 95 (02-26-24 @ 13:00) (82 - 110)  BP: 136/63 (02-26-24 @ 13:00) (85/50 - 147/83)  RR: 23 (02-26-24 @ 13:00) (16 - 36)  SpO2: 98% (02-26-24 @ 13:00) (91% - 100%)  Wt(kg): --    ----  INTAKE & OUTPUT:  I&O's Summary    25 Feb 2024 07:01  -  26 Feb 2024 07:00  --------------------------------------------------------  IN: 470 mL / OUT: 560 mL / NET: -90 mL    26 Feb 2024 07:01  -  26 Feb 2024 14:15  --------------------------------------------------------  IN: 150 mL / OUT: 500 mL / NET: -350 mL        LABS:                        9.7    7.71  )-----------( 199      ( 26 Feb 2024 05:43 )             29.8     02-26    140  |  107  |  30<H>  ----------------------------<  206<H>  5.7<H>   |  28  |  1.40<H>    Ca    8.8      26 Feb 2024 05:43  Phos  3.7     02-26  Mg     1.8     02-26    TPro  6.1  /  Alb  2.9<L>  /  TBili  0.4  /  DBili  x   /  AST  20  /  ALT  16  /  AlkPhos  82  02-26    PT/INR - ( 25 Feb 2024 16:40 )   PT: 12.2 sec;   INR: 1.04 ratio         PTT - ( 25 Feb 2024 16:40 )  PTT:50.0 sec  Urinalysis Basic - ( 26 Feb 2024 05:43 )    Color: x / Appearance: x / SG: x / pH: x  Gluc: 206 mg/dL / Ketone: x  / Bili: x / Urobili: x   Blood: x / Protein: x / Nitrite: x   Leuk Esterase: x / RBC: x / WBC x   Sq Epi: x / Non Sq Epi: x / Bacteria: x      CAPILLARY BLOOD GLUCOSE      POCT Blood Glucose.: 188 mg/dL (26 Feb 2024 11:19)  POCT Blood Glucose.: 210 mg/dL (26 Feb 2024 05:09)  POCT Blood Glucose.: 280 mg/dL (26 Feb 2024 00:45)  POCT Blood Glucose.: 193 mg/dL (25 Feb 2024 20:39)  POCT Blood Glucose.: 165 mg/dL (25 Feb 2024 20:15)    ABG - ( 25 Feb 2024 22:36 )  pH, Arterial: 7.48  pH, Blood: x     /  pCO2: 28    /  pO2: 177   / HCO3: 21    / Base Excess: -2.6  /  SaO2: 99.8                  ----  DATA REVIEW:  Personally reviewed:  Vital sign trends:  afebrile, intermittently tachycardic, bp improving, on nasal cannula  Laboratory results:  baseline ckd, mild hyperkalemia, h/h labile  Radiology results:  large R sided pleural effusion, loculated as noted on CT chest and based on TTE  Microbio results:  nothing available yet  Consultant recommendations:  id, micu recs appreciated          ----  ACTIVE MEDICATIONS (by system):  - CV - atorvastatin 20 milliGRAM(s) Oral at bedtime  - ENDO - insulin lispro (ADMELOG) corrective regimen sliding scale   SubCutaneous at bedtime  - ENDO - insulin lispro (ADMELOG) corrective regimen sliding scale   SubCutaneous three times a day before meals  - GI - pantoprazole  Injectable 40 milliGRAM(s) IV Push two times a day  - ID - piperacillin/tazobactam IVPB.. 3.375 Gram(s) IV Intermittent every 8 hours  - PULM - albuterol/ipratropium for Nebulization 3 milliLiter(s) Nebulizer every 6 hours  - SUPPLEMENT - ferrous    sulfate 325 milliGRAM(s) Oral daily  - SUPPLEMENT - folic acid 1 milliGRAM(s) Oral daily

## 2024-02-26 NOTE — CONSULT NOTE ADULT - SUBJECTIVE AND OBJECTIVE BOX
[facility] DIVISION of INFECTIOUS DISEASE  Simón Nolasco MD PhD, Patrica Bear MD, Lluvia Stahl MD, Tree Avery MD, Pelon Peralta MD  and providing coverage with Gerson Ortiz MD  Providing Infectious Disease Consultations at [facility], [facility], [place], [place], [facility], [facility]'s    Office# [phone] to schedule follow up appointments  Answering Service for urgent calls or New Consults [phone]  Cell# to text for urgent issues Simón Nolasco [phone]     HPI:  83F PMH cirrhosis, aortic stenosis, PAD s/p right carotid endarterectomy, 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  Patient reported she was hanging up some [employer] decorations  when the symptoms started. Daughter at bedside reported decreased PO intake, and has been vomiting intermittently all day long.  She denies any abdominal pain, fever chills, cough or recent URI symptoms.  Reports she felt fine all morning.     ED course:  Vital Signs T(F): 97.5F HR: 110  BP: 147/83  RR: 18  SpO2: 97% on RA  Troponin 694.1,    CXR: Slight increase left lung interstitial pattern and left loop recorder are presently seen. Otherwise stable findings.  CTA chest: No pulmonary embolism. New bilateral patchy and groundglass opacities, consistent with pneumonia. Hypodense left thyroid   lesion, measuring 0.9 cm.      PAST MEDICAL & SURGICAL HISTORY:  Carotid artery obstruction, left  had carotid surgery 2016      Lung mass  RLL, 2016, had RL lobectomy & chemotherapy      Hip fracture  left femur, had pins placed in 2016      Lumbar radiculopathy      Hyperlipidemia      Lymphoma      Former smoker      Unspecified atrial fibrillation      Hypertension      Hip fracture  2016, left hip      Right lower lobe lung mass  Right lower lobectomy 2016, chemo till 01/2017      Status post carotid surgery  left carotid artery stenosis 2016      History of loop recorder  placed 9/2021          Antimicrobials  piperacillin/tazobactam IVPB.- 3.375 Gram(s) IV Intermittent once  piperacillin/tazobactam IVPB.. 3.375 Gram(s) IV Intermittent every 8 hours      Immunological      Other  albuterol/ipratropium for Nebulization 3 milliLiter(s) Nebulizer every 6 hours  atorvastatin 20 milliGRAM(s) Oral at bedtime  dextrose 5%. 1000 milliLiter(s) IV Continuous <Continuous>  dextrose 5%. 1000 milliLiter(s) IV Continuous <Continuous>  dextrose 50% Injectable 25 Gram(s) IV Push once  dextrose 50% Injectable 12.5 Gram(s) IV Push once  dextrose 50% Injectable 25 Gram(s) IV Push once  dextrose Oral Gel 15 Gram(s) Oral once PRN  ferrous    sulfate 325 milliGRAM(s) Oral daily  folic acid 1 milliGRAM(s) Oral daily  glucagon  Injectable 1 milliGRAM(s) IntraMuscular once  insulin lispro (ADMELOG) corrective regimen sliding scale   SubCutaneous at bedtime  insulin lispro (ADMELOG) corrective regimen sliding scale   SubCutaneous three times a day before meals  pantoprazole  Injectable 40 milliGRAM(s) IV Push two times a day      Allergies    No Known Drug Allergies  latex (Rash)    Intolerances    aspirin (Stomach Upset)      SOCIAL HISTORY:  no current toxic habits reported      FAMILY HISTORY:  FH: diabetes mellitus (Father)        ROS:    limited by cognitive status    Vital Signs Last 24 Hrs  T(C): 36.7 (26 Feb 2024 11:56), Max: 36.7 (26 Feb 2024 11:56)  T(F): 98.1 (26 Feb 2024 11:56), Max: 98.1 (26 Feb 2024 11:56)  HR: 100 (26 Feb 2024 12:00) (82 - 110)  BP: 121/58 (26 Feb 2024 12:00) (85/50 - 147/83)  BP(mean): 84 (26 Feb 2024 12:00) (63 - 93)  RR: 35 (26 Feb 2024 12:00) (16 - 36)  SpO2: 98% (26 Feb 2024 12:00) (91% - 100%)    Parameters below as of 26 Feb 2024 08:00  Patient On (Oxygen Delivery Method): nasal cannula  O2 Flow (L/min): 3      PE:  In no distress  HEENT:  NC, PERRL, sclerae anicteric, conjunctivae clear, EOMI.  Sinuses nontender, no nasal exudate.  No buccal or pharyngeal lesions, erythema or exudate  Neck:  Supple, no adenopathy  Lungs:  No accessory muscle use, dec BS RLL  Cor:  distant  Abd:  Symmetric, normoactive BS.  Soft, nontender, no masses, guarding or rebound.  Liver and spleen not enlarged  Extrem:  No cyanosis or edema  Skin:  No rashes.  Neuro: somnolent      LABS:                        9.7    7.71  )-----------( 199      ( 26 Feb 2024 05:43 )             29.8       WBC Count: 7.71 K/uL (02-26-24 @ 05:43)  WBC Count: 13.19 K/uL (02-25-24 @ 23:40)  WBC Count: 19.36 K/uL (02-25-24 @ 20:18)  WBC Count: 8.67 K/uL (02-25-24 @ 16:40)      02-26    140  |  107  |  30<H>  ----------------------------<  206<H>  5.7<H>   |  28  |  1.40<H>    Ca    8.8      26 Feb 2024 05:43  Phos  3.7     02-26  Mg     1.8     02-26    TPro  6.1  /  Alb  2.9<L>  /  TBili  0.4  /  DBili  x   /  AST  20  /  ALT  16  /  AlkPhos  82  02-26      Creatinine: 1.40 mg/dL (02-26-24 @ 05:43)  Creatinine: 1.30 mg/dL (02-25-24 @ 20:40)  Creatinine: 1.40 mg/dL (02-25-24 @ 16:40)      Urinalysis Basic - ( 26 Feb 2024 05:43 )    Color: x / Appearance: x / SG: x / pH: x  Gluc: 206 mg/dL / Ketone: x  / Bili: x / Urobili: x   Blood: x / Protein: x / Nitrite: x   Leuk Esterase: x / RBC: x / WBC x   Sq Epi: x / Non Sq Epi: x / Bacteria: x              MICROBIOLOGY:      RADIOLOGY & ADDITIONAL STUDIES:    --

## 2024-02-27 LAB
A1C WITH ESTIMATED AVERAGE GLUCOSE RESULT: 6.2 % — HIGH (ref 4–5.6)
ANION GAP SERPL CALC-SCNC: 5 MMOL/L — SIGNIFICANT CHANGE UP (ref 5–17)
BASOPHILS # BLD AUTO: 0.03 K/UL — SIGNIFICANT CHANGE UP (ref 0–0.2)
BASOPHILS NFR BLD AUTO: 0.4 % — SIGNIFICANT CHANGE UP (ref 0–2)
BUN SERPL-MCNC: 26 MG/DL — HIGH (ref 7–23)
CALCIUM SERPL-MCNC: 9.5 MG/DL — SIGNIFICANT CHANGE UP (ref 8.5–10.1)
CHLORIDE SERPL-SCNC: 108 MMOL/L — SIGNIFICANT CHANGE UP (ref 96–108)
CO2 SERPL-SCNC: 27 MMOL/L — SIGNIFICANT CHANGE UP (ref 22–31)
CREAT SERPL-MCNC: 1.6 MG/DL — HIGH (ref 0.5–1.3)
CULTURE RESULTS: NO GROWTH — SIGNIFICANT CHANGE UP
EGFR: 32 ML/MIN/1.73M2 — LOW
EOSINOPHIL # BLD AUTO: 0.04 K/UL — SIGNIFICANT CHANGE UP (ref 0–0.5)
EOSINOPHIL NFR BLD AUTO: 0.6 % — SIGNIFICANT CHANGE UP (ref 0–6)
ESTIMATED AVERAGE GLUCOSE: 131 MG/DL — HIGH (ref 68–114)
GLUCOSE SERPL-MCNC: 163 MG/DL — HIGH (ref 70–99)
HCT VFR BLD CALC: 31.1 % — LOW (ref 34.5–45)
HGB BLD-MCNC: 9.9 G/DL — LOW (ref 11.5–15.5)
IMM GRANULOCYTES NFR BLD AUTO: 0.4 % — SIGNIFICANT CHANGE UP (ref 0–0.9)
LEGIONELLA AG UR QL: NEGATIVE — SIGNIFICANT CHANGE UP
LYMPHOCYTES # BLD AUTO: 1.04 K/UL — SIGNIFICANT CHANGE UP (ref 1–3.3)
LYMPHOCYTES # BLD AUTO: 15.1 % — SIGNIFICANT CHANGE UP (ref 13–44)
MAGNESIUM SERPL-MCNC: 1.9 MG/DL — SIGNIFICANT CHANGE UP (ref 1.6–2.6)
MCHC RBC-ENTMCNC: 28.9 PG — SIGNIFICANT CHANGE UP (ref 27–34)
MCHC RBC-ENTMCNC: 31.8 GM/DL — LOW (ref 32–36)
MCV RBC AUTO: 90.9 FL — SIGNIFICANT CHANGE UP (ref 80–100)
MONOCYTES # BLD AUTO: 0.6 K/UL — SIGNIFICANT CHANGE UP (ref 0–0.9)
MONOCYTES NFR BLD AUTO: 8.7 % — SIGNIFICANT CHANGE UP (ref 2–14)
NEUTROPHILS # BLD AUTO: 5.13 K/UL — SIGNIFICANT CHANGE UP (ref 1.8–7.4)
NEUTROPHILS NFR BLD AUTO: 74.8 % — SIGNIFICANT CHANGE UP (ref 43–77)
NRBC # BLD: 0 /100 WBCS — SIGNIFICANT CHANGE UP (ref 0–0)
PHOSPHATE SERPL-MCNC: 4.1 MG/DL — SIGNIFICANT CHANGE UP (ref 2.5–4.5)
PLATELET # BLD AUTO: 174 K/UL — SIGNIFICANT CHANGE UP (ref 150–400)
POTASSIUM SERPL-MCNC: 4.4 MMOL/L — SIGNIFICANT CHANGE UP (ref 3.5–5.3)
POTASSIUM SERPL-SCNC: 4.4 MMOL/L — SIGNIFICANT CHANGE UP (ref 3.5–5.3)
RBC # BLD: 3.42 M/UL — LOW (ref 3.8–5.2)
RBC # FLD: 14.4 % — SIGNIFICANT CHANGE UP (ref 10.3–14.5)
S PNEUM AG UR QL: NEGATIVE — SIGNIFICANT CHANGE UP
SODIUM SERPL-SCNC: 140 MMOL/L — SIGNIFICANT CHANGE UP (ref 135–145)
SPECIMEN SOURCE: SIGNIFICANT CHANGE UP
WBC # BLD: 6.87 K/UL — SIGNIFICANT CHANGE UP (ref 3.8–10.5)
WBC # FLD AUTO: 6.87 K/UL — SIGNIFICANT CHANGE UP (ref 3.8–10.5)

## 2024-02-27 PROCEDURE — 99233 SBSQ HOSP IP/OBS HIGH 50: CPT

## 2024-02-27 PROCEDURE — 99291 CRITICAL CARE FIRST HOUR: CPT

## 2024-02-27 PROCEDURE — 93306 TTE W/DOPPLER COMPLETE: CPT | Mod: 26

## 2024-02-27 PROCEDURE — 99233 SBSQ HOSP IP/OBS HIGH 50: CPT | Mod: GC

## 2024-02-27 RX ORDER — ACETAMINOPHEN 500 MG
1000 TABLET ORAL ONCE
Refills: 0 | Status: COMPLETED | OUTPATIENT
Start: 2024-02-27 | End: 2024-02-27

## 2024-02-27 RX ORDER — SODIUM CHLORIDE 9 MG/ML
1000 INJECTION, SOLUTION INTRAVENOUS ONCE
Refills: 0 | Status: COMPLETED | OUTPATIENT
Start: 2024-02-27 | End: 2024-02-27

## 2024-02-27 RX ORDER — HEPARIN SODIUM 5000 [USP'U]/ML
5000 INJECTION INTRAVENOUS; SUBCUTANEOUS EVERY 12 HOURS
Refills: 0 | Status: DISCONTINUED | OUTPATIENT
Start: 2024-02-27 | End: 2024-02-27

## 2024-02-27 RX ORDER — IPRATROPIUM/ALBUTEROL SULFATE 18-103MCG
3 AEROSOL WITH ADAPTER (GRAM) INHALATION EVERY 6 HOURS
Refills: 0 | Status: DISCONTINUED | OUTPATIENT
Start: 2024-02-27 | End: 2024-03-02

## 2024-02-27 RX ORDER — LANOLIN ALCOHOL/MO/W.PET/CERES
5 CREAM (GRAM) TOPICAL AT BEDTIME
Refills: 0 | Status: DISCONTINUED | OUTPATIENT
Start: 2024-02-27 | End: 2024-03-02

## 2024-02-27 RX ORDER — ONDANSETRON 8 MG/1
4 TABLET, FILM COATED ORAL EVERY 6 HOURS
Refills: 0 | Status: DISCONTINUED | OUTPATIENT
Start: 2024-02-27 | End: 2024-03-02

## 2024-02-27 RX ORDER — CEFTRIAXONE 500 MG/1
1000 INJECTION, POWDER, FOR SOLUTION INTRAMUSCULAR; INTRAVENOUS EVERY 24 HOURS
Refills: 0 | Status: DISCONTINUED | OUTPATIENT
Start: 2024-02-27 | End: 2024-03-02

## 2024-02-27 RX ORDER — METOPROLOL TARTRATE 50 MG
12.5 TABLET ORAL ONCE
Refills: 0 | Status: COMPLETED | OUTPATIENT
Start: 2024-02-27 | End: 2024-02-27

## 2024-02-27 RX ORDER — METOPROLOL TARTRATE 50 MG
12.5 TABLET ORAL EVERY 12 HOURS
Refills: 0 | Status: DISCONTINUED | OUTPATIENT
Start: 2024-02-27 | End: 2024-02-28

## 2024-02-27 RX ORDER — CHLORHEXIDINE GLUCONATE 213 G/1000ML
1 SOLUTION TOPICAL
Refills: 0 | Status: DISCONTINUED | OUTPATIENT
Start: 2024-02-27 | End: 2024-03-02

## 2024-02-27 RX ORDER — HEPARIN SODIUM 5000 [USP'U]/ML
5000 INJECTION INTRAVENOUS; SUBCUTANEOUS EVERY 8 HOURS
Refills: 0 | Status: DISCONTINUED | OUTPATIENT
Start: 2024-02-27 | End: 2024-03-02

## 2024-02-27 RX ADMIN — ATORVASTATIN CALCIUM 20 MILLIGRAM(S): 80 TABLET, FILM COATED ORAL at 21:06

## 2024-02-27 RX ADMIN — Medication 12.5 MILLIGRAM(S): at 10:38

## 2024-02-27 RX ADMIN — CEFTRIAXONE 100 MILLIGRAM(S): 500 INJECTION, POWDER, FOR SOLUTION INTRAMUSCULAR; INTRAVENOUS at 11:30

## 2024-02-27 RX ADMIN — Medication 400 MILLIGRAM(S): at 16:00

## 2024-02-27 RX ADMIN — Medication 1: at 08:30

## 2024-02-27 RX ADMIN — HEPARIN SODIUM 5000 UNIT(S): 5000 INJECTION INTRAVENOUS; SUBCUTANEOUS at 21:06

## 2024-02-27 RX ADMIN — Medication 325 MILLIGRAM(S): at 11:30

## 2024-02-27 RX ADMIN — Medication 12.5 MILLIGRAM(S): at 17:19

## 2024-02-27 RX ADMIN — Medication 2: at 17:19

## 2024-02-27 RX ADMIN — HEPARIN SODIUM 5000 UNIT(S): 5000 INJECTION INTRAVENOUS; SUBCUTANEOUS at 13:03

## 2024-02-27 RX ADMIN — PIPERACILLIN AND TAZOBACTAM 25 GRAM(S): 4; .5 INJECTION, POWDER, LYOPHILIZED, FOR SOLUTION INTRAVENOUS at 05:12

## 2024-02-27 RX ADMIN — Medication 1 MILLIGRAM(S): at 11:30

## 2024-02-27 RX ADMIN — ONDANSETRON 4 MILLIGRAM(S): 8 TABLET, FILM COATED ORAL at 09:08

## 2024-02-27 RX ADMIN — Medication 1000 MILLIGRAM(S): at 17:26

## 2024-02-27 RX ADMIN — PANTOPRAZOLE SODIUM 40 MILLIGRAM(S): 20 TABLET, DELAYED RELEASE ORAL at 17:19

## 2024-02-27 RX ADMIN — ONDANSETRON 4 MILLIGRAM(S): 8 TABLET, FILM COATED ORAL at 16:00

## 2024-02-27 RX ADMIN — SODIUM CHLORIDE 200 MILLILITER(S): 9 INJECTION, SOLUTION INTRAVENOUS at 10:38

## 2024-02-27 RX ADMIN — Medication 5 MILLIGRAM(S): at 21:23

## 2024-02-27 RX ADMIN — CHLORHEXIDINE GLUCONATE 1 APPLICATION(S): 213 SOLUTION TOPICAL at 17:49

## 2024-02-27 RX ADMIN — PANTOPRAZOLE SODIUM 40 MILLIGRAM(S): 20 TABLET, DELAYED RELEASE ORAL at 05:12

## 2024-02-27 NOTE — PROGRESS NOTE ADULT - SUBJECTIVE AND OBJECTIVE BOX
[facility] -- Mg Win, June Everett, Bennie Hobbs Savella  Office # [phone]      Follow Up:  ACS PSVT     Subjective/Observations: Patient seen and examined. Events noted. Resting  in bed. No complaints of chest pain, dyspnea, or palpitations reported. No signs of orthopnea or PND. Now with sig nausea this AM and vomitting       REVIEW OF SYSTEMS: All other review of systems is negative unless indicated above    PAST MEDICAL & SURGICAL HISTORY:  Carotid artery obstruction, left  had carotid surgery 2016      Lung mass  RLL, 2016, had RL lobectomy & chemotherapy      Hip fracture  left femur, had pins placed in 2016      Lumbar radiculopathy      Hyperlipidemia      Lymphoma      Former smoker      Unspecified atrial fibrillation      Hypertension      Hip fracture  2016, left hip      Right lower lobe lung mass  Right lower lobectomy 2016, chemo till 01/2017      Status post carotid surgery  left carotid artery stenosis 2016      History of loop recorder  placed 9/2021          MEDICATIONS  (STANDING):  atorvastatin 20 milliGRAM(s) Oral at bedtime  cefTRIAXone   IVPB 1000 milliGRAM(s) IV Intermittent every 24 hours  chlorhexidine 2% Cloths 1 Application(s) Topical <User Schedule>  dextrose 5%. 1000 milliLiter(s) (50 mL/Hr) IV Continuous <Continuous>  dextrose 5%. 1000 milliLiter(s) (100 mL/Hr) IV Continuous <Continuous>  dextrose 50% Injectable 25 Gram(s) IV Push once  dextrose 50% Injectable 25 Gram(s) IV Push once  dextrose 50% Injectable 12.5 Gram(s) IV Push once  ferrous    sulfate 325 milliGRAM(s) Oral daily  folic acid 1 milliGRAM(s) Oral daily  glucagon  Injectable 1 milliGRAM(s) IntraMuscular once  heparin   Injectable 5000 Unit(s) SubCutaneous every 8 hours  insulin lispro (ADMELOG) corrective regimen sliding scale   SubCutaneous three times a day before meals  insulin lispro (ADMELOG) corrective regimen sliding scale   SubCutaneous at bedtime  metoprolol tartrate 12.5 milliGRAM(s) Oral every 12 hours  pantoprazole  Injectable 40 milliGRAM(s) IV Push two times a day    MEDICATIONS  (PRN):  albuterol/ipratropium for Nebulization 3 milliLiter(s) Nebulizer every 6 hours PRN Shortness of Breath and/or Wheezing  dextrose Oral Gel 15 Gram(s) Oral once PRN Blood Glucose LESS THAN 70 milliGRAM(s)/deciliter  ondansetron Injectable 4 milliGRAM(s) IV Push every 6 hours PRN Nausea and/or Vomiting      Allergies    No Known Drug Allergies  latex (Rash)    Intolerances    aspirin (Stomach Upset)          Vital Signs Last 24 Hrs  T(C): 36.6 (27 Feb 2024 12:12), Max: 37.4 (27 Feb 2024 00:19)  T(F): 97.9 (27 Feb 2024 12:12), Max: 99.4 (27 Feb 2024 00:19)  HR: 109 (27 Feb 2024 13:00) (74 - 121)  BP: 106/59 (27 Feb 2024 13:00) (89/51 - 130/61)  BP(mean): 77 (27 Feb 2024 13:00) (66 - 88)  RR: 22 (27 Feb 2024 13:00) (16 - 28)  SpO2: 95% (27 Feb 2024 13:00) (94% - 100%)    Parameters below as of 27 Feb 2024 07:00  Patient On (Oxygen Delivery Method): nasal cannula  O2 Flow (L/min): 3      I&O's Summary    26 Feb 2024 07:01  -  27 Feb 2024 07:00  --------------------------------------------------------  IN: 650 mL / OUT: 1330 mL / NET: -680 mL    27 Feb 2024 07:01  -  27 Feb 2024 13:54  --------------------------------------------------------  IN: 1100 mL / OUT: 270 mL / NET: 830 mL          PHYSICAL EXAM:  [facility]: [address]   Constitutional: NAD, awake    HEENT: Moist Mucous Membranes, Anicteric  Pulmonary: Decreased breath sounds b/l. No rales, crackles or wheeze appreciated.   Cardiovascular: tachy, S1 and S2, No murmurs, rubs, gallops or clicks  Gastrointestinal: Bowel Sounds present, soft, nontender.   Lymph: No peripheral edema. No lymphadenopathy.  Skin: No visible rashes or ulcers.  Psych:  Mood & affect appropriate for situation    LABS: All Labs Reviewed:                        9.9    6.87  )-----------( 174      ( 27 Feb 2024 06:23 )             31.1                         9.7    7.71  )-----------( 199      ( 26 Feb 2024 05:43 )             29.8                         11.3   13.19 )-----------( 287      ( 25 Feb 2024 23:40 )             34.8     27 Feb 2024 06:23    140    |  108    |  26     ----------------------------<  163    4.4     |  27     |  1.60   26 Feb 2024 14:00    140    |  108    |  27     ----------------------------<  183    4.7     |  26     |  1.40   26 Feb 2024 05:43    140    |  107    |  30     ----------------------------<  206    5.7     |  28     |  1.40     Ca    9.5        27 Feb 2024 06:23  Ca    9.1        26 Feb 2024 14:00  Ca    8.8        26 Feb 2024 05:43  Phos  4.1       27 Feb 2024 06:23  Phos  3.7       26 Feb 2024 05:43  Phos  3.7       25 Feb 2024 20:40  Mg     1.9       27 Feb 2024 06:23  Mg     1.8       26 Feb 2024 05:43  Mg     2.0       25 Feb 2024 20:40    TPro  6.1    /  Alb  2.9    /  TBili  0.4    /  DBili  x      /  AST  20     /  ALT  16     /  AlkPhos  82     26 Feb 2024 05:43  TPro  7.2    /  Alb  3.6    /  TBili  0.5    /  DBili  x      /  AST  19     /  ALT  16     /  AlkPhos  105    25 Feb 2024 20:40  TPro  6.9    /  Alb  3.5    /  TBili  0.4    /  DBili  x      /  AST  14     /  ALT  16     /  AlkPhos  94     25 Feb 2024 16:40    PT/INR - ( 25 Feb 2024 16:40 )   PT: 12.2 sec;   INR: 1.04 ratio         PTT - ( 25 Feb 2024 16:40 )  PTT:50.0 sec    - Troponin: <-1438.2, <-1732.3, <-1738.9, <-694.1

## 2024-02-27 NOTE — DIETITIAN INITIAL EVALUATION ADULT - PROBLEM SELECTOR PLAN 3
Pt went into to SVT, rapid response called  - EKG in ED with sinus tachycardia rate 105  - Initial EKG at rapid showing SVT  - Adenosine 6mg, 12mgx2 IV given w/ little improvement in HR  - Cardizem 20mg IV given  - Repeat EKG ordered showing likely aflutter  - Discussed with Dr. Elias, cardizem Drip started @ 5mL/hr  - Cardiology (Dr. Elias) consulted, recs appreciated  - ICU eval

## 2024-02-27 NOTE — DIETITIAN INITIAL EVALUATION ADULT - ADD RECOMMEND
Recommend SLP evaluation to assess swallowing status. Encourage po intake with emphasis on HBV protein sources (declined oral nutritional supplements). Continue liberalized diet at this time in setting of poor po intake/weight loss. Recommend MVI with minerals daily. Recommend bowel regimen.

## 2024-02-27 NOTE — PROGRESS NOTE ADULT - SUBJECTIVE AND OBJECTIVE BOX
Name: CAROL ALEXANDRE  MRN: [id number]  LOCATION: [facility] ICU1 17A    ----  Patient is a 83y old  Female who presents with a chief complaint of ACS, PNA (27 Feb 2024 13:54)      FROM ADMISSION H+P:   HPI:  83F PMH cirrhosis, aortic stenosis, PAD s/p right carotid endarterectomy, non Hodgkins Lymphoma (pulmonary mass s/p removal, chemo; last infusion fall 2021), COPD, pAF s/p ILR implant 10/8/21, s/p watchman (2/11/22), hx of prior GI bleed requiring blood transfusions presents to the ED with complaints of shortness of breath, nausea, generalized weakness and chest pain that started at 2:20 PM     ----  INTERVAL HPI/OVERNIGHT EVENTS: No acute overnight events occurred.   The patient was seen and evaluated at bedside. She reports feeling constipated and states that she has not moved her bowels since admission to the hospital. She requests a more aggressive bowel regimen. The patient is tolerating solids but she is having a difficult time tolerating liquids. She states that this has been an ongoing problem for her even prior to hospitalization. She states that liquids feel like they gets stuck in her throat and then she regurgitates them back up. She has minimal cough. She continues on a nasal cannula. She has no chest pain, chest pressure, palpitations.   Pt expresses eagerness for dc    ----  PAST MEDICAL & SURGICAL HISTORY:  Carotid artery obstruction, left  had carotid surgery 2016      Lung mass  RLL, 2016, had RL lobectomy & chemotherapy      Hip fracture  left femur, had pins placed in 2016      Lumbar radiculopathy      Hyperlipidemia      Lymphoma      Former smoker      Unspecified atrial fibrillation      Hypertension      Hip fracture  2016, left hip      Right lower lobe lung mass  Right lower lobectomy 2016, chemo till 01/2017      Status post carotid surgery  left carotid artery stenosis 2016      History of loop recorder  placed 9/2021          FAMILY HISTORY:  FH: diabetes mellitus (Father)        Allergies    No Known Drug Allergies  latex (Rash)    Intolerances    aspirin (Stomach Upset)      ----  REVIEW OF SYSTEMS:  CONSTITUTIONAL: denies fever, chills   HEENT: denies blurred vision, sore throat  CARDIOVASCULAR: denies chest pain, chest pressure   RESPIRATORY: as per HPI  GASTROINTESTINAL: denies nausea, vomiting, diarrhea, abdominal pain  NEUROLOGICAL: denies numbness, headache, focal weakness  MUSCULOSKELETAL: denies new joint pain, muscle aches    ----  PHYSICAL EXAM:  Appears frail and weak      Mucous membranes are moist. No oral lesions visualized.     Clear to auscultation bilaterally. No wheezing or rales during my assessment   S1-S2 mildly elevated heart rates, no murmurs noted     Abdomen is soft non-tender non-distended.     No peripheral edema visualized     Cap refill is less than 2 seconds in digits of bilateral hands     Skin is warm, no pallor noted     No clubbing, no gross deformities     Patient is sitting up in chair and is readily interactive, responsive.     T(C): 36.6 (02-27-24 @ 12:12), Max: 37.4 (02-27-24 @ 00:19)  HR: 109 (02-27-24 @ 13:00) (74 - 121)  BP: 106/59 (02-27-24 @ 13:00) (89/51 - 130/61)  RR: 22 (02-27-24 @ 13:00) (16 - 28)  SpO2: 95% (02-27-24 @ 13:00) (94% - 100%)  Wt(kg): --    ----  INTAKE & OUTPUT:  I&O's Summary    26 Feb 2024 07:01  -  27 Feb 2024 07:00  --------------------------------------------------------  IN: 650 mL / OUT: 1330 mL / NET: -680 mL    27 Feb 2024 07:01  -  27 Feb 2024 14:17  --------------------------------------------------------  IN: 1100 mL / OUT: 270 mL / NET: 830 mL        LABS:                        9.9    6.87  )-----------( 174      ( 27 Feb 2024 06:23 )             31.1     02-27    140  |  108  |  26<H>  ----------------------------<  163<H>  4.4   |  27  |  1.60<H>    Ca    9.5      27 Feb 2024 06:23  Phos  4.1     02-27  Mg     1.9     02-27    TPro  6.1  /  Alb  2.9<L>  /  TBili  0.4  /  DBili  x   /  AST  20  /  ALT  16  /  AlkPhos  82  02-26    PT/INR - ( 25 Feb 2024 16:40 )   PT: 12.2 sec;   INR: 1.04 ratio         PTT - ( 25 Feb 2024 16:40 )  PTT:50.0 sec  Urinalysis Basic - ( 27 Feb 2024 06:23 )    Color: x / Appearance: x / SG: x / pH: x  Gluc: 163 mg/dL / Ketone: x  / Bili: x / Urobili: x   Blood: x / Protein: x / Nitrite: x   Leuk Esterase: x / RBC: x / WBC x   Sq Epi: x / Non Sq Epi: x / Bacteria: x      CAPILLARY BLOOD GLUCOSE      POCT Blood Glucose.: 141 mg/dL (27 Feb 2024 11:28)  POCT Blood Glucose.: 151 mg/dL (27 Feb 2024 08:25)  POCT Blood Glucose.: 212 mg/dL (26 Feb 2024 21:21)  POCT Blood Glucose.: 194 mg/dL (26 Feb 2024 17:23)    ABG - ( 25 Feb 2024 22:36 )  pH, Arterial: 7.48  pH, Blood: x     /  pCO2: 28    /  pO2: 177   / HCO3: 21    / Base Excess: -2.6  /  SaO2: 99.8                Culture - Urine (collected 02-26-24 @ 01:47)  Source: Clean Catch Clean Catch (Midstream)  Final Report (02-27-24 @ 07:07):    No growth    Culture - Blood (collected 02-25-24 @ 22:38)  Source: .Blood Blood-Peripheral  Preliminary Report (02-27-24 @ 06:01):    No growth at 24 hours    Culture - Blood (collected 02-25-24 @ 22:33)  Source: .Blood Blood-Peripheral  Preliminary Report (02-27-24 @ 06:01):    No growth at 24 hours        ----  DATA REVIEW:  Personally reviewed:  Vital sign trends:  HR's have been labile, bp mostly stable but w/ some episodes of hypotension  Laboratory results:  lytes and renal indices are mostly stable (cre slightly up today but not worrisome)  Radiology results:  continued right lung volume loss on cxr  Microbio results:  blood cx's ngtd  Consultant recommendations:    -- cardio - cannot definitively r/o CAD. however given anemia and jose alberto would not plan for cath at this time. needs repeat TTE. will need to start on ARB/ARNI when able  -- ID - potential to deescalate from zosyn to ceftriaxone - 5d course planned            ----  ACTIVE MEDICATIONS (by system):  - CV - atorvastatin 20 milliGRAM(s) Oral at bedtime  - CV - metoprolol tartrate 12.5 milliGRAM(s) Oral every 12 hours  - ENDO - insulin lispro (ADMELOG) corrective regimen sliding scale   SubCutaneous three times a day before meals  - ENDO - insulin lispro (ADMELOG) corrective regimen sliding scale   SubCutaneous at bedtime  - GI - ondansetron Injectable 4 milliGRAM(s) IV Push every 6 hours PRN  - GI - pantoprazole  Injectable 40 milliGRAM(s) IV Push two times a day  - ID - cefTRIAXone   IVPB 1000 milliGRAM(s) IV Intermittent every 24 hours  - PULM - albuterol/ipratropium for Nebulization 3 milliLiter(s) Nebulizer every 6 hours PRN  - SUPPLEMENT - ferrous    sulfate 325 milliGRAM(s) Oral daily  - SUPPLEMENT - folic acid 1 milliGRAM(s) Oral daily  - VTE PPX - heparin   Injectable 5000 Unit(s) SubCutaneous every 8 hours

## 2024-02-27 NOTE — DIETITIAN INITIAL EVALUATION ADULT - PROBLEM SELECTOR PLAN 4
Pt p/w chest pain, SOB, found to have NSTEMI  - EKG in ED w/ sinus tachy, diffuse Q waves, no ST elevations  - Initial troponin 694.1  - Repeat troponin 1738.9  - Repeat trop ordered in 3h  -check TTE  - Cardiology (Dr. Elias) consulted, recs appreciated  - ICU eval

## 2024-02-27 NOTE — DIETITIAN INITIAL EVALUATION ADULT - PROBLEM SELECTOR PLAN 9
- SCDs given hx and r/o GIB    Incidental imaging finding: thyroid lesion noted on CT chest- please inform patient of abnormality when she is clinically stable. -check AM TSH

## 2024-02-27 NOTE — DIETITIAN INITIAL EVALUATION ADULT - PROBLEM SELECTOR PLAN 8
Positive covid test today and states Dr Hidalgo told him if he ever gets covid she should get the antiviral, on daily steroid so cannot get RX from RN    Reason for Disposition    HIGH RISK patient (e.g., weak immune system, age > 64 years, obesity with BMI of 30 or higher, pregnant, chronic lung disease or other chronic medical condition) and COVID symptoms (e.g., cough, fever)  (Exceptions: Already seen by doctor or NP/PA and no new or worsening symptoms.)    Additional Information    Negative: SEVERE difficulty breathing (e.g., struggling for each breath, speaks in single words)    Negative: Difficult to awaken or acting confused (e.g., disoriented, slurred speech)    Negative: Bluish (or gray) lips or face now    Negative: Shock suspected (e.g., cold/pale/clammy skin, too weak to stand, low BP, rapid pulse)    Negative: Sounds like a life-threatening emergency to the triager    Negative: Diagnosed or suspected COVID-19 and symptoms lasting 3 or more weeks    Negative: COVID-19 exposure and no symptoms    Negative: COVID-19 vaccine reaction suspected (e.g., fever, headache, muscle aches) occurring 1 to 3 days after getting vaccine    Negative: COVID-19 vaccine, questions about    Negative: Lives with someone known to have influenza (flu test positive) and flu-like symptoms (e.g., cough, runny nose, sore throat, SOB; with or without fever)    Negative: Possible COVID-19 symptoms and triager concerned about severity of symptoms or other causes    Negative: COVID-19 and breastfeeding, questions about    Negative: SEVERE or constant chest pain or pressure  (Exception: Mild central chest pain, present only when coughing.)    Negative: MODERATE difficulty breathing (e.g., speaks in phrases, SOB even at rest, pulse 100-120)    Negative: Headache and stiff neck (can't touch chin to chest)    Negative: Oxygen level (e.g., pulse oximetry) 90% or lower    Negative: Chest pain or pressure  (Exception: MILD central chest pain,  "present only when coughing.)    Negative: Drinking very little and dehydration suspected (e.g., no urine > 12 hours, very dry mouth, very lightheaded)    Negative: Patient sounds very sick or weak to the triager    Answer Assessment - Initial Assessment Questions  1. COVID-19 DIAGNOSIS: \"How do you know that you have COVID?\" (e.g., positive lab test or self-test, diagnosed by doctor or NP/PA, symptoms after exposure).      Positive home test today  2. COVID-19 EXPOSURE: \"Was there any known exposure to COVID before the symptoms began?\" CDC Definition of close contact: within 6 feet (2 meters) for a total of 15 minutes or more over a 24-hour period.       na  3. ONSET: \"When did the COVID-19 symptoms start?\"       today  4. WORST SYMPTOM: \"What is your worst symptom?\" (e.g., cough, fever, shortness of breath, muscle aches)      Sore throat , headache     10. HIGH RISK DISEASE: \"Do you have any chronic medical problems?\" (e.g., asthma, heart or lung disease, weak immune system, obesity, etc.)        Yes    Protocols used: Coronavirus (COVID-19) Diagnosed or [id number]    " Chronic, has required multiple transfusions  - c/w home iron

## 2024-02-27 NOTE — SWALLOW BEDSIDE ASSESSMENT ADULT - COMMENTS
Full report to follow per charting - · Assessment	  82 y/o F w/ pmh of cirrhosis, AA, pad s/p R. carotid endarterectomy, NHL (chemo last on 2/22), copd, Afib (not on AC 2/2 to hx of GIB) s/p watchman, presented to [facility] for SOB, nausea generalized weakness and CP. In the ED pt was found to have NSTEMI, being worked up for GIB and Pna pt was admitted to [facility]. Hospital course c/b RRT for SVT tonight pt was given adenosine 6mg x1, 12mg x2 with no improvement followed up by 20mg of IV Cardizem w/ improvement in HR. Repeat EKG finding showing possible Afib/flutter pt was started on Cardizem gtt w/ improvement in HR. MICU consult requested for SVT. Pt seen and examined will admit to MICU for Acute hypoxic resp failure 2/2 to pna, elevated trops likely 2/2 to demand ischemia in the setting of rapid HR, and r/o GIB.    Pt seen for swallow eval this date. AAOx3, following commands. States she has been tolerating regular diet since admission but reports history of dysphagia and previous MBS which showed "liquids go down the wrong way sometimes" She also reports regurgitation with liquids, she points to her sternum and states it feels like liquids are stuck in this area before coming back up. She states she was previously diagnosed with GERD

## 2024-02-27 NOTE — DIETITIAN INITIAL EVALUATION ADULT - OTHER INFO
82 y/o F w/ pmh of cirrhosis, AA, pad s/p R. carotid endarterectomy, NHL (chemo last on 2/22), copd, Afib (not on AC 2/2 to hx of GIB) s/p watchman, presented to [facility] for SOB, nausea generalized weakness and CP. In the ED pt was found to have NSTEMI, being worked up for GIB and Pna pt was admitted to [facility]. Hospital course c/b RRT for SVT tonight pt was given adenosine 6mg x1, 12mg x2 with no improvement followed up by 20mg of IV Cardizem w/ improvement in HR. Repeat EKG finding showing possible Afib/flutter pt was started on Cardizem gtt w/ improvement in HR. MICU consult requested for SVT. Pt seen and examined will admit to MICU for Acute hypoxic resp failure 2/2 to pna, elevated trops likely 2/2 to demand ischemia in the setting of rapid HR, and r/o GIB.    Pt A+Ox4 at visit. Dash/TLC diet rx. Small appetite/po intake for breakfast this morning however episode emesis following breakfast. Persistent N/V past few days. Pta reports this often happens pta predominantly with liquids. Consider SLP evaluation to assess swallowing status. Hx constipation. Last BM 2/24. Recommend bowel regimen. Pt reports fair appetite/po intake pta; does not eat large meals grazes throughout the day. Typical day - breakfast english muffin, lunch- sandwich, dinner steak cut up small. Little snacks throughout the day. -122lbs. On lasix pta and weighs herself daily. Recent weight loss reported; states she thinks her current weight ~116-117lbs. 125.8lb(2/27)- increase in weight possibly related to fluid retention. S/p lasix 2/25. No edema documented. Food preferences/meal alternatives obtained to maximize po intake. Pt declined ensure supplements.  84 y/o F w/ pmh of cirrhosis, AA, pad s/p R. carotid endarterectomy, NHL (chemo last on 2/22), copd, Afib (not on AC 2/2 to hx of GIB) s/p watchman, presented to [facility] for SOB, nausea generalized weakness and CP. In the ED pt was found to have NSTEMI, being worked up for GIB and Pna pt was admitted to [facility]. Hospital course c/b RRT for SVT tonight pt was given adenosine 6mg x1, 12mg x2 with no improvement followed up by 20mg of IV Cardizem w/ improvement in HR. Repeat EKG finding showing possible Afib/flutter pt was started on Cardizem gtt w/ improvement in HR. MICU consult requested for SVT. Pt seen and examined will admit to MICU for Acute hypoxic resp failure 2/2 to pna, elevated trops likely 2/2 to demand ischemia in the setting of rapid HR, and r/o GIB.    Pt A+Ox4 at visit. Dash/TLC diet rx. Small appetite/po intake for breakfast this morning however episode emesis following breakfast. Persistent N/V past few days. Pt reports this often happens pta predominantly with liquids(regurgitation/aspiration?). Consider SLP evaluation to assess swallowing status. Hx constipation. Last BM 2/24. Recommend bowel regimen. Pt reports fair appetite/po intake pta; does not eat large meals grazes throughout the day. Typical day - breakfast english muffin, lunch- sandwich, dinner steak cut up small. Little snacks throughout the day. -122lbs. On lasix pta and weighs herself daily. Recent weight loss reported; states she thinks her current weight ~116-117lbs. 125.8lb(2/27)- increase in weight possibly related to fluid retention. S/p lasix 2/25. No edema documented. Food preferences/meal alternatives obtained to maximize po intake. Pt declined ensure supplements.

## 2024-02-27 NOTE — SWALLOW BEDSIDE ASSESSMENT ADULT - SWALLOW EVAL: DIAGNOSIS
Patient presents with functional oral stage swallow for regular solids and thin liquids. Suspect pharyngeal dysphagia marked by intermittent multiple swallows for large sips thin liquids.

## 2024-02-27 NOTE — DIETITIAN INITIAL EVALUATION ADULT - PROBLEM SELECTOR PLAN 2
- Hx of GIB needing iron replacement and multiple blood transfusions, previously on eliquis., GI workup with colonoscopy and endoscopy were unremarkable apart from polyps.  - Baseline Hg 9-10 per daughter  - Hg 9.5 on admission, repeat 11.3  - FOBT ordered given hx in setting of r/o PE  - CTA negative for PE, however FOBT+  - s/p 1000cc bolus  - c/w IVF @ 65mL/hr  - Start Protonix 40mg IV BID  - c/w home iron  - GI consulted, recs appreciated  - ICU eval appreciated

## 2024-02-27 NOTE — PROGRESS NOTE ADULT - SUBJECTIVE AND OBJECTIVE BOX
[facility] DIVISION of INFECTIOUS DISEASE  Simón Nolasco MD PhD, Patrica Bear MD, Lluvia Stahl MD, Tree Avery MD, Pelon Peralta MD  and providing coverage with Gerson Ortiz MD  Providing Infectious Disease Consultations at [facility], [facility], [facility]'s    Office# [phone] to schedule follow up appointments  Answering Service for urgent calls or New Consults [phone]  Cell# to text for urgent issues Simón Nolasco [phone]     infectious diseases progress note:    CAROL ALEXANDRE is a 83y y. o. Female patient    Overnight and events of the last 24hrs reviewed    Allergies    No Known Drug Allergies  latex (Rash)    Intolerances    aspirin (Stomach Upset)      ANTIBIOTICS/RELEVANT:  antimicrobials  piperacillin/tazobactam IVPB.. 3.375 Gram(s) IV Intermittent every 8 hours    immunologic:    OTHER:  albuterol/ipratropium for Nebulization 3 milliLiter(s) Nebulizer every 6 hours  atorvastatin 20 milliGRAM(s) Oral at bedtime  dextrose 5%. 1000 milliLiter(s) IV Continuous <Continuous>  dextrose 5%. 1000 milliLiter(s) IV Continuous <Continuous>  dextrose 50% Injectable 25 Gram(s) IV Push once  dextrose 50% Injectable 12.5 Gram(s) IV Push once  dextrose 50% Injectable 25 Gram(s) IV Push once  dextrose Oral Gel 15 Gram(s) Oral once PRN  ferrous    sulfate 325 milliGRAM(s) Oral daily  folic acid 1 milliGRAM(s) Oral daily  glucagon  Injectable 1 milliGRAM(s) IntraMuscular once  insulin lispro (ADMELOG) corrective regimen sliding scale   SubCutaneous at bedtime  insulin lispro (ADMELOG) corrective regimen sliding scale   SubCutaneous three times a day before meals  metoprolol tartrate 12.5 milliGRAM(s) Oral every 12 hours  ondansetron Injectable 4 milliGRAM(s) IV Push every 6 hours PRN  pantoprazole  Injectable 40 milliGRAM(s) IV Push two times a day      Objective:  Vital Signs Last 24 Hrs  T(C): 36.6 (27 Feb 2024 07:59), Max: 37.4 (27 Feb 2024 00:19)  T(F): 97.8 (27 Feb 2024 07:59), Max: 99.4 (27 Feb 2024 00:19)  HR: 106 (27 Feb 2024 08:00) (74 - 113)  BP: 113/59 (27 Feb 2024 08:00) (89/51 - 136/63)  BP(mean): 82 (27 Feb 2024 08:00) (66 - 90)  RR: 28 (27 Feb 2024 08:00) (16 - 35)  SpO2: 96% (27 Feb 2024 08:00) (94% - 100%)    Parameters below as of 27 Feb 2024 07:00  Patient On (Oxygen Delivery Method): nasal cannula  O2 Flow (L/min): 3      T(C): 36.6 (02-27-24 @ 07:59), Max: 37.4 (02-27-24 @ 00:19)  T(C): 36.6 (02-27-24 @ 07:59), Max: 37.4 (02-27-24 @ 00:19)  T(C): 36.6 (02-27-24 @ 07:59), Max: 37.4 (02-27-24 @ 00:19)    PHYSICAL EXAM:  HEENT: NC atraumatic  Neck: supple  Respiratory: no accessory muscle use, breathing comfortably, dec BS RLL  Cardiovascular: distant  Gastrointestinal: normal appearing, nondistended  Extremities: no clubbing, no cyanosis,        LABS:                          9.9    6.87  )-----------( 174      ( 27 Feb 2024 06:23 )             31.1       WBC  6.87 02-27 @ 06:23  7.71 02-26 @ 05:43  13.19 02-25 @ 23:40  19.36 02-25 @ 20:18  8.67 02-25 @ 16:40      02-27    140  |  108  |  26<H>  ----------------------------<  163<H>  4.4   |  27  |  1.60<H>    Ca    9.5      27 Feb 2024 06:23  Phos  4.1     02-27  Mg     1.9     02-27    TPro  6.1  /  Alb  2.9<L>  /  TBili  0.4  /  DBili  x   /  AST  20  /  ALT  16  /  AlkPhos  82  02-26      Creatinine: 1.60 mg/dL (02-27-24 @ 06:23)  Creatinine: 1.40 mg/dL (02-26-24 @ 14:00)  Creatinine: 1.40 mg/dL (02-26-24 @ 05:43)  Creatinine: 1.30 mg/dL (02-25-24 @ 20:40)  Creatinine: 1.40 mg/dL (02-25-24 @ 16:40)      PT/INR - ( 25 Feb 2024 16:40 )   PT: 12.2 sec;   INR: 1.04 ratio         PTT - ( 25 Feb 2024 16:40 )  PTT:50.0 sec  Urinalysis Basic - ( 27 Feb 2024 06:23 )    Color: x / Appearance: x / SG: x / pH: x  Gluc: 163 mg/dL / Ketone: x  / Bili: x / Urobili: x   Blood: x / Protein: x / Nitrite: x   Leuk Esterase: x / RBC: x / WBC x   Sq Epi: x / Non Sq Epi: x / Bacteria: x            INFLAMMATORY MARKERS      MICROBIOLOGY:              RADIOLOGY & ADDITIONAL STUDIES:

## 2024-02-27 NOTE — DIETITIAN INITIAL EVALUATION ADULT - PROBLEM SELECTOR PLAN 1
Pt p/w sob, CTA ordered to r/o PE, showed unloculated R pleural effusion c/w PNA  - Ceftriaxone and zithromax given in ED  - Initially w/ no leukocytosis, repeat WBC at 19.36  - c/w Zosyn  - Pt requiring escalating oxygen requirements, now on BiPAP  - ABG ordered, f/u results  - f/u AM CBC  - ID consulted, recs appreciated  - ICU eval appreciated

## 2024-02-27 NOTE — DIETITIAN INITIAL EVALUATION ADULT - PROBLEM SELECTOR PLAN 6
Chronic  - Home amlodipine 5mg qd, lisinopril 40qd, and lasix 20mg every other day (next dose tomorrow) HELD as pt is on cardizem drip  - continue to monitor BPs

## 2024-02-27 NOTE — PROGRESS NOTE ADULT - ASSESSMENT
84 y/o F w/ pmh of cirrhosis, AA, pad s/p R. carotid endarterectomy, NHL (chemo last on 2/22), copd, Afib (not on AC 2/2 to hx of GIB) s/p watchman, presented to [facility] for SOB, nausea generalized weakness and CP. In the ED pt was found to have NSTEMI, being worked up for GIB and Pna pt was admitted to [facility]. Hospital course c/b RRT for SVT tonight pt was given adenosine 6mg x1, 12mg x2 with no improvement followed up by 20mg of IV Cardizem w/ improvement in HR. Repeat EKG finding showing possible Afib/flutter pt was started on Cardizem gtt w/ improvement in HR. MICU consult requested for SVT. Pt seen and examined will admit to MICU for Acute hypoxic resp failure 2/2 to pna, elevated trops likely 2/2 to demand ischemia in the setting of rapid HR, and r/o GIB.    NEURO  -no acute interventions at this time  -no active issues    CARDIAC  - Paroxysmal Afib/Aflutter with an episode of SVT on 2/25, Patent has returned to NSR   - will hold Cardizem gtt now s/p HR <100 and lower trending blood pressures  - Elevated trops likely in the setting of demand, trops downtrending and patient denies chest pain, will no longer trend   - will hold off on AC for now given concern for GIB  - Pt is not on AC but has Watchman  - TTE results; Left Ventricle: akinesis of the apical wall, mid to distal inferoseptal and anterolateral walls. Findings are consistent with either Takotsubo cardiomyopathy or LAD ischemia. The basal segments appear hyperkinetic. The LVEF appears estimated at 35-40%; LA: mod dilation; MV: trace MR with mild posterior calcification; IVC: normal in size (1.85cm in diameter) consistent with mildly elevated right atrial pressure (~8, range 5-10mmHg); Rec repeat TTE to further evaluate LV thrombus.    RESP  - Acute hypoxic resp failure 2/2 PNA  - CTA: pleural effusion appears to be chronic, -PE   - Patient on 3L O2 NC managing patient's dyspnea, duonebs started PRN    GI  - Dash diet started  - pending barium swallow study results  - GIB? +FOBT, h/h stable cont to trend, baseline Hemoglobin: 9-10  - GI ppx w/ protonix     RENAL  - Renal function stable cont to monitor along w/ lytes, d/c IVF    ID  - Pna stop IV zosyn, initiate Rocephin 1000 mg for 5 days as PNA most likely 2/2 aspiration (initation date: 2/27- end date: 3/3)  - Blood cx and UCX NG at 24hrs  - MRSA swap, legionella and strep urine antigen: Negative  - Swallow study conducted to evaluate dysphagia, patient rec to be on normal diet and to have barium swallow study to be done tomorrow  - Procal slightly elevated at 0.13    ENDO  - monitor FS following diet initiation  - 1L of LR started to be given over 4-5 hrs  - elevated BG checks, sliding scale initiated  - A1C 6.2 now    HEME  - DVT ppx w/ SCD and Heparin 5000 units SQ initiated    DISPO  - Pt is full code   84 y/o F w/ pmh of cirrhosis, AA, pad s/p R. carotid endarterectomy, NHL (chemo last on 2/22), copd, Afib (not on AC 2/2 to hx of GIB) s/p watchman, presented to [facility] for SOB, nausea generalized weakness and CP. In the ED pt was found to have NSTEMI, being worked up for GIB and Pna pt was admitted to [facility]. Hospital course c/b RRT for SVT tonight pt was given adenosine 6mg x1, 12mg x2 with no improvement followed up by 20mg of IV Cardizem w/ improvement in HR. Repeat EKG finding showing possible Afib/flutter pt was started on Cardizem gtt w/ improvement in HR. MICU consult requested for SVT. Pt seen and examined will admit to MICU for Acute hypoxic resp failure 2/2 to pna, elevated trops likely 2/2 to demand ischemia in the setting of rapid HR, and r/o GIB.    NEURO  - no acute interventions at this time  - no active issues    CARDIAC  - Paroxysmal Afib/Aflutter with an episode of SVT on 2/25, Patent has returned to NSR   - off cardizem gtt  - Elevated trops likely in the setting of demand, trops downtrending and patient denies chest pain, will no longer trend 5  - Pt is not on AC but has Watchman  - TTE results; Left Ventricle: akinesis of the apical wall, mid to distal inferoseptal and anterolateral walls. Findings are consistent with either Takotsubo cardiomyopathy or LAD ischemia. The basal segments appear hyperkinetic. The LVEF appears estimated at 35-40%; LA: mod dilation; MV: trace MR with mild posterior calcification; IVC: normal in size (1.85cm in diameter) consistent with mildly elevated right atrial pressure (~8, range 5-10mmHg); Rec repeat TTE to further evaluate LV thrombus.  - Cardio following    RESP  - Acute hypoxic resp failure 2/2 PNA  - CTA: pleural effusion appears to be chronic, -PE   - Patient on 3L O2 NC managing patient's dyspnea, duonebs started PRN    GI  - DASH diet, pureed, mod thick liquid as per S&S  - GIB? +FOBT, h/h stable cont to trend, baseline Hemoglobin: 9-10. Low suspicion for GI bleed  - GI ppx w/ protonix     RENAL  - Renal function stable cont to monitor along w/ lytes,  - w/ little    ID  - Pna stop IV zosyn, initiate Rocephin 1000 mg for 5 days as PNA most likely 2/2 aspiration. ID rec last day suspecting 3/1  - Blood cx and UCX NG at 24hrs  - MRSA swap, legionella and strep urine antigen: Negative    ENDO  - monitor FS following diet initiation  - 1L of LR started to be given over 4-5 hrs  - elevated BG checks, sliding scale initiated  - A1C 6.2 now    HEME  - DVT ppx w/ SCD and Heparin 5000 units SQ initiated    DISPO  - Pt is full code

## 2024-02-27 NOTE — DIETITIAN INITIAL EVALUATION ADULT - PERTINENT LABORATORY DATA
02-27    140  |  108  |  26<H>  ----------------------------<  163<H>  4.4   |  27  |  1.60<H>    Ca    9.5      27 Feb 2024 06:23  Phos  4.1     02-27  Mg     1.9     02-27    TPro  6.1  /  Alb  2.9<L>  /  TBili  0.4  /  DBili  x   /  AST  20  /  ALT  16  /  AlkPhos  82  02-26  POCT Blood Glucose.: 151 mg/dL (02-27-24 @ 08:25)  A1C with Estimated Average Glucose Result: 6.2 % (02-27-24 @ 06:23)

## 2024-02-27 NOTE — DIETITIAN INITIAL EVALUATION ADULT - PROBLEM SELECTOR PLAN 5
2021 was noted to have pAF during admission at [facility], started on Eliquis and amiodarone. After d/c had episode of syncope, reevaluated and amiodarone hasd been stopped. Underwent ILR implant 10/8/21, had infrequent episodes of pAF, last 1/21/22 lasting nearly 3 hours with controlled rates.   - Per daughter, reevaluated by cardiology and found not to have atrial fibrillation and stopped medications  - EKG in ED w/ sinus tachy  - Repeat EKG in rapid w/ likely aflutter  - Discussed with Dr. Elias, cardizem Drip started @ 5mL/hr  - Cardiology (Dr. Elias) consulted, recs appreciated  - Continue to monitor on telemetry

## 2024-02-27 NOTE — PROGRESS NOTE ADULT - ASSESSMENT
83 y.o. 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     - EKG showing SVT 149bpm   - Troponin 694.1 --> 1738.9 --> 1732.3 --> 1438.2. Elevated likely due to demand ischemia in the setting of tachyarrhythmia.   -  TTE results; Left Ventricle: akinesis of the apical wall, mid to distal inferoseptal and anterolateral walls. Findings are consistent with either Takotsubo cardiomyopathy or LAD ischemia. The basal segments appear hyperkinetic. The LVEF appears estimated at 35-40%; LA: mod dilation; MV: trace MR with mild posterior calcification; IVC: normal in size (1.85cm in diameter) consistent with mildly elevated right atrial pressure (~8, range 5-10mmHg); Rec repeat TTE to further evaluate LV thrombus.  - cannot definitively r/o CAD. however given anemia and jose alberto would not plan for cath at this time.   - Continue ASA and monitor H/H in the setting of GIB    - hb was downtrending but stable today.   - repeat echo prior to dc  - will need to start on ARB/ARNI when able    - Now sinus tachy and off cardizem gtt  - started on lopressor 12.5mg q12  - today HR is still 100-110s appears sinus tachy on tele  - tachycardiac likely reflective of nausea/vomitting.   - Appears compensated from HF POV.   - Please continue to maintain strict I/Os, monitor daily weights, Cr, and K.     - cont GI w/u   - abx for asp pna.     - Monitor and replete lytes, keep K>4, Mg>2.  - Strict I/Os, daily weights.

## 2024-02-27 NOTE — PROGRESS NOTE ADULT - CRITICAL CARE ATTENDING COMMENT
Upon my evaluation, this patient is at high risk for imminent or life threatening deterioration due to hypotension. CHF, NSTEMI  and other active medical issues which require my direct attention, intervention, and personal management.  I have personally spent >30 minutes  of critical care time exclusive of time spent on separate billing procedures. This includes review of laboratory data, radiology results, discussion with primary team\patient, and monitoring for potential decompensation. Interventions were performed as documented above.

## 2024-02-27 NOTE — DIETITIAN INITIAL EVALUATION ADULT - SIGNS/SYMPTOMS
as evidenced by 4lb(3.3%) weight loss x 1 week, moderate muscle wasting and fat depletion per NFPE.
No

## 2024-02-27 NOTE — PROGRESS NOTE ADULT - ASSESSMENT
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   Vital Signs T(F): 97.5F HR: 110  BP: 147/83  RR: 18  SpO2: 97% on RA  Troponin 694.1,  CTA chest: No pulmonary embolism. New bilateral patchy and ground glass opacities, consistent with pneumonia. Hypodense left thyroid   lesion, measuring 0.9 cm.    RECOMMENDATIONS  1-Dyspnea noted leukocytosis with eosinopenia, hypoxemia, dyspnea, imaging concerning for airspace disease, but pt is afebrile and leukocytosis quickly resolved, so rec  Zosyn-started late 2/25 - continue for now with concerns for asp PNA but sig rt pleural effusion anticipate 3/1 as last day and potential to de-escalate to ceftriaxone  consider swallow eval with issues tolerating thin liquids    2-Elevated Troponins may be demand ischemia    recs to follow based on results of other tests.    Thank you for consulting us and involving us in the management of this most interesting and challenging case.  We will follow along in the care of this patient. Please call us at [phone] or text me directly on my cell# at [phone] with any concerns.

## 2024-02-27 NOTE — PROGRESS NOTE ADULT - ATTENDING COMMENTS
83F h/o cirrhosis, AA, s/p R. carotid endarterectomy, NHL (chemo last on 2/22), COPD, AFib (not on AC 2/2 to hx of GIB) s/p Watchman a/w acute hypoxemic respiratory failure 2/2 multifocal PNA requiring NIPPV c/b SVT and demand ischemia now greatly improved from respiratory stand-point but remains in sinus tach.    Neuro: no acute issues  CV: pAFib with episode of SVT on admission, now with sinus tach to low 100s   - continue low dose metoprolol and uptitrate as able  - TTE with regional wall motion abnormalities - akinesis of the apical wall and mid to distal inferoseptal and anterolateral walls concerning for  Takotsubo cardiomyopathy vs LAD ischemia; EF ~35-40%. RV not well visualized  - will need repeat TTE prior to dc to monitor for improvement, also recommending with DVT to ensure no LV thrombus  - elevated troponin now peaked, likely due to demand ischemia  - off AC for AFib s/p Watchman  Pulm: continue supplemental O2 via NC to maintain SpO2 > 92% and wean as tolerated  GI: diet as tolerated  - FOBT checked in ED but H/H at baseline and pt denies any dark/tarry/bloody stools - does note prior GIB and is currently on iron supplements - can continue with protonix BID for now though low concern for acute GIB as cause of presenting symptoms  Renal: GERSON noted but UOP satisfactory with little placed for urinary retention overnight  ID: de-escalate zosyn to ceftriaxone for emperic pneumonia coverage  - RVP, blood/urine cultures, urine legionella and MRSA swab negative  Endo: A1c 6.2 - monitor FS premeal and qHS   Heme: DVT ppx with HSQ    Stable for transfer to floor with tele
83F h/o cirrhosis, AA, s/p R. carotid endarterectomy, NHL (chemo last on 2/22), COPD, AFib (not on AC 2/2 to hx of GIB) s/p Watchman a/w acute hypoxemic respiratory failure 2/2 multifocal PNA requiring NIPPV c/b SVT and demand ischemia now greatly improved from respiratory stand-point and returned to NSR    Neuro: mentation at baseline, no acute issues  CV: pAFib with episode of SVT last night, now returned to NSR and off diltiazem gett - can start low dose beta blocker, holding ASA due to prior GIB  - elevated troponin now peaked, likely due to demand ischemia  - follow-up official TTE   - off AC for AFib s/p Watchman  Pulm: acute hypoxemic respiratory failure 2/2 multifocal PNA, CT and POCUS with small loculated right pleural effusion that per CT report is unchanged from baseline - weaned off BiPAP, continue supplemental O2 via NC to maintain SpO2 > 92%  GI: diet as tolerated  - FOBT checked in ED but H/H at baseline and pt denies any dark/tarry/bloody stools - does note prior GIB and is currently on iron supplements - can continue with protonix BID for now though low concern for acute GIB as cause of presenting symptoms  Renal: K+ 5.0 - 5.7, now 4.7 on rpt BMP with stable sCr - continue to trend renal indices   ID: continue zosyn for emperic pneumonia coverage  - RVP and MRSA swab negative  - obtain sputum culture if able; folow-up blood cultures from admission  - follow-up urine legionella and Strep Ag  Endo: monitor FS premeal and qHS   Heme: DVT ppx with HSQ    Full code. Pt and daughter updated at bedside

## 2024-02-27 NOTE — PROGRESS NOTE ADULT - SUBJECTIVE AND OBJECTIVE BOX
Patient is a 83y old  Female who presents with a chief complaint of Non-ST elevation myocardial infarction (NSTEMI)     (27 Feb 2024 10:16)    24 hour events: No reported events over night. Patient admits to reduced dyspnea this morning and denies abdominal pain or nausea at this moment. Patient stated she has a minimal cough, reduced appetite and slight tenderness around her chemo port. Patient denies chest pain, palpitations or changes in urinary patterns at this moment. Patient also stated she has previous difficulty swallowing liquids and on a previous swallow study it did appear that she did have difficulty swallowing fluids.     REVIEW OF SYSTEMS  Constitutional: No fever, chills, fatigue  Neuro: No headache, numbness, weakness  Resp:+ cough, no wheezing or shortness of breath  CVS: No chest pain, palpitations, leg swelling  GI: No abdominal pain, nausea, vomiting, diarrhea   : No dysuria, frequency, incontinence  Skin: No itching, burning, rashes, or lesions   Msk: No joint pain or swelling  Psych: No depression, anxiety, mood swings  Heme: No bleeding    T(F): 97.8 (02-27-24 @ 07:59), Max: 99.4 (02-27-24 @ 00:19)  HR: 121 (02-27-24 @ 11:00) (74 - 121)  BP: 130/61 (02-27-24 @ 11:00) (89/51 - 136/63)  RR: 24 (02-27-24 @ 11:00) (16 - 35)  SpO2: 99% (02-27-24 @ 11:00) (94% - 100%)  Wt(kg): --    I&O's Summary  02-26 @ 07:01  -  02-27 @ 07:00  --------------------------------------------------------  IN: 650 mL / OUT: 1330 mL / NET: -680 mL    02-27 @ 07:01  -  02-27 @ 11:34  --------------------------------------------------------  IN: 0 mL / OUT: 200 mL / NET: -200 mL    PHYSICAL EXAM  Gen: NAD, lying down in bed on 3L  Neuro: awake, alert and following commands  HEENT: NC/AT  Resp: +increased inspiratory effort, wheezing appreciated in R lower lung field, non erythematous chemoport on anterior R pectoral region  CVS: +tachycardic, normal s1/s2, -M/R/G  Abd: BSx4, soft, NT/ND  Ext: no edema   Skin: warm/dry      MEDICATIONS  cefTRIAXone   IVPB IV Intermittent  metoprolol tartrate Oral  atorvastatin Oral  dextrose 50% Injectable IV Push  dextrose 50% Injectable IV Push  dextrose 50% Injectable IV Push  dextrose Oral Gel Oral PRN  glucagon  Injectable IntraMuscular  insulin lispro (ADMELOG) corrective regimen sliding scale SubCutaneous  insulin lispro (ADMELOG) corrective regimen sliding scale SubCutaneous  albuterol/ipratropium for Nebulization Nebulizer PRN  ondansetron Injectable IV Push PRN  heparin   Injectable SubCutaneous  pantoprazole  Injectable IV Push  dextrose 5%. IV Continuous  dextrose 5%. IV Continuous  ferrous    sulfate Oral  folic acid Oral  chlorhexidine 2% Cloths Topical                            9.9    6.87  )-----------( 174      ( 27 Feb 2024 06:23 )             31.1       02-27    140  |  108  |  26<H>  ----------------------------<  163<H>  4.4   |  27  |  1.60<H>    Ca    9.5      27 Feb 2024 06:23  Phos  4.1     02-27  Mg     1.9     02-27  TPro  6.1  /  Alb  2.9<L>  /  TBili  0.4  /  DBili  x   /  AST  20  /  ALT  16  /  AlkPhos  82  02-26  PT/INR - ( 25 Feb 2024 16:40 )   PT: 12.2 sec;   INR: 1.04 ratio    PTT - ( 25 Feb 2024 16:40 )  PTT:50.0 sec  Urinalysis Basic - ( 27 Feb 2024 06:23 )    Color: x / Appearance: x / SG: x / pH: x  Gluc: 163 mg/dL / Ketone: x  / Bili: x / Urobili: x   Blood: x / Protein: x / Nitrite: x   Leuk Esterase: x / RBC: x / WBC x   Sq Epi: x / Non Sq Epi: x / Bacteria: x      Clean Catch Clean Catch (Midstream)   No growth -- 02-26 @ 01:47  .Blood Blood-Peripheral   No growth at 24 hours -- 02-25 @ 22:38  .Blood Blood-Peripheral   No growth at 24 hours -- 02-25 @ 22:33      CENTRAL LINE: N            BRANTLEY: Y                      DATE INSERTED:  2/27/2024            REMOVE: N  A-LINE: N                          GLOBAL ISSUE/BEST PRACTICE  Analgesia: N  Sedation: N  CAM-ICU: N  HOB elevation: yes  Stress ulcer prophylaxis: Y  VTE prophylaxis: Y  Glycemic control: Y  Nutrition: Y    CODE STATUS: Full code  GO discussion: VANESSA       Patient is a 83y old  Female who presents with a chief complaint of Non-ST elevation myocardial infarction (NSTEMI)     (27 Feb 2024 10:16)    24 hour events: Received IV tylenol for pain last night, improved. Patient admits to reduced shortness of breath this morning and denies abdominal pain or nausea at this moment. Patient stated she has a minimal cough, reduced appetite and slight tenderness around her chemo port. Patient denies chest pain, palpitations or changes in urinary patterns at this moment. Patient also stated she has previous difficulty swallowing liquids.    REVIEW OF SYSTEMS  Constitutional: No fever, fatigue  Neuro: No headache, numbness  Resp:+ cough, no wheezing or shortness of breath  CVS: No chest pain, palpitations, leg swelling  GI: No abdominal pain, nausea, vomiting, diarrhea   : No dysuria, incontinence  Msk: No joint pain or swelling  Heme: No bleeding    T(F): 97.8 (02-27-24 @ 07:59), Max: 99.4 (02-27-24 @ 00:19)  HR: 121 (02-27-24 @ 11:00) (74 - 121)  BP: 130/61 (02-27-24 @ 11:00) (89/51 - 136/63)  RR: 24 (02-27-24 @ 11:00) (16 - 35)  SpO2: 99% (02-27-24 @ 11:00) (94% - 100%)  Wt(kg): --    I&O's Summary  02-26 @ 07:01  -  02-27 @ 07:00  --------------------------------------------------------  IN: 650 mL / OUT: 1330 mL / NET: -680 mL    02-27 @ 07:01  -  02-27 @ 11:34  --------------------------------------------------------  IN: 0 mL / OUT: 200 mL / NET: -200 mL    PHYSICAL EXAM  Gen: NAD, lying down in bed on 3L  Neuro: awake, alert and following commands  HEENT: NC/AT  Resp: + wheezing appreciated in R lower lung field, non erythematous chemoport on anterior R pectoral region  CVS: +tachycardic, normal s1/s2, -M/R/G  Abd: BSx4, soft, NT/ND  Ext: no edema   Skin: warm/dry      MEDICATIONS  cefTRIAXone   IVPB IV Intermittent  metoprolol tartrate Oral  atorvastatin Oral  dextrose 50% Injectable IV Push  dextrose 50% Injectable IV Push  dextrose 50% Injectable IV Push  dextrose Oral Gel Oral PRN  glucagon  Injectable IntraMuscular  insulin lispro (ADMELOG) corrective regimen sliding scale SubCutaneous  insulin lispro (ADMELOG) corrective regimen sliding scale SubCutaneous  albuterol/ipratropium for Nebulization Nebulizer PRN  ondansetron Injectable IV Push PRN  heparin   Injectable SubCutaneous  pantoprazole  Injectable IV Push  dextrose 5%. IV Continuous  dextrose 5%. IV Continuous  ferrous    sulfate Oral  folic acid Oral  chlorhexidine 2% Cloths Topical                            9.9    6.87  )-----------( 174      ( 27 Feb 2024 06:23 )             31.1       02-27    140  |  108  |  26<H>  ----------------------------<  163<H>  4.4   |  27  |  1.60<H>    Ca    9.5      27 Feb 2024 06:23  Phos  4.1     02-27  Mg     1.9     02-27  TPro  6.1  /  Alb  2.9<L>  /  TBili  0.4  /  DBili  x   /  AST  20  /  ALT  16  /  AlkPhos  82  02-26  PT/INR - ( 25 Feb 2024 16:40 )   PT: 12.2 sec;   INR: 1.04 ratio    PTT - ( 25 Feb 2024 16:40 )  PTT:50.0 sec  Urinalysis Basic - ( 27 Feb 2024 06:23 )    Color: x / Appearance: x / SG: x / pH: x  Gluc: 163 mg/dL / Ketone: x  / Bili: x / Urobili: x   Blood: x / Protein: x / Nitrite: x   Leuk Esterase: x / RBC: x / WBC x   Sq Epi: x / Non Sq Epi: x / Bacteria: x      Clean Catch Clean Catch (Midstream)   No growth -- 02-26 @ 01:47  .Blood Blood-Peripheral   No growth at 24 hours -- 02-25 @ 22:38  .Blood Blood-Peripheral   No growth at 24 hours -- 02-25 @ 22:33      CENTRAL LINE: N            BRANTLEY: Y                      DATE INSERTED:  2/27/2024            REMOVE: N  A-LINE: N                          GLOBAL ISSUE/BEST PRACTICE  Analgesia: N  Sedation: N  CAM-ICU: N  HOB elevation: yes  Stress ulcer prophylaxis: Y  VTE prophylaxis: Y  Glycemic control: Y  Nutrition: Y    CODE STATUS: Full code  GOC discussion: VANESSA

## 2024-02-27 NOTE — DIETITIAN INITIAL EVALUATION ADULT - PERTINENT MEDS FT
MEDICATIONS  (STANDING):  albuterol/ipratropium for Nebulization 3 milliLiter(s) Nebulizer every 6 hours  atorvastatin 20 milliGRAM(s) Oral at bedtime  chlorhexidine 2% Cloths 1 Application(s) Topical <User Schedule>  dextrose 5%. 1000 milliLiter(s) (100 mL/Hr) IV Continuous <Continuous>  dextrose 5%. 1000 milliLiter(s) (50 mL/Hr) IV Continuous <Continuous>  dextrose 50% Injectable 12.5 Gram(s) IV Push once  dextrose 50% Injectable 25 Gram(s) IV Push once  dextrose 50% Injectable 25 Gram(s) IV Push once  ferrous    sulfate 325 milliGRAM(s) Oral daily  folic acid 1 milliGRAM(s) Oral daily  glucagon  Injectable 1 milliGRAM(s) IntraMuscular once  insulin lispro (ADMELOG) corrective regimen sliding scale   SubCutaneous at bedtime  insulin lispro (ADMELOG) corrective regimen sliding scale   SubCutaneous three times a day before meals  metoprolol tartrate 12.5 milliGRAM(s) Oral every 12 hours  pantoprazole  Injectable 40 milliGRAM(s) IV Push two times a day  piperacillin/tazobactam IVPB.. 3.375 Gram(s) IV Intermittent every 8 hours    MEDICATIONS  (PRN):  dextrose Oral Gel 15 Gram(s) Oral once PRN Blood Glucose LESS THAN 70 milliGRAM(s)/deciliter  ondansetron Injectable 4 milliGRAM(s) IV Push every 6 hours PRN Nausea and/or Vomiting

## 2024-02-28 LAB
ALBUMIN SERPL ELPH-MCNC: 3.1 G/DL — LOW (ref 3.3–5)
ALP SERPL-CCNC: 81 U/L — SIGNIFICANT CHANGE UP (ref 40–120)
ALT FLD-CCNC: 17 U/L — SIGNIFICANT CHANGE UP (ref 12–78)
ANION GAP SERPL CALC-SCNC: 13 MMOL/L — SIGNIFICANT CHANGE UP (ref 5–17)
AST SERPL-CCNC: 21 U/L — SIGNIFICANT CHANGE UP (ref 15–37)
BASOPHILS # BLD AUTO: 0.1 K/UL — SIGNIFICANT CHANGE UP (ref 0–0.2)
BASOPHILS NFR BLD AUTO: 0.6 % — SIGNIFICANT CHANGE UP (ref 0–2)
BILIRUB SERPL-MCNC: 1 MG/DL — SIGNIFICANT CHANGE UP (ref 0.2–1.2)
BUN SERPL-MCNC: 28 MG/DL — HIGH (ref 7–23)
CALCIUM SERPL-MCNC: 10 MG/DL — SIGNIFICANT CHANGE UP (ref 8.5–10.1)
CHLORIDE SERPL-SCNC: 107 MMOL/L — SIGNIFICANT CHANGE UP (ref 96–108)
CO2 SERPL-SCNC: 21 MMOL/L — LOW (ref 22–31)
CREAT SERPL-MCNC: 1.4 MG/DL — HIGH (ref 0.5–1.3)
EGFR: 38 ML/MIN/1.73M2 — LOW
EOSINOPHIL # BLD AUTO: 0.15 K/UL — SIGNIFICANT CHANGE UP (ref 0–0.5)
EOSINOPHIL NFR BLD AUTO: 0.9 % — SIGNIFICANT CHANGE UP (ref 0–6)
GLUCOSE SERPL-MCNC: 136 MG/DL — HIGH (ref 70–99)
HCT VFR BLD CALC: 32.6 % — LOW (ref 34.5–45)
HGB BLD-MCNC: 10.4 G/DL — LOW (ref 11.5–15.5)
IMM GRANULOCYTES NFR BLD AUTO: 0.3 % — SIGNIFICANT CHANGE UP (ref 0–0.9)
LYMPHOCYTES # BLD AUTO: 1.98 K/UL — SIGNIFICANT CHANGE UP (ref 1–3.3)
LYMPHOCYTES # BLD AUTO: 11.9 % — LOW (ref 13–44)
MAGNESIUM SERPL-MCNC: 1.8 MG/DL — SIGNIFICANT CHANGE UP (ref 1.6–2.6)
MCHC RBC-ENTMCNC: 28.8 PG — SIGNIFICANT CHANGE UP (ref 27–34)
MCHC RBC-ENTMCNC: 31.9 GM/DL — LOW (ref 32–36)
MCV RBC AUTO: 90.3 FL — SIGNIFICANT CHANGE UP (ref 80–100)
MONOCYTES # BLD AUTO: 1.08 K/UL — HIGH (ref 0–0.9)
MONOCYTES NFR BLD AUTO: 6.5 % — SIGNIFICANT CHANGE UP (ref 2–14)
NEUTROPHILS # BLD AUTO: 13.34 K/UL — HIGH (ref 1.8–7.4)
NEUTROPHILS NFR BLD AUTO: 79.8 % — HIGH (ref 43–77)
NRBC # BLD: 0 /100 WBCS — SIGNIFICANT CHANGE UP (ref 0–0)
PHOSPHATE SERPL-MCNC: 3.6 MG/DL — SIGNIFICANT CHANGE UP (ref 2.5–4.5)
PLATELET # BLD AUTO: 383 K/UL — SIGNIFICANT CHANGE UP (ref 150–400)
POTASSIUM SERPL-MCNC: 4.2 MMOL/L — SIGNIFICANT CHANGE UP (ref 3.5–5.3)
POTASSIUM SERPL-SCNC: 4.2 MMOL/L — SIGNIFICANT CHANGE UP (ref 3.5–5.3)
PROT SERPL-MCNC: 6.9 G/DL — SIGNIFICANT CHANGE UP (ref 6–8.3)
RBC # BLD: 3.61 M/UL — LOW (ref 3.8–5.2)
RBC # FLD: 14.1 % — SIGNIFICANT CHANGE UP (ref 10.3–14.5)
SODIUM SERPL-SCNC: 141 MMOL/L — SIGNIFICANT CHANGE UP (ref 135–145)
TROPONIN I, HIGH SENSITIVITY RESULT: 374.2 NG/L — HIGH
WBC # BLD: 16.7 K/UL — HIGH (ref 3.8–10.5)
WBC # FLD AUTO: 16.7 K/UL — HIGH (ref 3.8–10.5)

## 2024-02-28 PROCEDURE — 93010 ELECTROCARDIOGRAM REPORT: CPT

## 2024-02-28 PROCEDURE — 99233 SBSQ HOSP IP/OBS HIGH 50: CPT

## 2024-02-28 PROCEDURE — 99232 SBSQ HOSP IP/OBS MODERATE 35: CPT

## 2024-02-28 PROCEDURE — 71045 X-RAY EXAM CHEST 1 VIEW: CPT | Mod: 26

## 2024-02-28 RX ORDER — METOPROLOL TARTRATE 50 MG
12.5 TABLET ORAL ONCE
Refills: 0 | Status: COMPLETED | OUTPATIENT
Start: 2024-02-28 | End: 2024-02-28

## 2024-02-28 RX ORDER — FUROSEMIDE 40 MG
20 TABLET ORAL ONCE
Refills: 0 | Status: COMPLETED | OUTPATIENT
Start: 2024-02-28 | End: 2024-02-28

## 2024-02-28 RX ORDER — METOPROLOL TARTRATE 50 MG
25 TABLET ORAL EVERY 12 HOURS
Refills: 0 | Status: DISCONTINUED | OUTPATIENT
Start: 2024-02-28 | End: 2024-02-29

## 2024-02-28 RX ADMIN — Medication 12.5 MILLIGRAM(S): at 08:31

## 2024-02-28 RX ADMIN — ATORVASTATIN CALCIUM 20 MILLIGRAM(S): 80 TABLET, FILM COATED ORAL at 21:51

## 2024-02-28 RX ADMIN — Medication 25 MILLIGRAM(S): at 17:13

## 2024-02-28 RX ADMIN — HEPARIN SODIUM 5000 UNIT(S): 5000 INJECTION INTRAVENOUS; SUBCUTANEOUS at 21:51

## 2024-02-28 RX ADMIN — Medication 1 MILLIGRAM(S): at 12:34

## 2024-02-28 RX ADMIN — PANTOPRAZOLE SODIUM 40 MILLIGRAM(S): 20 TABLET, DELAYED RELEASE ORAL at 05:49

## 2024-02-28 RX ADMIN — PANTOPRAZOLE SODIUM 40 MILLIGRAM(S): 20 TABLET, DELAYED RELEASE ORAL at 17:14

## 2024-02-28 RX ADMIN — Medication 12.5 MILLIGRAM(S): at 05:49

## 2024-02-28 RX ADMIN — HEPARIN SODIUM 5000 UNIT(S): 5000 INJECTION INTRAVENOUS; SUBCUTANEOUS at 05:49

## 2024-02-28 RX ADMIN — CEFTRIAXONE 100 MILLIGRAM(S): 500 INJECTION, POWDER, FOR SOLUTION INTRAMUSCULAR; INTRAVENOUS at 12:34

## 2024-02-28 RX ADMIN — HEPARIN SODIUM 5000 UNIT(S): 5000 INJECTION INTRAVENOUS; SUBCUTANEOUS at 13:46

## 2024-02-28 RX ADMIN — Medication 5 MILLIGRAM(S): at 21:51

## 2024-02-28 RX ADMIN — Medication 325 MILLIGRAM(S): at 12:34

## 2024-02-28 RX ADMIN — Medication 1: at 17:13

## 2024-02-28 RX ADMIN — Medication 20 MILLIGRAM(S): at 12:34

## 2024-02-28 NOTE — PROGRESS NOTE ADULT - SUBJECTIVE AND OBJECTIVE BOX
[facility] DIVISION of INFECTIOUS DISEASE  Simón Nolasco MD PhD, Patrica Bear MD, Lluvia Stahl MD, Tree Avery MD, Pelon Peralta MD  and providing coverage with Gerson Ortiz MD  Providing Infectious Disease Consultations at [facility], [facility], [facility]'s    Office# [phone] to schedule follow up appointments  Answering Service for urgent calls or New Consults [phone]  Cell# to text for urgent issues Simón Nolasco [phone]     infectious diseases progress note:    CAROL ALEXANDRE is a 83y y. o. Female patient    Overnight and events of the last 24hrs reviewed    Allergies    No Known Drug Allergies  latex (Rash)    Intolerances    aspirin (Stomach Upset)      ANTIBIOTICS/RELEVANT:  antimicrobials  cefTRIAXone   IVPB 1000 milliGRAM(s) IV Intermittent every 24 hours    immunologic:    OTHER:  albuterol/ipratropium for Nebulization 3 milliLiter(s) Nebulizer every 6 hours PRN  atorvastatin 20 milliGRAM(s) Oral at bedtime  chlorhexidine 2% Cloths 1 Application(s) Topical <User Schedule>  dextrose 5%. 1000 milliLiter(s) IV Continuous <Continuous>  dextrose 5%. 1000 milliLiter(s) IV Continuous <Continuous>  dextrose 50% Injectable 25 Gram(s) IV Push once  dextrose 50% Injectable 12.5 Gram(s) IV Push once  dextrose 50% Injectable 25 Gram(s) IV Push once  dextrose Oral Gel 15 Gram(s) Oral once PRN  ferrous    sulfate 325 milliGRAM(s) Oral daily  folic acid 1 milliGRAM(s) Oral daily  glucagon  Injectable 1 milliGRAM(s) IntraMuscular once  heparin   Injectable 5000 Unit(s) SubCutaneous every 8 hours  insulin lispro (ADMELOG) corrective regimen sliding scale   SubCutaneous three times a day before meals  insulin lispro (ADMELOG) corrective regimen sliding scale   SubCutaneous at bedtime  melatonin 5 milliGRAM(s) Oral at bedtime  metoprolol tartrate 25 milliGRAM(s) Oral every 12 hours  ondansetron Injectable 4 milliGRAM(s) IV Push every 6 hours PRN  pantoprazole  Injectable 40 milliGRAM(s) IV Push two times a day      Objective:  Vital Signs Last 24 Hrs  T(C): 36.4 (28 Feb 2024 13:10), Max: 37.2 (27 Feb 2024 20:45)  T(F): 97.6 (28 Feb 2024 13:10), Max: 98.9 (27 Feb 2024 20:45)  HR: 96 (28 Feb 2024 13:10) (87 - 124)  BP: 108/64 (28 Feb 2024 13:10) (90/54 - 148/81)  BP(mean): 70 (27 Feb 2024 22:18) (70 - 85)  RR: 17 (28 Feb 2024 13:10) (16 - 22)  SpO2: 99% (28 Feb 2024 13:10) (95% - 100%)    Parameters below as of 28 Feb 2024 13:10  Patient On (Oxygen Delivery Method): nasal cannula        T(C): 36.4 (02-28-24 @ 13:10), Max: 37.4 (02-27-24 @ 00:19)  T(C): 36.4 (02-28-24 @ 13:10), Max: 37.4 (02-27-24 @ 00:19)  T(C): 36.4 (02-28-24 @ 13:10), Max: 37.4 (02-27-24 @ 00:19)    PHYSICAL EXAM:  HEENT: NC atraumatic  Neck: supple  Respiratory: no accessory muscle use, breathing comfortably  Cardiovascular: distant  Gastrointestinal: normal appearing, nondistended  Extremities: no clubbing, no cyanosis,        LABS:                          10.4   16.70 )-----------( 383      ( 28 Feb 2024 07:40 )             32.6       WBC  16.70 02-28 @ 07:40  6.87 02-27 @ 06:23  7.71 02-26 @ 05:43  13.19 02-25 @ 23:40  19.36 02-25 @ 20:18  8.67 02-25 @ 16:40      02-28    141  |  107  |  28<H>  ----------------------------<  136<H>  4.2   |  21<L>  |  1.40<H>    Ca    10.0      28 Feb 2024 07:40  Phos  3.6     02-28  Mg     1.8     02-28    TPro  6.9  /  Alb  3.1<L>  /  TBili  1.0  /  DBili  x   /  AST  21  /  ALT  17  /  AlkPhos  81  02-28      Creatinine: 1.40 mg/dL (02-28-24 @ 07:40)  Creatinine: 1.60 mg/dL (02-27-24 @ 06:23)  Creatinine: 1.40 mg/dL (02-26-24 @ 14:00)  Creatinine: 1.40 mg/dL (02-26-24 @ 05:43)  Creatinine: 1.30 mg/dL (02-25-24 @ 20:40)  Creatinine: 1.40 mg/dL (02-25-24 @ 16:40)        Urinalysis Basic - ( 28 Feb 2024 07:40 )    Color: x / Appearance: x / SG: x / pH: x  Gluc: 136 mg/dL / Ketone: x  / Bili: x / Urobili: x   Blood: x / Protein: x / Nitrite: x   Leuk Esterase: x / RBC: x / WBC x   Sq Epi: x / Non Sq Epi: x / Bacteria: x            INFLAMMATORY MARKERS      MICROBIOLOGY:              RADIOLOGY & ADDITIONAL STUDIES:

## 2024-02-28 NOTE — PHYSICAL THERAPY INITIAL EVALUATION ADULT - PERTINENT HX OF CURRENT PROBLEM, REHAB EVAL
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  Patient reports she was hanging up some [employer] decorations  when the symptoms started. Daughter at bedside reported decreased PO intake, and has been vomiting intermittently all day long.  She denies any abdominal pain, fever chills, cough or recent URI symptoms.

## 2024-02-28 NOTE — PHYSICAL THERAPY INITIAL EVALUATION ADULT - ADDITIONAL COMMENTS
Patient lives in private home, [address] and resides on main floor. Patient was independent in all ADLs, ambulated independently without device, +

## 2024-02-28 NOTE — SBIRT NOTE ADULT - NSSBIRTDRGNOACTINTDET_GEN_A_CORE
Pt reported that she uses marijuana chocolate from the dispensary for insomnia. Pt denies concerns/ family concerns at this time. SW to follow and remain available for any needs.

## 2024-02-28 NOTE — PROVIDER CONTACT NOTE (OTHER) - BACKGROUND
Informed provider that pt was told she was told she should not be eating the blackberries and blueberries as she is on a puree diet.

## 2024-02-28 NOTE — CASE MANAGEMENT PROGRESS NOTE - NSCMPROGRESSNOTE_GEN_ALL_CORE
CM consult noted for PICC line, Will continue to follow patient throughout hospital stay and assist with discharge planning needs

## 2024-02-28 NOTE — CARE COORDINATION ASSESSMENT. - NSCAREPROVIDERS_GEN_ALL_CORE_FT
CARE PROVIDERS:  Accepting Physician: Ruiz Stahl  Administration: Kevin Richards  Administration: Stan Ramirez  Administration: John De La Fuente  Administration: Amalia Dillon  Administration: Satya Orlando  Administration: Pipo Montoya  Admitting: Ruiz Stahl  Attending: Ruiz Stahl  Clinical Doc. Improvement: Natty Ruiz  Consultant: Prema Larose  Consultant: Alvin Mayberry  Consultant: Marcelino Stahl  Consultant: Gurwinder Rangel  Consultant: Schwab, Alfred  Consultant: Chandana Fowler  Consultant: Weil, Patricia  Consultant: Simón Watkins  Consultant: Josselin Nix  Consultant: Flakito Hobbs  Consultant: Juan M Avery  Consultant: Simón Nolasco  Consultant: Chuck Elias  Covering Team: Meghna Spicer  Covering Team: Yony Lorenzo  ED ACP: Amelie Quesada  ED Attending: Hiro Luna  ED Nurse: Kevin Chaudhary  HIM/Billing & Coding: Mague Crow  Nurse: Crystal Rey  Nurse: Lurdes Selby  Ordered: ADM, User  Ordered: Doctor, Unknown  Outpatient Provider: Raheem Juarez  Outpatient Provider: Sushant Friedman  Override: Lurdes Selby  Override: Trevon Pat  Override: Petra Hong  Physical Therapy: Aamir Rivera  Primary Team: Rios Gonzalez  Primary Team: Timo Mustafa  Primary Team: Shoshana Morrow  Primary Team: Hiro Cummings  Primary Team: Maryanne Us  Primary Team: Ravin Lagunas  Primary Team: Abraham Golden  Primary Team: Stan Valencia  Primary Team: Carroll Selby  Primary Team: Chrissy Chao  Primary Team: Jing Askew  Primary Team: Suha Hernandez  Primary Team: Karla Mckeon  Primary Team: Charito Bynum  Registered Dietitian: Homa Dixon  Respiratory Therapy: Marilee Molina  : Jessica Reyez  Student: Aura Martinez  Team: [facility], Team  Team: [facility], Team  Team: [facility], Team  UR// Supp. Assoc.: Stamos, Pat

## 2024-02-28 NOTE — CARE COORDINATION ASSESSMENT. - REASON FOR CONSULT
SBIRT completed. No concerns reported. Pt uses marijuana chocolate from the dispensary nightly for insomnia./substance abuse issues

## 2024-02-28 NOTE — PROVIDER CONTACT NOTE (CRITICAL VALUE NOTIFICATION) - ASSESSMENT
Pt is asleep, denies distress at this time. ; Cardiac monitoring continued; HR in 90s post Metoprolol PO.

## 2024-02-28 NOTE — PROGRESS NOTE ADULT - SUBJECTIVE AND OBJECTIVE BOX
Patient is a 83y old  Female who presents with a chief complaint of ACS, PNA (28 Feb 2024 15:57)    Patient seen and examined at bedside. Patient had CP overnight, tachycardic in the morning. She had BMs today, feels GI distress/nausea improving.    ALLERGIES:  No Known Drug Allergies  aspirin (Stomach Upset)  latex (Rash)    MEDICATIONS  (STANDING):  atorvastatin 20 milliGRAM(s) Oral at bedtime  cefTRIAXone   IVPB 1000 milliGRAM(s) IV Intermittent every 24 hours  chlorhexidine 2% Cloths 1 Application(s) Topical <User Schedule>  dextrose 5%. 1000 milliLiter(s) (50 mL/Hr) IV Continuous <Continuous>  dextrose 5%. 1000 milliLiter(s) (100 mL/Hr) IV Continuous <Continuous>  dextrose 50% Injectable 25 Gram(s) IV Push once  dextrose 50% Injectable 25 Gram(s) IV Push once  dextrose 50% Injectable 12.5 Gram(s) IV Push once  ferrous    sulfate 325 milliGRAM(s) Oral daily  folic acid 1 milliGRAM(s) Oral daily  glucagon  Injectable 1 milliGRAM(s) IntraMuscular once  heparin   Injectable 5000 Unit(s) SubCutaneous every 8 hours  insulin lispro (ADMELOG) corrective regimen sliding scale   SubCutaneous at bedtime  insulin lispro (ADMELOG) corrective regimen sliding scale   SubCutaneous three times a day before meals  melatonin 5 milliGRAM(s) Oral at bedtime  metoprolol tartrate 25 milliGRAM(s) Oral every 12 hours  pantoprazole  Injectable 40 milliGRAM(s) IV Push two times a day    MEDICATIONS  (PRN):  albuterol/ipratropium for Nebulization 3 milliLiter(s) Nebulizer every 6 hours PRN Shortness of Breath and/or Wheezing  dextrose Oral Gel 15 Gram(s) Oral once PRN Blood Glucose LESS THAN 70 milliGRAM(s)/deciliter  ondansetron Injectable 4 milliGRAM(s) IV Push every 6 hours PRN Nausea and/or Vomiting    Vital Signs Last 24 Hrs  T(F): 98.1 (28 Feb 2024 21:03), Max: 98.1 (28 Feb 2024 21:03)  HR: 94 (28 Feb 2024 21:03) (87 - 124)  BP: 96/57 (28 Feb 2024 21:03) (96/49 - 148/81)  RR: 18 (28 Feb 2024 21:03) (16 - 18)  SpO2: 99% (28 Feb 2024 21:03) (95% - 100%)    I&O's Summary    27 Feb 2024 07:01  -  28 Feb 2024 07:00  --------------------------------------------------------  IN: 1200 mL / OUT: 400 mL / NET: 800 mL    28 Feb 2024 07:01  -  28 Feb 2024 22:07  --------------------------------------------------------  IN: 0 mL / OUT: 500 mL / NET: -500 mL      BMI (kg/m2): 23.6 (02-25-24 @ 23:26)    PHYSICAL EXAM:  General: NAD, lying in bed  Eyes: Anicteric  ENT: Moist mucous membranes  Neck: Supple, No JVD  Lungs: Clear to auscultation bilaterally, normal respiratory effort  Cardio: RRR, S1/S2, No murmurs  Abdomen: Soft, Nontender, Nondistended; Bowel sounds present  Extremities: No calf tenderness, No pitting edema, no digital cyanosis  Skin: Warm, dry  Psych: A&O x 3, follows commands    LABS:                        10.4   16.70 )-----------( 383      ( 28 Feb 2024 07:40 )             32.6       02-28    141  |  107  |  28  ----------------------------<  136  4.2   |  21  |  1.40    Ca    10.0      28 Feb 2024 07:40  Phos  3.6     02-28  Mg     1.8     02-28    TPro  6.9  /  Alb  3.1  /  TBili  1.0  /  DBili  x   /  AST  21  /  ALT  17  /  AlkPhos  81  02-28            CARDIAC MARKERS ( 28 Feb 2024 07:40 )  x     / 374.2 ng/L / x     / x     / x      CARDIAC MARKERS ( 26 Feb 2024 05:43 )  x     / 1438.2 ng/L / x     / x     / x      CARDIAC MARKERS ( 25 Feb 2024 23:40 )  x     / 1732.3 ng/L / x     / x     / x            TSH 2.44   TSH with FT4 reflex --  Total T3 --      ABG - ( 25 Feb 2024 22:36 )  pH, Arterial: 7.48  pH, Blood: x     /  pCO2: 28    /  pO2: 177   / HCO3: 21    / Base Excess: -2.6  /  SaO2: 99.8                    POCT Blood Glucose.: 122 mg/dL (28 Feb 2024 21:29)  POCT Blood Glucose.: 167 mg/dL (28 Feb 2024 16:51)  POCT Blood Glucose.: 158 mg/dL (28 Feb 2024 11:43)  POCT Blood Glucose.: 133 mg/dL (28 Feb 2024 07:52)      Urinalysis Basic - ( 28 Feb 2024 07:40 )    Color: x / Appearance: x / SG: x / pH: x  Gluc: 136 mg/dL / Ketone: x  / Bili: x / Urobili: x   Blood: x / Protein: x / Nitrite: x   Leuk Esterase: x / RBC: x / WBC x   Sq Epi: x / Non Sq Epi: x / Bacteria: x        Culture - Urine (collected 26 Feb 2024 01:47)  Source: Clean Catch Clean Catch (Midstream)  Final Report (27 Feb 2024 07:07):    No growth    Culture - Blood (collected 25 Feb 2024 22:38)  Source: .Blood Blood-Peripheral  Preliminary Report (28 Feb 2024 06:02):    No growth at 48 Hours    Culture - Blood (collected 25 Feb 2024 22:33)  Source: .Blood Blood-Peripheral  Preliminary Report (28 Feb 2024 06:02):    No growth at 48 Hours          RADIOLOGY & ADDITIONAL TESTS:   Personally reviewed.     Consultant(s) Notes Reviewed:  [x] YES  [ ] NO    Care Discussed with Consultants/Other Providers:

## 2024-02-28 NOTE — PROGRESS NOTE ADULT - ASSESSMENT
83 y.o. 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     SOB  -2/26  CT chest with b/l patchy opacities, pna  -2/28, CXR wet read similar to 2/26, pending official read  - abx for asp pna, wbc uptrending     - Remains sinus tachy   - off cardizem gtt  - continue started on lopressor 12.5mg q12, can be uptitrated to 25mg bid   - tachycardia, multifactorial, likely reflective of anxiety, infection, dehydration from n/v    - pt reports intermittent pleuritic cp  - EKG showing SVT 149bpm   - Troponin 694.1 --> 1738.9 --> 1732.3 --> 1438.2. Elevated likely due to demand ischemia in the setting of tachyarrhythmia.   -  TTE results; Left Ventricle: akinesis of the apical wall, mid to distal inferoseptal and anterolateral walls. Findings are consistent with either Takotsubo cardiomyopathy or LAD ischemia. The basal segments appear hyperkinetic. The LVEF appears estimated at 35-40%; LA: mod dilation; MV: trace MR with mild posterior calcification; IVC: normal in size (1.85cm in diameter) consistent with mildly elevated right atrial pressure (~8, range 5-10mmHg); Rec repeat TTE to further evaluate LV thrombus.  - cannot definitively r/o CAD. however given anemia and jose alberto would not plan for cath at this time.   - Continue ASA and monitor H/H in the setting of GIB    - repeat echo prior to dc  - will need to start on ARB/ARNI when able    - Monitor and replete lytes, keep K>4, Mg>2.  - Will continue to follow.    Chandana Fowler NP  Nurse Practitioner- Cardiology   Call TEAMS       83 y.o. 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     SOB  -2/26  CTA chest with b/l patchy opacities, pna, neg PE  -2/28, CXR wet read similar to 2/26, pending official read  - abx for asp pna, wbc uptrending     - Remains sinus tachy   - off cardizem gtt  - continue started on lopressor 12.5mg q12, can be uptitrated to 25mg bid   - tachycardia, multifactorial, likely reflective of anxiety, infection, dehydration from n/v    - pt reports intermittent pleuritic cp  - EKG showing SVT 149bpm   - Troponin 694.1 --> 1738.9 --> 1732.3 --> 1438.2. Elevated likely due to demand ischemia in the setting of tachyarrhythmia.   -  TTE results; Left Ventricle: akinesis of the apical wall, mid to distal inferoseptal and anterolateral walls. Findings are consistent with either Takotsubo cardiomyopathy or LAD ischemia. The basal segments appear hyperkinetic. The LVEF appears estimated at 35-40%; LA: mod dilation; MV: trace MR with mild posterior calcification; IVC: normal in size (1.85cm in diameter) consistent with mildly elevated right atrial pressure (~8, range 5-10mmHg); Rec repeat TTE to further evaluate LV thrombus.  - cannot definitively r/o CAD. however given anemia and jose alberto would not plan for cath at this time.   - Continue ASA and monitor H/H in the setting of GIB    - repeat echo prior to dc  - will need to start on ARB/ARNI when able    - Monitor and replete lytes, keep K>4, Mg>2.  - Will continue to follow.    Chandana Fowler NP  Nurse Practitioner- Cardiology   Call TEAMS       83 y.o. 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     SOB  -2/26  CTA chest with b/l patchy opacities, pna, neg PE  -2/28, CXR wet read similar to 2/26, pending official read  - abx for asp pna, wbc uptrending   -  TTE results; Left Ventricle: akinesis of the apical wall, mid to distal inferoseptal and anterolateral walls. Findings are consistent with either Takotsubo cardiomyopathy or LAD ischemia. The basal segments appear hyperkinetic. The LVEF appears estimated at 35-40%; LA: mod dilation; MV: trace MR with mild posterior calcification; IVC: normal in size (1.85cm in diameter) consistent with mildly elevated right atrial pressure (~8, range 5-10mmHg); Rec repeat TTE to further evaluate LV thrombus.  - can trial lasix 20mg ivp, tte with mildly elevated right atrial pressure    - Remains sinus tachy   - off cardizem gtt  - continue started on lopressor 12.5mg q12, can be uptitrated to 25mg bid   - tachycardia, multifactorial, likely reflective of anxiety, infection    - pt reports intermittent pleuritic cp  - EKG showing SVT 149bpm   - Troponin 694.1 --> 1738.9 --> 1732.3 --> 1438.2. Elevated likely due to demand ischemia in the setting of tachyarrhythmia.     - cannot definitively r/o CAD. however given anemia and jose alberto would not plan for cath at this time.   - Continue ASA and monitor H/H in the setting of GIB    - repeat echo prior to dc  - will need to start on ARB/ARNI when able    - Monitor and replete lytes, keep K>4, Mg>2.  - Will continue to follow.    Chandana Fowler NP  Nurse Practitioner- Cardiology   Call TEAMS       83 y.o. 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     SOB  - dyspneic on exam today  -2/26  CTA chest with b/l patchy opacities, pna, neg PE  -2/28, CXR wet read similar to 2/26, pending official read  - abx for asp pna, wbc uptrending   -  TTE results; Left Ventricle: akinesis of the apical wall, mid to distal inferoseptal and anterolateral walls. Findings are consistent with either Takotsubo cardiomyopathy or LAD ischemia. The basal segments appear hyperkinetic. The LVEF appears estimated at 35-40%; LA: mod dilation; MV: trace MR with mild posterior calcification; IVC: normal in size (1.85cm in diameter) consistent with mildly elevated right atrial pressure (~8, range 5-10mmHg); Rec repeat TTE to further evaluate LV thrombus.  - lasix po held since 2/25 in the setting of nausea and vomiting   - can trial lasix 20mg ivp x1 today, as tte with mildly elevated right atrial pressure    - Remains sinus tachy   - off cardizem gtt  - continue started on lopressor 12.5mg q12, can be uptitrated to 25mg bid   - tachycardia, multifactorial, likely reflective of anxiety, infection, dehydration from n/v    - pt reports intermittent pleuritic cp  - EKG showing SVT 149bpm   - Troponin 694.1 --> 1738.9 --> 1732.3 --> 1438.2. Elevated likely due to demand ischemia in the setting of tachyarrhythmia.     - cannot definitively r/o CAD. however given anemia and jose alberto would not plan for cath at this time.   - Continue ASA and monitor H/H in the setting of GIB    - repeat echo prior to dc  - will need to start on ARB/ARNI when able    - Monitor and replete lytes, keep K>4, Mg>2.  - Will continue to follow.    Chandana Fowler NP  Nurse Practitioner- Cardiology   Call TEAMS

## 2024-02-28 NOTE — CHART NOTE - NSCHARTNOTEFT_GEN_A_CORE
called by RN for intermitent chest pain coming and going and for tachycardia  patient is downgrade from ICU 1 hour ago    patient worked up for CAD, poor candidate for cath as per cardio, troponins downtrending  AM meds not given, metoprolol for hx of SVT,  mag, phos, ekg, trops ordered  RN to call for any changes called by RN for intermitent chest pain coming and going and for tachycardia  patient is downgrade from ICU 1 hour ago    patient evalauted at bedside  currently patient being changed  patient states she feels tired, and has CP that comes and goes with some SOB  PE: tachycardic, clear lung sounds b/l, in mild distress from [facility], on 1L o2 saturating at 98  patient worked up for CAD, poor candidate for cath as per cardio, troponins downtrending  AM meds not given, metoprolol for hx of SVT  mag, phos, ekg, trops ordered  RN to call for any changes

## 2024-02-28 NOTE — PROGRESS NOTE ADULT - SUBJECTIVE AND OBJECTIVE BOX
[facility] -- Mg Win Wachsman, Bennie Hobbs [place], Goodger, Cohen  Office # [phone]    Follow Up:  ACS, PSVT     Subjective/Observations: Tachycardic 130s overnight. Reports SOB, feels anxious. Reported intermittent pleuritic chest pain this AM.     REVIEW OF SYSTEMS: All other review of systems is negative unless indicated above  PAST MEDICAL & SURGICAL HISTORY:  Carotid artery obstruction, left  had carotid surgery 2016      Lung mass  RLL, 2016, had RL lobectomy & chemotherapy      Hip fracture  left femur, had pins placed in 2016      Lumbar radiculopathy      Hyperlipidemia      Lymphoma      Former smoker      Unspecified atrial fibrillation      Hypertension      Hip fracture  2016, left hip      Right lower lobe lung mass  Right lower lobectomy 2016, chemo till 01/2017      Status post carotid surgery  left carotid artery stenosis 2016      History of loop recorder  placed 9/2021        MEDICATIONS  (STANDING):  atorvastatin 20 milliGRAM(s) Oral at bedtime  cefTRIAXone   IVPB 1000 milliGRAM(s) IV Intermittent every 24 hours  chlorhexidine 2% Cloths 1 Application(s) Topical <User Schedule>  dextrose 5%. 1000 milliLiter(s) (50 mL/Hr) IV Continuous <Continuous>  dextrose 5%. 1000 milliLiter(s) (100 mL/Hr) IV Continuous <Continuous>  dextrose 50% Injectable 25 Gram(s) IV Push once  dextrose 50% Injectable 25 Gram(s) IV Push once  dextrose 50% Injectable 12.5 Gram(s) IV Push once  ferrous    sulfate 325 milliGRAM(s) Oral daily  folic acid 1 milliGRAM(s) Oral daily  glucagon  Injectable 1 milliGRAM(s) IntraMuscular once  heparin   Injectable 5000 Unit(s) SubCutaneous every 8 hours  insulin lispro (ADMELOG) corrective regimen sliding scale   SubCutaneous at bedtime  insulin lispro (ADMELOG) corrective regimen sliding scale   SubCutaneous three times a day before meals  melatonin 5 milliGRAM(s) Oral at bedtime  metoprolol tartrate 12.5 milliGRAM(s) Oral every 12 hours  pantoprazole  Injectable 40 milliGRAM(s) IV Push two times a day    MEDICATIONS  (PRN):  albuterol/ipratropium for Nebulization 3 milliLiter(s) Nebulizer every 6 hours PRN Shortness of Breath and/or Wheezing  dextrose Oral Gel 15 Gram(s) Oral once PRN Blood Glucose LESS THAN 70 milliGRAM(s)/deciliter  ondansetron Injectable 4 milliGRAM(s) IV Push every 6 hours PRN Nausea and/or Vomiting    Allergies    No Known Drug Allergies  latex (Rash)    Intolerances    aspirin (Stomach Upset)    Vital Signs Last 24 Hrs  T(C): 36.5 (28 Feb 2024 05:07), Max: 37.2 (27 Feb 2024 20:45)  T(F): 97.7 (28 Feb 2024 05:07), Max: 98.9 (27 Feb 2024 20:45)  HR: 124 (28 Feb 2024 06:23) (87 - 124)  BP: 148/81 (28 Feb 2024 06:23) (90/54 - 148/81)  BP(mean): 70 (27 Feb 2024 22:18) (70 - 92)  RR: 18 (28 Feb 2024 06:23) (16 - 25)  SpO2: 98% (28 Feb 2024 06:23) (95% - 100%)    Parameters below as of 28 Feb 2024 06:23  Patient On (Oxygen Delivery Method): nasal cannula  O2 Flow (L/min): 2    I&O's Summary    27 Feb 2024 07:01  -  28 Feb 2024 07:00  --------------------------------------------------------  IN: 1200 mL / OUT: 400 mL / NET: 800 mL    Tele: -130s  Physical exam:  Constitutional: mildly dysneic  HEENT: Moist Mucous Membranes, Anicteric  Pulmonary: mildly, dyspneic, Decreased breath sounds b/l. No rales, crackles or wheeze appreciated.   Cardiovascular: tachy, S1 and S2, No murmurs, rubs, gallops or clicks  Gastrointestinal: Bowel Sounds present, soft, nontender.   Lymph: No peripheral edema. No lymphadenopathy.  Skin: No visible rashes or ulcers.  Psych:  anxious    LABS: All Labs Reviewed:                        10.4   16.70 )-----------( 383      ( 28 Feb 2024 07:40 )             32.6                         9.9    6.87  )-----------( 174      ( 27 Feb 2024 06:23 )             31.1                         9.7    7.71  )-----------( 199      ( 26 Feb 2024 05:43 )             29.8     27 Feb 2024 06:23    140    |  108    |  26     ----------------------------<  163    4.4     |  27     |  1.60   26 Feb 2024 14:00    140    |  108    |  27     ----------------------------<  183    4.7     |  26     |  1.40   26 Feb 2024 05:43    140    |  107    |  30     ----------------------------<  206    5.7     |  28     |  1.40     Ca    9.5        27 Feb 2024 06:23  Ca    9.1        26 Feb 2024 14:00  Ca    8.8        26 Feb 2024 05:43  Phos  4.1       27 Feb 2024 06:23  Phos  3.7       26 Feb 2024 05:43  Phos  3.7       25 Feb 2024 20:40  Mg     1.9       27 Feb 2024 06:23  Mg     1.8       26 Feb 2024 05:43  Mg     2.0       25 Feb 2024 20:40    TPro  6.1    /  Alb  2.9    /  TBili  0.4    /  DBili  x      /  AST  20     /  ALT  16     /  AlkPhos  82     26 Feb 2024 05:43  TPro  7.2    /  Alb  3.6    /  TBili  0.5    /  DBili  x      /  AST  19     /  ALT  16     /  AlkPhos  105    25 Feb 2024 20:40  TPro  6.9    /  Alb  3.5    /  TBili  0.4    /  DBili  x      /  AST  14     /  ALT  16     /  AlkPhos  94     25 Feb 2024 16:40          12 Lead ECG:   Ventricular Rate 148 BPM    QRS Duration 98 ms    Q-T Interval 282 ms    QTC Calculation(Bazett) 442 ms    R Axis -63 degrees    T Axis 69 degrees    Diagnosis Line *** Critical Test Result: High HR  Supraventricular tachycardia  Left axis deviation  Inferior infarct (cited on or before 25-FEB-2024)  Anterolateral infarct (cited on or before 25-FEB-2024)    Confirmed by SIERRA HOBBS ([id number]) on 2/26/2024 7:38:01 AM (02-25-24 @ 20:18)       EXAM:  ECHO TTE WO CON COMP W DOPP         PROCEDURE DATE:  06/03/2021        INTERPRETATION:  INDICATION: Edema  Sonographer KL    Blood Pressure 164/72    Height 160 cm     Weight 56.7 kg       BSA 1.58 sq m    Dimensions:  LA 3.7       Normal Values: 2.0 - 4.0 cm  Ao 2.7        Normal Values: 2.0 - 3.8 cm  SEPTUM 1.0       Normal Values: 0.6 - 1.2 cm  PWT 0.9       Normal Values: 0.6 - 1.1 cm  LVIDd 5.2         Normal Values: 3.0 - 5.6 cm  LVIDs 3.8         Normal Values: 1.8 - 4.0 cm      OBSERVATIONS:  Mitral Valve: Mild MR.  Aortic Valve/Aorta: Calcified trileaflet aortic valve with decreased opening. Peak transaortic valve gradient equals 15 mmHg with a mean transaortic valve gradient of 9 mmHg. This consistent with mild aortic stenosis.  Tricuspid Valve: Mild TR.  Pulmonic Valve: Not well-visualized  Left Atrium: normal  Right Atrium: Not well-visualized  Left Ventricle: normal LV size and systolic function, estimated LVEF of 55%.  Right Ventricle: Grossly normal size and systolic function.  Pericardium: no significant pericardial effusion.  Small bilateral pleural effusions        IMPRESSION:  Normal left ventricular internal dimensions and systolic function, estimated LVEF of 55%.  Grossly normal RV size and systolic function.  Calcified trileaflet aortic valve with mild aortic stenosis  Mild MR and TR.  No significant pericardial effusion.              JENNIFER LOAIZA MD; Attending Cardiologist  This document has been electronically signed. Jun 4 2021  3:27PM

## 2024-02-28 NOTE — CARE COORDINATION ASSESSMENT. - NSPASTMEDSURGHISTORY_GEN_ALL_CORE_FT
PAST MEDICAL & SURGICAL HISTORY:  Lumbar radiculopathy      Hip fracture  left femur, had pins placed in 2016      Lung mass  RLL, 2016, had RL lobectomy & chemotherapy      Carotid artery obstruction, left  had carotid surgery 2016      Status post carotid surgery  left carotid artery stenosis 2016      Right lower lobe lung mass  Right lower lobectomy 2016, chemo till 01/2017      Hip fracture  2016, left hip      Former smoker      Lymphoma      Hyperlipidemia      Hypertension      Unspecified atrial fibrillation      History of loop recorder  placed 9/2021

## 2024-02-28 NOTE — PROGRESS NOTE ADULT - ASSESSMENT
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   Vital Signs T(F): 97.5F HR: 110  BP: 147/83  RR: 18  SpO2: 97% on RA  Troponin 694.1,  CTA chest: No pulmonary embolism. New bilateral patchy and ground glass opacities, consistent with pneumonia. Hypodense left thyroid   lesion, measuring 0.9 cm.    RECOMMENDATIONS  1-Dyspnea noted leukocytosis with eosinopenia, hypoxemia, dyspnea, imaging concerning for airspace disease, but pt is afebrile and leukocytosis quickly resolved, so rec  Zosyn-started late 2/25 -then de-escalated to cetriaxone   w concerns for asp PNA but sig rt pleural effusion anticipate 3/1 as last day   consider swallow eval with issues tolerating thin liquids  2/28-now out of ICU but noted rise in wbc -recs to follow    2-Elevated Troponins may be demand ischemia    recs to follow based on results of other tests.    Thank you for consulting us and involving us in the management of this most interesting and challenging case.  We will follow along in the care of this patient. Please call us at [phone] or text me directly on my cell# at [phone] with any concerns.

## 2024-02-29 ENCOUNTER — RESULT REVIEW (OUTPATIENT)
Age: 84
End: 2024-02-29

## 2024-02-29 DIAGNOSIS — E04.1 NONTOXIC SINGLE THYROID NODULE: ICD-10-CM

## 2024-02-29 LAB
ALBUMIN SERPL ELPH-MCNC: 2.7 G/DL — LOW (ref 3.3–5)
ALP SERPL-CCNC: 69 U/L — SIGNIFICANT CHANGE UP (ref 40–120)
ALT FLD-CCNC: 13 U/L — SIGNIFICANT CHANGE UP (ref 12–78)
ANION GAP SERPL CALC-SCNC: 12 MMOL/L — SIGNIFICANT CHANGE UP (ref 5–17)
AST SERPL-CCNC: 15 U/L — SIGNIFICANT CHANGE UP (ref 15–37)
BASOPHILS # BLD AUTO: 0.03 K/UL — SIGNIFICANT CHANGE UP (ref 0–0.2)
BASOPHILS NFR BLD AUTO: 0.5 % — SIGNIFICANT CHANGE UP (ref 0–2)
BILIRUB SERPL-MCNC: 0.6 MG/DL — SIGNIFICANT CHANGE UP (ref 0.2–1.2)
BUN SERPL-MCNC: 38 MG/DL — HIGH (ref 7–23)
CALCIUM SERPL-MCNC: 9.2 MG/DL — SIGNIFICANT CHANGE UP (ref 8.5–10.1)
CHLORIDE SERPL-SCNC: 106 MMOL/L — SIGNIFICANT CHANGE UP (ref 96–108)
CO2 SERPL-SCNC: 24 MMOL/L — SIGNIFICANT CHANGE UP (ref 22–31)
CREAT SERPL-MCNC: 1.3 MG/DL — SIGNIFICANT CHANGE UP (ref 0.5–1.3)
EGFR: 41 ML/MIN/1.73M2 — LOW
EOSINOPHIL # BLD AUTO: 0.08 K/UL — SIGNIFICANT CHANGE UP (ref 0–0.5)
EOSINOPHIL NFR BLD AUTO: 1.3 % — SIGNIFICANT CHANGE UP (ref 0–6)
GLUCOSE SERPL-MCNC: 143 MG/DL — HIGH (ref 70–99)
HCT VFR BLD CALC: 29.5 % — LOW (ref 34.5–45)
HGB BLD-MCNC: 9.3 G/DL — LOW (ref 11.5–15.5)
IMM GRANULOCYTES NFR BLD AUTO: 0.6 % — SIGNIFICANT CHANGE UP (ref 0–0.9)
LYMPHOCYTES # BLD AUTO: 1.15 K/UL — SIGNIFICANT CHANGE UP (ref 1–3.3)
LYMPHOCYTES # BLD AUTO: 18.3 % — SIGNIFICANT CHANGE UP (ref 13–44)
MAGNESIUM SERPL-MCNC: 1.8 MG/DL — SIGNIFICANT CHANGE UP (ref 1.6–2.6)
MCHC RBC-ENTMCNC: 28.7 PG — SIGNIFICANT CHANGE UP (ref 27–34)
MCHC RBC-ENTMCNC: 31.5 GM/DL — LOW (ref 32–36)
MCV RBC AUTO: 91 FL — SIGNIFICANT CHANGE UP (ref 80–100)
MONOCYTES # BLD AUTO: 0.49 K/UL — SIGNIFICANT CHANGE UP (ref 0–0.9)
MONOCYTES NFR BLD AUTO: 7.8 % — SIGNIFICANT CHANGE UP (ref 2–14)
NEUTROPHILS # BLD AUTO: 4.51 K/UL — SIGNIFICANT CHANGE UP (ref 1.8–7.4)
NEUTROPHILS NFR BLD AUTO: 71.5 % — SIGNIFICANT CHANGE UP (ref 43–77)
NRBC # BLD: 0 /100 WBCS — SIGNIFICANT CHANGE UP (ref 0–0)
PLATELET # BLD AUTO: 219 K/UL — SIGNIFICANT CHANGE UP (ref 150–400)
POTASSIUM SERPL-MCNC: 4 MMOL/L — SIGNIFICANT CHANGE UP (ref 3.5–5.3)
POTASSIUM SERPL-SCNC: 4 MMOL/L — SIGNIFICANT CHANGE UP (ref 3.5–5.3)
PROCALCITONIN SERPL-MCNC: 0.39 NG/ML — HIGH
PROT SERPL-MCNC: 6.2 G/DL — SIGNIFICANT CHANGE UP (ref 6–8.3)
RBC # BLD: 3.24 M/UL — LOW (ref 3.8–5.2)
RBC # FLD: 14 % — SIGNIFICANT CHANGE UP (ref 10.3–14.5)
SODIUM SERPL-SCNC: 142 MMOL/L — SIGNIFICANT CHANGE UP (ref 135–145)
WBC # BLD: 6.3 K/UL — SIGNIFICANT CHANGE UP (ref 3.8–10.5)
WBC # FLD AUTO: 6.3 K/UL — SIGNIFICANT CHANGE UP (ref 3.8–10.5)

## 2024-02-29 PROCEDURE — 93306 TTE W/DOPPLER COMPLETE: CPT | Mod: 26

## 2024-02-29 PROCEDURE — 99233 SBSQ HOSP IP/OBS HIGH 50: CPT

## 2024-02-29 PROCEDURE — 71045 X-RAY EXAM CHEST 1 VIEW: CPT | Mod: 26

## 2024-02-29 RX ORDER — TIOTROPIUM BROMIDE 18 UG/1
2 CAPSULE ORAL; RESPIRATORY (INHALATION) DAILY
Refills: 0 | Status: DISCONTINUED | OUTPATIENT
Start: 2024-02-29 | End: 2024-03-02

## 2024-02-29 RX ORDER — ATORVASTATIN CALCIUM 80 MG/1
40 TABLET, FILM COATED ORAL AT BEDTIME
Refills: 0 | Status: DISCONTINUED | OUTPATIENT
Start: 2024-02-29 | End: 2024-03-02

## 2024-02-29 RX ORDER — METOPROLOL TARTRATE 50 MG
25 TABLET ORAL DAILY
Refills: 0 | Status: DISCONTINUED | OUTPATIENT
Start: 2024-02-29 | End: 2024-03-02

## 2024-02-29 RX ADMIN — Medication 1 MILLIGRAM(S): at 14:06

## 2024-02-29 RX ADMIN — PANTOPRAZOLE SODIUM 40 MILLIGRAM(S): 20 TABLET, DELAYED RELEASE ORAL at 05:28

## 2024-02-29 RX ADMIN — CEFTRIAXONE 100 MILLIGRAM(S): 500 INJECTION, POWDER, FOR SOLUTION INTRAMUSCULAR; INTRAVENOUS at 14:06

## 2024-02-29 RX ADMIN — Medication 325 MILLIGRAM(S): at 14:06

## 2024-02-29 RX ADMIN — HEPARIN SODIUM 5000 UNIT(S): 5000 INJECTION INTRAVENOUS; SUBCUTANEOUS at 21:55

## 2024-02-29 RX ADMIN — HEPARIN SODIUM 5000 UNIT(S): 5000 INJECTION INTRAVENOUS; SUBCUTANEOUS at 14:07

## 2024-02-29 RX ADMIN — HEPARIN SODIUM 5000 UNIT(S): 5000 INJECTION INTRAVENOUS; SUBCUTANEOUS at 05:28

## 2024-02-29 RX ADMIN — PANTOPRAZOLE SODIUM 40 MILLIGRAM(S): 20 TABLET, DELAYED RELEASE ORAL at 18:58

## 2024-02-29 RX ADMIN — Medication 5 MILLIGRAM(S): at 21:55

## 2024-02-29 RX ADMIN — ATORVASTATIN CALCIUM 40 MILLIGRAM(S): 80 TABLET, FILM COATED ORAL at 21:55

## 2024-02-29 RX ADMIN — Medication 1: at 12:31

## 2024-02-29 NOTE — CONSULT NOTE ADULT - ASSESSMENT
An 83 y.o. Female with  PMH cirrhosis, aortic stenosis, PAD s/p right carotid endarterectomy, non Hodgkins Lymphoma (pulmonary mass s/p removal, chemo; last infusion fall 2021), COPD, pAF s/p ILR implant 10/8/21, s/p watchman (2/11/22), hx of prior GI bleed requiring blood transfusions presents to the ED with complaints of shortness of breath, nausea, generalized weakness and chest pain that started at 2:20 PM. now on tele with + troponins, TTE with EF 35%  1) NPO after midnight for possible Right and LHC in am with Dr Heaven Lara  2) Trend Cardiac enzymes to peak  3) Continue BB and statin as ordered  4) Hold sc heparin dose in am  An 83 y.o. Female with  PMH cirrhosis, aortic stenosis, PAD s/p right carotid endarterectomy, non Hodgkins Lymphoma (pulmonary mass s/p removal, chemo; last infusion fall 2021), COPD, pAF s/p ILR implant 10/8/21, s/p watchman (2/11/22), hx of prior GI bleed requiring blood transfusions presents to the ED with complaints of shortness of breath, nausea, generalized weakness and chest pain that started at 2:20 PM. now on tele with + troponins, TTE with EF 35%  1) NPO after midnight for possible Right and LHC in am with Dr Heaven Lara  2) Cardiac enzymes  peaked 2/25/24   3) Continue BB and statin as ordered  4) Hold sc heparin dose in am

## 2024-02-29 NOTE — PROVIDER CONTACT NOTE (OTHER) - REASON
Informed provider that pt was received with 4L NC
Informed provider that pt BP last night 96/57 and this morning BP 95/51

## 2024-02-29 NOTE — CONSULT NOTE ADULT - SUBJECTIVE AND OBJECTIVE BOX
[facility]  Deshawn Diehl PA-C  [address] [place] [address]  [phone]      Chief Complaint:  Patient is a 83y old  Female who presents with a chief complaint of ACS, PNA (2024 11:40)      HPI:83F PMH cirrhosis, aortic stenosis, PAD s/p right carotid endarterectomy, non Hodgkins Lymphoma (pulmonary mass s/p removal, chemo; last infusion fall ), COPD, pAF s/p ILR implant 10/8/21, s/p watchman (22), hx of prior GI bleed requiring blood transfusions presents to the ED with complaints of shortness of breath, nausea, generalized weakness and chest pain that started at 2:20 PM.  Patient reports she was hanging up some [employer] decorations  when the symptoms started. Daughter at bedside reported decreased PO intake, and has been vomiting intermittently all day long.  She denies any abdominal pain, fever chills, cough or recent URI symptoms.  Reports she felt fine all morning.     Allergies:  No Known Drug Allergies  aspirin (Stomach Upset)  latex (Rash)      Medications:  albuterol/ipratropium for Nebulization 3 milliLiter(s) Nebulizer every 6 hours PRN  atorvastatin 20 milliGRAM(s) Oral at bedtime  cefTRIAXone   IVPB 1000 milliGRAM(s) IV Intermittent every 24 hours  chlorhexidine 2% Cloths 1 Application(s) Topical <User Schedule>  dextrose 5%. 1000 milliLiter(s) IV Continuous <Continuous>  dextrose 5%. 1000 milliLiter(s) IV Continuous <Continuous>  dextrose 50% Injectable 25 Gram(s) IV Push once  dextrose 50% Injectable 12.5 Gram(s) IV Push once  dextrose 50% Injectable 25 Gram(s) IV Push once  dextrose Oral Gel 15 Gram(s) Oral once PRN  ferrous    sulfate 325 milliGRAM(s) Oral daily  folic acid 1 milliGRAM(s) Oral daily  glucagon  Injectable 1 milliGRAM(s) IntraMuscular once  heparin   Injectable 5000 Unit(s) SubCutaneous every 8 hours  insulin lispro (ADMELOG) corrective regimen sliding scale   SubCutaneous three times a day before meals  insulin lispro (ADMELOG) corrective regimen sliding scale   SubCutaneous at bedtime  melatonin 5 milliGRAM(s) Oral at bedtime  metoprolol tartrate 25 milliGRAM(s) Oral every 12 hours  ondansetron Injectable 4 milliGRAM(s) IV Push every 6 hours PRN  pantoprazole  Injectable 40 milliGRAM(s) IV Push two times a day  tiotropium 2.5 MICROgram(s) Inhaler 2 Puff(s) Inhalation daily      PMHX/PSHX:  HLD (hyperlipidemia)    Carotid artery obstruction, left    DM (diabetes mellitus)    Lung mass    Hip fracture    Lumbar radiculopathy    Hyperlipidemia    Lymphoma    Former smoker    Unspecified atrial fibrillation    Hypertension    Hip fracture    Right lower lobe lung mass    Status post carotid surgery    No significant past surgical history    History of loop recorder        Family history:  No pertinent family history in first degree relatives    FH: diabetes mellitus (Father)        Social History:     ROS:     General:  no fevers, chills, night sweats, fatigue,   Eyes:  Good vision, no reported pain  ENT:  No sore throat, pain, runny nose, dysphagia  CV:  No pain, palpitations, hypo/hypertension  Resp:  No dyspnea, cough, tachypnea, wheezing  GI:  No pain, No nausea, No vomiting, No diarrhea, No constipation, No weight loss, No fever, No pruritis, No rectal bleeding, No tarry stools, No dysphagia,  :  No pain, bleeding, incontinence, nocturia  Muscle:  No pain, weakness  Neuro:  No weakness, tingling, memory problems  Psych:  No fatigue, insomnia, mood problems, depression  Endocrine:  No polyuria, polydipsia, cold/heat intolerance  Heme:  No petechiae, ecchymosis, easy bruisability  Skin:  No rash, tattoos, scars, edema      PHYSICAL EXAM:   Vital Signs:  Vital Signs Last 24 Hrs  T(C): 36.4 (2024 04:05), Max: 36.7 (2024 21:03)  T(F): 97.5 (2024 04:05), Max: 98.1 (2024 21:03)  HR: 88 (2024 04:05) (88 - 96)  BP: 95/51 (2024 04:05) (95/51 - 108/64)  BP(mean): --  RR: 18 (2024 04:05) (17 - 18)  SpO2: 96% (2024 04:05) (96% - 99%)    Parameters below as of 2024 04:05  Patient On (Oxygen Delivery Method): nasal cannula  O2 Flow (L/min): 4    Daily     Daily Weight in k.6 (2024 04:05)    GENERAL:  Appears stated age,   HEENT:  NC/AT,    CHEST:  Full & symmetric excursion,   HEART:  Regular rhythm  ABDOMEN:  Soft, non-tender, non-distended,   EXTEREMITIES:  no cyanosis,clubbing or edema  SKIN:  No rash  NEURO:  Alert,    LABS:                        9.3    6.30  )-----------( 219      ( 2024 07:00 )             29.5         142  |  106  |  38<H>  ----------------------------<  143<H>  4.0   |  24  |  1.30    Ca    9.2      2024 07:00  Phos  3.6       Mg     1.8         TPro  6.2  /  Alb  2.7<L>  /  TBili  0.6  /  DBili  x   /  AST  15  /  ALT  13  /  AlkPhos  69      LIVER FUNCTIONS - ( 2024 07:00 )  Alb: 2.7 g/dL / Pro: 6.2 g/dL / ALK PHOS: 69 U/L / ALT: 13 U/L / AST: 15 U/L / GGT: x             Urinalysis Basic - ( 2024 07:00 )    Color: x / Appearance: x / SG: x / pH: x  Gluc: 143 mg/dL / Ketone: x  / Bili: x / Urobili: x   Blood: x / Protein: x / Nitrite: x   Leuk Esterase: x / RBC: x / WBC x   Sq Epi: x / Non Sq Epi: x / Bacteria: x          Imaging:

## 2024-02-29 NOTE — CONSULT NOTE ADULT - ASSESSMENT
anemia  h/o GI bleed  fobt +     no overt gi bleeding  had EGD/colon in past; at present not a candidate for repeat endoscopic eval  diet as tolerated  cbc daily  previously required prbc while on noac but now off 2/2 watchmans and h/o anemia

## 2024-02-29 NOTE — PROGRESS NOTE ADULT - SUBJECTIVE AND OBJECTIVE BOX
[facility] of INFECTIOUS DISEASE  Simón Nolasco MD PhD, Patrica Bear MD, Lluvia Stahl MD, Tree Avery MD, Pelon Peralta MD  and providing coverage with Gerson Ortiz MD  Providing Infectious Disease Consultations at [facility], [facility], [facility]'s    Office# [phone] to schedule follow up appointments  Answering Service for urgent calls or New Consults [phone]  Cell# to text for urgent issues Simón Nolasco [phone]     infectious diseases progress note:    CAROL ALEXANDRE is a 83y y. o. Female patient    Overnight and events of the last 24hrs reviewed    Allergies    No Known Drug Allergies  latex (Rash)    Intolerances    aspirin (Stomach Upset)      ANTIBIOTICS/RELEVANT:  antimicrobials  cefTRIAXone   IVPB 1000 milliGRAM(s) IV Intermittent every 24 hours    immunologic:    OTHER:  albuterol/ipratropium for Nebulization 3 milliLiter(s) Nebulizer every 6 hours PRN  atorvastatin 20 milliGRAM(s) Oral at bedtime  chlorhexidine 2% Cloths 1 Application(s) Topical <User Schedule>  dextrose 5%. 1000 milliLiter(s) IV Continuous <Continuous>  dextrose 5%. 1000 milliLiter(s) IV Continuous <Continuous>  dextrose 50% Injectable 25 Gram(s) IV Push once  dextrose 50% Injectable 12.5 Gram(s) IV Push once  dextrose 50% Injectable 25 Gram(s) IV Push once  dextrose Oral Gel 15 Gram(s) Oral once PRN  ferrous    sulfate 325 milliGRAM(s) Oral daily  folic acid 1 milliGRAM(s) Oral daily  glucagon  Injectable 1 milliGRAM(s) IntraMuscular once  heparin   Injectable 5000 Unit(s) SubCutaneous every 8 hours  insulin lispro (ADMELOG) corrective regimen sliding scale   SubCutaneous at bedtime  insulin lispro (ADMELOG) corrective regimen sliding scale   SubCutaneous three times a day before meals  melatonin 5 milliGRAM(s) Oral at bedtime  metoprolol tartrate 25 milliGRAM(s) Oral every 12 hours  ondansetron Injectable 4 milliGRAM(s) IV Push every 6 hours PRN  pantoprazole  Injectable 40 milliGRAM(s) IV Push two times a day  tiotropium 2.5 MICROgram(s) Inhaler 2 Puff(s) Inhalation daily      Objective:  Vital Signs Last 24 Hrs  T(C): 36.4 (29 Feb 2024 04:05), Max: 36.7 (28 Feb 2024 21:03)  T(F): 97.5 (29 Feb 2024 04:05), Max: 98.1 (28 Feb 2024 21:03)  HR: 88 (29 Feb 2024 04:05) (88 - 96)  BP: 95/51 (29 Feb 2024 04:05) (95/51 - 108/64)  BP(mean): --  RR: 18 (29 Feb 2024 04:05) (17 - 18)  SpO2: 96% (29 Feb 2024 04:05) (96% - 99%)    Parameters below as of 29 Feb 2024 04:05  Patient On (Oxygen Delivery Method): nasal cannula  O2 Flow (L/min): 4      T(C): 36.4 (02-29-24 @ 04:05), Max: 37.2 (02-27-24 @ 20:45)  T(C): 36.4 (02-29-24 @ 04:05), Max: 37.4 (02-27-24 @ 00:19)  T(C): 36.4 (02-29-24 @ 04:05), Max: 37.4 (02-27-24 @ 00:19)    PHYSICAL EXAM:  HEENT: NC atraumatic  Neck: supple  Respiratory: no accessory muscle use, breathing comfortably, dec BS   Cardiovascular: distant  Gastrointestinal: normal appearing, nondistended  Extremities: no clubbing, no cyanosis,        LABS:                          9.3    6.30  )-----------( 219      ( 29 Feb 2024 07:00 )             29.5       WBC  6.30 02-29 @ 07:00  16.70 02-28 @ 07:40  6.87 02-27 @ 06:23  7.71 02-26 @ 05:43  13.19 02-25 @ 23:40  19.36 02-25 @ 20:18  8.67 02-25 @ 16:40      02-29    142  |  106  |  38<H>  ----------------------------<  143<H>  4.0   |  24  |  1.30    Ca    9.2      29 Feb 2024 07:00  Phos  3.6     02-28  Mg     1.8     02-29    TPro  6.2  /  Alb  2.7<L>  /  TBili  0.6  /  DBili  x   /  AST  15  /  ALT  13  /  AlkPhos  69  02-29      Creatinine: 1.30 mg/dL (02-29-24 @ 07:00)  Creatinine: 1.40 mg/dL (02-28-24 @ 07:40)  Creatinine: 1.60 mg/dL (02-27-24 @ 06:23)  Creatinine: 1.40 mg/dL (02-26-24 @ 14:00)  Creatinine: 1.40 mg/dL (02-26-24 @ 05:43)  Creatinine: 1.30 mg/dL (02-25-24 @ 20:40)  Creatinine: 1.40 mg/dL (02-25-24 @ 16:40)        Urinalysis Basic - ( 29 Feb 2024 07:00 )    Color: x / Appearance: x / SG: x / pH: x  Gluc: 143 mg/dL / Ketone: x  / Bili: x / Urobili: x   Blood: x / Protein: x / Nitrite: x   Leuk Esterase: x / RBC: x / WBC x   Sq Epi: x / Non Sq Epi: x / Bacteria: x            INFLAMMATORY MARKERS      MICROBIOLOGY:              RADIOLOGY & ADDITIONAL STUDIES:

## 2024-02-29 NOTE — PROGRESS NOTE ADULT - SUBJECTIVE AND OBJECTIVE BOX
Patient is a 83y old  Female who presents with a chief complaint of ACS, PNA (29 Feb 2024 08:16)       INTERVAL HPI/OVERNIGHT EVENTS: Patient seen and examined at bedside. Denies new symptoms/complaints. Does s not like Pureed diet. Wants to eat solid because has problem with liquids not solids     MEDICATIONS  (STANDING):  atorvastatin 20 milliGRAM(s) Oral at bedtime  cefTRIAXone   IVPB 1000 milliGRAM(s) IV Intermittent every 24 hours  chlorhexidine 2% Cloths 1 Application(s) Topical <User Schedule>  dextrose 5%. 1000 milliLiter(s) (50 mL/Hr) IV Continuous <Continuous>  dextrose 5%. 1000 milliLiter(s) (100 mL/Hr) IV Continuous <Continuous>  dextrose 50% Injectable 25 Gram(s) IV Push once  dextrose 50% Injectable 25 Gram(s) IV Push once  dextrose 50% Injectable 12.5 Gram(s) IV Push once  ferrous    sulfate 325 milliGRAM(s) Oral daily  folic acid 1 milliGRAM(s) Oral daily  glucagon  Injectable 1 milliGRAM(s) IntraMuscular once  heparin   Injectable 5000 Unit(s) SubCutaneous every 8 hours  insulin lispro (ADMELOG) corrective regimen sliding scale   SubCutaneous at bedtime  insulin lispro (ADMELOG) corrective regimen sliding scale   SubCutaneous three times a day before meals  melatonin 5 milliGRAM(s) Oral at bedtime  metoprolol tartrate 25 milliGRAM(s) Oral every 12 hours  pantoprazole  Injectable 40 milliGRAM(s) IV Push two times a day    MEDICATIONS  (PRN):  albuterol/ipratropium for Nebulization 3 milliLiter(s) Nebulizer every 6 hours PRN Shortness of Breath and/or Wheezing  dextrose Oral Gel 15 Gram(s) Oral once PRN Blood Glucose LESS THAN 70 milliGRAM(s)/deciliter  ondansetron Injectable 4 milliGRAM(s) IV Push every 6 hours PRN Nausea and/or Vomiting      Allergies    No Known Drug Allergies  latex (Rash)    Intolerances    aspirin (Stomach Upset)      REVIEW OF SYSTEMS:  Per HPI. All other ROS noted Negative   Vital Signs Last 24 Hrs  T(C): 36.4 (29 Feb 2024 04:05), Max: 36.7 (28 Feb 2024 21:03)  T(F): 97.5 (29 Feb 2024 04:05), Max: 98.1 (28 Feb 2024 21:03)  HR: 88 (29 Feb 2024 04:05) (88 - 96)  BP: 95/51 (29 Feb 2024 04:05) (95/51 - 108/64)  BP(mean): --  RR: 18 (29 Feb 2024 04:05) (17 - 18)  SpO2: 96% (29 Feb 2024 04:05) (96% - 99%)    Parameters below as of 29 Feb 2024 04:05  Patient On (Oxygen Delivery Method): nasal cannula  O2 Flow (L/min): 4      PHYSICAL EXAM:  General: NAD, comfortable, O2 via NC  Eyes: EOMI, PERRLA.   ENT: Moist mucous membranes  Neck: Supple, No JVD  Lungs: Decrease bibasilar breath sounds, no wheezing   Cardio: RRR, S1/S2, No murmurs  Abdomen: Soft, Nontender, Nondistended; Bowel sounds present  Extremities: No calf tenderness, No pitting edema, no digital cyanosis  Skin: Warm, dry  Psych: A&O x 3, follows commands    LABS:      Ca    10.0       28 Feb 2024 07:40        CAPILLARY BLOOD GLUCOSE      POCT Blood Glucose.: 146 mg/dL (29 Feb 2024 08:45)  POCT Blood Glucose.: 122 mg/dL (28 Feb 2024 21:29)  POCT Blood Glucose.: 167 mg/dL (28 Feb 2024 16:51)  POCT Blood Glucose.: 158 mg/dL (28 Feb 2024 11:43)    BLOOD CULTURE  02-26 @ 01:47   No growth  --  --  02-25 @ 22:38   No growth at 72 Hours  --  --  02-25 @ 22:33   No growth at 72 Hours  --  --    RADIOLOGY & ADDITIONAL TESTS:    Imaging Personally Reviewed:  [ ] YES     Consultant(s) Notes Reviewed:      Care Discussed with Consultants/Other Providers:

## 2024-02-29 NOTE — CASE MANAGEMENT PROGRESS NOTE - NSCMPROGRESSNOTE_GEN_ALL_CORE
PNA on IV Rocephin, Lopressor for heart rate control. Pt for possible cardiac cath as per rounds. CMS star pt for MI. HCPT and possible RW on discharge. 485 started.

## 2024-02-29 NOTE — CONSULT NOTE ADULT - SUBJECTIVE AND OBJECTIVE BOX
Date/Time Patient Seen:  		  Referring MD:   Data Reviewed	       Patient is a 83y old  Female who presents with a chief complaint of ACS, PNA (29 Feb 2024 09:25)      Subjective/HPI   83F PMH cirrhosis, aortic stenosis, PAD s/p right carotid endarterectomy, non Hodgkins Lymphoma (pulmonary mass s/p removal, chemo; last infusion fall 2021), COPD, pAF s/p ILR implant 10/8/21, s/p watchman (2/11/22), hx of prior GI bleed requiring blood transfusions presents to the ED with complaints of shortness of breath, nausea, generalized weakness and chest pain that started at 2:20 PM.  Patient reports she was hanging up some [employer] decorations  when the symptoms started. Daughter at bedside reported decreased PO intake, and has been vomiting intermittently all day long.  She denies any abdominal pain, fever chills, cough or recent URI symptoms.  Reports she felt fine all morning.   PAST MEDICAL & SURGICAL HISTORY:  HLD (hyperlipidemia)    Carotid artery obstruction, left  had carotid surgery 2016    DM (diabetes mellitus)    Lung mass  RLL, 2016, had RL lobectomy & chemotherapy    Hip fracture  left femur, had pins placed in 2016    Lumbar radiculopathy    Hyperlipidemia    Lymphoma    Former smoker    Unspecified atrial fibrillation    Hypertension    Hip fracture  2016, left hip    Right lower lobe lung mass  Right lower lobectomy 2016, chemo till 01/2017    Status post carotid surgery  left carotid artery stenosis 2016    No significant past surgical history    History of loop recorder  placed 9/2021    FAMILY HISTORY:  Father  Still living? Unknown  FH: diabetes mellitus, Age at diagnosis: Age Unknown.     Social History:  · Substance use	Yes  · Alcohol use	Social  · Substance use	Denies  · Tobacco use	Former smoker, 38 pack years, quit ~25 years ago     Tobacco Screening:  · Core Measure Site	Yes  · Has the patient used tobacco in the past 30 days?	No    Risk Assessment:    Present on Admission:  Deep Venous Thrombosis	no  Pulmonary Embolus	suspected     HIV Screening:  · In accordance with [place] State law, we offer every patient who comes to our ED an HIV test. Would you like to be tested today?	Opt out        Medication list         MEDICATIONS  (STANDING):  atorvastatin 20 milliGRAM(s) Oral at bedtime  cefTRIAXone   IVPB 1000 milliGRAM(s) IV Intermittent every 24 hours  chlorhexidine 2% Cloths 1 Application(s) Topical <User Schedule>  dextrose 5%. 1000 milliLiter(s) (50 mL/Hr) IV Continuous <Continuous>  dextrose 5%. 1000 milliLiter(s) (100 mL/Hr) IV Continuous <Continuous>  dextrose 50% Injectable 25 Gram(s) IV Push once  dextrose 50% Injectable 25 Gram(s) IV Push once  dextrose 50% Injectable 12.5 Gram(s) IV Push once  ferrous    sulfate 325 milliGRAM(s) Oral daily  folic acid 1 milliGRAM(s) Oral daily  glucagon  Injectable 1 milliGRAM(s) IntraMuscular once  heparin   Injectable 5000 Unit(s) SubCutaneous every 8 hours  insulin lispro (ADMELOG) corrective regimen sliding scale   SubCutaneous at bedtime  insulin lispro (ADMELOG) corrective regimen sliding scale   SubCutaneous three times a day before meals  melatonin 5 milliGRAM(s) Oral at bedtime  metoprolol tartrate 25 milliGRAM(s) Oral every 12 hours  pantoprazole  Injectable 40 milliGRAM(s) IV Push two times a day    MEDICATIONS  (PRN):  albuterol/ipratropium for Nebulization 3 milliLiter(s) Nebulizer every 6 hours PRN Shortness of Breath and/or Wheezing  dextrose Oral Gel 15 Gram(s) Oral once PRN Blood Glucose LESS THAN 70 milliGRAM(s)/deciliter  ondansetron Injectable 4 milliGRAM(s) IV Push every 6 hours PRN Nausea and/or Vomiting         Vitals log        ICU Vital Signs Last 24 Hrs  T(C): 36.4 (29 Feb 2024 04:05), Max: 36.7 (28 Feb 2024 21:03)  T(F): 97.5 (29 Feb 2024 04:05), Max: 98.1 (28 Feb 2024 21:03)  HR: 88 (29 Feb 2024 04:05) (88 - 96)  BP: 95/51 (29 Feb 2024 04:05) (95/51 - 108/64)  BP(mean): --  ABP: --  ABP(mean): --  RR: 18 (29 Feb 2024 04:05) (17 - 18)  SpO2: 96% (29 Feb 2024 04:05) (96% - 99%)    O2 Parameters below as of 29 Feb 2024 04:05  Patient On (Oxygen Delivery Method): nasal cannula  O2 Flow (L/min): 4               Input and Output:  I&O's Detail    28 Feb 2024 07:01  -  29 Feb 2024 07:00  --------------------------------------------------------  IN:  Total IN: 0 mL    OUT:    Indwelling Catheter - Urethral (mL): 500 mL  Total OUT: 500 mL    Total NET: -500 mL          Lab Data                        10.4   16.70 )-----------( 383      ( 28 Feb 2024 07:40 )             32.6     02-29    142  |  106  |  38<H>  ----------------------------<  143<H>  4.0   |  24  |  1.30    Ca    9.2      29 Feb 2024 07:00  Phos  3.6     02-28  Mg     1.8     02-29    TPro  6.2  /  Alb  2.7<L>  /  TBili  0.6  /  DBili  x   /  AST  15  /  ALT  13  /  AlkPhos  69  02-29            Review of Systems	  weakness      Objective     Physical Examination    heart s1s2  lung dc BS  head nc  verbal  alert  on RA  cn grossly int      Pertinent Lab findings & Imaging      Nuñez:  NO   Adequate UO     I&O's Detail    28 Feb 2024 07:01  -  29 Feb 2024 07:00  --------------------------------------------------------  IN:  Total IN: 0 mL    OUT:    Indwelling Catheter - Urethral (mL): 500 mL  Total OUT: 500 mL    Total NET: -500 mL               Discussed with:     Cultures:	        Radiology        ACC: [id number] EXAM:  CT ANGIO CHEST PULM ART [facility]   ORDERED BY: JAYJAY KAYE     PROCEDURE DATE:  02/25/2024          INTERPRETATION:  CLINICAL INFORMATION: Elevated d-dimer.    COMPARISON: June 6, 2021.    CONTRAST/COMPLICATIONS:  IV Contrast: Omnipaque 350  70 cc administered   30 cc discarded  Oral Contrast: NONE  Complications: None reported at time of study completion    PROCEDURE:  CT Angiography of the Chest.  Sagittal and coronal reformats were performed as well as 3D (MIP)   reconstructions.    FINDINGS:    LUNGS AND AIRWAYS: Right lower lobectomy. New patchy and groundglass   opacities in both lungs. Emphysema.  PLEURA: Diffuse right pleural thickening. Unchanged loculated right   pleural effusion.  MEDIASTINUM AND MIS: No lymphadenopathy.  VESSELS: Right chest wall port with catheter tip terminating in the   superior vena cava.  HEART: Heart size is normal. No pericardial effusion.  CHEST WALL AND LOWER NECK: Right chest wall port. Hypodense left thyroid   lesion, measuring 0.9 cm.  VISUALIZED UPPER ABDOMEN: Cholelithiasis. Atrophic left kidney.  BONES: Degenerative changes. Unchanged L1 compression deformity.    IMPRESSION:  No pulmonary embolism.    New bilateral patchy and groundglass opacities, consistent with pneumonia.    --- End of Report ---            MANISH WILLIS MD; Attending Radiologist  This document has been electronically signed. Feb 25 2024  7:25PM      CONCLUSIONS:      1. Technically difficult image quality.   2. Regional wall motion abnormalities present.   3. There is akinesis of the apical wall. The mid to distal inferoseptal and anterolateral walls appear akinetic. Findings are consistent with either Takotsubo cardiomyopathy or LAD ischemia. The basal segments appear hyperkinetic. The LVEF appears estimated at 35-40%.   4. Mild left ventricular hypertrophy. Discrete upper septal hypertrophy is noted.   5. There is mild (grade 1) left ventricular diastolic dysfunction.   6. The right ventricle is not well visualized.   7. Mitral valve leaflets have focal calcification. The mean gradient across the mitral valve is 4mmHg but the mitral valve appears to be opening normally on parasternal long axis view.   8. Trace mitral regurgitation.   9. The inferior vena cava is normal in size measuring 1.85 cm in diameter, (normal <2.1cm) with abnormal inspiratory collapse (abnormal <50%) consistent with mildly elevated right atrial pressure (~8, range 5-10mmHg).  10. Estimated pulmonary artery systolic pressure is 32 mmHg, consistent with normal pulmonary artery pressure.  11. Bilateral small pleural effusion noted  12.  13. Would recommend a repeat limited TTE with definity to ensure there is now LV thrmobus.

## 2024-02-29 NOTE — CONSULT NOTE ADULT - ASSESSMENT
83F PMH cirrhosis, aortic stenosis, PAD s/p right carotid endarterectomy, non Hodgkins Lymphoma (pulmonary mass s/p removal, chemo; last infusion fall 2021), COPD, pAF s/p ILR implant 10/8/21, s/p watchman (2/11/22), hx of prior GI bleed requiring blood transfusions presents to the ED with complaints of shortness of breath, nausea, generalized weakness and chest pain    AS  valv heart disease  NHL  atelectasis  pleural eff  PAF  COPD  cirrhosis    lung surgery (cancer w/ lobectomy and chemo) - due to Lymphoma   pt follows with Dr Gil in [facility]   cont nebs prn  spiriva inhaler   [facility] rx regimen optimization  complete ABX  will need CXR in 4 - 6 weeks  monitor VS and HD and Sat  Incentive Rakesh  spoke with DTR

## 2024-02-29 NOTE — CONSULT NOTE ADULT - SUBJECTIVE AND OBJECTIVE BOX
Patient is a 83y old  Female who presents with a chief complaint of ACS, PNA (29 Feb 2024 10:44)      Reason For Consult: thyroid nodule    HPI:  83F PMH cirrhosis, aortic stenosis, PAD s/p right carotid endarterectomy, non Hodgkins Lymphoma (pulmonary mass s/p removal, chemo; last infusion fall 2021), COPD, pAF s/p ILR implant 10/8/21, s/p watchman (2/11/22), hx of prior GI bleed requiring blood transfusions presents to the ED with complaints of shortness of breath, nausea, generalized weakness and chest pain that started at 2:20 PM.  Patient reports she was hanging up some [employer] decorations  when the symptoms started. Daughter at bedside reported decreased PO intake, and has been vomiting intermittently all day long.  She denies any abdominal pain, fever chills, cough or recent URI symptoms.  Reports she felt fine all morning.     While taking pts history and conducting the physical exam, pt began feeling unwell, had one episode of emesis and rapid response was called.    ED course:  Vital Signs T(F): 97.5F HR: 110  BP: 147/83  RR: 18  SpO2: 97% on RA  Labs significant for: Troponin 694.1, Hgb 9.5, hct 29.5, PTT 50, D-dimer 360, Cr 1.4,  BUN 35, Glu 184, eGFR 38. pBNP 571   FOBT+  ECG:   In ED given rocephin 1gx1, azithromycin 500mg x1, NS bolus 1L x1, zofran 4mg x1    Imaging  CXR: Slight increase left lung interstitial pattern and left loop recorder are presently seen. Otherwise stable findings.  CTA chest: No pulmonary embolism. New bilateral patchy and groundglass opacities, consistent with pneumonia. Hypodense left thyroid   lesion, measuring 0.9 cm.   (25 Feb 2024 20:05)      PAST MEDICAL & SURGICAL HISTORY:  Carotid artery obstruction, left  had carotid surgery 2016      Lung mass  RLL, 2016, had RL lobectomy & chemotherapy      Hip fracture  left femur, had pins placed in 2016      Lumbar radiculopathy      Hyperlipidemia      Lymphoma      Former smoker      Unspecified atrial fibrillation      Hypertension      Hip fracture  2016, left hip      Right lower lobe lung mass  Right lower lobectomy 2016, chemo till 01/2017      Status post carotid surgery  left carotid artery stenosis 2016      History of loop recorder  placed 9/2021          FAMILY HISTORY:  FH: diabetes mellitus (Father)          Social History:    MEDICATIONS  (STANDING):  atorvastatin 20 milliGRAM(s) Oral at bedtime  cefTRIAXone   IVPB 1000 milliGRAM(s) IV Intermittent every 24 hours  chlorhexidine 2% Cloths 1 Application(s) Topical <User Schedule>  dextrose 5%. 1000 milliLiter(s) (100 mL/Hr) IV Continuous <Continuous>  dextrose 5%. 1000 milliLiter(s) (50 mL/Hr) IV Continuous <Continuous>  dextrose 50% Injectable 25 Gram(s) IV Push once  dextrose 50% Injectable 25 Gram(s) IV Push once  dextrose 50% Injectable 12.5 Gram(s) IV Push once  ferrous    sulfate 325 milliGRAM(s) Oral daily  folic acid 1 milliGRAM(s) Oral daily  glucagon  Injectable 1 milliGRAM(s) IntraMuscular once  heparin   Injectable 5000 Unit(s) SubCutaneous every 8 hours  insulin lispro (ADMELOG) corrective regimen sliding scale   SubCutaneous at bedtime  insulin lispro (ADMELOG) corrective regimen sliding scale   SubCutaneous three times a day before meals  melatonin 5 milliGRAM(s) Oral at bedtime  metoprolol tartrate 25 milliGRAM(s) Oral every 12 hours  pantoprazole  Injectable 40 milliGRAM(s) IV Push two times a day  tiotropium 2.5 MICROgram(s) Inhaler 2 Puff(s) Inhalation daily    MEDICATIONS  (PRN):  albuterol/ipratropium for Nebulization 3 milliLiter(s) Nebulizer every 6 hours PRN Shortness of Breath and/or Wheezing  dextrose Oral Gel 15 Gram(s) Oral once PRN Blood Glucose LESS THAN 70 milliGRAM(s)/deciliter  ondansetron Injectable 4 milliGRAM(s) IV Push every 6 hours PRN Nausea and/or Vomiting        T(C): 36.4 (02-29-24 @ 04:05), Max: 36.7 (02-28-24 @ 21:03)  HR: 88 (02-29-24 @ 04:05) (88 - 96)  BP: 95/51 (02-29-24 @ 04:05) (95/51 - 108/64)  RR: 18 (02-29-24 @ 04:05) (17 - 18)  SpO2: 96% (02-29-24 @ 04:05) (96% - 99%)  Wt(kg): --    PHYSICAL EXAM:  GENERAL: NAD, well-groomed, well-developed  HEAD:  Atraumatic, Normocephalic  NECK: Supple, No JVD, Normal thyroid  CHEST/LUNG: Clear to percussion bilaterally; No rales, rhonchi, wheezing, or rubs  HEART: Regular rate and rhythm; No murmurs, rubs, or gallops  ABDOMEN: Soft, Nontender, Nondistended; Bowel sounds present  EXTREMITIES:  2+ Peripheral Pulses, No clubbing, cyanosis, or edema  SKIN: No rashes or lesions    CAPILLARY BLOOD GLUCOSE      POCT Blood Glucose.: 146 mg/dL (29 Feb 2024 08:45)  POCT Blood Glucose.: 122 mg/dL (28 Feb 2024 21:29)  POCT Blood Glucose.: 167 mg/dL (28 Feb 2024 16:51)  POCT Blood Glucose.: 158 mg/dL (28 Feb 2024 11:43)                            10.4   16.70 )-----------( 383      ( 28 Feb 2024 07:40 )             32.6       CMP:  02-29 @ 07:00  SGPT 13  Albumin 2.7   Alk Phos 69   Anion Gap 12   SGOT 15   Total Bili 0.6   BUN 38   Calcium Total 9.2   CO2 24   Chloride 106   Creatinine 1.30   eGFR if AA --   eGFR if non AA --   Glucose 143   Potassium 4.0   Protein 6.2   Sodium 142      Thyroid Function Tests:      Diabetes Tests:       Radiology:

## 2024-02-29 NOTE — PROGRESS NOTE ADULT - SUBJECTIVE AND OBJECTIVE BOX
[facility] -- June Holliday Pannella, Patel, Savella Goodger, Cohen  Office # [phone]      Follow Up:    PSVT   Subjective/Observations:       REVIEW OF SYSTEMS: All other review of systems is negative unless indicated above    PAST MEDICAL & SURGICAL HISTORY:  Carotid artery obstruction, left  had carotid surgery 2016      Lung mass  RLL, 2016, had RL lobectomy & chemotherapy      Hip fracture  left femur, had pins placed in 2016      Lumbar radiculopathy      Hyperlipidemia      Lymphoma      Former smoker      Unspecified atrial fibrillation      Hypertension      Hip fracture  2016, left hip      Right lower lobe lung mass  Right lower lobectomy 2016, chemo till 2017      Status post carotid surgery  left carotid artery stenosis 2016      History of loop recorder  placed 2021          MEDICATIONS  (STANDING):  atorvastatin 20 milliGRAM(s) Oral at bedtime  cefTRIAXone   IVPB 1000 milliGRAM(s) IV Intermittent every 24 hours  chlorhexidine 2% Cloths 1 Application(s) Topical <User Schedule>  dextrose 5%. 1000 milliLiter(s) (50 mL/Hr) IV Continuous <Continuous>  dextrose 5%. 1000 milliLiter(s) (100 mL/Hr) IV Continuous <Continuous>  dextrose 50% Injectable 25 Gram(s) IV Push once  dextrose 50% Injectable 12.5 Gram(s) IV Push once  dextrose 50% Injectable 25 Gram(s) IV Push once  ferrous    sulfate 325 milliGRAM(s) Oral daily  folic acid 1 milliGRAM(s) Oral daily  glucagon  Injectable 1 milliGRAM(s) IntraMuscular once  heparin   Injectable 5000 Unit(s) SubCutaneous every 8 hours  insulin lispro (ADMELOG) corrective regimen sliding scale   SubCutaneous at bedtime  insulin lispro (ADMELOG) corrective regimen sliding scale   SubCutaneous three times a day before meals  melatonin 5 milliGRAM(s) Oral at bedtime  metoprolol tartrate 25 milliGRAM(s) Oral every 12 hours  pantoprazole  Injectable 40 milliGRAM(s) IV Push two times a day    MEDICATIONS  (PRN):  albuterol/ipratropium for Nebulization 3 milliLiter(s) Nebulizer every 6 hours PRN Shortness of Breath and/or Wheezing  dextrose Oral Gel 15 Gram(s) Oral once PRN Blood Glucose LESS THAN 70 milliGRAM(s)/deciliter  ondansetron Injectable 4 milliGRAM(s) IV Push every 6 hours PRN Nausea and/or Vomiting      Allergies    No Known Drug Allergies  latex (Rash)    Intolerances    aspirin (Stomach Upset)      Vital Signs Last 24 Hrs  T(C): 36.4 (2024 04:05), Max: 36.7 (2024 21:03)  T(F): 97.5 (2024 04:05), Max: 98.1 (2024 21:03)  HR: 88 (2024 04:05) (88 - 96)  BP: 95/51 (2024 04:05) (95/51 - 108/64)  BP(mean): --  RR: 18 (2024 04:05) (17 - 18)  SpO2: 96% (2024 04:05) (96% - 99%)    Parameters below as of 2024 04:05  Patient On (Oxygen Delivery Method): nasal cannula  O2 Flow (L/min): 4      I&O's Summary    2024 07:01  -  2024 07:00  --------------------------------------------------------  IN: 0 mL / OUT: 500 mL / NET: -500 mL          PHYSICAL EXAM:  TELE:   Constitutional: NAD, awake and alert, well-developed  HEENT: Moist Mucous Membranes, Anicteric  Pulmonary: Non-labored, breath sounds are Dim   Cardiovascular: Regular, S1 and S2 nl, No murmurs, rubs, gallops or clicks  Gastrointestinal: Bowel Sounds present, soft, nontender.   Lymph: No lymphadenopathy. No peripheral edema.  Skin: No visible rashes or ulcers.  Psych:  Mood & affect appropriate    LABS: All Labs Reviewed:                        10.4   16.70 )-----------( 383      ( 2024 07:40 )             32.6                         9.9    6.87  )-----------( 174      ( 2024 06:23 )             31.1     2024 07:40    141    |  107    |  28     ----------------------------<  136    4.2     |  21     |  1.40   2024 06:23    140    |  108    |  26     ----------------------------<  163    4.4     |  27     |  1.60   2024 14:00    140    |  108    |  27     ----------------------------<  183    4.7     |  26     |  1.40     Ca    10.0       2024 07:40  Ca    9.5        2024 06:23  Ca    9.1        2024 14:00  Phos  3.6       2024 07:40  Phos  4.1       2024 06:23  Mg     1.8       2024 07:40  Mg     1.9       2024 06:23    TPro  6.9    /  Alb  3.1    /  TBili  1.0    /  DBili  x      /  AST  21     /  ALT  17     /  AlkPhos  81     2024 07:40             EC Lead ECG:   Ventricular Rate 112 BPM    Atrial Rate 112 BPM    P-R Interval 180 ms    QRS Duration 118 ms    Q-T Interval 364 ms    QTC Calculation(Bazett) 496 ms    P Axis 35 degrees    R Axis -78 degrees    T Axis 77 degrees    Diagnosis Line Sinus tachycardia  Left axis deviation  Inferior-posterior infarct (cited on or before 2024)  Abnormal ECG  When compared with ECG of 2024 20:47, (Unconfirmed)  fusion complexes are no longer present  Criteria for Septal infarct are no longer present  Borderline criteria for Lateral infarct are no longer present  Questionable change in initial forces of Posterior leads  T wave inversion now evident in Anterior leads  Confirmed by Simón Watkins MD ([id number]) on 2024 12:20:46 PM (24 @ 08:24)      TRANSTHORACIC ECHOCARDIOGRAM REPORT  ________________________________________________________________________________                                      _______       Pt. Name:       CAROL ALEXANDRE Study Date:    2024  MRN:            [id number]        YOB: 1940  Accession #:    [id number]       Age:           83 years  Account#:       [id number]      Gender:        F  Heart Rate:                     Height:        62.99 in (160.00 cm)  Rhythm:                         Weight:        132.28 lb (60.00 kg)  Blood Pressure: 118/70 mmHg     BSA/BMI:       1.62 m² / 23.44 kg/m²  ________________________________________________________________________________________  Referring Physician:    [id number] Ruiz Stahl  InterpretingPhysician: Prema Larose MD  Primary Sonographer:    Kosta Garcia    CPT:               ECHO TTE WO CON COMP W DOPP - 65155.m  Indication(s):     Abnormal electrocardiogram ECG/EKG - R94.31  Procedure:         Transthoracic echocardiogram with 2-D, M-mode and complete                     spectral and color flow Doppler.  Ordering Location: ICU1  Admission Status:  Inpatient  Study Information: Image quality for this study is technically difficult.    _______________________________________________________________________________________     CONCLUSIONS:      1. Technically difficult image quality.   2. Regional wall motion abnormalities present.   3. There is akinesis of the apical wall. The mid to distal inferoseptal and anterolateral walls appear akinetic. Findings are consistent with either Takotsubo cardiomyopathy or LAD ischemia. The basal segments appear hyperkinetic. The LVEF appears estimated at 35-40%.   4. Mild left ventricular hypertrophy. Discrete upper septal hypertrophy is noted.   5. There is mild (grade 1) left ventricular diastolic dysfunction.   6. The right ventricle is not well visualized.   7. Mitral valve leaflets have focal calcification. The mean gradient across the mitral valve is 4mmHg but the mitral valve appears to be opening normally on parasternal long axis view.   8. Trace mitral regurgitation.   9. The inferior vena cava is normal in size measuring 1.85 cm in diameter, (normal <2.1cm) with abnormal inspiratory collapse (abnormal <50%) consistent withmildly elevated right atrial pressure (~8, range 5-10mmHg).  10. Estimated pulmonary artery systolic pressure is 32 mmHg, consistent with normal pulmonary artery pressure.  11. Bilateral small pleural effusion noted  12.  13. Would recommend a repeatlimited TTE with definity to ensure there is now LV thrmobus.    ________________________________________________________________________________________  FINDINGS:     Left Ventricle:  There are regional wall motion abnormalities present. There is mild (grade 1) left ventricular diastolic dysfunction. Left ventricular regional wall motion assessment reveals akinesis of the apical wall. Mild left ventricular hypertrophy.  LV Wall Scoring: The mid and apical inferior septum, mid anterolateral segment,  apical lateral segment, and apex are akinetic.          Right Ventricle:  The right ventricle is not well visualized.     Left Atrium:  The left atrium is moderately dilated with an indexed volume of 30.82 ml/m².     Right Atrium:  The right atrium was not well visualized.     Aortic Valve:  The aortic valve was not well visualized. The aortic valve anatomy cannot be determined.     Mitral Valve:  There is mild posterior calcification of the mitral valve annulus. Mitral valve leaflets have focal calcification. There is trace mitral regurgitation.     Tricuspid Valve:  There is trace tricuspid regurgitation. Estimated pulmonary artery systolic pressure is 32 mmHg, consistent with normal pulmonary artery pressure.     Pulmonic Valve:  The pulmonic valve was not well visualized.     Aorta:  The aortic root at the sinuses of Valsalva is normal in size, measuring 2.90 cm (indexed 1.79 cm/m²).     Pleura:  Bilateral pleural effusion noted.     Systemic Veins:  The inferior vena cava is normal in size measuring 1.85 cm in diameter, (normal <2.1cm) with abnormal inspiratory collapse (abnormal <50%) consistent with mildly elevated right atrial pressure (~8, range 5-10mmHg).  ____________________________________________________________________  QUANTITATIVE DATA:  Left Ventricle Measurements: (Indexed to BSA)     IVSd (2D):   1.2 cm  LVPWd (2D):  1.2 cm  LVIDd (2D):  4.0 cm  LVIDs (2D):  2.5 cm  LV Mass:     163 g  100.4 g/m²  Visualized LV EF%: 35 to 40%     MV E Vmax:    1.23 m/s  MVA Vmax:    1.39 m/s  MV E/A:       0.88  e' lateral:   12.00 cm/s  e' medial:    5.33 cm/s  E/e' lateral: 10.25  E/e' medial:  23.08  E/e' Average: 14.20  MV DT:        231 msec    Aorta Measurements: (Normal range) (Indexed to BSA)     Sinuses of Valsalva: 2.90 cm (2.7 - 3.3 cm)       Left Atrium Measurements: (Indexed to BSA)  LA Diam 2D: 4.30 cm       LVOT / RVOT/ Qp/Qs Data: (Indexed to BSA)  LVOT Diameter: 1.80 cm    Mitral Valve Measurements:     MV Vmax:      1.48 m/s     MR Vmax:3.49 m/s  MV VTI, nat   0.383 m      MR Peak Gradient: 48.7 mmHg  MV Mean Grad: 4.0 mmHg  MV Peak Grad: 8.8 mmHg  MV E Vmax:    1.2 m/s  MV A Vmax:    1.4 m/s  MV E/A:       0.9       Tricuspid Valve Measurements:     TR Vmax:          2.5 m/s  TR Peak Gradient: 24.1 mmHg  RA Pressure:      8 mmHg  PASP:             32 mmHg    ________________________________________________________________________________________  Electronically signed on 2024 at 11:38:45 AM by Prema Larose MD         *** Final ***      Radiology:         [facility] -- June Holliday Pannella, Patel, Savella Goodger, Cohen  Office # [phone]      Follow Up:    PSVT   Subjective/Observations:   No events overnight resting comfortably in bed.  No complaints of chest pain, dyspnea, or palpitations reported. No signs of orthopnea or PND.  on room air    REVIEW OF SYSTEMS: All other review of systems is negative unless indicated above    PAST MEDICAL & SURGICAL HISTORY:  Carotid artery obstruction, left  had carotid surgery 2016      Lung mass  RLL, 2016, had RL lobectomy & chemotherapy      Hip fracture  left femur, had pins placed in 2016      Lumbar radiculopathy      Hyperlipidemia      Lymphoma      Former smoker      Unspecified atrial fibrillation      Hypertension      Hip fracture  2016, left hip      Right lower lobe lung mass  Right lower lobectomy 2016, chemo till 2017      Status post carotid surgery  left carotid artery stenosis 2016      History of loop recorder  placed 2021          MEDICATIONS  (STANDING):  atorvastatin 20 milliGRAM(s) Oral at bedtime  cefTRIAXone   IVPB 1000 milliGRAM(s) IV Intermittent every 24 hours  chlorhexidine 2% Cloths 1 Application(s) Topical <User Schedule>  dextrose 5%. 1000 milliLiter(s) (50 mL/Hr) IV Continuous <Continuous>  dextrose 5%. 1000 milliLiter(s) (100 mL/Hr) IV Continuous <Continuous>  dextrose 50% Injectable 25 Gram(s) IV Push once  dextrose 50% Injectable 12.5 Gram(s) IV Push once  dextrose 50% Injectable 25 Gram(s) IV Push once  ferrous    sulfate 325 milliGRAM(s) Oral daily  folic acid 1 milliGRAM(s) Oral daily  glucagon  Injectable 1 milliGRAM(s) IntraMuscular once  heparin   Injectable 5000 Unit(s) SubCutaneous every 8 hours  insulin lispro (ADMELOG) corrective regimen sliding scale   SubCutaneous at bedtime  insulin lispro (ADMELOG) corrective regimen sliding scale   SubCutaneous three times a day before meals  melatonin 5 milliGRAM(s) Oral at bedtime  metoprolol tartrate 25 milliGRAM(s) Oral every 12 hours  pantoprazole  Injectable 40 milliGRAM(s) IV Push two times a day    MEDICATIONS  (PRN):  albuterol/ipratropium for Nebulization 3 milliLiter(s) Nebulizer every 6 hours PRN Shortness of Breath and/or Wheezing  dextrose Oral Gel 15 Gram(s) Oral once PRN Blood Glucose LESS THAN 70 milliGRAM(s)/deciliter  ondansetron Injectable 4 milliGRAM(s) IV Push every 6 hours PRN Nausea and/or Vomiting      Allergies    No Known Drug Allergies  latex (Rash)    Intolerances    aspirin (Stomach Upset)      Vital Signs Last 24 Hrs  T(C): 36.4 (2024 04:05), Max: 36.7 (2024 21:03)  T(F): 97.5 (2024 04:05), Max: 98.1 (2024 21:03)  HR: 88 (2024 04:05) (88 - 96)  BP: 95/51 (2024 04:05) (95/51 - 108/64)  BP(mean): --  RR: 18 (2024 04:05) (17 - 18)  SpO2: 96% (2024 04:05) (96% - 99%)    Parameters below as of 2024 04:05  Patient On (Oxygen Delivery Method): nasal cannula  O2 Flow (L/min): 4      I&O's Summary    2024 07:01  -  2024 07:00  --------------------------------------------------------  IN: 0 mL / OUT: 500 mL / NET: -500 mL          PHYSICAL EXAM:  TELE:    Constitutional: NAD, awake and alert, well-developed  HEENT: Moist Mucous Membranes, Anicteric  Pulmonary: Non-labored, breath sounds are Dim   Cardiovascular: Regular, S1 and S2 nl, No murmurs, rubs, gallops or clicks  Gastrointestinal: Bowel Sounds present, soft, nontender.   Lymph: No lymphadenopathy. No peripheral edema.  Skin: No visible rashes or ulcers.  Psych:  Mood & affect appropriate    LABS: All Labs Reviewed:                        10.4   16.70 )-----------( 383      ( 2024 07:40 )             32.6                         9.9    6.87  )-----------( 174      ( 2024 06:23 )             31.1     2024 07:40    141    |  107    |  28     ----------------------------<  136    4.2     |  21     |  1.40   2024 06:23    140    |  108    |  26     ----------------------------<  163    4.4     |  27     |  1.60   2024 14:00    140    |  108    |  27     ----------------------------<  183    4.7     |  26     |  1.40     Ca    10.0       2024 07:40  Ca    9.5        2024 06:23  Ca    9.1        2024 14:00  Phos  3.6       2024 07:40  Phos  4.1       2024 06:23  Mg     1.8       2024 07:40  Mg     1.9       2024 06:23    TPro  6.9    /  Alb  3.1    /  TBili  1.0    /  DBili  x      /  AST  21     /  ALT  17     /  AlkPhos  81     2024 07:40             EC Lead ECG:   Ventricular Rate 112 BPM    Atrial Rate 112 BPM    P-R Interval 180 ms    QRS Duration 118 ms    Q-T Interval 364 ms    QTC Calculation(Bazett) 496 ms    P Axis 35 degrees    R Axis -78 degrees    T Axis 77 degrees    Diagnosis Line Sinus tachycardia  Left axis deviation  Inferior-posterior infarct (cited on or before 2024)  Abnormal ECG  When compared with ECG of 2024 20:47, (Unconfirmed)  fusion complexes are no longer present  Criteria for Septal infarct are no longer present  Borderline criteria for Lateral infarct are no longer present  Questionable change in initial forces of Posterior leads  T wave inversion now evident in Anterior leads  Confirmed by Simón Watkins MD ([id number]) on 2024 12:20:46 PM (24 @ 08:24)      TRANSTHORACIC ECHOCARDIOGRAM REPORT  ________________________________________________________________________________                                      _______       Pt. Name:       CAROL ALEXANDRE Study Date:    2024  MRN:            [id number]        YOB: 1940  Accession #:    [id number]       Age:           83 years  Account#:       [id number]      Gender:        F  Heart Rate:                     Height:        62.99 in (160.00 cm)  Rhythm:                         Weight:        132.28 lb (60.00 kg)  Blood Pressure: 118/70 mmHg     BSA/BMI:       1.62 m² / 23.44 kg/m²  ________________________________________________________________________________________  Referring Physician:    [id number] Ruiz Stahl  InterpretingPhysician: Prema Larose MD  Primary Sonographer:    Kosta Garcia    CPT:               ECHO TTE WO CON COMP W DOPP - 78529.m  Indication(s):     Abnormal electrocardiogram ECG/EKG - R94.31  Procedure:         Transthoracic echocardiogram with 2-D, M-mode and complete                     spectral and color flow Doppler.  Ordering Location: ICU1  Admission Status:  Inpatient  Study Information: Image quality for this study is technically difficult.    _______________________________________________________________________________________     CONCLUSIONS:      1. Technically difficult image quality.   2. Regional wall motion abnormalities present.   3. There is akinesis of the apical wall. The mid to distal inferoseptal and anterolateral walls appear akinetic. Findings are consistent with either Takotsubo cardiomyopathy or LAD ischemia. The basal segments appear hyperkinetic. The LVEF appears estimated at 35-40%.   4. Mild left ventricular hypertrophy. Discrete upper septal hypertrophy is noted.   5. There is mild (grade 1) left ventricular diastolic dysfunction.   6. The right ventricle is not well visualized.   7. Mitral valve leaflets have focal calcification. The mean gradient across the mitral valve is 4mmHg but the mitral valve appears to be opening normally on parasternal long axis view.   8. Trace mitral regurgitation.   9. The inferior vena cava is normal in size measuring 1.85 cm in diameter, (normal <2.1cm) with abnormal inspiratory collapse (abnormal <50%) consistent withmildly elevated right atrial pressure (~8, range 5-10mmHg).  10. Estimated pulmonary artery systolic pressure is 32 mmHg, consistent with normal pulmonary artery pressure.  11. Bilateral small pleural effusion noted  12.  13. Would recommend a repeatlimited TTE with definity to ensure there is now LV thrmobus.    ________________________________________________________________________________________  FINDINGS:     Left Ventricle:  There are regional wall motion abnormalities present. There is mild (grade 1) left ventricular diastolic dysfunction. Left ventricular regional wall motion assessment reveals akinesis of the apical wall. Mild left ventricular hypertrophy.  LV Wall Scoring: The mid and apical inferior septum, mid anterolateral segment,  apical lateral segment, and apex are akinetic.          Right Ventricle:  The right ventricle is not well visualized.     Left Atrium:  The left atrium is moderately dilated with an indexed volume of 30.82 ml/m².     Right Atrium:  The right atrium was not well visualized.     Aortic Valve:  The aortic valve was not well visualized. The aortic valve anatomy cannot be determined.     Mitral Valve:  There is mild posterior calcification of the mitral valve annulus. Mitral valve leaflets have focal calcification. There is trace mitral regurgitation.     Tricuspid Valve:  There is trace tricuspid regurgitation. Estimated pulmonary artery systolic pressure is 32 mmHg, consistent with normal pulmonary artery pressure.     Pulmonic Valve:  The pulmonic valve was not well visualized.     Aorta:  The aortic root at the sinuses of Valsalva is normal in size, measuring 2.90 cm (indexed 1.79 cm/m²).     Pleura:  Bilateral pleural effusion noted.     Systemic Veins:  The inferior vena cava is normal in size measuring 1.85 cm in diameter, (normal <2.1cm) with abnormal inspiratory collapse (abnormal <50%) consistent with mildly elevated right atrial pressure (~8, range 5-10mmHg).  ____________________________________________________________________  QUANTITATIVE DATA:  Left Ventricle Measurements: (Indexed to BSA)     IVSd (2D):   1.2 cm  LVPWd (2D):  1.2 cm  LVIDd (2D):  4.0 cm  LVIDs (2D):  2.5 cm  LV Mass:     163 g  100.4 g/m²  Visualized LV EF%: 35 to 40%     MV E Vmax:    1.23 m/s  MVA Vmax:    1.39 m/s  MV E/A:       0.88  e' lateral:   12.00 cm/s  e' medial:    5.33 cm/s  E/e' lateral: 10.25  E/e' medial:  23.08  E/e' Average: 14.20  MV DT:        231 msec    Aorta Measurements: (Normal range) (Indexed to BSA)     Sinuses of Valsalva: 2.90 cm (2.7 - 3.3 cm)       Left Atrium Measurements: (Indexed to BSA)  LA Diam 2D: 4.30 cm       LVOT / RVOT/ Qp/Qs Data: (Indexed to BSA)  LVOT Diameter: 1.80 cm    Mitral Valve Measurements:     MV Vmax:      1.48 m/s     MR Vmax:3.49 m/s  MV VTI, nat   0.383 m      MR Peak Gradient: 48.7 mmHg  MV Mean Grad: 4.0 mmHg  MV Peak Grad: 8.8 mmHg  MV E Vmax:    1.2 m/s  MV A Vmax:    1.4 m/s  MV E/A:       0.9       Tricuspid Valve Measurements:     TR Vmax:          2.5 m/s  TR Peak Gradient: 24.1 mmHg  RA Pressure:      8 mmHg  PASP:             32 mmHg    ________________________________________________________________________________________  Electronically signed on 2024 at 11:38:45 AM by Prema Larose MD         *** Final ***      Radiology:         [facility] -- June Holliday Pannella, Patel, Savella Goodger, Cohen  Office # [phone]      Follow Up:    PSVT   Subjective/Observations:   No events overnight resting comfortably in bed.  No complaints of chest pain, dyspnea, or palpitations reported. No signs of orthopnea or PND.  on room air    REVIEW OF SYSTEMS: All other review of systems is negative unless indicated above    PAST MEDICAL & SURGICAL HISTORY:  Carotid artery obstruction, left  had carotid surgery 2016      Lung mass  RLL, 2016, had RL lobectomy & chemotherapy      Hip fracture  left femur, had pins placed in 2016      Lumbar radiculopathy      Hyperlipidemia      Lymphoma      Former smoker      Unspecified atrial fibrillation      Hypertension      Hip fracture  2016, left hip      Right lower lobe lung mass  Right lower lobectomy 2016, chemo till 2017      Status post carotid surgery  left carotid artery stenosis 2016      History of loop recorder  placed 2021          MEDICATIONS  (STANDING):  atorvastatin 20 milliGRAM(s) Oral at bedtime  cefTRIAXone   IVPB 1000 milliGRAM(s) IV Intermittent every 24 hours  chlorhexidine 2% Cloths 1 Application(s) Topical <User Schedule>  dextrose 5%. 1000 milliLiter(s) (50 mL/Hr) IV Continuous <Continuous>  dextrose 5%. 1000 milliLiter(s) (100 mL/Hr) IV Continuous <Continuous>  dextrose 50% Injectable 25 Gram(s) IV Push once  dextrose 50% Injectable 12.5 Gram(s) IV Push once  dextrose 50% Injectable 25 Gram(s) IV Push once  ferrous    sulfate 325 milliGRAM(s) Oral daily  folic acid 1 milliGRAM(s) Oral daily  glucagon  Injectable 1 milliGRAM(s) IntraMuscular once  heparin   Injectable 5000 Unit(s) SubCutaneous every 8 hours  insulin lispro (ADMELOG) corrective regimen sliding scale   SubCutaneous at bedtime  insulin lispro (ADMELOG) corrective regimen sliding scale   SubCutaneous three times a day before meals  melatonin 5 milliGRAM(s) Oral at bedtime  metoprolol tartrate 25 milliGRAM(s) Oral every 12 hours  pantoprazole  Injectable 40 milliGRAM(s) IV Push two times a day    MEDICATIONS  (PRN):  albuterol/ipratropium for Nebulization 3 milliLiter(s) Nebulizer every 6 hours PRN Shortness of Breath and/or Wheezing  dextrose Oral Gel 15 Gram(s) Oral once PRN Blood Glucose LESS THAN 70 milliGRAM(s)/deciliter  ondansetron Injectable 4 milliGRAM(s) IV Push every 6 hours PRN Nausea and/or Vomiting      Allergies    No Known Drug Allergies  latex (Rash)    Intolerances    aspirin (Stomach Upset)      Vital Signs Last 24 Hrs  T(C): 36.4 (2024 04:05), Max: 36.7 (2024 21:03)  T(F): 97.5 (2024 04:05), Max: 98.1 (2024 21:03)  HR: 88 (2024 04:05) (88 - 96)  BP: 95/51 (2024 04:05) (95/51 - 108/64)  BP(mean): --  RR: 18 (2024 04:05) (17 - 18)  SpO2: 96% (2024 04:05) (96% - 99%)    Parameters below as of 2024 04:05  Patient On (Oxygen Delivery Method): nasal cannula  O2 Flow (L/min): 4      I&O's Summary    2024 07:01  -  2024 07:00  --------------------------------------------------------  IN: 0 mL / OUT: 500 mL / NET: -500 mL          PHYSICAL EXAM:  TELE:    Constitutional: NAD, awake and alert, well-developed  HEENT: Moist Mucous Membranes, Anicteric  Pulmonary: Non-labored, breath sounds are Dim   Cardiovascular: Regular, S1 and S2 nl, No murmurs, rubs, gallops or clicks  Gastrointestinal: Bowel Sounds present, soft, nontender.   Lymph: No lymphadenopathy. No peripheral edema.  Skin: No visible rashes or ulcers.  Psych:  Mood & affect appropriate    LABS: All Labs Reviewed:                        10.4   16.70 )-----------( 383      ( 2024 07:40 )             32.6                         9.9    6.87  )-----------( 174      ( 2024 06:23 )             31.1     2024 07:40    141    |  107    |  28     ----------------------------<  136    4.2     |  21     |  1.40   2024 06:23    140    |  108    |  26     ----------------------------<  163    4.4     |  27     |  1.60   2024 14:00    140    |  108    |  27     ----------------------------<  183    4.7     |  26     |  1.40     Ca    10.0       2024 07:40  Ca    9.5        2024 06:23  Ca    9.1        2024 14:00  Phos  3.6       2024 07:40  Phos  4.1       2024 06:23  Mg     1.8       2024 07:40  Mg     1.9       2024 06:23    TPro  6.9    /  Alb  3.1    /  TBili  1.0    /  DBili  x      /  AST  21     /  ALT  17     /  AlkPhos  81     2024 07:40             EC Lead ECG:   Ventricular Rate 112 BPM    Atrial Rate 112 BPM    P-R Interval 180 ms    QRS Duration 118 ms    Q-T Interval 364 ms    QTC Calculation(Bazett) 496 ms    P Axis 35 degrees    R Axis -78 degrees    T Axis 77 degrees    Diagnosis Line Sinus tachycardia  Left axis deviation  Inferior-posterior infarct (cited on or before 2024)  Abnormal ECG  When compared with ECG of 2024 20:47, (Unconfirmed)  fusion complexes are no longer present  Criteria for Septal infarct are no longer present  Borderline criteria for Lateral infarct are no longer present  Questionable change in initial forces of Posterior leads  T wave inversion now evident in Anterior leads  Confirmed by Simón Watkins MD ([id number]) on 2024 12:20:46 PM (24 @ 08:24)      TRANSTHORACIC ECHOCARDIOGRAM REPORT  ________________________________________________________________________________                                      _______       Pt. Name:       CAROL ALEXANDRE Study Date:    2024  MRN:            [id number]        YOB: 1940  Accession #:    [id number]       Age:           83 years  Account#:       [id number]      Gender:        F  Heart Rate:                     Height:        62.99 in (160.00 cm)  Rhythm:                         Weight:        132.28 lb (60.00 kg)  Blood Pressure: 118/70 mmHg     BSA/BMI:       1.62 m² / 23.44 kg/m²  ________________________________________________________________________________________  Referring Physician:    [id number] Ruiz Stahl  InterpretingPhysician: Prema Larose MD  Primary Sonographer:    Kosta Garcia    CPT:               ECHO TTE WO CON COMP W DOPP - 81137.m  Indication(s):     Abnormal electrocardiogram ECG/EKG - R94.31  Procedure:         Transthoracic echocardiogram with 2-D, M-mode and complete                     spectral and color flow Doppler.  Ordering Location: ICU1  Admission Status:  Inpatient  Study Information: Image quality for this study is technically difficult.    _______________________________________________________________________________________     CONCLUSIONS:      1. Technically difficult image quality.   2. Regional wall motion abnormalities present.   3. There is akinesis of the apical wall. The mid to distal inferoseptal and anterolateral walls appear akinetic. Findings are consistent with either Takotsubo cardiomyopathy or LAD ischemia. The basal segments appear hyperkinetic. The LVEF appears estimated at 35-40%.   4. Mild left ventricular hypertrophy. Discrete upper septal hypertrophy is noted.   5. There is mild (grade 1) left ventricular diastolic dysfunction.   6. The right ventricle is not well visualized.   7. Mitral valve leaflets have focal calcification. The mean gradient across the mitral valve is 4mmHg but the mitral valve appears to be opening normally on parasternal long axis view.   8. Trace mitral regurgitation.   9. The inferior vena cava is normal in size measuring 1.85 cm in diameter, (normal <2.1cm) with abnormal inspiratory collapse (abnormal <50%) consistent withmildly elevated right atrial pressure (~8, range 5-10mmHg).  10. Estimated pulmonary artery systolic pressure is 32 mmHg, consistent with normal pulmonary artery pressure.  11. Bilateral small pleural effusion noted  12.  13. Would recommend a repeatlimited TTE with definity to ensure there is now LV thrmobus.    ________________________________________________________________________________________  FINDINGS:     Left Ventricle:  There are regional wall motion abnormalities present. There is mild (grade 1) left ventricular diastolic dysfunction. Left ventricular regional wall motion assessment reveals akinesis of the apical wall. Mild left ventricular hypertrophy.  LV Wall Scoring: The mid and apical inferior septum, mid anterolateral segment,  apical lateral segment, and apex are akinetic.          Right Ventricle:  The right ventricle is not well visualized.     Left Atrium:  The left atrium is moderately dilated with an indexed volume of 30.82 ml/m².     Right Atrium:  The right atrium was not well visualized.     Aortic Valve:  The aortic valve was not well visualized. The aortic valve anatomy cannot be determined.     Mitral Valve:  There is mild posterior calcification of the mitral valve annulus. Mitral valve leaflets have focal calcification. There is trace mitral regurgitation.     Tricuspid Valve:  There is trace tricuspid regurgitation. Estimated pulmonary artery systolic pressure is 32 mmHg, consistent with normal pulmonary artery pressure.     Pulmonic Valve:  The pulmonic valve was not well visualized.     Aorta:  The aortic root at the sinuses of Valsalva is normal in size, measuring 2.90 cm (indexed 1.79 cm/m²).     Pleura:  Bilateral pleural effusion noted.     Systemic Veins:  The inferior vena cava is normal in size measuring 1.85 cm in diameter, (normal <2.1cm) with abnormal inspiratory collapse (abnormal <50%) consistent with mildly elevated right atrial pressure (~8, range 5-10mmHg).  ____________________________________________________________________  QUANTITATIVE DATA:  Left Ventricle Measurements: (Indexed to BSA)     IVSd (2D):   1.2 cm  LVPWd (2D):  1.2 cm  LVIDd (2D):  4.0 cm  LVIDs (2D):  2.5 cm  LV Mass:     163 g  100.4 g/m²  Visualized LV EF%: 35 to 40%     MV E Vmax:    1.23 m/s  MVA Vmax:    1.39 m/s  MV E/A:       0.88  e' lateral:   12.00 cm/s  e' medial:    5.33 cm/s  E/e' lateral: 10.25  E/e' medial:  23.08  E/e' Average: 14.20  MV DT:        231 msec    Aorta Measurements: (Normal range) (Indexed to BSA)     Sinuses of Valsalva: 2.90 cm (2.7 - 3.3 cm)       Left Atrium Measurements: (Indexed to BSA)  LA Diam 2D: 4.30 cm       LVOT / RVOT/ Qp/Qs Data: (Indexed to BSA)  LVOT Diameter: 1.80 cm    Mitral Valve Measurements:     MV Vmax:      1.48 m/s     MR Vmax:3.49 m/s  MV VTI, nat   0.383 m      MR Peak Gradient: 48.7 mmHg  MV Mean Grad: 4.0 mmHg  MV Peak Grad: 8.8 mmHg  MV E Vmax:    1.2 m/s  MV A Vmax:    1.4 m/s  MV E/A:       0.9       Tricuspid Valve Measurements:     TR Vmax:          2.5 m/s  TR Peak Gradient: 24.1 mmHg  RA Pressure:      8 mmHg  PASP:             32 mmHg    ________________________________________________________________________________________  Electronically signed on 2024 at 11:38:45 AM by Prema Larose MD         *** Final ***      Radiology:

## 2024-02-29 NOTE — PROGRESS NOTE ADULT - ASSESSMENT
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   Vital Signs T(F): 97.5F HR: 110  BP: 147/83  RR: 18  SpO2: 97% on RA  Troponin 694.1,  CTA chest: No pulmonary embolism. New bilateral patchy and ground glass opacities, consistent with pneumonia. Hypodense left thyroid   lesion, measuring 0.9 cm.    RECOMMENDATIONS  1-Dyspnea noted leukocytosis with eosinopenia, hypoxemia, dyspnea, imaging concerning for airspace disease, but pt is afebrile and leukocytosis quickly resolved, so rec  Zosyn-started late 2/25 -then de-escalated to ceftriaxone-rec 3/1 as last day    w concerns for asp PNA but sig rt pleural effusion   consider swallow eval with issues tolerating thin liquids  -now out of ICU but noted rise in wbc     2-Elevated Troponins may be demand ischemia    recs to follow based on results of other tests.    Thank you for consulting us and involving us in the management of this most interesting and challenging case.  We will follow along in the care of this patient. Please call us at [phone] or text me directly on my cell# at [phone] with any concerns.

## 2024-02-29 NOTE — CONSULT NOTE ADULT - SUBJECTIVE AND OBJECTIVE BOX
Department of Cardiology                                                               Division of Interventional Cardiology                                                               [facility] [id number]                                                                                 [phone]                                                                                                                               Interventional Cardiology Consult / Pre-Procedure Note    24-Pt seen and examined per the request of Cardiology Team-to evaluate for Diagnostic Right and LHC tomorrow am with Dr Heaven Lara secondary to abnormal echo EF-35% ( see below) & + troponins,   Right and LHC procedures explained to pt-agreeable to having them tomorrow     Subjective/ROS:   Denies CP, SOB, palpitations, N/V, fever/chills, abd pain, numbness/tingling/weakness, other c/o at this time.  ROS negative x 10 systems except as documented as above.    HPI:  83F PMH cirrhosis, aortic stenosis, PAD s/p right carotid endarterectomy, non Hodgkins Lymphoma (pulmonary mass s/p removal, chemo; last infusion fall ), COPD, pAF s/p ILR implant 10/8/21, s/p watchman (22), hx of prior GI bleed requiring blood transfusions presents to the ED with complaints of shortness of breath, nausea, generalized weakness and chest pain that started at 2:20 PM.  Patient reports she was hanging up some [employer] decorations  when the symptoms started. Daughter at bedside reported decreased PO intake, and has been vomiting intermittently all day long.  She denies any abdominal pain, fever chills, cough or recent URI symptoms.  Reports she felt fine all morning.     While taking pts history and conducting the physical exam, pt began feeling unwell, had one episode of emesis and rapid response was called.    ED course:  Vital Signs T(F): 97.5F HR: 110  BP: 147/83  RR: 18  SpO2: 97% on RA  Labs significant for: Troponin 694.1, Hgb 9.5, hct 29.5, PTT 50, D-dimer 360, Cr 1.4,  BUN 35, Glu 184, eGFR 38. pBNP 571   FOBT+  ECG:   In ED given rocephin 1gx1, azithromycin 500mg x1, NS bolus 1L x1, zofran 4mg x1    Imaging  CXR: Slight increase left lung interstitial pattern and left loop recorder are presently seen. Otherwise stable findings.  CTA chest: No pulmonary embolism. New bilateral patchy and groundglass opacities, consistent with pneumonia. Hypodense left thyroid   lesion, measuring 0.9 cm.   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 carotid surgery 2016      Lung mass  RLL, 2016, had RL lobectomy & chemotherapy      Hip fracture  left femur, had pins placed in 2016      Lumbar radiculopathy      Hyperlipidemia      Lymphoma      Former smoker      Unspecified atrial fibrillation      Hypertension      Hip fracture  , left hip      Right lower lobe lung mass  Right lower lobectomy , chemo till 2017      Status post carotid surgery  left carotid artery stenosis 2016      History of loop recorder  placed 2021        FAMILY HISTORY:  FH: diabetes mellitus (Father)      Social History: Lives alone Daughter lives nearby      Echo:   TRANSTHORACIC ECHOCARDIOGRAM REPORT  ________________________________________________________________________________                                      _______       Pt. Name:       CAROL ALEXANDRE Study Date:    2024  MRN:            [id number]        YOB: 1940  Accession #:    [id number]       Age:           83 years  Account#:       [id number]      Gender:        F  Heart Rate:                     Height:        62.99 in (160.00 cm)  Rhythm:                         Weight:        132.28 lb (60.00 kg)  Blood Pressure: 118/70 mmHg     BSA/BMI:       1.62 m² / 23.44 kg/m²  ________________________________________________________________________________________  Referring Physician:    [id number] Ruiz Stahl  InterpretingPhysician: Prema Larose MD  Primary Sonographer:    Kosta Garcia    CPT:               ECHO TTE WO CON COMP W DOPP - 72014.m  Indication(s):     Abnormal electrocardiogram ECG/EKG - R94.31  Procedure:         Transthoracic echocardiogram with 2-D, M-mode and complete                     spectral and color flow Doppler.  Ordering Location: ICU1  Admission Status:  Inpatient  Study Information: Image quality for this study is technically difficult.    _______________________________________________________________________________________     CONCLUSIONS:      1. Technically difficult image quality.   2. Regional wall motion abnormalities present.   3. There is akinesis of the apical wall. The mid to distal inferoseptal and anterolateral walls appear akinetic. Findings are consistent with either Takotsubo cardiomyopathy or LAD ischemia. The basal segments appear hyperkinetic. The LVEF appears estimated at 35-40%.   4. Mild left ventricular hypertrophy. Discrete upper septal hypertrophy is noted.   5. There is mild (grade 1) left ventricular diastolic dysfunction.   6. The right ventricle is not well visualized.   7. Mitral valve leaflets have focal calcification. The mean gradient across the mitral valve is 4mmHg but the mitral valve appears to be opening normally on parasternal long axis view.   8. Trace mitral regurgitation.   9. The inferior vena cava is normal in size measuring 1.85 cm in diameter, (normal <2.1cm) with abnormal inspiratory collapse (abnormal <50%) consistent withmildly elevated right atrial pressure (~8, range 5-10mmHg).  10. Estimated pulmonary artery systolic pressure is 32 mmHg, consistent with normal pulmonary artery pressure.  11. Bilateral small pleural effusion noted  12.  13. Would recommend a repeatlimited TTE with definity to ensure there is now LV thrmobus.        MEDICATIONS  (STANDING):  atorvastatin 20 milliGRAM(s) Oral at bedtime  cefTRIAXone   IVPB 1000 milliGRAM(s) IV Intermittent every 24 hours  chlorhexidine 2% Cloths 1 Application(s) Topical <User Schedule>  dextrose 5%. 1000 milliLiter(s) (50 mL/Hr) IV Continuous <Continuous>  dextrose 5%. 1000 milliLiter(s) (100 mL/Hr) IV Continuous <Continuous>  dextrose 50% Injectable 25 Gram(s) IV Push once  dextrose 50% Injectable 12.5 Gram(s) IV Push once  dextrose 50% Injectable 25 Gram(s) IV Push once  ferrous    sulfate 325 milliGRAM(s) Oral daily  folic acid 1 milliGRAM(s) Oral daily  glucagon  Injectable 1 milliGRAM(s) IntraMuscular once  heparin   Injectable 5000 Unit(s) SubCutaneous every 8 hours  insulin lispro (ADMELOG) corrective regimen sliding scale   SubCutaneous three times a day before meals  insulin lispro (ADMELOG) corrective regimen sliding scale   SubCutaneous at bedtime  melatonin 5 milliGRAM(s) Oral at bedtime  metoprolol tartrate 25 milliGRAM(s) Oral every 12 hours  pantoprazole  Injectable 40 milliGRAM(s) IV Push two times a day  tiotropium 2.5 MICROgram(s) Inhaler 2 Puff(s) Inhalation daily    MEDICATIONS  (PRN):  albuterol/ipratropium for Nebulization 3 milliLiter(s) Nebulizer every 6 hours PRN Shortness of Breath and/or Wheezing  dextrose Oral Gel 15 Gram(s) Oral once PRN Blood Glucose LESS THAN 70 milliGRAM(s)/deciliter  ondansetron Injectable 4 milliGRAM(s) IV Push every 6 hours PRN Nausea and/or Vomiting      No Known Drug Allergies  aspirin (Stomach Upset)  latex (Rash)        T(C): 36.4 (24 @ 04:05), Max: 36.7 (24 @ 21:03)  HR: 88 (24 @ 04:05) (88 - 96)  BP: 95/51 (24 @ 04:05) (95/51 - 108/64)  RR: 18 (24 @ 04:05) (17 - 18)  SpO2: 96% (24 @ 04:05) (96% - 99%)  Wt(kg): --    I&O's Summary    2024 07:01  -  2024 07:00  --------------------------------------------------------  IN: 0 mL / OUT: 500 mL / NET: -500 mL        Daily     Daily Weight in k.6 (2024 04:05)      TELEMETRY: 	    Sinus tachycardia / NSR    ECG:  	    Ventricular Rate 112 BPM    Atrial Rate 112 BPM    P-R Interval 180 ms    QRS Duration 118 ms    Q-T Interval 364 ms    QTC Calculation(Bazett) 496 ms    P Axis 35 degrees    R Axis -78 degrees    T Axis 77 degrees    Diagnosis Line Sinus tachycardia  Left axis deviation  Inferior-posterior infarct (cited on or before 2024)  Abnormal ECG  When compared with ECG of 2024 20:47, (Unconfirmed)  fusion complexes are no longer present  Criteria for Septal infarct are no longer present  Borderline criteria for Lateral infarct are no longer present  Questionable change in initial forces of Posterior leads  T wave inversion now evident in Anterior leads  Confirmed by June HIGGINS, Simón ([id number]) on 2024 12:20:46 PM        LABS:	 	                        9.3    6.30  )-----------( 219      ( 2024 07:00 )             29.5     02    142  |  106  |  38<H>  ----------------------------<  143<H>  4.0   |  24  |  1.30    Ca    9.2      2024 07:00  Phos  3.6     02  Mg     1.8         TPro  6.2  /  Alb  2.7<L>  /  TBili  0.6  /  DBili  x   /  AST  15  /  ALT  13  /  AlkPhos  69            Constitutional: NAD  Neuro: A+O x 3, non-focal, speech clear and intact  HEENT: NC/AT, PERRL, EOMI, anicteric sclerae, oral mucosa pink and moist  Neck: supple, no JVD  CV: regular rate, regular rhythm, +S1S2, no murmurs or rub  Pulm/chest: lung sounds CTA and equal bilaterally, no accessory muscle use noted  Abd: soft, NT, ND, +BS  Ext: WEBER x 4, no C/C/E  Pulses: R radial 2+, R femoral 2+, bilat DP 2+  Skin: warm, well perfused  Psych: calm, appropriate affect

## 2024-02-29 NOTE — PROVIDER CONTACT NOTE (OTHER) - SITUATION
Informed provider that pt BP last night 96/57 and this morning BP 95/51.
Informed provider that pt was received with 4L NC. Also informed provider pt on puree diet, mod thick liquid and was eating blackberries and blueberries earlier.

## 2024-02-29 NOTE — CHART NOTE - NSCHARTNOTEFT_GEN_A_CORE
Spoke to patient and her daughter at bedside. Plan of care d/w them. Both state that patient has problem with thin liquids but not Solid food so asking to change diet since she is already eating the solid food that daughter has been getting her from home, including Tuna sandwich, with out any symptoms of choking, coughing or sob. Will change the diet to solid for now and monitor closely. Repeat S&S  evaluation to evaluate patient. Also plan for Voiding Trial in am after Cardiac Cath, since patient can not urinate in bed and has to go to the bathroom and is on bedrest until cardiac  Cath is performed Spoke to patient and her daughter at bedside. Plan of care d/w them. Both state that patient has problem with thin liquids but not Solid food so asking to change diet since she is already eating the solid food that daughter has been getting her from home, including Tuna sandwich, without any problem/ symptoms of choking, coughing or sob. Will change the diet to solid for now and monitor closely. Repeat S&S  evaluation to evaluate patient. Also plan for Voiding Trial in am after Cardiac Cath, since patient can not urinate in bed and has to go to the bathroom and is on bedrest until cardiac  Cath is performed Spoke to patient and her daughter at bedside. Plan of care d/w them. Both state that patient has problem with thin liquids but not Solid food so asking to change diet since she is already eating the solid food that daughter has been getting her from home, including Tuna sandwich, without any problem/ symptoms of choking, coughing or sob. Will change the diet to solid for now and monitor closely. Repeat S&S  evaluation to evaluate patient. Also plan for Voiding Trial in am after Cardiac Cath, since patient can not urinate in bedpan  and has to go to the bathroom and is on bedrest until cardiac  Cath is performed

## 2024-02-29 NOTE — PROGRESS NOTE ADULT - ASSESSMENT
83 y.o. 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     IN progress     acute respiratory failure 2/2 PNA, elevated troponins, and GIB.   -2/26  CTA chest with b/l patchy opacities, pna, neg PE  -2/28, CXR wet read similar to 2/26, pending official read  - abx for asp pna, wbc uptrending   -  TTE results; Left Ventricle: akinesis of the apical wall, mid to distal inferoseptal and anterolateral walls. Findings are consistent with either Takotsubo cardiomyopathy or LAD ischemia. The basal segments appear hyperkinetic. The LVEF appears estimated at 35-40%; LA: mod dilation; MV: trace MR with mild posterior calcification; IVC: normal in size (1.85cm in diameter) consistent with mildly elevated right atrial pressure (~8, range 5-10mmHg); Rec repeat TTE to further evaluate LV thrombus.  - sp lasix trial for echo findings of elevated right atrial pressure     - tachycardia, multifactorial, likely reflective of anxiety, infection, dehydration from n/v now improving, on lopressor BID     - EKG showing SVT 149bpm   - Troponin 694.1 --> 1738.9 --> 1732.3 --> 1438.2. Elevated likely due to demand ischemia in the setting of tachyarrhythmia.   - repeat EKG    - -remains unclear whether this is stress myop or ischemic, but given anemia and jose alberto would not plan for cath at this time.   - Start ASA and monitor H/H in the setting of GIB    - repeat echo prior to dc  - will need to start on ARB/ARNI when able    - Monitor and replete lytes, keep K>4, Mg>2.  - Will continue to follow.   Assessment and Plan:  Candi Smyth is a 66 year old female with a past medical history of hypertension and hyperlipidemia referred by Gricelda Brandt DO, presents in the office for a follow-up regarding type 2 diabetes mellitus.    Patient has given consent to record this visit for documentation in their clinical record.    Type 2 diabetes mellitus with hyperglycemia, with long-term current use of insulin(CMD):  Eye exam done on 02/14/2023 reports: no diabetic retrinopathy; having a cortical cataract.  Lab Results   Component Value Date    HGBA1C 7.0 (H) 03/02/2023    HGBA1C 6.8 (H) 12/02/2022    CREATININE 0.73 12/02/2022    CREATININE 0.80 02/11/2022    GFRNA 78 09/01/2020    GFRNA 84 03/02/2020    TSH 1.652 12/02/2022    TSH 1.423 04/08/2021     Wt Readings from Last 3 Encounters:   03/08/23 89.8 kg (197 lb 15.6 oz)   01/24/23 90.7 kg (200 lb)   12/27/22 91.6 kg (202 lb)   BG Reviewed: This morning her BG was 115 mg/dL.  Reviewed and discussed recent reports.  Her recent A1c is 7.0% and is at goal.  Her urine test is normal.  Ordered glycohemoglobin to be done a week prior to next visit.  Increase Ozempic from 0.5 mg to 1 mg weekly. Significance explained.   Continue Jardiance 10 mg 1 tablet before breakfast.  Continue metformin 1000 mg, one tablet twice a day.  Continue Tresiba 22 units every morning.  Follow up in three months with lab reports done prior to next visit.    Hyperlipidemia associated with type 2 diabetes mellitus (CMD):   Lab Results   Component Value Date    CHOLESTEROL 144 12/02/2022    CALCLDL 53 12/02/2022    HDL 64 12/02/2022    TRIGLYCERIDE 134 12/02/2022   Reviewed and discussed recent relevant reports.  Her cholesterol was normal in last labs.  Continue rosuvastatin 20 mg, once a day.   Will continue to monitor.    Hypertension associated with type 2 diabetes mellitus (CMD):   Lab Results   Component Value Date    MALBCR 24.9 12/02/2022    POTASSIUM 4.6 12/02/2022    SODIUM 145  12/02/2022   Blood pressure on today's visit is 105/65 mmHg and is appropriate.  Continue lisinopril 2.5 mg, two tablets once a day.   Will continue to monitor.    Chief Complaint   Patient presents with   • Diabetes Mellitus     EE-02/2023 SMBG-1-2x day FBS-115   • Office Visit     HPI:  Candi Smyth is a 66 year old female with a past medical history of hypertension and hyperlipidemia referred by Gricelda Brandt DO, presents for a follow-up regarding type 2 diabetes mellitus via office visit.    She has a history of type 2 diabetes mellitus, and is currently on,   Tresiba 22 units every morning,  Ozempic from 0.5 mg weekly,  Metformin 1000 mg, one tablet twice a day,  Jardiance 10 mg 1 tablet before breakfast. She denies UTI and yeast infection.  BG Reviewed: This morning her BG was 115 mg/dL.  She lost 5 lb weight.    She has a history of hypertension, and is currently on lisinopril 2.5 mg, two tablets once a day.    She has a history of hyperlipidemia, and is currently on rosuvastatin 20 mg, once a day.    Denies symptoms of COVID-19 including chest pain, fever, cough, SOB, and denies any recent travels. Patient denies having come in contact with anyone who has tested positive.    Past Medical History:   Diagnosis Date   • Arthritis    • Cataract    • Diabetes mellitus (CMD)    • Essential (primary) hypertension    • Excess weight 05/06/2019   • Hyperlipidemia    • Migraine        Current Outpatient Medications   Medication Sig Dispense Refill   • [START ON 3/13/2023] Semaglutide, 1 MG/DOSE, (Ozempic, 1 MG/DOSE,) 4 MG/3ML Solution Pen-injector Inject 1 mg into the skin 1 day a week. 9 mL 3   • topiramate (TOPAMAX) 100 MG tablet TAKE 1 TABLET BY MOUTH DAILY 90 tablet 3   • metformin (GLUCOPHAGE) 1000 MG tablet TAKE 1 TABLET BY MOUTH EVERY 12 HOURS 180 tablet 3   • lisinopril (ZESTRIL) 2.5 MG tablet TAKE 2 TABLETS BY MOUTH DAILY 180 tablet 0   • rosuvastatin (CRESTOR) 20 MG tablet Take 1 tablet by mouth  daily. 90 tablet 3   • FLUoxetine (PROzac) 20 MG capsule Take 1 capsule by mouth daily. 90 capsule 3   • topiramate (TOPAMAX) 50 MG tablet Take 1 tablet by mouth in the morning and 1 tablet in the evening. 180 tablet 3   • tiZANidine (ZANAFLEX) 4 MG tablet Take one capsule 2 times daily for headache as needed 180 tablet 3   • empagliflozin (JARDIANCE) 10 MG tablet Take 1 tablet by mouth daily (before breakfast). 30 tablet 3   • aspirin 81 MG EC tablet Take 81 mg by mouth.     • Cyanocobalamin 1000 MCG Tab CR Take 5,000 mcg by mouth.     • Lactobacillus Acid-Pectin (acidophilus-pectin) capsule Take 1 capsule by mouth.     • Melatonin 5 MG Cap Take 5 mg by mouth nightly.     • loratadine (CLARITIN) 10 MG tablet Take 10 mg by mouth.     • SUPER B COMPLEX/C PO      • FLAXSEED, LINSEED, PO      • Cholecalciferol (vitamin D) 50 mcg (2,000 units) capsule      • Ascorbic Acid (vitamin C) 1000 MG tablet Take 1,000 mg by mouth daily.     • LUTEIN PO      • vitamin E 1000 units capsule Take 1,000 Units by mouth daily.     • traMADol (ULTRAM) 50 MG tablet Take one daily as needed for pain up to 2 times daily 45 tablet 1   • meloxicam (MOBIC) 15 MG tablet Take 1 tablet by mouth daily. 30 tablet 11   • Alcohol Swabs (Alcohol Prep) Pads 1 each as needed (For injection). 100 each 1   • blood glucose (OneTouch Verio) test strip Test blood sugar 3 times daily. Diagnosis: E11.65. Meter: onetouch verio 300 strip 3   • Insulin Pen Needle (Pen Needles) 31G X 6 MM Misc Use as directed 50 each 5   • insulin degludec (Tresiba FlexTouch) 100 UNIT/ML pen-injector Prime 2 units before each dose.INJECT 22 UNITS UNDER SKIN EVERY MORNING 15 mL 5   • ALPRAZolam (XANAX) 0.25 MG tablet Take 1-2 as needed for anxiety up to 3 times daily. 30 tablet 0   • Aimovig 140 MG/ML Solution Auto-injector INJECT SUBCUTANEOUSLY EVERY MONTH     • esomeprazole (NexIUM) 20 MG capsule Take 1 capsule by mouth daily (before breakfast). (Patient taking differently: Take  20 mg by mouth in the morning and 20 mg in the evening.) 30 capsule 11   • Lancets (MICROLET) Misc TEST 3 TIMES DAILY AS DIRECTED       No current facility-administered medications for this visit.       Family History   Problem Relation Age of Onset   • Osteoarthritis Mother    • Rheumatoid Arthritis Sister        Social History     Socioeconomic History   • Marital status: /Civil Union     Spouse name: Not on file   • Number of children: 3   • Years of education: Not on file   • Highest education level: Not on file   Occupational History   • Occupation: works for the state of [place]   Tobacco Use   • Smoking status: Never   • Smokeless tobacco: Never   Vaping Use   • Vaping Use: never used   Substance and Sexual Activity   • Alcohol use: Yes     Alcohol/week: 1.0 standard drink     Types: 1 Glasses of wine per week     Comment: social   • Drug use: Never   • Sexual activity: Not on file   Other Topics Concern   • Not on file   Social History Narrative   • Not on file     Social Determinants of Health     Financial Resource Strain: Not on file   Food Insecurity: Not on file   Transportation Needs: Not on file   Physical Activity: Not on file   Stress: Not on file   Social Connections: Not on file   Intimate Partner Violence: Not on file       Review of Systems   Respiratory: Negative for cough and shortness of breath.    Cardiovascular: Negative for chest pain and palpitations.   Gastrointestinal: Negative for abdominal pain, nausea and vomiting.   All other systems reviewed and are negative.      /65   Pulse 72   Resp 16   Ht 5' 3\"   Wt 89.8 kg (197 lb 15.6 oz)   BMI 35.07 kg/m²   BSA 1.93 m²       Physical examination:   General: Wearing a face mask  HEENT: EOMI, No exophthalmos, no palpable Lymph node  Neck: No visible thyroid enlargement or palpable Thyroid Nodule  Chest: No labor respiration, Clear to Auscultation  CVS: No tachycardia noted or murmur noted  Abdomen: Not distended abdomen    Neurologic: Pt is awake, alert, oriented x 3  MSK: Moves all extremities   Skin: No visible rash  Pysch: Pleasant  Speech: No dysarthria    Hemoglobin A1C (%)   Date Value   03/02/2023 7.0 (H)   12/02/2022 6.8 (H)      Cholesterol (mg/dL)   Date Value   12/02/2022 144     HDL (mg/dL)   Date Value   12/02/2022 64     Cholesterol/ HDL Ratio (no units)   Date Value   12/02/2022 2.3     Triglycerides (mg/dL)   Date Value   12/02/2022 134     LDL (mg/dL)   Date Value   12/02/2022 53       Thank you Gricelda VALLADARES Will, DO or allowing me to participate in the care of the patient and for consultation. If any of the questions are unanswered please don't hesitate to give me a call.     Anais Yuan MD, F.A.C.E;F.A.C.P  Assoc. Professor of Medicine, [facility]  Staff Endocrinologist, [facility]/[place], [place]    I, Dr. Clive Edwards, have created a visit summary document based on the audio recording between Dr. Hanane Yuan MD and this patient for the physician to review, edit as needed, and authenticate.  Creation Date: 3/9/2023          Provider Attestation  I have reviewed and edited the visit summary above and attest that it is accurate. The documentation recorded by the scribble accurately reflects the service I personally performed and the decisions made by me, Hanane Yuan       83 y.o. 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        acute respiratory failure 2/2 PNA, elevated troponins, and GIB.   -2/26  CTA chest with b/l patchy opacities, pna, neg PE  -  TTE results; Left Ventricle: akinesis of the apical wall, mid to distal inferoseptal and anterolateral walls. Findings are consistent with either Takotsubo cardiomyopathy or LAD ischemia. The basal segments appear hyperkinetic. The LVEF appears estimated at 35-40%; LA: mod dilation; MV: trace MR with mild posterior calcification; IVC: normal in size (1.85cm in diameter) consistent with mildly elevated right atrial pressure (~8, range 5-10mmHg); Rec repeat TTE to further evaluate LV thrombus.  - sp lasix trial for echo findings of elevated right atrial pressure   - GDMT: Change lopressor to toprol. Add Ace/arb post cath ,   -Add ASA , increase Statin   -Interventional to see patient for right and left diagnostic cath     - tachycardia, multifactorial, likely reflective of anxiety, infection, dehydration from n/v now improving   -tele now Sr 80-90's     - EKG showing SVT 149bpm   - Troponin 694.1 --> 1738.9 --> 1732.3 --> 1438.2  - repeat EKG  -hx watchman in past     further plan pending results of above   - Monitor and replete lytes, keep K>4, Mg>2.  - Will continue to follow.   83 y.o. 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        acute respiratory failure 2/2 PNA, elevated troponins, and GIB.   -2/26  CTA chest with b/l patchy opacities, pna, neg PE  -  TTE results; Left Ventricle: akinesis of the apical wall, mid to distal inferoseptal and anterolateral walls. Findings are consistent with either Takotsubo cardiomyopathy or LAD ischemia. The basal segments appear hyperkinetic. The LVEF appears estimated at 35-40%; LA: mod dilation; MV: trace MR with mild posterior calcification; IVC: normal in size (1.85cm in diameter) consistent with mildly elevated right atrial pressure (~8, range 5-10mmHg); Rec repeat TTE to further evaluate LV thrombus.  - sp lasix trial for echo findings of elevated right atrial pressure   - GDMT: Change lopressor to toprol. Add Ace/arb post cath ,   - increase Statin to 40 check full lipid, No asa had intolerance in the past   -Interventional to see patient for right and left diagnostic cath     - tachycardia, multifactorial, likely reflective of anxiety, infection, dehydration from n/v now improving   -tele now Sr 80-90's     - EKG showing SVT 149bpm   - Troponin 694.1 --> 1738.9 --> 1732.3 --> 1438.2  - repeat EKG  -hx watchman in past     further plan pending results of above   - Monitor and replete lytes, keep K>4, Mg>2.  - Will continue to follow.   83 y.o. 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      acute respiratory failure 2/2 PNA, elevated troponins, and GIB.   -2/26  CTA chest with b/l patchy opacities, pna, neg PE  -  TTE results; Left Ventricle: akinesis of the apical wall, mid to distal inferoseptal and anterolateral walls. Findings are consistent with either Takotsubo cardiomyopathy or LAD ischemia. The basal segments appear hyperkinetic. The LVEF appears estimated at 35-40%; LA: mod dilation; MV: trace MR with mild posterior calcification; IVC: normal in size (1.85cm in diameter) consistent with mildly elevated right atrial pressure (~8, range 5-10mmHg); Rec repeat TTE to further evaluate LV thrombus.  - sp lasix trial for echo findings of elevated right atrial pressure   - GDMT: Change lopressor to toprol. Add Ace/arb post cath ,   - increase Statin to 40 check full lipid, No asa had intolerance in the past   -Interventional to see patient for right and left diagnostic cath     - tachycardia, multifactorial, likely reflective of anxiety, infection, dehydration from n/v now improving   -tele now Sr 80-90's     - EKG showing SVT 149bpm   - Troponin 694.1 --> 1738.9 --> 1732.3 --> 1438.2  - repeat EKG  -hx watchman in past     further plan pending results of above   - Monitor and replete lytes, keep K>4, Mg>2.  - Will continue to follow.

## 2024-03-01 LAB
ANION GAP SERPL CALC-SCNC: 12 MMOL/L — SIGNIFICANT CHANGE UP (ref 5–17)
BUN SERPL-MCNC: 37 MG/DL — HIGH (ref 7–23)
CALCIUM SERPL-MCNC: 9.6 MG/DL — SIGNIFICANT CHANGE UP (ref 8.5–10.1)
CHLORIDE SERPL-SCNC: 107 MMOL/L — SIGNIFICANT CHANGE UP (ref 96–108)
CHOLEST SERPL-MCNC: 132 MG/DL — SIGNIFICANT CHANGE UP
CK MB BLD-MCNC: 10.9 % — HIGH (ref 0–3.5)
CK MB CFR SERPL CALC: 3.6 NG/ML — SIGNIFICANT CHANGE UP (ref 0–3.6)
CK SERPL-CCNC: 33 U/L — SIGNIFICANT CHANGE UP (ref 26–192)
CO2 SERPL-SCNC: 23 MMOL/L — SIGNIFICANT CHANGE UP (ref 22–31)
CREAT SERPL-MCNC: 1.1 MG/DL — SIGNIFICANT CHANGE UP (ref 0.5–1.3)
EGFR: 50 ML/MIN/1.73M2 — LOW
GLUCOSE SERPL-MCNC: 161 MG/DL — HIGH (ref 70–99)
HCT VFR BLD CALC: 30 % — LOW (ref 34.5–45)
HDLC SERPL-MCNC: 42 MG/DL — LOW
HGB BLD-MCNC: 9.7 G/DL — LOW (ref 11.5–15.5)
LIPID PNL WITH DIRECT LDL SERPL: 70 MG/DL — SIGNIFICANT CHANGE UP
MAGNESIUM SERPL-MCNC: 1.7 MG/DL — SIGNIFICANT CHANGE UP (ref 1.6–2.6)
MCHC RBC-ENTMCNC: 28.7 PG — SIGNIFICANT CHANGE UP (ref 27–34)
MCHC RBC-ENTMCNC: 32.3 GM/DL — SIGNIFICANT CHANGE UP (ref 32–36)
MCV RBC AUTO: 88.8 FL — SIGNIFICANT CHANGE UP (ref 80–100)
NON HDL CHOLESTEROL: 91 MG/DL — SIGNIFICANT CHANGE UP
NRBC # BLD: 0 /100 WBCS — SIGNIFICANT CHANGE UP (ref 0–0)
PHOSPHATE SERPL-MCNC: 2.7 MG/DL — SIGNIFICANT CHANGE UP (ref 2.5–4.5)
PLATELET # BLD AUTO: 235 K/UL — SIGNIFICANT CHANGE UP (ref 150–400)
POTASSIUM SERPL-MCNC: 3.9 MMOL/L — SIGNIFICANT CHANGE UP (ref 3.5–5.3)
POTASSIUM SERPL-SCNC: 3.9 MMOL/L — SIGNIFICANT CHANGE UP (ref 3.5–5.3)
RBC # BLD: 3.38 M/UL — LOW (ref 3.8–5.2)
RBC # FLD: 13.8 % — SIGNIFICANT CHANGE UP (ref 10.3–14.5)
SODIUM SERPL-SCNC: 142 MMOL/L — SIGNIFICANT CHANGE UP (ref 135–145)
TRIGL SERPL-MCNC: 116 MG/DL — SIGNIFICANT CHANGE UP
TROPONIN I, HIGH SENSITIVITY RESULT: 157.9 NG/L — HIGH
TROPONIN I, HIGH SENSITIVITY RESULT: 170.5 NG/L — HIGH
WBC # BLD: 5.33 K/UL — SIGNIFICANT CHANGE UP (ref 3.8–10.5)
WBC # FLD AUTO: 5.33 K/UL — SIGNIFICANT CHANGE UP (ref 3.8–10.5)

## 2024-03-01 PROCEDURE — 99152 MOD SED SAME PHYS/QHP 5/>YRS: CPT

## 2024-03-01 PROCEDURE — 93460 R&L HRT ART/VENTRICLE ANGIO: CPT | Mod: 26

## 2024-03-01 PROCEDURE — 99233 SBSQ HOSP IP/OBS HIGH 50: CPT

## 2024-03-01 PROCEDURE — 93010 ELECTROCARDIOGRAM REPORT: CPT

## 2024-03-01 RX ORDER — MAGNESIUM SULFATE 500 MG/ML
1 VIAL (ML) INJECTION ONCE
Refills: 0 | Status: COMPLETED | OUTPATIENT
Start: 2024-03-01 | End: 2024-03-01

## 2024-03-01 RX ADMIN — Medication 5 MILLIGRAM(S): at 21:55

## 2024-03-01 RX ADMIN — CEFTRIAXONE 100 MILLIGRAM(S): 500 INJECTION, POWDER, FOR SOLUTION INTRAMUSCULAR; INTRAVENOUS at 16:54

## 2024-03-01 RX ADMIN — Medication 100 GRAM(S): at 11:47

## 2024-03-01 RX ADMIN — Medication 1: at 11:56

## 2024-03-01 RX ADMIN — CHLORHEXIDINE GLUCONATE 1 APPLICATION(S): 213 SOLUTION TOPICAL at 06:08

## 2024-03-01 RX ADMIN — Medication 25 MILLIGRAM(S): at 06:08

## 2024-03-01 RX ADMIN — PANTOPRAZOLE SODIUM 40 MILLIGRAM(S): 20 TABLET, DELAYED RELEASE ORAL at 06:08

## 2024-03-01 RX ADMIN — Medication 2: at 21:43

## 2024-03-01 RX ADMIN — ATORVASTATIN CALCIUM 40 MILLIGRAM(S): 80 TABLET, FILM COATED ORAL at 21:35

## 2024-03-01 RX ADMIN — PANTOPRAZOLE SODIUM 40 MILLIGRAM(S): 20 TABLET, DELAYED RELEASE ORAL at 18:39

## 2024-03-01 RX ADMIN — HEPARIN SODIUM 5000 UNIT(S): 5000 INJECTION INTRAVENOUS; SUBCUTANEOUS at 21:36

## 2024-03-01 NOTE — PROGRESS NOTE ADULT - PROBLEM SELECTOR PLAN 1
suspected gram negative multifocal pna complicated by large R sided pleural effusion  - abx adjusted to ctx today  - needed bipap previously but now tolerating nasal cannula and appears compensated from volume perspective  - serial abg's  - ID/ICU recs appreciated
suspected gram negative multifocal PNA complicated by large R sided pleural effusion  - On Ceftriaxone, last dose today   - needed bipap previously but now tolerating nasal cannula  -Check O2 saturation on room air at rest and ambulation   - ID f/u appreciated   -Speech and Swallow re evaluation since patient states that she wants to eat solid food, to see if can upgrade the diet
suspected gram negative multifocal PNA complicated by large R sided pleural effusion  - abx adjusted to ctx  - needed bipap previously but now tolerating nasal cannua  -Check O2 saturation on room air at rest and ambulation   - ID following  -Speech and Swallow re evaluation since patient states that she wants to eat solid food, to see if can upgrade the diet
suspected gram negative multifocal PNA complicated by large R sided pleural effusion  - abx adjusted to ctx  - needed bipap previously but now tolerating nasal cannula and appears compensated from volume perspective  - ID recs appreciated
suspected gram negative multifocal pna complicated by large R sided pleural effusion  - c/w Zosyn  - needed bipap previously but now on nasal cannula  - serial abg's  - ID/ICu recs appreciated

## 2024-03-01 NOTE — PROGRESS NOTE ADULT - PROBLEM SELECTOR PROBLEM 3
GI bleed
SVT (supraventricular tachycardia)

## 2024-03-01 NOTE — CASE MANAGEMENT PROGRESS NOTE - NSCMPROGRESSNOTE_GEN_ALL_CORE
Pt for cardiac cath today. Star pt for MI. 485 started NOT sent. PT recommending HCPT. C/O chest pain last night back on O2 2L via NC. CM team to continue to follow for post acute needs.

## 2024-03-01 NOTE — PROGRESS NOTE ADULT - PROBLEM SELECTOR PLAN 9
#Incidental imaging finding:   -Thyroid lesion noted on CT chest- we will need to discuss this further once clinically stable.   -TSH WNL    #VTE ppx: SCDs
#Incidental imaging finding:   -Thyroid lesion noted on CT chest- we will need to f/u as outpatient   -TSH WNL    #VTE ppx: SCDs
Incidental imaging finding: thyroid lesion noted on CT chest- we will need to discuss this further once clinically stable. -check AM TSH (pending)    #VTE ppx: scd's  #GI ppx: ppi  #Diet, Pureed:   DASH/TLC {Sodium & Cholesterol Restricted}  Moderately Thick Liquids (MODTHICKLIQS) (02-27-24 @ 12:40) [Active]  #Activity: Activity - Bedrest (02-25-24 @ 16:27) [Active]  #ACP: full code  #Dispo: further plans pending clinical course
Incidental imaging finding: thyroid lesion noted on CT chest- we will need to discuss this further once clinically stable. -check AM TSH (pending)    #VTE ppx: scd's  #GI ppx: ppi  #Diet: Diet, DASH/TLC:   Sodium & Cholesterol Restricted (02-26-24 @ 11:15) [Active]  #Activity: Activity - Bedrest (02-25-24 @ 16:27) [Active]  #ACP: full code  #Dispo: further plans pending clinical course
#Incidental imaging finding:   -Thyroid lesion noted on CT chest- we will need to f/u as outpatient   -TSH WNL    #VTE ppx: SCDs

## 2024-03-01 NOTE — SWALLOW BEDSIDE ASSESSMENT ADULT - COMMENTS
Chart reviewed, new order received for swallow eval, however, pt currently NPO for procedure.  Will follow to reattempt as appropriate.  LIANNA Rojas notified, message sent to Dr. Brumfield.

## 2024-03-01 NOTE — PROGRESS NOTE ADULT - TIME BILLING
spoke w MICU team    at risk for decompnesation - planning for downgrade from the MICU  empiric abx  holding diuretics  adjusting bb to tolerance (BP/P monitoring)  downgraded to tele
spoke w MICU team  spoke w/ family at bedside  addressed their concerns  all in agreement w tx plan at present
spoke w MICU team    at risk for decompnesation - planning for downgrade from the MICU  empiric abx  holding diuretics  adjusting bb to tolerance (BP/P monitoring)  downgraded to tele
Note written by attending. Meds, labs, vitals, chart reviewed
Note written by attending. Meds, labs, vitals, chart reviewed

## 2024-03-01 NOTE — PROGRESS NOTE ADULT - ASSESSMENT
83 y.o. 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      acute respiratory failure 2/2 PNA, elevated troponins, and GIB.   -2/26  CTA chest with b/l patchy opacities, pna, neg PE  -  TTE results; Left Ventricle: akinesis of the apical wall, mid to distal inferoseptal and anterolateral walls. Findings are consistent with either Takotsubo cardiomyopathy or LAD ischemia. The basal segments appear hyperkinetic. The LVEF appears estimated at 35-40%; LA: mod dilation; MV: trace MR with mild posterior calcification; IVC: normal in size (1.85cm in diameter) consistent with mildly elevated right atrial pressure (~8, range 5-10mmHg); Rec repeat TTE to further evaluate LV thrombus.  -overnight with chest pain./ shortness of breath , trop trending down , chest x-ray pending , laying flat on room air no orthopnea   - GDMT: Continue toprol  Add Ace/arb post cath ,   - Statin increased to 40mg   -, No asa had intolerance in the past   -Plan is for diagnostic right and left today with Dr Lara , keep NPO     - tachycardia, multifactorial, likely reflective of anxiety, infection, dehydration from n/v   -continue to monitor on tele     - EKG showing SVT 149bpm  -hx watchman in past     further plan pending results of above   - Monitor and replete lytes, keep K>4, Mg>2.  - Will continue to follow.

## 2024-03-01 NOTE — PROGRESS NOTE ADULT - SUBJECTIVE AND OBJECTIVE BOX
[facility] -- June Holliday Pannella, Patel, Savella Goodger, Cohen  Office # [phone]      Follow Up:  Shortness of breath     Subjective/Observations:   Seen at bedside, overnight with chest pain and shortness of breath now laying flat on room air with no orthopnea  awaiting cath today    REVIEW OF SYSTEMS: All other review of systems is negative unless indicated above    PAST MEDICAL & SURGICAL HISTORY:  Carotid artery obstruction, left  had carotid surgery 2016      Lung mass  RLL, 2016, had RL lobectomy & chemotherapy      Hip fracture  left femur, had pins placed in 2016      Lumbar radiculopathy      Hyperlipidemia      Lymphoma      Former smoker      Unspecified atrial fibrillation      Hypertension      Hip fracture  2016, left hip      Right lower lobe lung mass  Right lower lobectomy 2016, chemo till 2017      Status post carotid surgery  left carotid artery stenosis 2016      History of loop recorder  placed 2021          MEDICATIONS  (STANDING):  atorvastatin 40 milliGRAM(s) Oral at bedtime  cefTRIAXone   IVPB 1000 milliGRAM(s) IV Intermittent every 24 hours  chlorhexidine 2% Cloths 1 Application(s) Topical <User Schedule>  dextrose 5%. 1000 milliLiter(s) (50 mL/Hr) IV Continuous <Continuous>  dextrose 5%. 1000 milliLiter(s) (100 mL/Hr) IV Continuous <Continuous>  dextrose 50% Injectable 25 Gram(s) IV Push once  dextrose 50% Injectable 12.5 Gram(s) IV Push once  dextrose 50% Injectable 25 Gram(s) IV Push once  ferrous    sulfate 325 milliGRAM(s) Oral daily  folic acid 1 milliGRAM(s) Oral daily  glucagon  Injectable 1 milliGRAM(s) IntraMuscular once  heparin   Injectable 5000 Unit(s) SubCutaneous every 8 hours  insulin lispro (ADMELOG) corrective regimen sliding scale   SubCutaneous three times a day before meals  insulin lispro (ADMELOG) corrective regimen sliding scale   SubCutaneous at bedtime  magnesium sulfate  IVPB 1 Gram(s) IV Intermittent once  melatonin 5 milliGRAM(s) Oral at bedtime  metoprolol succinate ER 25 milliGRAM(s) Oral daily  pantoprazole  Injectable 40 milliGRAM(s) IV Push two times a day  tiotropium 2.5 MICROgram(s) Inhaler 2 Puff(s) Inhalation daily    MEDICATIONS  (PRN):  albuterol/ipratropium for Nebulization 3 milliLiter(s) Nebulizer every 6 hours PRN Shortness of Breath and/or Wheezing  dextrose Oral Gel 15 Gram(s) Oral once PRN Blood Glucose LESS THAN 70 milliGRAM(s)/deciliter  ondansetron Injectable 4 milliGRAM(s) IV Push every 6 hours PRN Nausea and/or Vomiting      Allergies    No Known Drug Allergies  latex (Rash)    Intolerances    aspirin (Stomach Upset)      Vital Signs Last 24 Hrs  T(C): 36.4 (01 Mar 2024 05:04), Max: 36.6 (2024 19:54)  T(F): 97.6 (01 Mar 2024 05:04), Max: 97.9 (2024 19:54)  HR: 116 (01 Mar 2024 05:04) (102 - 116)  BP: 135/65 (01 Mar 2024 05:04) (119/68 - 145/81)  BP(mean): --  RR: 18 (01 Mar 2024 05:04) (17 - 18)  SpO2: 99% (01 Mar 2024 05:04) (90% - 99%)    Parameters below as of 2024 19:56  Patient On (Oxygen Delivery Method): room air        I&O's Summary        PHYSICAL EXAM:  TELE: Sr - St   Constitutional: NAD, awake and alert, frail   HEENT: Moist Mucous Membranes, Anicteric  Pulmonary: Non-labored, breath sounds are DIM   Cardiovascular: Regular, S1 and S2 nl, No murmurs, rubs, gallops or clicks  Gastrointestinal: Bowel Sounds present, soft, nontender.   Lymph: No lymphadenopathy. No peripheral edema.  Skin: No visible rashes or ulcers.  Psych:  Mood & affect appropriate    LABS: All Labs Reviewed:                        9.7    5.33  )-----------( 235      ( 01 Mar 2024 05:40 )             30.0                         9.3    6.30  )-----------( 219      ( 2024 07:00 )             29.5                         10.4   16.70 )-----------( 383      ( 2024 07:40 )             32.6     01 Mar 2024 05:40    142    |  107    |  37     ----------------------------<  161    3.9     |  23     |  1.10   2024 07:00    142    |  106    |  38     ----------------------------<  143    4.0     |  24     |  1.30   2024 07:40    141    |  107    |  28     ----------------------------<  136    4.2     |  21     |  1.40     Ca    9.6        01 Mar 2024 05:40  Ca    9.2        2024 07:00  Ca    10.0       2024 07:40  Phos  2.7       01 Mar 2024 05:40  Phos  3.6       2024 07:40  Mg     1.7       01 Mar 2024 05:40  Mg     1.8       2024 07:00  Mg     1.8       2024 07:40    TPro  6.2    /  Alb  2.7    /  TBili  0.6    /  DBili  x      /  AST  15     /  ALT  13     /  AlkPhos  69     2024 07:00  TPro  6.9    /  Alb  3.1    /  TBili  1.0    /  DBili  x      /  AST  21     /  ALT  17     /  AlkPhos  81     2024 07:40      CARDIAC MARKERS ( 01 Mar 2024 05:40 )  x     / x     / 33 U/L / x     / 3.6 ng/mL         EC Lead ECG:   Ventricular Rate 112 BPM    Atrial Rate 112 BPM    P-R Interval 180 ms    QRS Duration 118 ms    Q-T Interval 364 ms    QTC Calculation(Bazett) 496 ms    P Axis 35 degrees    R Axis -78 degrees    T Axis 77 degrees    Diagnosis Line Sinus tachycardia  Left axis deviation  Inferior-posterior infarct (cited on or before 2024)  Abnormal ECG  When compared with ECG of 2024 20:47, (Unconfirmed)  fusion complexes are no longer present  Criteria for Septal infarct are no longer present  Borderline criteria for Lateral infarct are no longer present  Questionable change in initial forces of Posterior leads  T wave inversion now evident in Anterior leads  Confirmed by Simón Watkins MD ([id number]) on 2024 12:20:46 PM (24 @ 08:24)      TRANSTHORACIC ECHOCARDIOGRAM REPORT  ________________________________________________________________________________                                      _______       Pt. Name:       CAROL ALEXANDRE Study Date:    2024  MRN:            [id number]        YOB: 1940  Accession #:    [id number]       Age:           83 years  Account#:       [id number]      Gender:        F  Heart Rate:                     Height:        62.99 in (160.00 cm)  Rhythm:                         Weight:        132.28 lb (60.00 kg)  Blood Pressure: 148/81 mmHg     BSA/BMI:       1.62 m² / 23.44 kg/m²  ________________________________________________________________________________________  Referring Physician:    [id number] Ruiz Stahl  InterpretingPhysician: Simón Watkins  Primary Sonographer:    Samira Mckeon RDCS    CPT:               ECHO TTE WO CON COMP W DOPP - 45301.m  Indication(s):     Heart failure, unspecified - I50.9  Procedure:         Transthoracic echocardiogram with 2-D, M-mode and complete                     spectral and color flow Doppler.  Ordering Location: [facility]  Admission Status:  Inpatient  Study Information: Image quality for this study is technically difficult.    _______________________________________________________________________________________     CONCLUSIONS:      1. Technically difficult image quality.   2. Left ventricular endocardium is not well visualized; however, the left ventricular systolic function appears mildly reduced.   3. The apex appears severely hypokinetic to akinetic. No clear apical thrombus is identified on this study, though it cannot be excluded given technical limitations.   4. Aortic valve was not well visualized.   5. Structurally normal mitral valve with normal leaflet excursion.   6. There is moderate calcification of the mitral valve annulus.   7. Mild mitral regurgitation.   8. No pericardial effusion seen.    ________________________________________________________________________________________  FINDINGS:     Left Ventricle:  The left ventricular diastolic function is indeterminate. Left ventricular endocardium is not well visualized; however, the left ventricular systolic function appears mildly reduced. The apex appears severely hypokinetic to akinetic. Noclear apical thrombus is identified on this study, though it cannot be excluded given technical limitations.     Right Ventricle:  The right ventricle is not well visualized.     Left Atrium:  The left atrium is normal.     Right Atrium:  The right atrium is normal in size.     Aortic Valve:  The aortic valve was not well visualized.     Mitral Valve:  Structurally normal mitral valve with normal leaflet excursion. There is moderate calcification of the mitral valve annulus. There is mild mitral regurgitation.     Tricuspid Valve:  The tricuspid valve was not well visualized.     Pulmonic Valve:  The pulmonic valve was not well visualized.     Pericardium:  No pericardial effusion seen.  ____________________________________________________________________  QUANTITATIVE DATA:  Left Ventricle Measurements: (Indexed to BSA)     MV E Vmax:    1.37 m/s  MV A Vmax:    1.57 m/s  MV E/A:       0.87  e' lateral:   7.07 cm/s  e' medial:    4.90 cm/s  E/e' lateral: 19.38  E/e' medial:  27.96  E/e' Average: 22.89  MV DT:        183 msec       Aortic Valve Measurements:  AV Vmax:          1.7 m/s  AV Peak Gradient: 11.0 mmHg  AV Mean Gradient: 6.0 mmHg  AV VTI:           27.9 cm    Mitral Valve Measurements:     MV E Vmax: 1.4 m/s         MR Vmax:         5.19 m/s  MV A Vmax: 1.6 m/s         MR Peak Gradient: 107.7 mmHg  MV E/A:    0.9    ________________________________________________________________________________________  Electronically signed on 2024 at 1:00:57 PM by Simón Watkins         *** Final ***      Radiology:

## 2024-03-01 NOTE — PROGRESS NOTE ADULT - PROBLEM SELECTOR PLAN 5
pAF s/p ILR implant 10/8/21, s/p watchman (2/11/22)  2021 was noted to have pAF during admission at [facility], started on Eliquis and amiodarone. After d/c had episode of syncope, reevaluated and amiodarone had been stopped. Underwent ILR implant 10/8/21, had infrequent episodes of pAF, last 1/21/22 lasting nearly 3 hours with controlled rates.   - cardio following, rolandater recs pending course
pAF s/p ILR implant 10/8/21, s/p watchman (2/11/22)  - 2021 was noted to have pAF during admission at [facility], started on Eliquis and amiodarone. After d/c had episode of syncope, reevaluated and amiodarone had been stopped. Underwent ILR implant 10/8/21, had infrequent episodes of pAF, last 1/21/22 lasting nearly 3 hours with controlled rates.   - cardio following, recs appreciated
pAF s/p ILR implant 10/8/21, s/p watchman (2/11/22)  - 2021 was noted to have pAF during admission at [facility], started on Eliquis and amiodarone. After d/c had episode of syncope, reevaluated and amiodarone had been stopped. Underwent ILR implant 10/8/21, had infrequent episodes of pAF, last 1/21/22 lasting nearly 3 hours with controlled rates.   - cardio following, recs appreciated
pAF s/p ILR implant 10/8/21, s/p watchman (2/11/22)  - 2021 was noted to have pAF during admission at [facility], started on Eliquis and amiodarone. After d/c had episode of syncope, reevaluated and amiodarone had been stopped. Underwent ILR implant 10/8/21, had infrequent episodes of pAF, last 1/21/22 lasting nearly 3 hours with controlled rates.   - cardio following, recs appreciated
2021 was noted to have pAF during admission at [facility], started on Eliquis and amiodarone. After d/c had episode of syncope, reevaluated and amiodarone had been stopped. Underwent ILR implant 10/8/21, had infrequent episodes of pAF, last 1/21/22 lasting nearly 3 hours with controlled rates.   - Per daughter, reevaluated by cardiology and found not to have atrial fibrillation and stopped medications  - EKG in ED w/ sinus tachy  - Repeat EKG in rapid w/ likely aflutter  - cardio following, furthter recs pending course

## 2024-03-01 NOTE — CHART NOTE - NSCHARTNOTEFT_GEN_A_CORE
Post Diagnostic Cardiac Catheterization Chart Note    Pt is s/p Diagnostic RIght & LHC- No intervention Right iliac totally occluded ( has #5 FR & #6Fr sheaths being removed in holding area), RFA # 5Fr sheath to be removed in holding area -Mild CAD PCWP-6, C.I. 2.4       Patient without complaints. Denies CP, SOB, palpitations, N/V, fever/chills, abd pain, numbness/tingling/weakness, other c/o at this time.    Bilateral groin  access sites stable (clean, dry, intact, without bleeding, heat, erythema, or hematoma).   Bilateral lower extremities  motor, neuro, circ intact.  Hemodynamically stable, neurologically intact, VS stable, afebrile    A/P: s/p diagnostic Right &  LHC   Transfer pt to [facility] for bilateral groin access assessment and management  Remainder of plan per Cardiology/ Hospitalist teams Post Diagnostic Cardiac Catheterization Chart Note    Pt is s/p Diagnostic RIght & LHC- No intervention Right iliac totally occluded ( has #5 FR & #6Fr sheaths being removed in holding area), RFA # 5Fr sheath to be removed in holding area -Mild CAD PCWP-6, C.I. 2.4       Patient without complaints. Denies CP, SOB, palpitations, N/V, fever/chills, abd pain, numbness/tingling/weakness, other c/o at this time.    Bilateral groin  access sites stable (clean, dry, intact, without bleeding, heat, erythema, or hematoma).   Bilateral lower extremities  motor, neuro, circ intact.  Hemodynamically stable, neurologically intact, VS stable, afebrile    A/P: s/p diagnostic Right &  LHC   Transfer pt to [facility] for bilateral groin access assessment and management  May restart DVT prophylaxis in am  Remainder of plan per Cardiology/ Hospitalist teams  Hand off given to Dr Brumfield Hospitalist Team

## 2024-03-01 NOTE — PROGRESS NOTE ADULT - SUBJECTIVE AND OBJECTIVE BOX
Patient is a 83y old  Female who presents with a chief complaint of ACS, PNA (01 Mar 2024 05:51)      INTERVAL HPI/OVERNIGHT EVENTS: Patient seen and examined at bedside. Denies chest pain, palpitations, sob, abdominal pain, n/v/d    MEDICATIONS  (STANDING):  atorvastatin 40 milliGRAM(s) Oral at bedtime  cefTRIAXone   IVPB 1000 milliGRAM(s) IV Intermittent every 24 hours  chlorhexidine 2% Cloths 1 Application(s) Topical <User Schedule>  dextrose 5%. 1000 milliLiter(s) (50 mL/Hr) IV Continuous <Continuous>  dextrose 5%. 1000 milliLiter(s) (100 mL/Hr) IV Continuous <Continuous>  dextrose 50% Injectable 25 Gram(s) IV Push once  dextrose 50% Injectable 25 Gram(s) IV Push once  dextrose 50% Injectable 12.5 Gram(s) IV Push once  ferrous    sulfate 325 milliGRAM(s) Oral daily  folic acid 1 milliGRAM(s) Oral daily  glucagon  Injectable 1 milliGRAM(s) IntraMuscular once  heparin   Injectable 5000 Unit(s) SubCutaneous every 8 hours  insulin lispro (ADMELOG) corrective regimen sliding scale   SubCutaneous at bedtime  insulin lispro (ADMELOG) corrective regimen sliding scale   SubCutaneous three times a day before meals  magnesium sulfate  IVPB 1 Gram(s) IV Intermittent once  melatonin 5 milliGRAM(s) Oral at bedtime  metoprolol succinate ER 25 milliGRAM(s) Oral daily  pantoprazole  Injectable 40 milliGRAM(s) IV Push two times a day  tiotropium 2.5 MICROgram(s) Inhaler 2 Puff(s) Inhalation daily    MEDICATIONS  (PRN):  albuterol/ipratropium for Nebulization 3 milliLiter(s) Nebulizer every 6 hours PRN Shortness of Breath and/or Wheezing  dextrose Oral Gel 15 Gram(s) Oral once PRN Blood Glucose LESS THAN 70 milliGRAM(s)/deciliter  ondansetron Injectable 4 milliGRAM(s) IV Push every 6 hours PRN Nausea and/or Vomiting      Allergies    No Known Drug Allergies  latex (Rash)    Intolerances    aspirin (Stomach Upset)      REVIEW OF SYSTEMS:  Per HPI. All other ROS noted Negative   Vital Signs Last 24 Hrs  T(C): 36.4 (01 Mar 2024 05:04), Max: 36.6 (29 Feb 2024 19:54)  T(F): 97.6 (01 Mar 2024 05:04), Max: 97.9 (29 Feb 2024 19:54)  HR: 116 (01 Mar 2024 05:04) (102 - 116)  BP: 135/65 (01 Mar 2024 05:04) (119/68 - 145/81)  BP(mean): --  RR: 18 (01 Mar 2024 05:04) (17 - 18)  SpO2: 99% (01 Mar 2024 05:04) (90% - 99%)    Parameters below as of 29 Feb 2024 19:56  Patient On (Oxygen Delivery Method): room air        PHYSICAL EXAM:  General: NAD, comfortable, O2 via NC  Eyes: EOMI, PERRLA.   ENT: Moist mucous membranes  Neck: Supple, No JVD  Lungs: Decrease bibasilar breath sounds, no wheezing   Cardio: RRR, S1/S2, No murmurs  Abdomen: Soft, Nontender, Nondistended; Bowel sounds present  Extremities: No calf tenderness, No pitting edema, no digital cyanosis  Skin: Warm, dry  Psych: A&O x 3, follows commands    LABS:                        9.7    5.33  )-----------( 235      ( 01 Mar 2024 05:40 )             30.0     01 Mar 2024 05:40    142    |  107    |  37     ----------------------------<  161    3.9     |  23     |  1.10     Ca    9.6        01 Mar 2024 05:40  Phos  2.7       01 Mar 2024 05:40  Mg     1.7       01 Mar 2024 05:40        CAPILLARY BLOOD GLUCOSE      POCT Blood Glucose.: 145 mg/dL (01 Mar 2024 08:14)  POCT Blood Glucose.: 176 mg/dL (01 Mar 2024 02:05)  POCT Blood Glucose.: 160 mg/dL (29 Feb 2024 21:27)  POCT Blood Glucose.: 150 mg/dL (29 Feb 2024 18:54)  POCT Blood Glucose.: 175 mg/dL (29 Feb 2024 11:33)    BLOOD CULTURE    RADIOLOGY & ADDITIONAL TESTS:    Imaging Personally Reviewed:  [ ] YES     Consultant(s) Notes Reviewed:      Care Discussed with Consultants/Other Providers:

## 2024-03-01 NOTE — PROGRESS NOTE ADULT - ASSESSMENT
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   Vital Signs T(F): 97.5F HR: 110  BP: 147/83  RR: 18  SpO2: 97% on RA  Troponin 694.1,  CTA chest: No pulmonary embolism. New bilateral patchy and ground glass opacities, consistent with pneumonia. Hypodense left thyroid   lesion, measuring 0.9 cm.    RECOMMENDATIONS  1-Dyspnea noted leukocytosis with eosinopenia, hypoxemia, dyspnea, imaging concerning for airspace disease, but pt is afebrile and leukocytosis quickly resolved, so rec  Zosyn-started late 2/25 -then de-escalated to ceftriaxone-rec 3/1-TODAY as last day    w concerns for asp PNA but sig rt pleural effusion   consider swallow eval with issues tolerating thin liquids  -now out of ICU     2-Elevated Troponins may be demand ischemia    recs to follow based on results of other tests.    Thank you for consulting us and involving us in the management of this most interesting and challenging case.  We will follow along in the care of this patient. Please call us at [phone] or text me directly on my cell# at [phone] with any concerns.

## 2024-03-01 NOTE — PROGRESS NOTE ADULT - PROBLEM SELECTOR PLAN 2
- Hx of GIB needing iron replacement and multiple blood transfusions, previously on eliquis., GI workup with colonoscopy and endoscopy were unremarkable apart from polyps.  - Baseline Hg 9-10 per daughter  - Hg 9.5 on admission, repeat 11.3  - FOBT ordered given hx in setting of r/o PE  - CTA negative for PE, however FOBT+   - Start Protonix 40mg IV BID  - c/w home iron   - gi consulted
- Hx of GIB iron replacement and multiple blood transfusions, previously on eliquis., GI workup with colonoscopy and endoscopy were unremarkable apart from polyps.  - Baseline Hg 9-10 per daughter  - Hg 9.5 on admission, repeat 11.3  - FOBT ordered given hx in setting of r/o PE  - CTA negative for PE, however FOBT+   - Start Protonix 40mg IV BID  - c/w home iron    - bowel regimen
- Hx of GIB iron replacement and multiple blood transfusions, previously on eliquis, GI workup with colonoscopy and endoscopy were unremarkable apart from polyps.  - Baseline Hg 9-10 per daughter  - Hg 9.5 on admission, repeat 11.3  - CTA negative for PE, however FOBT+   - Continue Protonix 40mg IV BID  - continue home iron    - bowel regimen
- NSTEMI  -Plan for cardiac Cath today   -Continue statin, BB   -F/u lipid panel   - planning for acei/arb after Cath   -d/w cardio
- Hx of GIB iron replacement and multiple blood transfusions, previously on eliquis, GI workup with colonoscopy and endoscopy were unremarkable apart from polyps.  - Baseline Hg 9-10 per daughter  - Hg 9.5 on admission, repeat 11.3  - CTA negative for PE, however FOBT+   -GI consulted   - Continue Protonix 40mg IV BID  - continue home iron    - bowel regimen

## 2024-03-01 NOTE — PROGRESS NOTE ADULT - PROBLEM SELECTOR PROBLEM 4
Elevated troponin
SVT (supraventricular tachycardia)

## 2024-03-01 NOTE — PROGRESS NOTE ADULT - SUBJECTIVE AND OBJECTIVE BOX
Date/Time Patient Seen:  		  Referring MD:   Data Reviewed	       Patient is a 83y old  Female who presents with a chief complaint of ACS, PNA (29 Feb 2024 12:00)      Subjective/HPI     PAST MEDICAL & SURGICAL HISTORY:  HLD (hyperlipidemia)    Carotid artery obstruction, left  had carotid surgery 2016    DM (diabetes mellitus)    Lung mass  RLL, 2016, had RL lobectomy & chemotherapy    Hip fracture  left femur, had pins placed in 2016    Lumbar radiculopathy    Hyperlipidemia    Lymphoma    Former smoker    Unspecified atrial fibrillation    Hypertension    Hip fracture  2016, left hip    Right lower lobe lung mass  Right lower lobectomy 2016, chemo till 01/2017    Status post carotid surgery  left carotid artery stenosis 2016    No significant past surgical history    History of loop recorder  placed 9/2021          Medication list         MEDICATIONS  (STANDING):  atorvastatin 40 milliGRAM(s) Oral at bedtime  cefTRIAXone   IVPB 1000 milliGRAM(s) IV Intermittent every 24 hours  chlorhexidine 2% Cloths 1 Application(s) Topical <User Schedule>  dextrose 5%. 1000 milliLiter(s) (50 mL/Hr) IV Continuous <Continuous>  dextrose 5%. 1000 milliLiter(s) (100 mL/Hr) IV Continuous <Continuous>  dextrose 50% Injectable 25 Gram(s) IV Push once  dextrose 50% Injectable 25 Gram(s) IV Push once  dextrose 50% Injectable 12.5 Gram(s) IV Push once  ferrous    sulfate 325 milliGRAM(s) Oral daily  folic acid 1 milliGRAM(s) Oral daily  glucagon  Injectable 1 milliGRAM(s) IntraMuscular once  heparin   Injectable 5000 Unit(s) SubCutaneous every 8 hours  insulin lispro (ADMELOG) corrective regimen sliding scale   SubCutaneous three times a day before meals  insulin lispro (ADMELOG) corrective regimen sliding scale   SubCutaneous at bedtime  melatonin 5 milliGRAM(s) Oral at bedtime  metoprolol succinate ER 25 milliGRAM(s) Oral daily  pantoprazole  Injectable 40 milliGRAM(s) IV Push two times a day  tiotropium 2.5 MICROgram(s) Inhaler 2 Puff(s) Inhalation daily    MEDICATIONS  (PRN):  albuterol/ipratropium for Nebulization 3 milliLiter(s) Nebulizer every 6 hours PRN Shortness of Breath and/or Wheezing  dextrose Oral Gel 15 Gram(s) Oral once PRN Blood Glucose LESS THAN 70 milliGRAM(s)/deciliter  ondansetron Injectable 4 milliGRAM(s) IV Push every 6 hours PRN Nausea and/or Vomiting         Vitals log        ICU Vital Signs Last 24 Hrs  T(C): 36.4 (01 Mar 2024 05:04), Max: 36.6 (29 Feb 2024 19:54)  T(F): 97.6 (01 Mar 2024 05:04), Max: 97.9 (29 Feb 2024 19:54)  HR: 116 (01 Mar 2024 05:04) (102 - 116)  BP: 135/65 (01 Mar 2024 05:04) (119/68 - 145/81)  BP(mean): --  ABP: --  ABP(mean): --  RR: 18 (01 Mar 2024 05:04) (17 - 18)  SpO2: 99% (01 Mar 2024 05:04) (90% - 99%)    O2 Parameters below as of 29 Feb 2024 19:56  Patient On (Oxygen Delivery Method): room air                 Input and Output:  I&O's Detail    28 Feb 2024 07:01  -  29 Feb 2024 07:00  --------------------------------------------------------  IN:  Total IN: 0 mL    OUT:    Indwelling Catheter - Urethral (mL): 500 mL  Total OUT: 500 mL    Total NET: -500 mL          Lab Data                        9.3    6.30  )-----------( 219      ( 29 Feb 2024 07:00 )             29.5     02-29    142  |  106  |  38<H>  ----------------------------<  143<H>  4.0   |  24  |  1.30    Ca    9.2      29 Feb 2024 07:00  Phos  3.6     02-28  Mg     1.8     02-29    TPro  6.2  /  Alb  2.7<L>  /  TBili  0.6  /  DBili  x   /  AST  15  /  ALT  13  /  AlkPhos  69  02-29            Review of Systems	      Objective     Physical Examination    heart s1s2  lung dec BS      Pertinent Lab findings & Imaging      [place]:  NO   Adequate UO     I&O's Detail    28 Feb 2024 07:01  -  29 Feb 2024 07:00  --------------------------------------------------------  IN:  Total IN: 0 mL    OUT:    Indwelling Catheter - Urethral (mL): 500 mL  Total OUT: 500 mL    Total NET: -500 mL               Discussed with:     Cultures:	        Radiology

## 2024-03-01 NOTE — PROGRESS NOTE ADULT - PROBLEM SELECTOR PLAN 8
Chronic, has required multiple transfusions  - c/w home iron
Chronic, has required multiple transfusions  - c/w home iron - needs bowel regimen
Chronic, has required multiple transfusions  - c/w home iron  - h/h stable
Chronic, has required multiple transfusions  - c/w home iron
Chronic, has required multiple transfusions  - c/w home iron

## 2024-03-01 NOTE — PROGRESS NOTE ADULT - ASSESSMENT
83F PMH cirrhosis, aortic stenosis, PAD s/p right carotid endarterectomy, non Hodgkins Lymphoma (pulmonary mass s/p removal, chemo; last infusion fall 2021), COPD, pAF s/p ILR implant 10/8/21, s/p watchman (2/11/22), hx of prior GI bleed requiring blood transfusions presents to the ED with complaints of shortness of breath, nausea, generalized weakness and chest pain, admitted for PNA, GIB, NSTEMI

## 2024-03-01 NOTE — PROGRESS NOTE ADULT - ASSESSMENT
83F PMH cirrhosis, aortic stenosis, PAD s/p right carotid endarterectomy, non Hodgkins Lymphoma (pulmonary mass s/p removal, chemo; last infusion fall 2021), COPD, pAF s/p ILR implant 10/8/21, s/p watchman (2/11/22), hx of prior GI bleed requiring blood transfusions presents to the ED with complaints of shortness of breath, nausea, generalized weakness and chest pain    AS  valv heart disease  NHL  atelectasis  pleural eff  PAF  COPD  cirrhosis    planned for card cath  vs noted  GI and ENDO eval noted    lung surgery (cancer w/ lobectomy and chemo) - due to Lymphoma   pt follows with Dr Gil in [place] [facility]   cont nebs prn  spiriva inhaler   cvs rx regimen optimization  complete ABX  will need CXR in 4 - 6 weeks  monitor VS and HD and Sat  Incentive Rakesh  spoke with DTR

## 2024-03-01 NOTE — CHART NOTE - NSCHARTNOTESELECT_GEN_ALL_CORE
Event Note
Interventional Cardiology/Event Note
Nutrition Services
Point of Care Ultrasound

## 2024-03-01 NOTE — CHART NOTE - NSCHARTNOTEFT_GEN_A_CORE
called by RN for severe CP, SOB, and chest tightness  examined patient at bedside  patient describing chest tightness like someone was sitting on her chest  patient states she is having difficulty breathing and is nauseous  PE   generally ill appearing, diaphoretic  tachycardic  clear lung sounds b/l  soft abdomen      Cath planed today  per previous carido note: takosubo vs LAD ischemia    EKG ordered  cardiac enzymes  mag/phos w/ morning labs  RN to call for any changes

## 2024-03-01 NOTE — PROGRESS NOTE ADULT - SUBJECTIVE AND OBJECTIVE BOX
[facility]  Deshawn Diehl PA-C  [address] [place] [address]  [phone]      INTERVAL HPI/OVERNIGHT EVENTS:  Pt s/e  Hgb stable  No overt GI bleeding    MEDICATIONS  (STANDING):  atorvastatin 40 milliGRAM(s) Oral at bedtime  cefTRIAXone   IVPB 1000 milliGRAM(s) IV Intermittent every 24 hours  chlorhexidine 2% Cloths 1 Application(s) Topical <User Schedule>  dextrose 5%. 1000 milliLiter(s) (50 mL/Hr) IV Continuous <Continuous>  dextrose 5%. 1000 milliLiter(s) (100 mL/Hr) IV Continuous <Continuous>  dextrose 50% Injectable 25 Gram(s) IV Push once  dextrose 50% Injectable 25 Gram(s) IV Push once  dextrose 50% Injectable 12.5 Gram(s) IV Push once  ferrous    sulfate 325 milliGRAM(s) Oral daily  folic acid 1 milliGRAM(s) Oral daily  glucagon  Injectable 1 milliGRAM(s) IntraMuscular once  heparin   Injectable 5000 Unit(s) SubCutaneous every 8 hours  insulin lispro (ADMELOG) corrective regimen sliding scale   SubCutaneous three times a day before meals  insulin lispro (ADMELOG) corrective regimen sliding scale   SubCutaneous at bedtime  melatonin 5 milliGRAM(s) Oral at bedtime  metoprolol succinate ER 25 milliGRAM(s) Oral daily  pantoprazole  Injectable 40 milliGRAM(s) IV Push two times a day  tiotropium 2.5 MICROgram(s) Inhaler 2 Puff(s) Inhalation daily    MEDICATIONS  (PRN):  albuterol/ipratropium for Nebulization 3 milliLiter(s) Nebulizer every 6 hours PRN Shortness of Breath and/or Wheezing  dextrose Oral Gel 15 Gram(s) Oral once PRN Blood Glucose LESS THAN 70 milliGRAM(s)/deciliter  ondansetron Injectable 4 milliGRAM(s) IV Push every 6 hours PRN Nausea and/or Vomiting      Allergies    No Known Drug Allergies  latex (Rash)    Intolerances    aspirin (Stomach Upset)      PHYSICAL EXAM:   Vital Signs:  Vital Signs Last 24 Hrs  T(C): 36.4 (01 Mar 2024 05:04), Max: 36.6 (2024 19:54)  T(F): 97.6 (01 Mar 2024 05:04), Max: 97.9 (2024 19:54)  HR: 116 (01 Mar 2024 05:04) (102 - 116)  BP: 135/65 (01 Mar 2024 05:04) (119/68 - 145/81)  BP(mean): --  RR: 18 (01 Mar 2024 05:04) (17 - 18)  SpO2: 99% (01 Mar 2024 05:04) (90% - 99%)    Parameters below as of 2024 19:56  Patient On (Oxygen Delivery Method): room air      Daily     Daily Weight in k.7 (01 Mar 2024 05:04)    GENERAL:  Appears stated age  HEENT:  NC/AT  CHEST:  Full & symmetric excursion  HEART:  Regular rhythm  ABDOMEN:  Soft, non-tender, non-distended  EXTEREMITIES:  no cyanosis  SKIN:  No rash  NEURO:  Alert      LABS:                        9.7    5.33  )-----------( 235      ( 01 Mar 2024 05:40 )             30.0     03-01    142  |  107  |  37<H>  ----------------------------<  161<H>  3.9   |  23  |  1.10    Ca    9.6      01 Mar 2024 05:40  Phos  2.7     03-01  Mg     1.7     03-01    TPro  6.2  /  Alb  2.7<L>  /  TBili  0.6  /  DBili  x   /  AST  15  /  ALT  13  /  AlkPhos  69  02-      Urinalysis Basic - ( 01 Mar 2024 05:40 )    Color: x / Appearance: x / SG: x / pH: x  Gluc: 161 mg/dL / Ketone: x  / Bili: x / Urobili: x   Blood: x / Protein: x / Nitrite: x   Leuk Esterase: x / RBC: x / WBC x   Sq Epi: x / Non Sq Epi: x / Bacteria: x

## 2024-03-01 NOTE — PROGRESS NOTE ADULT - SUBJECTIVE AND OBJECTIVE BOX
[facility] DIVISION of INFECTIOUS DISEASE  Simón Nolasco MD PhD, Patrica Bear MD, Lluvia Stahl MD, Tree Avery MD, Pelon Peralta MD  and providing coverage with Gerson Ortiz MD  Providing Infectious Disease Consultations at [facility], [facility], [facility]'s    Office# [phone] to schedule follow up appointments  Answering Service for urgent calls or New Consults [phone]  Cell# to text for urgent issues Simón Nolasco [phone]     infectious diseases progress note:    CAROL ALEXANDRE is a 83y y. o. Female patient    Overnight and events of the last 24hrs reviewed    Allergies    No Known Drug Allergies  latex (Rash)    Intolerances    aspirin (Stomach Upset)      ANTIBIOTICS/RELEVANT:  antimicrobials  cefTRIAXone   IVPB 1000 milliGRAM(s) IV Intermittent every 24 hours    immunologic:    OTHER:  albuterol/ipratropium for Nebulization 3 milliLiter(s) Nebulizer every 6 hours PRN  atorvastatin 40 milliGRAM(s) Oral at bedtime  chlorhexidine 2% Cloths 1 Application(s) Topical <User Schedule>  dextrose 5%. 1000 milliLiter(s) IV Continuous <Continuous>  dextrose 5%. 1000 milliLiter(s) IV Continuous <Continuous>  dextrose 50% Injectable 25 Gram(s) IV Push once  dextrose 50% Injectable 25 Gram(s) IV Push once  dextrose 50% Injectable 12.5 Gram(s) IV Push once  dextrose Oral Gel 15 Gram(s) Oral once PRN  ferrous    sulfate 325 milliGRAM(s) Oral daily  folic acid 1 milliGRAM(s) Oral daily  glucagon  Injectable 1 milliGRAM(s) IntraMuscular once  heparin   Injectable 5000 Unit(s) SubCutaneous every 8 hours  insulin lispro (ADMELOG) corrective regimen sliding scale   SubCutaneous three times a day before meals  insulin lispro (ADMELOG) corrective regimen sliding scale   SubCutaneous at bedtime  magnesium sulfate  IVPB 1 Gram(s) IV Intermittent once  melatonin 5 milliGRAM(s) Oral at bedtime  metoprolol succinate ER 25 milliGRAM(s) Oral daily  ondansetron Injectable 4 milliGRAM(s) IV Push every 6 hours PRN  pantoprazole  Injectable 40 milliGRAM(s) IV Push two times a day  tiotropium 2.5 MICROgram(s) Inhaler 2 Puff(s) Inhalation daily      Objective:  Vital Signs Last 24 Hrs  T(C): 36.4 (01 Mar 2024 05:04), Max: 36.6 (29 Feb 2024 19:54)  T(F): 97.6 (01 Mar 2024 05:04), Max: 97.9 (29 Feb 2024 19:54)  HR: 116 (01 Mar 2024 05:04) (102 - 116)  BP: 135/65 (01 Mar 2024 05:04) (119/68 - 145/81)  BP(mean): --  RR: 18 (01 Mar 2024 05:04) (17 - 18)  SpO2: 99% (01 Mar 2024 05:04) (90% - 99%)    Parameters below as of 29 Feb 2024 19:56  Patient On (Oxygen Delivery Method): room air        T(C): 36.4 (03-01-24 @ 05:04), Max: 36.7 (02-28-24 @ 21:03)  T(C): 36.4 (03-01-24 @ 05:04), Max: 37.2 (02-27-24 @ 20:45)  T(C): 36.4 (03-01-24 @ 05:04), Max: 37.4 (02-27-24 @ 00:19)    PHYSICAL EXAM:  HEENT: NC atraumatic  Neck: supple  Respiratory: no accessory muscle use, breathing comfortably, dec BS RLL  Cardiovascular: distant  Gastrointestinal: normal appearing, nondistended  Extremities: no clubbing, no cyanosis,        LABS:                          9.7    5.33  )-----------( 235      ( 01 Mar 2024 05:40 )             30.0       WBC  5.33 03-01 @ 05:40  6.30 02-29 @ 07:00  16.70 02-28 @ 07:40  6.87 02-27 @ 06:23  7.71 02-26 @ 05:43  13.19 02-25 @ 23:40  19.36 02-25 @ 20:18  8.67 02-25 @ 16:40      03-01    142  |  107  |  37<H>  ----------------------------<  161<H>  3.9   |  23  |  1.10    Ca    9.6      01 Mar 2024 05:40  Phos  2.7     03-01  Mg     1.7     03-01    TPro  6.2  /  Alb  2.7<L>  /  TBili  0.6  /  DBili  x   /  AST  15  /  ALT  13  /  AlkPhos  69  02-29      Creatinine: 1.10 mg/dL (03-01-24 @ 05:40)  Creatinine: 1.30 mg/dL (02-29-24 @ 07:00)  Creatinine: 1.40 mg/dL (02-28-24 @ 07:40)  Creatinine: 1.60 mg/dL (02-27-24 @ 06:23)  Creatinine: 1.40 mg/dL (02-26-24 @ 14:00)  Creatinine: 1.40 mg/dL (02-26-24 @ 05:43)  Creatinine: 1.30 mg/dL (02-25-24 @ 20:40)  Creatinine: 1.40 mg/dL (02-25-24 @ 16:40)        Urinalysis Basic - ( 01 Mar 2024 05:40 )    Color: x / Appearance: x / SG: x / pH: x  Gluc: 161 mg/dL / Ketone: x  / Bili: x / Urobili: x   Blood: x / Protein: x / Nitrite: x   Leuk Esterase: x / RBC: x / WBC x   Sq Epi: x / Non Sq Epi: x / Bacteria: x            INFLAMMATORY MARKERS      MICROBIOLOGY:              RADIOLOGY & ADDITIONAL STUDIES:

## 2024-03-01 NOTE — PROGRESS NOTE ADULT - PROBLEM SELECTOR PLAN 4
Pt p/w chest pain, SOB, found to have NSTEMI  - EKG in ED w/ sinus tachy, diffuse Q waves, no ST elevations  - enzymes have been trended to peak  - check TTE
-Pt went into SVT, rapid response called  - h/o AF, s/p watchman, not on AC  - Adenosine 6mg, 12mgx2 IV given w/ little improvement in HR  - Cardizem 20mg IV given, then cardizem infusion, now with sinus tach and on Metoprolol 25 mg BID  - trial IV Lasix 20 mg x1 during hospital course   - TTE: Findings are consistent with either Takotsubo cardiomyopathy or LAD ischemia. The LVEF appears estimated at 35-40% - cardio recommended repeat TTE to further evaluate LV thrombus.  - monitor rates  - cardio following
Pt p/w chest pain, SOB, found to have NSTEMI  - EKG in ED w/ sinus tachy, diffuse Q waves, no ST elevations  - enzymes have been trended to peak  - poor candidate for short interval cath, further plans pending course  - planning for acei/arb
Pt p/w chest pain, SOB, found to have NSTEMI  - EKG in ED w/ sinus tachy, diffuse Q waves, no ST elevations  - enzymes have been trended to peak  - poor candidate for short interval cath, further plans pending course  - planning for acei/arb   - check TTE
Pt p/w chest pain, SOB, found to have NSTEMI  - EKG in ED w/ sinus tachy, diffuse Q waves, no ST elevations  - enzymes have been trended to peak  - poor candidate for short interval cath, further plans pending course  - planning for acei/arb

## 2024-03-01 NOTE — PROGRESS NOTE ADULT - PROBLEM SELECTOR PLAN 7
Chronic  - continue spiriva as home incruse ellipta is not on formulary
Chronic  - continue spiriva as home incruse ellipta is not on formulary
Chronic  - Start spiriva as home incruse ellipta is not on formulary
Chronic  - Start spiriva as home incruse ellipta is not on formulary
Chronic  - continue spiriva as home incruse ellipta is not on formulary

## 2024-03-01 NOTE — CHART NOTE - NSCHARTNOTEFT_GEN_A_CORE
Assessment:   Pt seen for nutrition follow up.  Chart reviewed, hospital course noted.     Brief History:   "83F PMH cirrhosis, aortic stenosis, PAD s/p right carotid endarterectomy, non Hodgkins Lymphoma (pulmonary mass s/p removal, chemo; last infusion fall 2021), COPD, pAF s/p ILR implant 10/8/21, s/p watchman (2/11/22), hx of prior GI bleed requiring blood transfusions presents to the ED with complaints of shortness of breath, nausea, generalized weakness and chest pain, admitted for PNA, GIB, NSTEMI "    Pt seen at bedside, states she doesn't want to talk about food right now.  Pt currently NPO for pending cardiac cath procedure.   Seen by SLP 2/27, puree diet moderately thick liquids recommended and MBS.   Repeat asmnt attempted today however not able to be done due to current NPO status.   +BM 2/27    Factors impacting intake: [ ] none [ ] nausea  [ ] vomiting [ ] diarrhea [ ] constipation  [ ]chewing problems [ ] swallowing issues  [ ] other:     Diet Prescription: Diet, DASH/TLC:   Sodium & Cholesterol Restricted  Moderately Thick Liquids (MODTHICKLIQS) (02-29-24 @ 14:15)  Diet, NPO after Midnight:      NPO Start Date: 29-Feb-2024,   NPO Start Time: 23:59  Except Medications (02-29-24 @ 10:43)    Intake: poor    Current Weight: 2/26 133.1#, 3/1 122.7#;  ?11# wt loss x 4 days      Pertinent Medications: MEDICATIONS  (STANDING):  atorvastatin 40 milliGRAM(s) Oral at bedtime  cefTRIAXone   IVPB 1000 milliGRAM(s) IV Intermittent every 24 hours  chlorhexidine 2% Cloths 1 Application(s) Topical <User Schedule>  dextrose 5%. 1000 milliLiter(s) (100 mL/Hr) IV Continuous <Continuous>  dextrose 5%. 1000 milliLiter(s) (50 mL/Hr) IV Continuous <Continuous>  dextrose 50% Injectable 25 Gram(s) IV Push once  dextrose 50% Injectable 25 Gram(s) IV Push once  dextrose 50% Injectable 12.5 Gram(s) IV Push once  ferrous    sulfate 325 milliGRAM(s) Oral daily  folic acid 1 milliGRAM(s) Oral daily  glucagon  Injectable 1 milliGRAM(s) IntraMuscular once  heparin   Injectable 5000 Unit(s) SubCutaneous every 8 hours  insulin lispro (ADMELOG) corrective regimen sliding scale   SubCutaneous at bedtime  insulin lispro (ADMELOG) corrective regimen sliding scale   SubCutaneous three times a day before meals  magnesium sulfate  IVPB 1 Gram(s) IV Intermittent once  melatonin 5 milliGRAM(s) Oral at bedtime  metoprolol succinate ER 25 milliGRAM(s) Oral daily  pantoprazole  Injectable 40 milliGRAM(s) IV Push two times a day  tiotropium 2.5 MICROgram(s) Inhaler 2 Puff(s) Inhalation daily    MEDICATIONS  (PRN):  albuterol/ipratropium for Nebulization 3 milliLiter(s) Nebulizer every 6 hours PRN Shortness of Breath and/or Wheezing  dextrose Oral Gel 15 Gram(s) Oral once PRN Blood Glucose LESS THAN 70 milliGRAM(s)/deciliter  ondansetron Injectable 4 milliGRAM(s) IV Push every 6 hours PRN Nausea and/or Vomiting    Pertinent Labs: 03-01 Na142 mmol/L Glu 161 mg/dL<H> K+ 3.9 mmol/L Cr  1.10 mg/dL BUN 37 mg/dL<H> 03-01 Phos 2.7 mg/dL 02-29 Alb 2.7 g/dL<L>     CAPILLARY BLOOD GLUCOSE      POCT Blood Glucose.: 145 mg/dL (01 Mar 2024 08:14)  POCT Blood Glucose.: 176 mg/dL (01 Mar 2024 02:05)  POCT Blood Glucose.: 160 mg/dL (29 Feb 2024 21:27)  POCT Blood Glucose.: 150 mg/dL (29 Feb 2024 18:54)  POCT Blood Glucose.: 175 mg/dL (29 Feb 2024 11:33)      Skin: sacral I  Edema: none noted    Estimated Needs:   [x] no change since previous assessment on: 2/27 based on dry wt 116#   kcal/day: 0044-3945 april  gms protein/day: 63-73gm    Previous Nutrition Diagnosis:   [ ] Inadequate Energy Intake [ ] Inadequate Oral Intake [ ] Excessive Energy Intake   [ ] Underweight  [ ] Overweight/Obesity [ ] Unintended Weight Loss  [ ] Increased Nutrient Needs [ ] Decreased Nutrient Needs   [ ] Food & Nutrition Related Knowledge Deficit   [ ] Swallowing Difficulty  [ ] Altered GI Function [x]  Malnutrition (moderate)    Nutrition Diagnosis is [x] ongoing  [ ] resolved [ ] not applicable     New Nutrition Diagnosis: [x] not applicable       Interventions:   Recommend  [ ] Continue current diet  [ ] Change Diet To:  [x] Nutrition Supplement- pt previously refused  [ ] Nutrition Support  [x] Other:  consider regular liberal diet due to malnutrition dx with texture pending SLP recommendation    Monitoring and Evaluation:   [x] PO intake [ x ] Tolerance to diet prescription [ x ] weights [ x ] labs [ x ] follow up per protocol  [x] other: s/s GI distress, bowel function, skin integrity/ edema

## 2024-03-01 NOTE — PROGRESS NOTE ADULT - PROBLEM SELECTOR PLAN 6
Losartan 50 MG is on backorder. Pharmacy asks for Losartan 100 MG to be submitted.
continue metoprolol tartrate w/ holding parameters as indicated
continue metoprolol tartrate w/ holding parameters as indicated
metoprolol tartrate 12.5 milliGRAM(s) Oral every 12 hours  - will continue w/ holding parameters as indicated  - BP readings for last 24 hours: BP:  (89/51 - 130/61)
Chronic  - Home amlodipine 5mg qd, lisinopril 40qd, and lasix 20mg every other day (next dose tomorrow) HELD as pt is on cardizem drip  - continue to monitor BPs
continue metoprolol tartrate w/ holding parameters as indicated

## 2024-03-01 NOTE — PROGRESS NOTE ADULT - PROBLEM SELECTOR PLAN 3
- Hx of GIB iron replacement and multiple blood transfusions, previously on eliquis, GI workup with colonoscopy and endoscopy were unremarkable apart from polyps.  - Baseline Hg 9-10 per daughter  - Hg 9.5 on admission, repeat 11.3  - CTA negative for PE, however FOBT+   -GI consulted   - Continue Protonix 40mg IV BID, will switch to PO   - continue home iron    - bowel regimen
-Pt went into SVT, rapid response called  - h/o AF, s/p watchman, not on AC  - Adenosine 6mg, 12mgx2 IV given w/ little improvement in HR  - Cardizem 20mg IV given, then cardizem infusion, now with sinus tach and on Metoprolol 25 mg BID  - trial IV Lasix 20 mg x1 during hospital course   - TTE: Findings are consistent with either Takotsubo cardiomyopathy or LAD ischemia. The LVEF appears estimated at 35-40% - cardio recommended repeat TTE to further evaluate LV thrombus.  - monitor rates  - cardio following
Pt went into to SVT, rapid response called  - EKG in ED with sinus tachycardia rate 105  - Initial EKG at rapid showing SVT  - Adenosine 6mg, 12mgx2 IV given w/ little improvement in HR  - Cardizem 20mg IV given  - Repeat EKG ordered showing likely aflutter
Pt went into to SVT, rapid response called  - h/o AF, s/p watchman, not on AC  - EKG in ED with sinus tachycardia rate 105  - Initial EKG at rapid showing SVT  - Adenosine 6mg, 12mgx2 IV given w/ little improvement in HR  - Cardizem 20mg IV given, then cardizem infusion, now with sinus tach, added beta blocker  - monitor rates which are poorly controlled today
Pt went into SVT, rapid response called  - h/o AF, s/p watchman, not on AC  - EKG in ED with sinus tachycardia rate 105  - Initial EKG at rapid showing SVT  - Adenosine 6mg, 12mgx2 IV given w/ little improvement in HR  - Cardizem 20mg IV given, then cardizem infusion, now with sinus tach, added beta blocker - uptitrated today to Metoprolol 25 mg BID  - trial IV Lasix 20 mg x1  - TTE: Findings are consistent with either Takotsubo cardiomyopathy or LAD ischemia. The LVEF appears estimated at 35-40% - cardio recommended repeat TTE to further evaluate LV thrombus.  - monitor rates  - cardio following

## 2024-03-02 ENCOUNTER — TRANSCRIPTION ENCOUNTER (OUTPATIENT)
Age: 84
End: 2024-03-02

## 2024-03-02 VITALS — TEMPERATURE: 99 F

## 2024-03-02 LAB
ANION GAP SERPL CALC-SCNC: 11 MMOL/L — SIGNIFICANT CHANGE UP (ref 5–17)
BUN SERPL-MCNC: 32 MG/DL — HIGH (ref 7–23)
CALCIUM SERPL-MCNC: 9.4 MG/DL — SIGNIFICANT CHANGE UP (ref 8.5–10.1)
CHLORIDE SERPL-SCNC: 105 MMOL/L — SIGNIFICANT CHANGE UP (ref 96–108)
CO2 SERPL-SCNC: 27 MMOL/L — SIGNIFICANT CHANGE UP (ref 22–31)
CREAT SERPL-MCNC: 0.92 MG/DL — SIGNIFICANT CHANGE UP (ref 0.5–1.3)
CULTURE RESULTS: SIGNIFICANT CHANGE UP
CULTURE RESULTS: SIGNIFICANT CHANGE UP
EGFR: 62 ML/MIN/1.73M2 — SIGNIFICANT CHANGE UP
GLUCOSE SERPL-MCNC: 158 MG/DL — HIGH (ref 70–99)
HCT VFR BLD CALC: 27.7 % — LOW (ref 34.5–45)
HGB BLD-MCNC: 9.1 G/DL — LOW (ref 11.5–15.5)
MCHC RBC-ENTMCNC: 29.2 PG — SIGNIFICANT CHANGE UP (ref 27–34)
MCHC RBC-ENTMCNC: 32.9 GM/DL — SIGNIFICANT CHANGE UP (ref 32–36)
MCV RBC AUTO: 88.8 FL — SIGNIFICANT CHANGE UP (ref 80–100)
NRBC # BLD: 0 /100 WBCS — SIGNIFICANT CHANGE UP (ref 0–0)
PLATELET # BLD AUTO: 221 K/UL — SIGNIFICANT CHANGE UP (ref 150–400)
POTASSIUM SERPL-MCNC: 3.9 MMOL/L — SIGNIFICANT CHANGE UP (ref 3.5–5.3)
POTASSIUM SERPL-SCNC: 3.9 MMOL/L — SIGNIFICANT CHANGE UP (ref 3.5–5.3)
RBC # BLD: 3.12 M/UL — LOW (ref 3.8–5.2)
RBC # FLD: 13.7 % — SIGNIFICANT CHANGE UP (ref 10.3–14.5)
SODIUM SERPL-SCNC: 143 MMOL/L — SIGNIFICANT CHANGE UP (ref 135–145)
SPECIMEN SOURCE: SIGNIFICANT CHANGE UP
SPECIMEN SOURCE: SIGNIFICANT CHANGE UP
WBC # BLD: 4.45 K/UL — SIGNIFICANT CHANGE UP (ref 3.8–10.5)
WBC # FLD AUTO: 4.45 K/UL — SIGNIFICANT CHANGE UP (ref 3.8–10.5)

## 2024-03-02 PROCEDURE — 36415 COLL VENOUS BLD VENIPUNCTURE: CPT

## 2024-03-02 PROCEDURE — 82553 CREATINE MB FRACTION: CPT

## 2024-03-02 PROCEDURE — 93306 TTE W/DOPPLER COMPLETE: CPT

## 2024-03-02 PROCEDURE — 82272 OCCULT BLD FECES 1-3 TESTS: CPT

## 2024-03-02 PROCEDURE — 99232 SBSQ HOSP IP/OBS MODERATE 35: CPT

## 2024-03-02 PROCEDURE — 82803 BLOOD GASES ANY COMBINATION: CPT

## 2024-03-02 PROCEDURE — 87640 STAPH A DNA AMP PROBE: CPT

## 2024-03-02 PROCEDURE — 83880 ASSAY OF NATRIURETIC PEPTIDE: CPT

## 2024-03-02 PROCEDURE — 85610 PROTHROMBIN TIME: CPT

## 2024-03-02 PROCEDURE — 85730 THROMBOPLASTIN TIME PARTIAL: CPT

## 2024-03-02 PROCEDURE — 93005 ELECTROCARDIOGRAM TRACING: CPT

## 2024-03-02 PROCEDURE — 83735 ASSAY OF MAGNESIUM: CPT

## 2024-03-02 PROCEDURE — 97116 GAIT TRAINING THERAPY: CPT

## 2024-03-02 PROCEDURE — 80048 BASIC METABOLIC PNL TOTAL CA: CPT

## 2024-03-02 PROCEDURE — C1894: CPT

## 2024-03-02 PROCEDURE — 84484 ASSAY OF TROPONIN QUANT: CPT

## 2024-03-02 PROCEDURE — 85025 COMPLETE CBC W/AUTO DIFF WBC: CPT

## 2024-03-02 PROCEDURE — C1887: CPT

## 2024-03-02 PROCEDURE — 85027 COMPLETE CBC AUTOMATED: CPT

## 2024-03-02 PROCEDURE — 81003 URINALYSIS AUTO W/O SCOPE: CPT

## 2024-03-02 PROCEDURE — 83036 HEMOGLOBIN GLYCOSYLATED A1C: CPT

## 2024-03-02 PROCEDURE — 92610 EVALUATE SWALLOWING FUNCTION: CPT

## 2024-03-02 PROCEDURE — 99285 EMERGENCY DEPT VISIT HI MDM: CPT

## 2024-03-02 PROCEDURE — 97162 PT EVAL MOD COMPLEX 30 MIN: CPT

## 2024-03-02 PROCEDURE — 87899 AGENT NOS ASSAY W/OPTIC: CPT

## 2024-03-02 PROCEDURE — 36600 WITHDRAWAL OF ARTERIAL BLOOD: CPT

## 2024-03-02 PROCEDURE — 84100 ASSAY OF PHOSPHORUS: CPT

## 2024-03-02 PROCEDURE — 93460 R&L HRT ART/VENTRICLE ANGIO: CPT

## 2024-03-02 PROCEDURE — 82550 ASSAY OF CK (CPK): CPT

## 2024-03-02 PROCEDURE — 94760 N-INVAS EAR/PLS OXIMETRY 1: CPT

## 2024-03-02 PROCEDURE — 84145 PROCALCITONIN (PCT): CPT

## 2024-03-02 PROCEDURE — 71045 X-RAY EXAM CHEST 1 VIEW: CPT

## 2024-03-02 PROCEDURE — 87449 NOS EACH ORGANISM AG IA: CPT

## 2024-03-02 PROCEDURE — 97530 THERAPEUTIC ACTIVITIES: CPT

## 2024-03-02 PROCEDURE — C1889: CPT

## 2024-03-02 PROCEDURE — 87641 MR-STAPH DNA AMP PROBE: CPT

## 2024-03-02 PROCEDURE — 80053 COMPREHEN METABOLIC PANEL: CPT

## 2024-03-02 PROCEDURE — 94640 AIRWAY INHALATION TREATMENT: CPT

## 2024-03-02 PROCEDURE — C1769: CPT

## 2024-03-02 PROCEDURE — 99239 HOSP IP/OBS DSCHRG MGMT >30: CPT

## 2024-03-02 PROCEDURE — 80061 LIPID PANEL: CPT

## 2024-03-02 PROCEDURE — 94660 CPAP INITIATION&MGMT: CPT

## 2024-03-02 PROCEDURE — 87086 URINE CULTURE/COLONY COUNT: CPT

## 2024-03-02 PROCEDURE — 84443 ASSAY THYROID STIM HORMONE: CPT

## 2024-03-02 PROCEDURE — 85379 FIBRIN DEGRADATION QUANT: CPT

## 2024-03-02 PROCEDURE — 87040 BLOOD CULTURE FOR BACTERIA: CPT

## 2024-03-02 PROCEDURE — 82962 GLUCOSE BLOOD TEST: CPT

## 2024-03-02 PROCEDURE — 71275 CT ANGIOGRAPHY CHEST: CPT | Mod: MC

## 2024-03-02 PROCEDURE — 87637 SARSCOV2&INF A&B&RSV AMP PRB: CPT

## 2024-03-02 RX ORDER — METOPROLOL TARTRATE 50 MG
1 TABLET ORAL
Qty: 20 | Refills: 0
Start: 2024-03-02 | End: 2024-03-21

## 2024-03-02 RX ORDER — FOLIC ACID 0.8 MG
1 TABLET ORAL
Qty: 0 | Refills: 0 | DISCHARGE
Start: 2024-03-02

## 2024-03-02 RX ORDER — LOSARTAN POTASSIUM 100 MG/1
25 TABLET, FILM COATED ORAL DAILY
Refills: 0 | Status: DISCONTINUED | OUTPATIENT
Start: 2024-03-02 | End: 2024-03-02

## 2024-03-02 RX ORDER — PANTOPRAZOLE SODIUM 20 MG/1
40 TABLET, DELAYED RELEASE ORAL
Qty: 0 | Refills: 0 | DISCHARGE
Start: 2024-03-02

## 2024-03-02 RX ORDER — AMLODIPINE BESYLATE 2.5 MG/1
1 TABLET ORAL
Refills: 0 | DISCHARGE

## 2024-03-02 RX ADMIN — Medication 1: at 11:41

## 2024-03-02 RX ADMIN — Medication 325 MILLIGRAM(S): at 11:41

## 2024-03-02 RX ADMIN — Medication 25 MILLIGRAM(S): at 05:02

## 2024-03-02 RX ADMIN — PANTOPRAZOLE SODIUM 40 MILLIGRAM(S): 20 TABLET, DELAYED RELEASE ORAL at 05:03

## 2024-03-02 RX ADMIN — Medication 1 MILLIGRAM(S): at 11:40

## 2024-03-02 RX ADMIN — HEPARIN SODIUM 5000 UNIT(S): 5000 INJECTION INTRAVENOUS; SUBCUTANEOUS at 05:02

## 2024-03-02 RX ADMIN — Medication 1: at 08:47

## 2024-03-02 RX ADMIN — TIOTROPIUM BROMIDE 2 PUFF(S): 18 CAPSULE ORAL; RESPIRATORY (INHALATION) at 06:29

## 2024-03-02 RX ADMIN — CHLORHEXIDINE GLUCONATE 1 APPLICATION(S): 213 SOLUTION TOPICAL at 04:45

## 2024-03-02 RX ADMIN — ONDANSETRON 4 MILLIGRAM(S): 8 TABLET, FILM COATED ORAL at 08:48

## 2024-03-02 NOTE — PROGRESS NOTE ADULT - ASSESSMENT
83F PMH cirrhosis, aortic stenosis, PAD s/p right carotid endarterectomy, non Hodgkins Lymphoma (pulmonary mass s/p removal, chemo; last infusion fall 2021), COPD, pAF s/p ILR implant 10/8/21, s/p watchman (2/11/22), hx of prior GI bleed requiring blood transfusions presents to the ED with complaints of shortness of breath, nausea, generalized weakness and chest pain    AS  valv heart disease  NHL  atelectasis  pleural eff  PAF  COPD  cirrhosis    s/p Card Cath  vs noted  on ABX  cardio f/u    lung surgery (cancer w/ lobectomy and chemo) - due to Lymphoma   pt follows with Dr Gil in [facility]   cont nebs prn  spiriva inhaler   cvs rx regimen optimization  complete ABX  will need CXR in 4 - 6 weeks  monitor VS and HD and Sat  Incentive Rakesh  spoke with DTR

## 2024-03-02 NOTE — PROGRESS NOTE ADULT - ASSESSMENT
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   Vital Signs T(F): 97.5F HR: 110  BP: 147/83  RR: 18  SpO2: 97% on RA  Troponin 694.1,  CTA chest: No pulmonary embolism. New bilateral patchy and ground glass opacities, consistent with pneumonia. Hypodense left thyroid   lesion, measuring 0.9 cm.    RECOMMENDATIONS  1-Dyspnea noted leukocytosis with eosinopenia, hypoxemia, dyspnea, imaging concerning for airspace disease, but pt is afebrile and leukocytosis quickly resolved, so rec  Zosyn-started late 2/25 -then de-escalated to ceftriaxone-rec 3/1- as last day    w concerns for asp PNA but sig rt pleural effusion   consider swallow eval with issues tolerating thin liquids    2-Elevated Troponins may be demand ischemia but noted 3/1 events      Thank you for consulting us and involving us in the management of this most interesting and challenging case.  We will follow along in the care of this patient. Please call us at [phone] or text me directly on my cell# at [phone] with any concerns.

## 2024-03-02 NOTE — CONSULT NOTE ADULT - CONSULT REASON
R/LHC
ICU admission
SVT
anemia
?PNA
Iliac artery occlusion
SVT
pleural eff  acs  pna  atelectasis  ataxic gait
thyroid nodule

## 2024-03-02 NOTE — DISCHARGE NOTE NURSING/CASE MANAGEMENT/SOCIAL WORK - PATIENT PORTAL LINK FT
You can access the FollowMyHealth Patient Portal offered by [facility] by registering at the following website: [email or web address]. By joining [employer]’s FollowMyHealth portal, you will also be able to view your health information using other applications (apps) compatible with our system.
negative...

## 2024-03-02 NOTE — PROGRESS NOTE ADULT - NS ATTEND AMEND GEN_ALL_CORE FT
83 y.o. 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     - dyspneic, with chest pain overnight  - being treated for pna based on CT  - echo with mod lv dysfunction with akinesis of the apical wall, mid to distal inferoseptal and anterolateral walls.   - had been receiving iv diuretics, now being held  - had svt on admission, now in sr. cont toprol  - notable troponin leak now improved  - in the setting of recurrent symptoms and lv dysfunction, plan for at least diagnostic cath today  - will need repeat echo with def to r/o thrombus
83 y.o. 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     SOB  - dyspneic on exam today  -2/26  CTA chest with b/l patchy opacities, pna, neg PE  -2/28, CXR wet read similar to 2/26, pending official read  - abx for asp pna, wbc uptrending   -  TTE results; Left Ventricle: akinesis of the apical wall, mid to distal inferoseptal and anterolateral walls. Findings are consistent with either Takotsubo cardiomyopathy or LAD ischemia. The basal segments appear hyperkinetic. The LVEF appears estimated at 35-40%; LA: mod dilation; MV: trace MR with mild posterior calcification; IVC: normal in size (1.85cm in diameter) consistent with mildly elevated right atrial pressure (~8, range 5-10mmHg); Rec repeat TTE to further evaluate LV thrombus.    - lasix held since 2/25 in the setting of nausea and vomiting   -reports some addl sxs of discomfort/dyspnea, poorly described  - can trial lasix 20mg ivp x1 today   - Remains srst   - would incr bb  - tachycardia, multifactorial, likely reflective of anxiety, infection, dehydration from n/v    - pt reports intermittent pleuritic cp  - EKG showing SVT 149bpm   - Troponin 694.1 --> 1738.9 --> 1732.3 --> 1438.2.    -remains unclear whether this is stress myop or ischemic, but given anemia and jose alberto would not plan for cath at this time.   - Continue ASA and monitor H/H in the setting of GIB    - repeat echo prior to dc  - will need to start on ARB/ARNI when able
83 y.o. 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     SOB  - dyspneic on exam today  -2/26  CTA chest with b/l patchy opacities, pna, neg PE  -2/28, CXR wet read similar to 2/26, pending official read  - abx for asp pna, wbc uptrending   -  TTE results; Left Ventricle: akinesis of the apical wall, mid to distal inferoseptal and anterolateral walls. Findings are consistent with either Takotsubo cardiomyopathy or LAD ischemia. The basal segments appear hyperkinetic. The LVEF appears estimated at 35-40%; LA: mod dilation; MV: trace MR with mild posterior calcification; IVC: normal in size (1.85cm in diameter) consistent with mildly elevated right atrial pressure (~8, range 5-10mmHg); Rec repeat TTE to further evaluate LV thrombus.    - lasix held since 2/25 in the setting of nausea and vomiting   -reports some addl sxs of discomfort/dyspnea, poorly described  - S/p IV lasix on 2/28  - To switch Lopressor to 25mg Metoprolol succinate BID. Would then increase to 50mg BID in the AM.     - pt reports intermittent pleuritic cp  - EKG showing SVT 149bpm   - Troponin 694.1 --> 1738.9 --> 1732.3 --> 1438.2.    -remains unclear whether this is stress myop or ischemic.   - H+H stable. Seen by GI and no plan for EGD at this time. Cr normalized.   - Will plan for a diagnostic LHC in the AM and a RHC as well.   - She states she cannot be on ASA due to GI upset with this in the past.   - repeat echo prior to dc  - will need to start on ARB/ARNI after LHC.
I have personally seen and examined the patient in detail.  I have spoken to the provider regarding the assessment and plan of care.  I have made changes to the note accordingly.

## 2024-03-02 NOTE — CONSULT NOTE ADULT - ASSESSMENT
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

## 2024-03-02 NOTE — PROGRESS NOTE ADULT - SUBJECTIVE AND OBJECTIVE BOX
[facility] DIVISION of INFECTIOUS DISEASE  Simón Nolasco MD PhD, Patrica Bear MD, Lluvia Stahl MD, Tree Avery MD, Pelon Peralta MD  and providing coverage with Gerson Ortiz MD  Providing Infectious Disease Consultations at [facility], [facility], [facility]'s    Office# [phone] to schedule follow up appointments  Answering Service for urgent calls or New Consults [phone]  Cell# to text for urgent issues Simón Nolasco [phone]     infectious diseases progress note:    CAROL ALEXANDRE is a 83y y. o. Female patient    Overnight and events of the last 24hrs reviewed    Allergies    No Known Drug Allergies  latex (Rash)    Intolerances    aspirin (Stomach Upset)      ANTIBIOTICS/RELEVANT:  antimicrobials    immunologic:    OTHER:  albuterol/ipratropium for Nebulization 3 milliLiter(s) Nebulizer every 6 hours PRN  atorvastatin 40 milliGRAM(s) Oral at bedtime  chlorhexidine 2% Cloths 1 Application(s) Topical <User Schedule>  dextrose 5%. 1000 milliLiter(s) IV Continuous <Continuous>  dextrose 5%. 1000 milliLiter(s) IV Continuous <Continuous>  dextrose 50% Injectable 25 Gram(s) IV Push once  dextrose 50% Injectable 12.5 Gram(s) IV Push once  dextrose 50% Injectable 25 Gram(s) IV Push once  dextrose Oral Gel 15 Gram(s) Oral once PRN  ferrous    sulfate 325 milliGRAM(s) Oral daily  folic acid 1 milliGRAM(s) Oral daily  glucagon  Injectable 1 milliGRAM(s) IntraMuscular once  heparin   Injectable 5000 Unit(s) SubCutaneous every 8 hours  insulin lispro (ADMELOG) corrective regimen sliding scale   SubCutaneous three times a day before meals  insulin lispro (ADMELOG) corrective regimen sliding scale   SubCutaneous at bedtime  losartan 25 milliGRAM(s) Oral daily  melatonin 5 milliGRAM(s) Oral at bedtime  metoprolol succinate ER 25 milliGRAM(s) Oral daily  ondansetron Injectable 4 milliGRAM(s) IV Push every 6 hours PRN  pantoprazole  Injectable 40 milliGRAM(s) IV Push two times a day  tiotropium 2.5 MICROgram(s) Inhaler 2 Puff(s) Inhalation daily      Objective:  Vital Signs Last 24 Hrs  T(C): 37 (02 Mar 2024 04:45), Max: 37.1 (02 Mar 2024 04:00)  T(F): 98.6 (02 Mar 2024 04:45), Max: 98.7 (02 Mar 2024 04:00)  HR: 105 (02 Mar 2024 07:00) (91 - 112)  BP: 127/61 (02 Mar 2024 07:00) (103/80 - 153/67)  BP(mean): 88 (02 Mar 2024 07:00) (71 - 96)  RR: 16 (02 Mar 2024 07:00) (12 - 25)  SpO2: 97% (02 Mar 2024 07:00) (94% - 100%)    Parameters below as of 02 Mar 2024 07:00  Patient On (Oxygen Delivery Method): room air        T(C): 37 (03-02-24 @ 04:45), Max: 37.1 (03-02-24 @ 04:00)  T(C): 37 (03-02-24 @ 04:45), Max: 37.1 (03-02-24 @ 04:00)  T(C): 37 (03-02-24 @ 04:45), Max: 37.2 (02-27-24 @ 20:45)    PHYSICAL EXAM:  HEENT: NC atraumatic  Neck: supple  Respiratory: no accessory muscle use, breathing comfortably  Cardiovascular: distant  Gastrointestinal: normal appearing, nondistended  Extremities: no clubbing, no cyanosis,        LABS:                          9.1    4.45  )-----------( 221      ( 02 Mar 2024 06:05 )             27.7       WBC  4.45 03-02 @ 06:05  5.33 03-01 @ 05:40  6.30 02-29 @ 07:00  16.70 02-28 @ 07:40  6.87 02-27 @ 06:23  7.71 02-26 @ 05:43  13.19 02-25 @ 23:40  19.36 02-25 @ 20:18  8.67 02-25 @ 16:40      03-02    143  |  105  |  32<H>  ----------------------------<  158<H>  3.9   |  27  |  0.92    Ca    9.4      02 Mar 2024 06:05  Phos  2.7     03-01  Mg     1.7     03-01        Creatinine: 0.92 mg/dL (03-02-24 @ 06:05)  Creatinine: 1.10 mg/dL (03-01-24 @ 05:40)  Creatinine: 1.30 mg/dL (02-29-24 @ 07:00)  Creatinine: 1.40 mg/dL (02-28-24 @ 07:40)  Creatinine: 1.60 mg/dL (02-27-24 @ 06:23)  Creatinine: 1.40 mg/dL (02-26-24 @ 14:00)  Creatinine: 1.40 mg/dL (02-26-24 @ 05:43)  Creatinine: 1.30 mg/dL (02-25-24 @ 20:40)  Creatinine: 1.40 mg/dL (02-25-24 @ 16:40)        Urinalysis Basic - ( 02 Mar 2024 06:05 )    Color: x / Appearance: x / SG: x / pH: x  Gluc: 158 mg/dL / Ketone: x  / Bili: x / Urobili: x   Blood: x / Protein: x / Nitrite: x   Leuk Esterase: x / RBC: x / WBC x   Sq Epi: x / Non Sq Epi: x / Bacteria: x            INFLAMMATORY MARKERS      MICROBIOLOGY:              RADIOLOGY & ADDITIONAL STUDIES:

## 2024-03-02 NOTE — DISCHARGE NOTE PROVIDER - CARE PROVIDER_API CALL
Barby Arenas  Vascular Surgery  [address] [place] [id number]  Phone: [phone]  Fax: [phone]  Follow Up Time:     Chuck Elias  Cardiovascular Disease  [address] [place] [address]  Phone: [phone]  Fax: [phone]  Follow Up Time:

## 2024-03-02 NOTE — PROGRESS NOTE ADULT - NUTRITIONAL ASSESSMENT
This patient has been assessed with a concern for Malnutrition and has been determined to have a diagnosis/diagnoses of Moderate protein-calorie malnutrition.    This patient is being managed with:   Diet DASH/TLC-  Sodium & Cholesterol Restricted  Moderately Thick Liquids (MODTHICKLIQS)  Entered: Feb 29 2024  2:15PM    Diet NPO after Midnight-     NPO Start Date: 29-Feb-2024   NPO Start Time: 23:59  Except Medications  Entered: Feb 29 2024 10:42AM  
This patient has been assessed with a concern for Malnutrition and has been determined to have a diagnosis/diagnoses of Moderate protein-calorie malnutrition.    This patient is being managed with:   Diet Pureed-  DASH/TLC {Sodium & Cholesterol Restricted}  Moderately Thick Liquids (MODTHICKLIQS)  Entered: Feb 27 2024 12:40PM  
This patient has been assessed with a concern for Malnutrition and has been determined to have a diagnosis/diagnoses of Moderate protein-calorie malnutrition.    This patient is being managed with:   Diet DASH/TLC-  Sodium & Cholesterol Restricted  Moderately Thick Liquids (MODTHICKLIQS)  Entered: Feb 29 2024  2:15PM  
This patient has been assessed with a concern for Malnutrition and has been determined to have a diagnosis/diagnoses of Moderate protein-calorie malnutrition.    This patient is being managed with:   Diet DASH/TLC-  Sodium & Cholesterol Restricted  Moderately Thick Liquids (MODTHICKLIQS)  Entered: Feb 29 2024  2:15PM    Diet NPO after Midnight-     NPO Start Date: 29-Feb-2024   NPO Start Time: 23:59  Except Medications  Entered: Feb 29 2024 10:42AM  
This patient has been assessed with a concern for Malnutrition and has been determined to have a diagnosis/diagnoses of Moderate protein-calorie malnutrition.    This patient is being managed with:   Diet Pureed-  DASH/TLC {Sodium & Cholesterol Restricted}  Moderately Thick Liquids (MODTHICKLIQS)  Entered: Feb 27 2024 12:40PM  
Patient requests all Lab, Cardiology, and Radiology Results on their Discharge Instructions

## 2024-03-02 NOTE — CONSULT NOTE ADULT - CONSULT REQUESTED DATE/TIME
02-Mar-2024 10:51
26-Feb-2024 13:24
26-Feb-2024 14:32
29-Feb-2024 11:04
25-Feb-2024 21:25
26-Feb-2024 00:00
29-Feb-2024 12:00
29-Feb-2024 10:44
29-Feb-2024 11:58

## 2024-03-02 NOTE — CONSULT NOTE ADULT - PROVIDER SPECIALTY LIST ADULT
Gastroenterology
Pulmonology
Cardiology
Vascular Surgery
Critical Care
Infectious Disease
eICU
Intervent Cardiology
Endocrinology

## 2024-03-02 NOTE — DISCHARGE NOTE PROVIDER - NSDCCPCAREPLAN_GEN_ALL_CORE_FT
PRINCIPAL DISCHARGE DIAGNOSIS  Diagnosis: NSTEMI (non-ST elevation myocardial infarction)  Assessment and Plan of Treatment: You had Angiogram performed but did not require any interevntion   please continue meds per the list and as recommedned by cardiologist here   f/u with your cardiologist. Cardiologist who saw you here, their number is attached in case you or your doctor has any questions   f/u with Vascular for iliac artery stenosis as dicussed by vascular team here   f/u with your PCP in 3- 5 days. Your blood sugar is on high side and your HbA1c is 6.2. Carbohydrate controlled diet and as tolerated exercise is recommedned. Please f/u with your PCP to have repeat blood work and monitor blood sugar level and advice if need to start any medication  f/u with your Pulmonologist and all your doctors  f/u with pcp to get prescripetion/ referral for Modified Barium swallow study to check your swallowing and recommend any diet changes. It is important because other wise you are at high risk for aspiration and lung infections/ pneumonia, again      SECONDARY DISCHARGE DIAGNOSES  Diagnosis: GIB (gastrointestinal bleeding)  Assessment and Plan of Treatment:     Diagnosis: Pneumonia  Assessment and Plan of Treatment:

## 2024-03-02 NOTE — PROGRESS NOTE ADULT - SUBJECTIVE AND OBJECTIVE BOX
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 lobectomy & chemotherapy    Hip fracture  left femur, had pins placed in 2016    Lumbar radiculopathy    Hyperlipidemia    Lymphoma    Former smoker    Unspecified atrial fibrillation    Hypertension    Hip fracture  2016, left hip    Right lower lobe lung mass  Right lower lobectomy 2016, chemo till 01/2017    Status post carotid surgery  left carotid artery stenosis 2016    No significant past surgical history    History of loop recorder  placed 9/2021          Medication list         MEDICATIONS  (STANDING):  atorvastatin 40 milliGRAM(s) Oral at bedtime  cefTRIAXone   IVPB 1000 milliGRAM(s) IV Intermittent every 24 hours  chlorhexidine 2% Cloths 1 Application(s) Topical <User Schedule>  dextrose 5%. 1000 milliLiter(s) (50 mL/Hr) IV Continuous <Continuous>  dextrose 5%. 1000 milliLiter(s) (100 mL/Hr) IV Continuous <Continuous>  dextrose 50% Injectable 25 Gram(s) IV Push once  dextrose 50% Injectable 25 Gram(s) IV Push once  dextrose 50% Injectable 12.5 Gram(s) IV Push once  ferrous    sulfate 325 milliGRAM(s) Oral daily  folic acid 1 milliGRAM(s) Oral daily  glucagon  Injectable 1 milliGRAM(s) IntraMuscular once  heparin   Injectable 5000 Unit(s) SubCutaneous every 8 hours  insulin lispro (ADMELOG) corrective regimen sliding scale   SubCutaneous at bedtime  insulin lispro (ADMELOG) corrective regimen sliding scale   SubCutaneous three times a day before meals  melatonin 5 milliGRAM(s) Oral at bedtime  metoprolol succinate ER 25 milliGRAM(s) Oral daily  pantoprazole  Injectable 40 milliGRAM(s) IV Push two times a day  tiotropium 2.5 MICROgram(s) Inhaler 2 Puff(s) Inhalation daily    MEDICATIONS  (PRN):  albuterol/ipratropium for Nebulization 3 milliLiter(s) Nebulizer every 6 hours PRN Shortness of Breath and/or Wheezing  dextrose Oral Gel 15 Gram(s) Oral once PRN Blood Glucose LESS THAN 70 milliGRAM(s)/deciliter  ondansetron Injectable 4 milliGRAM(s) IV Push every 6 hours PRN Nausea and/or Vomiting         Vitals log        ICU Vital Signs Last 24 Hrs  T(C): 37 (02 Mar 2024 04:45), Max: 37.1 (02 Mar 2024 04:00)  T(F): 98.6 (02 Mar 2024 04:45), Max: 98.7 (02 Mar 2024 04:00)  HR: 91 (02 Mar 2024 04:00) (91 - 112)  BP: 115/56 (02 Mar 2024 04:00) (103/80 - 153/67)  BP(mean): 79 (02 Mar 2024 04:00) (71 - 96)  ABP: --  ABP(mean): --  RR: 12 (02 Mar 2024 04:00) (12 - 25)  SpO2: 98% (02 Mar 2024 04:00) (94% - 100%)    O2 Parameters below as of 02 Mar 2024 04:00  Patient On (Oxygen Delivery Method): room air                 Input and Output:  I&O's Detail      Lab Data                        9.7    5.33  )-----------( 235      ( 01 Mar 2024 05:40 )             30.0     03-01    142  |  107  |  37<H>  ----------------------------<  161<H>  3.9   |  23  |  1.10    Ca    9.6      01 Mar 2024 05:40  Phos  2.7     03-01  Mg     1.7     03-01    TPro  6.2  /  Alb  2.7<L>  /  TBili  0.6  /  DBili  x   /  AST  15  /  ALT  13  /  AlkPhos  69  02-29      CARDIAC MARKERS ( 01 Mar 2024 05:40 )  x     / x     / 33 U/L / x     / 3.6 ng/mL        Review of Systems	      Objective     Physical Examination    heart s1s2  lung dc BS  head nc      Pertinent Lab findings & Imaging      [place]:  NO   Adequate UO     I&O's Detail           Discussed with:     Cultures:	        Radiology

## 2024-03-02 NOTE — PROGRESS NOTE ADULT - PROVIDER SPECIALTY LIST ADULT
Gastroenterology
Infectious Disease
Infectious Disease
Cardiology
Cardiology
Critical Care
Critical Care
Infectious Disease
Pulmonology
Pulmonology
Cardiology
Cardiology
Infectious Disease
Infectious Disease
Cardiology
Hospitalist

## 2024-03-02 NOTE — PROGRESS NOTE ADULT - SUBJECTIVE AND OBJECTIVE BOX
[facility] -- June Holliday Pannella, Patel, Savella Goodger, Cohen  Office # [phone]      Follow Up:    Shortness of breath   Subjective/Observations:   No events overnight resting comfortably in bed.  No complaints of chest pain, dyspnea, or palpitations reported. No signs of orthopnea or PND.  on room air     REVIEW OF SYSTEMS: All other review of systems is negative unless indicated above    PAST MEDICAL & SURGICAL HISTORY:  Carotid artery obstruction, left  had carotid surgery 2016      Lung mass  RLL, 2016, had RL lobectomy & chemotherapy      Hip fracture  left femur, had pins placed in 2016      Lumbar radiculopathy      Hyperlipidemia      Lymphoma      Former smoker      Unspecified atrial fibrillation      Hypertension      Hip fracture  2016, left hip      Right lower lobe lung mass  Right lower lobectomy , chemo till 2017      Status post carotid surgery  left carotid artery stenosis 2016      History of loop recorder  placed 2021          MEDICATIONS  (STANDING):  atorvastatin 40 milliGRAM(s) Oral at bedtime  chlorhexidine 2% Cloths 1 Application(s) Topical <User Schedule>  dextrose 5%. 1000 milliLiter(s) (50 mL/Hr) IV Continuous <Continuous>  dextrose 5%. 1000 milliLiter(s) (100 mL/Hr) IV Continuous <Continuous>  dextrose 50% Injectable 12.5 Gram(s) IV Push once  dextrose 50% Injectable 25 Gram(s) IV Push once  dextrose 50% Injectable 25 Gram(s) IV Push once  ferrous    sulfate 325 milliGRAM(s) Oral daily  folic acid 1 milliGRAM(s) Oral daily  glucagon  Injectable 1 milliGRAM(s) IntraMuscular once  heparin   Injectable 5000 Unit(s) SubCutaneous every 8 hours  insulin lispro (ADMELOG) corrective regimen sliding scale   SubCutaneous at bedtime  insulin lispro (ADMELOG) corrective regimen sliding scale   SubCutaneous three times a day before meals  losartan 25 milliGRAM(s) Oral daily  melatonin 5 milliGRAM(s) Oral at bedtime  metoprolol succinate ER 25 milliGRAM(s) Oral daily  pantoprazole  Injectable 40 milliGRAM(s) IV Push two times a day  tiotropium 2.5 MICROgram(s) Inhaler 2 Puff(s) Inhalation daily    MEDICATIONS  (PRN):  albuterol/ipratropium for Nebulization 3 milliLiter(s) Nebulizer every 6 hours PRN Shortness of Breath and/or Wheezing  dextrose Oral Gel 15 Gram(s) Oral once PRN Blood Glucose LESS THAN 70 milliGRAM(s)/deciliter  ondansetron Injectable 4 milliGRAM(s) IV Push every 6 hours PRN Nausea and/or Vomiting      Allergies    No Known Drug Allergies  latex (Rash)    Intolerances    aspirin (Stomach Upset)      Vital Signs Last 24 Hrs  T(C): 37 (02 Mar 2024 04:45), Max: 37.1 (02 Mar 2024 04:00)  T(F): 98.6 (02 Mar 2024 04:45), Max: 98.7 (02 Mar 2024 04:00)  HR: 105 (02 Mar 2024 07:00) (91 - 112)  BP: 127/61 (02 Mar 2024 07:00) (103/80 - 153/67)  BP(mean): 88 (02 Mar 2024 07:00) (71 - 96)  RR: 16 (02 Mar 2024 07:00) (12 - 25)  SpO2: 97% (02 Mar 2024 07:00) (94% - 100%)    Parameters below as of 02 Mar 2024 07:00  Patient On (Oxygen Delivery Method): room air        I&O's Summary    Weight (kg): 51.7 (- @ 18:33)    PHYSICAL EXAM:  TELE: - St   Constitutional: NAD, awake and alert, well-developed  HEENT: Moist Mucous Membranes, Anicteric  Pulmonary: Non-labored, breath sounds are clear bilaterally, No wheezing, crackles or rhonchi  Cardiovascular: Regular, S1 and S2 nl, No murmurs, rubs, gallops or clicks  Gastrointestinal: Bowel Sounds present, soft, nontender.   Lymph: No lymphadenopathy. No peripheral edema.  Skin: No visible rashes or ulcers., bl groin benign dressing in place   Psych:  Mood & affect appropriate    LABS: All Labs Reviewed:                        9.1    4.45  )-----------( 221      ( 02 Mar 2024 06:05 )             27.7                         9.7    5.33  )-----------( 235      ( 01 Mar 2024 05:40 )             30.0                         9.3    6.30  )-----------( 219      ( 2024 07:00 )             29.5     02 Mar 2024 06:05    143    |  105    |  32     ----------------------------<  158    3.9     |  27     |  0.92   01 Mar 2024 05:40    142    |  107    |  37     ----------------------------<  161    3.9     |  23     |  1.10   2024 07:00    142    |  106    |  38     ----------------------------<  143    4.0     |  24     |  1.30     Ca    9.4        02 Mar 2024 06:05  Ca    9.6        01 Mar 2024 05:40  Ca    9.2        2024 07:00  Phos  2.7       01 Mar 2024 05:40  Mg     1.7       01 Mar 2024 05:40  Mg     1.8       2024 07:00    TPro  6.2    /  Alb  2.7    /  TBili  0.6    /  DBili  x      /  AST  15     /  ALT  13     /  AlkPhos  69     2024 07:00      CARDIAC MARKERS ( 01 Mar 2024 05:40 )  x     / x     / 33 U/L / x     / 3.6 ng/mL         EC Lead ECG:   Ventricular Rate 112 BPM    Atrial Rate 112 BPM    P-R Interval 180 ms    QRS Duration 118 ms    Q-T Interval 364 ms    QTC Calculation(Bazett) 496 ms    P Axis 35 degrees    R Axis -78 degrees    T Axis 77 degrees    Diagnosis Line Sinus tachycardia  Left axis deviation  Inferior-posterior infarct (cited on or before 2024)  Abnormal ECG  When compared with ECG of 2024 20:47, (Unconfirmed)  fusion complexes are no longer present  Criteria for Septal infarct are no longer present  Borderline criteria for Lateral infarct are no longer present  Questionable change in initial forces of Posterior leads  T wave inversion now evident in Anterior leads  Confirmed by Simón Watkins MD ([id number]) on 2024 12:20:46 PM (24 @ 08:24)      TRANSTHORACIC ECHOCARDIOGRAM REPORT  ________________________________________________________________________________                                      _______       Pt. Name:       CAROL ALEXANDRE Study Date:    2024  MRN:            [id number]        YOB: 1940  Accession #:    [id number]       Age:           83 years  Account#:       [id number]      Gender:        F  Heart Rate:                     Height:        62.99 in (160.00 cm)  Rhythm:                         Weight:        132.28 lb (60.00 kg)  Blood Pressure: 148/81 mmHg     BSA/BMI:       1.62 m² / 23.44 kg/m²  ________________________________________________________________________________________  Referring Physician:    [id number] Ruiz Stahl  InterpretingPhysician: Simón Watkins  Primary Sonographer:    Samira Mckeon JERRY    CPT:               ECHO TTE WO CON COMP W DOPP - 90590.m  Indication(s):     Heart failure, unspecified - I50.9  Procedure:         Transthoracic echocardiogram with 2-D, M-mode and complete                     spectral and color flow Doppler.  Ordering Location: OR  Admission Status:  Inpatient  Study Information: Image quality for this study is technically difficult.    _______________________________________________________________________________________     CONCLUSIONS:      1. Technically difficult image quality.   2. Left ventricular endocardium is not well visualized; however, the left ventricular systolic function appears mildly reduced.   3. The apex appears severely hypokinetic to akinetic. No clear apical thrombus is identified on this study, though it cannot be excluded given technical limitations.   4. Aortic valve was not well visualized.   5. Structurally normal mitral valve with normal leaflet excursion.   6. There is moderate calcification of the mitral valve annulus.   7. Mild mitral regurgitation.   8. No pericardial effusion seen.    ________________________________________________________________________________________  FINDINGS:     Left Ventricle:  The left ventricular diastolic function is indeterminate. Left ventricular endocardium is not well visualized; however, the left ventricular systolic function appears mildly reduced. The apex appears severely hypokinetic to akinetic. Noclear apical thrombus is identified on this study, though it cannot be excluded given technical limitations.     Right Ventricle:  The right ventricle is not well visualized.     Left Atrium:  The left atrium is normal.     Right Atrium:  The right atrium is normal in size.     Aortic Valve:  The aortic valve was not well visualized.     Mitral Valve:  Structurally normal mitral valve with normal leaflet excursion. There is moderate calcification of the mitral valve annulus. There is mild mitral regurgitation.     Tricuspid Valve:  The tricuspid valve was not well visualized.     Pulmonic Valve:  The pulmonic valve was not well visualized.     Pericardium:  No pericardial effusion seen.  ____________________________________________________________________  QUANTITATIVE DATA:  Left Ventricle Measurements: (Indexed to BSA)     MV E Vmax:    1.37 m/s  MV A Vmax:    1.57 m/s  MV E/A:       0.87  e' lateral:   7.07 cm/s  e' medial:    4.90 cm/s  E/e' lateral: 19.38  E/e' medial:  27.96  E/e' Average: 22.89  MV DT:        183 msec       Aortic Valve Measurements:  AV Vmax:          1.7 m/s  AV Peak Gradient: 11.0 mmHg  AV Mean Gradient: 6.0 mmHg  AV VTI:           27.9 cm    Mitral Valve Measurements:     MV E Vmax: 1.4 m/s         MR Vmax:         5.19 m/s  MV A Vmax: 1.6 m/s         MR Peak Gradient: 107.7 mmHg  MV E/A:    0.9    ________________________________________________________________________________________  Electronically signed on 2024 at 1:00:57 PM by Simón Watkins         *** Final ***      Radiology:

## 2024-03-02 NOTE — PROGRESS NOTE ADULT - ASSESSMENT
83 y.o. 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      acute respiratory failure 2/2 PNA, elevated troponins, and GIB.   -2/26  CTA chest with b/l patchy opacities, pna, neg PE  -  TTE results; Left Ventricle: akinesis of the apical wall, mid to distal inferoseptal and anterolateral walls. Findings are consistent with either Takotsubo cardiomyopathy or LAD ischemia. The basal segments appear hyperkinetic. The LVEF appears estimated at 35-40%; LA: mod dilation; MV: trace MR with mild posterior calcification; IVC: normal in size (1.85cm in diameter) consistent with mildly elevated right atrial pressure (~8, range 5-10mmHg); Rec repeat TTE to further evaluate LV thrombus.  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  - Will continue to follow. 83 y.o. 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      acute respiratory failure 2/2 PNA, elevated troponins, and GIB.   -2/26  CTA chest with b/l patchy opacities, pna, neg PE  -  TTE results; Left Ventricle: akinesis of the apical wall, mid to distal inferoseptal and anterolateral walls. Findings are consistent with either Takotsubo cardiomyopathy or LAD ischemia. The basal segments appear hyperkinetic. The LVEF appears estimated at 35-40%; LA: mod dilation; MV: trace MR with mild posterior calcification; IVC: normal in size (1.85cm in diameter) consistent with mildly elevated right atrial pressure (~8, range 5-10mmHg); Rec repeat TTE to further evaluate LV thrombus.  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  - Will continue to follow. 83 y.o. 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      acute respiratory failure 2/2 PNA, elevated troponins, and GIB.   -2/26  CTA chest with b/l patchy opacities, pna, neg PE  -  TTE results; Left Ventricle: akinesis of the apical wall, mid to distal inferoseptal and anterolateral walls. Findings are consistent with either Takotsubo cardiomyopathy or LAD ischemia. The basal segments appear hyperkinetic. The LVEF appears estimated at 35-40%; LA: mod dilation; MV: trace MR with mild posterior calcification; IVC: normal in size (1.85cm in diameter) consistent with mildly elevated right atrial pressure (~8, range 5-10mmHg); Rec repeat TTE to further evaluate LV thrombus.  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  - Will continue to follow.

## 2024-03-02 NOTE — CONSULT NOTE ADULT - SUBJECTIVE AND OBJECTIVE BOX
Vascular Attending:  Dr Marie      HPI:  83F PMH cirrhosis, aortic stenosis, PAD s/p right carotid endarterectomy, non Hodgkins Lymphoma (pulmonary mass s/p removal, chemo; last infusion fall 2021), COPD, pAF s/p ILR implant 10/8/21, s/p watchman (2/11/22), hx of prior GI bleed requiring blood transfusions presents to the ED with complaints of shortness of breath, nausea, generalized weakness and chest pain that started at 2:20 PM.  Patient reports she was hanging up some [employer] decorations  when the symptoms started. Daughter at bedside reported decreased PO intake, and has been vomiting intermittently all day long.  She denies any abdominal pain, fever chills, cough or recent URI symptoms.  Reports she felt fine all morning.     While taking pts history and conducting the physical exam, pt began feeling unwell, had one episode of emesis and rapid response was called.    ED course:  Vital Signs T(F): 97.5F HR: 110  BP: 147/83  RR: 18  SpO2: 97% on RA  Labs significant for: Troponin 694.1, Hgb 9.5, hct 29.5, PTT 50, D-dimer 360, Cr 1.4,  BUN 35, Glu 184, eGFR 38. pBNP 571   FOBT+  ECG:   In ED given rocephin 1gx1, azithromycin 500mg x1, NS bolus 1L x1, zofran 4mg x1    Imaging  CXR: Slight increase left lung interstitial pattern and left loop recorder are presently seen. Otherwise stable findings.  CTA chest: No pulmonary embolism. New bilateral patchy and groundglass opacities, consistent with pneumonia. Hypodense left thyroid   lesion, measuring 0.9 cm.   (25 Feb 2024 20:05)    Course in hospital: Pt underwent R&L heart cath without intervention performed. She was reported to have a R iliac artery occlusion and vascular surgery was consulted. Pt denies any h/o PAD; she denies claudication although ambulation is independent, she is limited by SOB. She endorses BLE cramps at night R>L.    PAST MEDICAL & SURGICAL HISTORY:  Carotid artery obstruction, left carotid surgery 2016  Lung mass  RLL, 2016, had RL lobectomy & chemotherapy  Hip fracture  left femur, had pins placed in 2016  Lumbar radiculopathy  Hyperlipidemia  Lymphoma  Former smoker  Unspecified atrial fibrillation  Hypertension  Hip fracture  2016, left hip  Right lower lobe lung mass  Right lower lobectomy 2016, chemo till 01/2017  Status post carotid surgery  left carotid artery stenosis 2016  History of loop recorder  placed 9/2021      REVIEW OF SYSTEMS  General: generalized weakness and fatigue  Skin/Breast: denies	  Ophthalmologic: denies	  ENMT:	difficulty swallowing liquids  Respiratory and Thorax: SOB, orthopnea, cough	  Cardiovascular:	denies CP or palps  Gastrointestinal:	int nausea, anorexia  Genitourinary:	denies  Musculoskeletal:	 denies  Neurological:	denies  Psychiatric: depressed  Hematology/Lymphatics:	 cancer, anemia  	    MEDICATIONS  (STANDING):  atorvastatin 40 milliGRAM(s) Oral at bedtime  chlorhexidine 2% Cloths 1 Application(s) Topical <User Schedule>  dextrose 5%. 1000 milliLiter(s) (50 mL/Hr) IV Continuous <Continuous>  dextrose 5%. 1000 milliLiter(s) (100 mL/Hr) IV Continuous <Continuous>  dextrose 50% Injectable 25 Gram(s) IV Push once  dextrose 50% Injectable 12.5 Gram(s) IV Push once  dextrose 50% Injectable 25 Gram(s) IV Push once  ferrous    sulfate 325 milliGRAM(s) Oral daily  folic acid 1 milliGRAM(s) Oral daily  glucagon  Injectable 1 milliGRAM(s) IntraMuscular once  heparin   Injectable 5000 Unit(s) SubCutaneous every 8 hours  insulin lispro (ADMELOG) corrective regimen sliding scale   SubCutaneous three times a day before meals  insulin lispro (ADMELOG) corrective regimen sliding scale   SubCutaneous at bedtime  losartan 25 milliGRAM(s) Oral daily  melatonin 5 milliGRAM(s) Oral at bedtime  metoprolol succinate ER 25 milliGRAM(s) Oral daily  pantoprazole  Injectable 40 milliGRAM(s) IV Push two times a day  tiotropium 2.5 MICROgram(s) Inhaler 2 Puff(s) Inhalation daily    MEDICATIONS  (PRN):  albuterol/ipratropium for Nebulization 3 milliLiter(s) Nebulizer every 6 hours PRN Shortness of Breath and/or Wheezing  dextrose Oral Gel 15 Gram(s) Oral once PRN Blood Glucose LESS THAN 70 milliGRAM(s)/deciliter  ondansetron Injectable 4 milliGRAM(s) IV Push every 6 hours PRN Nausea and/or Vomiting      Allergies  No Known Drug Allergies  latex (Rash)    Intolerances  aspirin (Stomach Upset)      SOCIAL HISTORY: Former smoker, 38 pack years, quit ~25 years ago. Social ETOH      Vital Signs Last 24 Hrs  T(C): 37 (02 Mar 2024 04:45), Max: 37.1 (02 Mar 2024 04:00)  T(F): 98.6 (02 Mar 2024 04:45), Max: 98.7 (02 Mar 2024 04:00)  HR: 105 (02 Mar 2024 07:00) (91 - 112)  BP: 127/61 (02 Mar 2024 07:00) (103/80 - 153/67)  BP(mean): 88 (02 Mar 2024 07:00) (71 - 96)  RR: 16 (02 Mar 2024 07:00) (12 - 25)  SpO2: 97% (02 Mar 2024 07:00) (94% - 100%)    Parameters below as of 02 Mar 2024 07:00  Patient On (Oxygen Delivery Method): room air        PHYSICAL EXAM:  Constitutional: Elderly thin F in NAD  Neck: No JVD  Respiratory: fine bibasilar crackles, diminished R base  Cardiovascular: normal S1, S2 with 3/6 KRISTEN  Gastrointestinal: soft, ND, NT  Extremities: BLE warm without ulcerations. No edema  Neurological: A&O x 3, WEBER X 4 =; tearful  Pulses:   Right:                                                                          Left:  FEM [ ]2+ [x ]1+ [ ]doppler                                             FEM [ x]2+ [ ]1+ [ ]doppler    POP [ ]2+ [x ]1+ [ ]doppler                                             POP [ ]2+ [x ]1+ [ ]doppler    DP [ ]2+ [x ]1+ [ ]doppler                                                DP [ ]2+ [x ]1+ [ ]doppler  PT[ ]2+ [x ]1+ [ ]doppler                                                  PT [ ]2+ [x ]1+ [ ]doppler      LABS:                        9.1    4.45  )-----------( 221      ( 02 Mar 2024 06:05 )             27.7     03-02    143  |  105  |  32<H>  ----------------------------<  158<H>  3.9   |  27  |  0.92    Ca    9.4      02 Mar 2024 06:05  Phos  2.7     03-01  Mg     1.7     03-01        Urinalysis Basic - ( 02 Mar 2024 06:05 )    Color: x / Appearance: x / SG: x / pH: x  Gluc: 158 mg/dL / Ketone: x  / Bili: x / Urobili: x   Blood: x / Protein: x / Nitrite: x   Leuk Esterase: x / RBC: x / WBC x   Sq Epi: x / Non Sq Epi: x / Bacteria: x        RADIOLOGY & ADDITIONAL STUDIES

## 2024-03-02 NOTE — DISCHARGE NOTE PROVIDER - HOSPITAL COURSE
83F PMH cirrhosis, aortic stenosis, PAD s/p right carotid endarterectomy, non Hodgkins Lymphoma (pulmonary mass s/p removal, chemo; last infusion fall 2021), COPD, pAF s/p ILR implant 10/8/21, s/p watchman (2/11/22), hx of prior GI bleed requiring blood transfusions presents to the ED with complaints of shortness of breath, nausea, generalized weakness and chest pain, admitted for PNA, GIB, NSTEMI. Completed course of antibiotics. Also had diagnostic Cardiac Cath and did not require intervention. Noted to to have right iliac occulusion, seen by Vascular, recommended outpatient follow up. S&S recommended Pureed diet but patient refused since has been able to tolerate solid food that daughter has been bringing from home. Patient wants to go home and have MBS done as outpatient as recommended by Speech Pathology here. Seen and examined at bedside. VSS   PE:   General: NAD, comfortable  Eyes: EOMI, PERRLA.   ENT: Moist mucous membranes  Neck: Supple, No JVD  Lungs: Decrease bibasilar breath sounds, no wheezing   Cardio: RRR, S1/S2, No murmurs  Abdomen: Soft, Nontender, Nondistended; Bowel sounds present  Extremities: No calf tenderness, No pitting edema, no digital cyanosis  Skin: Warm, dry  Psych: A&O x 3, follows commands    Total discharge time spent 50 minutes including medication reconciliation, prescription writing, counseling and education.

## 2024-03-02 NOTE — DISCHARGE NOTE PROVIDER - NSDCMRMEDTOKEN_GEN_ALL_CORE_FT
atorvastatin 20 mg oral tablet: 1 tab(s) orally once a day (at bedtime)  folic acid 1 mg oral tablet: 1 tab(s) orally once a day  furosemide 20 mg oral tablet: 1 tab(s) orally every other day  ipratropium-albuterol 0.5 mg-2.5 mg/3 mL inhalation solution: 3 milliliter(s) by nebulizer once a day *Patient not compliant - takes 5x/ week  lisinopril 40 mg oral tablet: 1 tab(s) orally once a day  metoprolol succinate 25 mg oral tablet, extended release: 1 tab(s) orally once a day  pantoprazole 40 mg intravenous injection: 40 milligram(s) intravenous 2 times a day  potassium chloride 10 mEq oral tablet, extended release: 1 tab(s) orally once a day  Slow Fe (as elemental iron) 45 mg oral tablet, extended release: 1 tab(s) orally once a day *Patient unsure of strength- States takes Slow Iron

## 2024-03-02 NOTE — PROGRESS NOTE ADULT - REASON FOR ADMISSION
ACS, PNA

## 2024-03-03 ENCOUNTER — TRANSCRIPTION ENCOUNTER (OUTPATIENT)
Age: 84
End: 2024-03-03

## 2024-03-18 ENCOUNTER — OUTPATIENT (OUTPATIENT)
Dept: OUTPATIENT SERVICES | Facility: HOSPITAL | Age: 84
LOS: 1 days | End: 2024-03-18
Payer: MEDICARE

## 2024-03-18 ENCOUNTER — NON-APPOINTMENT (OUTPATIENT)
Age: 84
End: 2024-03-18

## 2024-03-18 DIAGNOSIS — R13.10 DYSPHAGIA, UNSPECIFIED: ICD-10-CM

## 2024-03-18 DIAGNOSIS — S72.009A FRACTURE OF UNSPECIFIED PART OF NECK OF UNSPECIFIED FEMUR, INITIAL ENCOUNTER FOR CLOSED FRACTURE: Chronic | ICD-10-CM

## 2024-03-18 DIAGNOSIS — R91.8 OTHER NONSPECIFIC ABNORMAL FINDING OF LUNG FIELD: Chronic | ICD-10-CM

## 2024-03-18 DIAGNOSIS — Z98.890 OTHER SPECIFIED POSTPROCEDURAL STATES: Chronic | ICD-10-CM

## 2024-03-18 PROCEDURE — 92611 MOTION FLUOROSCOPY/SWALLOW: CPT

## 2024-03-18 PROCEDURE — A9698: CPT

## 2024-03-18 PROCEDURE — 74230 X-RAY XM SWLNG FUNCJ C+: CPT | Mod: 26

## 2024-03-18 PROCEDURE — 74230 X-RAY XM SWLNG FUNCJ C+: CPT

## 2024-03-25 ENCOUNTER — TRANSCRIPTION ENCOUNTER (OUTPATIENT)
Age: 84
End: 2024-03-25

## 2024-03-26 ENCOUNTER — APPOINTMENT (OUTPATIENT)
Dept: CARDIOLOGY | Facility: CLINIC | Age: 84
End: 2024-03-26
Payer: MEDICARE

## 2024-03-26 DIAGNOSIS — R09.89 OTHER SPECIFIED SYMPTOMS AND SIGNS INVOLVING THE CIRCULATORY AND RESPIRATORY SYSTEMS: ICD-10-CM

## 2024-03-26 PROCEDURE — 99214 OFFICE O/P EST MOD 30 MIN: CPT

## 2024-03-26 PROCEDURE — G2211 COMPLEX E/M VISIT ADD ON: CPT

## 2024-03-26 NOTE — PHYSICAL EXAM
[Normal Appearance] : normal appearance [General Appearance - Well Developed] : well developed [Well Groomed] : well groomed [General Appearance - Well Nourished] : well nourished [No Deformities] : no deformities [General Appearance - In No Acute Distress] : no acute distress [Normal Conjunctiva] : the conjunctiva exhibited no abnormalities [Eyelids - No Xanthelasma] : the eyelids demonstrated no xanthelasmas [Normal Oral Mucosa] : normal oral mucosa [No Oral Pallor] : no oral pallor [No Oral Cyanosis] : no oral cyanosis [Nail Clubbing] : no clubbing of the fingernails [Cyanosis, Localized] : no localized cyanosis [Petechial Hemorrhages (___cm)] : no petechial hemorrhages [Skin Color & Pigmentation] : normal skin color and pigmentation [No Venous Stasis] : no venous stasis [] : no rash [Skin Lesions] : no skin lesions [No Skin Ulcers] : no skin ulcer [No Xanthoma] : no  xanthoma was observed [FreeTextEntry1] : L DP is palp

## 2024-03-26 NOTE — REASON FOR VISIT
[Follow-Up - Clinic] : a clinic follow-up of [FreeTextEntry1] : 3/26  Since last visit patient reports admitted to [facility] earlier this month  had coronary angio Dr. marsh, mild -moderate CAD, no stents placed however noted to have bilateral iliac disease with gradients on exam R DP diminished, Left DP is palp she has no rest pain or claudication    8/8/2023 Doing well no CVA, stroke, TIA or amaurosis Remains on statin  Duplex today shows patent L ICA and 16-49% R ICA not on antiplatlet therapy    82 F here for vascular eval Had carotid duplex that showd 16-49% Prior CEA she states LEFT sided, with Dr Christina Crump  at [place] No CVA  Afib - Watchman and loop recorder- Dr. Jim NO  claudication No PPost prandial pains no wounds  She is not on antiplatelet therapy due to severe anemia She is on statin therapy

## 2024-03-26 NOTE — ASSESSMENT
[FreeTextEntry1] : Assessment: 1.  Carotid stenosis - mild bilaterally -  CEA 2.  No Claudication  3.  Afib with Watchman and ILR  Plan 1.  Continue statin therapy 2.  Cant be on antiplatelet due to history of anemia with this 3.  Repeat carotid duplex in  August  4.  No claudication symptoms, L DP is palp, R DP diminished.  no wounds or rest pain.  Will get MAYE and arterial duplex for baseline  5.   Return in sept for continued vascular care 6.  Given no symptoms in the legs, no role for intervention of the iliac  d/w daughter at bedside

## 2024-04-12 ENCOUNTER — RESULT REVIEW (OUTPATIENT)
Age: 84
End: 2024-04-12

## 2024-04-12 ENCOUNTER — INPATIENT (INPATIENT)
Facility: HOSPITAL | Age: 84
LOS: 0 days | Discharge: HOME | DRG: 242 | End: 2024-04-13
Attending: STUDENT IN AN ORGANIZED HEALTH CARE EDUCATION/TRAINING PROGRAM | Admitting: STUDENT IN AN ORGANIZED HEALTH CARE EDUCATION/TRAINING PROGRAM
Payer: MEDICARE

## 2024-04-12 ENCOUNTER — TRANSCRIPTION ENCOUNTER (OUTPATIENT)
Age: 84
End: 2024-04-12

## 2024-04-12 VITALS
SYSTOLIC BLOOD PRESSURE: 102 MMHG | OXYGEN SATURATION: 98 % | DIASTOLIC BLOOD PRESSURE: 63 MMHG | HEIGHT: 63 IN | HEART RATE: 65 BPM | TEMPERATURE: 98 F | RESPIRATION RATE: 18 BRPM

## 2024-04-12 DIAGNOSIS — S72.009A FRACTURE OF UNSPECIFIED PART OF NECK OF UNSPECIFIED FEMUR, INITIAL ENCOUNTER FOR CLOSED FRACTURE: Chronic | ICD-10-CM

## 2024-04-12 DIAGNOSIS — I44.2 ATRIOVENTRICULAR BLOCK, COMPLETE: ICD-10-CM

## 2024-04-12 DIAGNOSIS — Z98.890 OTHER SPECIFIED POSTPROCEDURAL STATES: Chronic | ICD-10-CM

## 2024-04-12 DIAGNOSIS — Z95.818 PRESENCE OF OTHER CARDIAC IMPLANTS AND GRAFTS: Chronic | ICD-10-CM

## 2024-04-12 DIAGNOSIS — R91.8 OTHER NONSPECIFIC ABNORMAL FINDING OF LUNG FIELD: Chronic | ICD-10-CM

## 2024-04-12 LAB
ANION GAP SERPL CALC-SCNC: 14 MMOL/L — SIGNIFICANT CHANGE UP (ref 5–17)
BUN SERPL-MCNC: 21.9 MG/DL — HIGH (ref 8–20)
CALCIUM SERPL-MCNC: 9.6 MG/DL — SIGNIFICANT CHANGE UP (ref 8.4–10.5)
CHLORIDE SERPL-SCNC: 104 MMOL/L — SIGNIFICANT CHANGE UP (ref 96–108)
CO2 SERPL-SCNC: 25 MMOL/L — SIGNIFICANT CHANGE UP (ref 22–29)
CREAT SERPL-MCNC: 0.86 MG/DL — SIGNIFICANT CHANGE UP (ref 0.5–1.3)
EGFR: 67 ML/MIN/1.73M2 — SIGNIFICANT CHANGE UP
GLUCOSE BLDC GLUCOMTR-MCNC: 101 MG/DL — HIGH (ref 70–99)
GLUCOSE SERPL-MCNC: 76 MG/DL — SIGNIFICANT CHANGE UP (ref 70–99)
HCT VFR BLD CALC: 34.4 % — LOW (ref 34.5–45)
HGB BLD-MCNC: 10.9 G/DL — LOW (ref 11.5–15.5)
MAGNESIUM SERPL-MCNC: 1.7 MG/DL — LOW (ref 1.8–2.6)
MCHC RBC-ENTMCNC: 28.9 PG — SIGNIFICANT CHANGE UP (ref 27–34)
MCHC RBC-ENTMCNC: 31.7 GM/DL — LOW (ref 32–36)
MCV RBC AUTO: 91.2 FL — SIGNIFICANT CHANGE UP (ref 80–100)
PLATELET # BLD AUTO: 172 K/UL — SIGNIFICANT CHANGE UP (ref 150–400)
POTASSIUM SERPL-MCNC: 4.1 MMOL/L — SIGNIFICANT CHANGE UP (ref 3.5–5.3)
POTASSIUM SERPL-SCNC: 4.1 MMOL/L — SIGNIFICANT CHANGE UP (ref 3.5–5.3)
RBC # BLD: 3.77 M/UL — LOW (ref 3.8–5.2)
RBC # FLD: 14.6 % — HIGH (ref 10.3–14.5)
SODIUM SERPL-SCNC: 142 MMOL/L — SIGNIFICANT CHANGE UP (ref 135–145)
WBC # BLD: 8.87 K/UL — SIGNIFICANT CHANGE UP (ref 3.8–10.5)
WBC # FLD AUTO: 8.87 K/UL — SIGNIFICANT CHANGE UP (ref 3.8–10.5)

## 2024-04-12 PROCEDURE — 93306 TTE W/DOPPLER COMPLETE: CPT | Mod: 26

## 2024-04-12 PROCEDURE — 86077 PHYS BLOOD BANK SERV XMATCH: CPT

## 2024-04-12 PROCEDURE — 71045 X-RAY EXAM CHEST 1 VIEW: CPT | Mod: 26

## 2024-04-12 PROCEDURE — 33286 RMVL SUBQ CAR RHYTHM MNTR: CPT | Mod: 59

## 2024-04-12 PROCEDURE — 93010 ELECTROCARDIOGRAM REPORT: CPT

## 2024-04-12 PROCEDURE — 33207 INSERT HEART PM VENTRICULAR: CPT | Mod: KX

## 2024-04-12 RX ORDER — METOPROLOL TARTRATE 50 MG
25 TABLET ORAL DAILY
Refills: 0 | Status: DISCONTINUED | OUTPATIENT
Start: 2024-04-12 | End: 2024-04-13

## 2024-04-12 RX ORDER — LISINOPRIL 2.5 MG/1
20 TABLET ORAL DAILY
Refills: 0 | Status: DISCONTINUED | OUTPATIENT
Start: 2024-04-12 | End: 2024-04-13

## 2024-04-12 RX ORDER — INSULIN LISPRO 100/ML
VIAL (ML) SUBCUTANEOUS AT BEDTIME
Refills: 0 | Status: DISCONTINUED | OUTPATIENT
Start: 2024-04-12 | End: 2024-04-13

## 2024-04-12 RX ORDER — ONDANSETRON 8 MG/1
4 TABLET, FILM COATED ORAL EVERY 6 HOURS
Refills: 0 | Status: DISCONTINUED | OUTPATIENT
Start: 2024-04-12 | End: 2024-04-13

## 2024-04-12 RX ORDER — CLOPIDOGREL BISULFATE 75 MG/1
1 TABLET, FILM COATED ORAL
Refills: 0 | DISCHARGE

## 2024-04-12 RX ORDER — DEXTROSE 50 % IN WATER 50 %
15 SYRINGE (ML) INTRAVENOUS ONCE
Refills: 0 | Status: DISCONTINUED | OUTPATIENT
Start: 2024-04-12 | End: 2024-04-13

## 2024-04-12 RX ORDER — ATORVASTATIN CALCIUM 80 MG/1
20 TABLET, FILM COATED ORAL AT BEDTIME
Refills: 0 | Status: DISCONTINUED | OUTPATIENT
Start: 2024-04-12 | End: 2024-04-13

## 2024-04-12 RX ORDER — BUDESONIDE, MICRONIZED 100 %
2 POWDER (GRAM) MISCELLANEOUS
Refills: 0 | DISCHARGE

## 2024-04-12 RX ORDER — LISINOPRIL 2.5 MG/1
1 TABLET ORAL
Refills: 0 | DISCHARGE

## 2024-04-12 RX ORDER — IPRATROPIUM/ALBUTEROL SULFATE 18-103MCG
3 AEROSOL WITH ADAPTER (GRAM) INHALATION
Refills: 0 | DISCHARGE

## 2024-04-12 RX ORDER — FOLIC ACID 0.8 MG
1 TABLET ORAL DAILY
Refills: 0 | Status: DISCONTINUED | OUTPATIENT
Start: 2024-04-12 | End: 2024-04-13

## 2024-04-12 RX ORDER — INSULIN LISPRO 100/ML
VIAL (ML) SUBCUTANEOUS
Refills: 0 | Status: DISCONTINUED | OUTPATIENT
Start: 2024-04-12 | End: 2024-04-13

## 2024-04-12 RX ORDER — OXYCODONE HYDROCHLORIDE 5 MG/1
5 TABLET ORAL EVERY 6 HOURS
Refills: 0 | Status: DISCONTINUED | OUTPATIENT
Start: 2024-04-12 | End: 2024-04-13

## 2024-04-12 RX ORDER — FUROSEMIDE 40 MG
20 TABLET ORAL ONCE
Refills: 0 | Status: DISCONTINUED | OUTPATIENT
Start: 2024-04-12 | End: 2024-04-13

## 2024-04-12 RX ORDER — REVEFENACIN 175 UG/3ML
175 SOLUTION RESPIRATORY (INHALATION)
Refills: 0 | DISCHARGE

## 2024-04-12 RX ORDER — GABAPENTIN 400 MG/1
1 CAPSULE ORAL
Refills: 0 | DISCHARGE

## 2024-04-12 RX ORDER — ALPRAZOLAM 0.25 MG
0.25 TABLET ORAL EVERY 8 HOURS
Refills: 0 | Status: DISCONTINUED | OUTPATIENT
Start: 2024-04-12 | End: 2024-04-13

## 2024-04-12 RX ORDER — DEXTROSE 10 % IN WATER 10 %
125 INTRAVENOUS SOLUTION INTRAVENOUS ONCE
Refills: 0 | Status: DISCONTINUED | OUTPATIENT
Start: 2024-04-12 | End: 2024-04-13

## 2024-04-12 RX ORDER — SODIUM CHLORIDE 9 MG/ML
1000 INJECTION, SOLUTION INTRAVENOUS
Refills: 0 | Status: DISCONTINUED | OUTPATIENT
Start: 2024-04-12 | End: 2024-04-13

## 2024-04-12 RX ORDER — DEXTROSE 50 % IN WATER 50 %
12.5 SYRINGE (ML) INTRAVENOUS ONCE
Refills: 0 | Status: DISCONTINUED | OUTPATIENT
Start: 2024-04-12 | End: 2024-04-13

## 2024-04-12 RX ORDER — BUDESONIDE, MICRONIZED 100 %
0.5 POWDER (GRAM) MISCELLANEOUS EVERY 12 HOURS
Refills: 0 | Status: DISCONTINUED | OUTPATIENT
Start: 2024-04-12 | End: 2024-04-13

## 2024-04-12 RX ORDER — FERROUS SULFATE 325(65) MG
1 TABLET ORAL
Refills: 0 | DISCHARGE

## 2024-04-12 RX ORDER — CEFAZOLIN SODIUM 1 G
2000 VIAL (EA) INJECTION EVERY 8 HOURS
Refills: 0 | Status: COMPLETED | OUTPATIENT
Start: 2024-04-12 | End: 2024-04-13

## 2024-04-12 RX ORDER — MAGNESIUM SULFATE 500 MG/ML
2 VIAL (ML) INJECTION ONCE
Refills: 0 | Status: COMPLETED | OUTPATIENT
Start: 2024-04-12 | End: 2024-04-12

## 2024-04-12 RX ORDER — POTASSIUM CHLORIDE 20 MEQ
10 PACKET (EA) ORAL DAILY
Refills: 0 | Status: DISCONTINUED | OUTPATIENT
Start: 2024-04-12 | End: 2024-04-13

## 2024-04-12 RX ORDER — POTASSIUM CHLORIDE 20 MEQ
1 PACKET (EA) ORAL
Refills: 0 | DISCHARGE

## 2024-04-12 RX ORDER — DEXTROSE 50 % IN WATER 50 %
25 SYRINGE (ML) INTRAVENOUS ONCE
Refills: 0 | Status: DISCONTINUED | OUTPATIENT
Start: 2024-04-12 | End: 2024-04-13

## 2024-04-12 RX ORDER — CEFAZOLIN SODIUM 1 G
2000 VIAL (EA) INJECTION EVERY 8 HOURS
Refills: 0 | Status: DISCONTINUED | OUTPATIENT
Start: 2024-04-12 | End: 2024-04-12

## 2024-04-12 RX ORDER — ACETAMINOPHEN 500 MG
650 TABLET ORAL EVERY 6 HOURS
Refills: 0 | Status: DISCONTINUED | OUTPATIENT
Start: 2024-04-12 | End: 2024-04-13

## 2024-04-12 RX ORDER — GLUCAGON INJECTION, SOLUTION 0.5 MG/.1ML
1 INJECTION, SOLUTION SUBCUTANEOUS ONCE
Refills: 0 | Status: DISCONTINUED | OUTPATIENT
Start: 2024-04-12 | End: 2024-04-13

## 2024-04-12 RX ADMIN — Medication 25 GRAM(S): at 21:04

## 2024-04-12 RX ADMIN — Medication 0.5 MILLIGRAM(S): at 21:23

## 2024-04-12 RX ADMIN — Medication 2000 MILLIGRAM(S): at 23:01

## 2024-04-12 RX ADMIN — Medication 0.25 MILLIGRAM(S): at 23:11

## 2024-04-12 RX ADMIN — ATORVASTATIN CALCIUM 20 MILLIGRAM(S): 80 TABLET, FILM COATED ORAL at 21:05

## 2024-04-12 NOTE — DISCHARGE NOTE PROVIDER - CARE PROVIDER_API CALL
Medardo Jim  Cardiac Electrophysiology  [address] [place] [address]  Phone: [phone]  Fax: [phone]  Follow Up Time:

## 2024-04-12 NOTE — ASU PATIENT PROFILE, ADULT - FALL HARM RISK - UNIVERSAL INTERVENTIONS
Bed in lowest position, wheels locked, appropriate side rails in place/Call bell, personal items and telephone in reach/Instruct patient to call for assistance before getting out of bed or chair/Non-slip footwear when patient is out of bed/[place] to call system/Physically safe environment - no spills, clutter or unnecessary equipment/Purposeful Proactive Rounding/Room/bathroom lighting operational, light cord in reach

## 2024-04-12 NOTE — DISCHARGE NOTE PROVIDER - NSDCCPTREATMENT_GEN_ALL_CORE_FT
PRINCIPAL PROCEDURE  Procedure: Implantation of intravenous single chamber cardiac pacemaker  Findings and Treatment: Cardiac Device Implant Post Operative Instructions  - Do not touch the incision until it is completely healed.   - There are Steristrips (white strips of tape) on your incision, which will start to peel off on their own over the next 2-3 weeks. Do not pick at or peel off the Steristrips.   - Bruising around the implant site or over the chest, side or arm near the incision is normal, and will take a few weeks to resolve.  -Do not lift the affected arm higher than 90 degrees (shoulder height) in any direction for 6 weeks.   - Do not push, pull or lift anything heavier than 10 lbs (about a gallon of milk) with the affected arm for 6 weeks.     - Do not apply soaps, creams, lotions, ointments or powders to the incision until it is completely healed.  - You may take a shower in 24 hours, and allow the water to run over the incision. However, do not submerge the incision in water: do not swim or soak in bath tubs, hot tubs, swimming pools, etc.   You should call the doctor if:   - You notice redness, drainage, swelling, increased tenderness, hot sensation around the incision, bleeding or incision edges pulling apart.  - Your temperature is greater than 100 degrees F for more than 24 hours.  - You notice swelling or bulging at the incision or around the device that was not there when you left the hospital or is increasing in size.  - You experience increased difficulty breathing.  - You notice new/worsening swelling in your legs and ankles.  - You faint or have dizzy spells.  - You have any questions or concerns regarding your device or the procedure.

## 2024-04-12 NOTE — DISCHARGE NOTE PROVIDER - NSDCFUSCHEDAPPT_GEN_ALL_CORE_FT
Discussed the importance of blood sugar control in the prevention of ocular complications. Medardo Jim  [facility]  [address]   Scheduled Appointment: 04/17/2024

## 2024-04-12 NOTE — H&P PST ADULT - NSICDXPASTSURGICALHX_GEN_ALL_CORE_FT
PAST SURGICAL HISTORY:  Hip fracture 2016, left hip    History of loop recorder placed 9/2021    Presence of Watchman left atrial appendage closure device     Right lower lobe lung mass Right lower lobectomy 2016, chemo till 01/2017    Status post carotid surgery left carotid artery stenosis 2016

## 2024-04-12 NOTE — DISCHARGE NOTE PROVIDER - HOSPITAL COURSE
82 y/o f with cirrhosis, PAD s/p right carotid endartectomy, non Hodgkins Lymphoma (pulmonary mass s/p removal, and chemo), COPD, atrial fibrillation s/p ILR implant 10/8/21, GIB, DANIE occlusion with Watchman on 2/11/22([place]), bifasicular block (RBBB / LAFB), recently noted to have an episode of high grade AVB on her ILR remote transmission. Pt is now status post uncomplicated single chamber  MDT PPM (L-bundle lead) Implant and loop recorder removal. Access achieved via L sided axillary stick. 83 year old female with cirrhosis, PAD s/p right carotid endarterectomy, non-Hodgkin's lymphoma s/p lung resection and chemo, COPD, atrial fibrillation s/p ILR 10/2021, GI bleed, s/p LAAO w/ Watchman device (Dr. Jim, 2/2022), and bifascicular block (RBBB & LAFB). Recently noted to have an episode of high grade AVB on her ILR remote transmission. Now POD#1 s/p MDT 1ch PPM (left bundle pacing), and ILR removal. The patient was observed overnight without event and was discharged home the following morning with a plan for outpatient follow up. 83 year old female with cirrhosis, PAD s/p right carotid endarterectomy, non-Hodgkin's lymphoma s/p lung resection and chemo, COPD, atrial fibrillation s/p ILR 10/2021, GI bleed, s/p LAAO w/ Watchman device (Dr. Jim, 2/2022), and bifascicular block (RBBB & LAFB). Recently noted to have an episode of high grade AVB on her ILR remote transmission as well as new acute HRmrEF exacerbation which is likely precipitated by high grade AV block. Now POD#1 s/p MDT 1ch PPM (left bundle pacing), and ILR removal. The patient was observed overnight without event and was discharged home the following morning with a plan for outpatient follow up.

## 2024-04-12 NOTE — PROGRESS NOTE ADULT - SUBJECTIVE AND OBJECTIVE BOX
PROCEDURE: MDT PPM (L-bundle lead)  Implant and loop recorder removal    ELECTRPHYSIOLOGIS: Medardo Jim MD    COMPLICATIONS:  none          DISPOSITION:  Observation Unit           CONDITION: Stable      Pt doing well s/p single chamber  MDT PPM (L-bundle lead)  Implant and loop recorder removal. Access achieved via L sided axillary stick. Pt denies any complaints post procedure.    Device settings:  VVI 50    VS:  T(C): 36.7 (04-12-24 @ 13:30), Max: 36.7 (04-12-24 @ 13:13)  HR: 72 (04-12-24 @ 17:40) (65 - 77)  BP: 131/55 (04-12-24 @ 17:40) (102/63 - 170/58)  RR: 13 (04-12-24 @ 17:40) (13 - 19)  SpO2: 95% (04-12-24 @ 17:40) (95% - 98%)      Exam:  General: NAD, A&O x 3  LACW Incision: Dressing C/D/I; no bleeding, hematoma, erythema or edema  ILR incision: C/D/I; no bleeding, hematoma, erythema or edema  Card: S1/S2, RRR, no m/g/r  Resp: lungs CTA b/l  Abd: S/NT/ND  Ext: no edema, distal pulses intact    EKG: SR with bifasicular block. RBBB / LAFB  CXR: No gross pneumothorax. Lead in appropriate position      Assessment:   82 y/o f with cirrhosis, PAD s/p right carotid endartectomy, non Hodgkins Lymphoma (pulmonary mass s/p removal, and chemo), COPD, atrial fibrillation s/p ILR implant 10/8/21, GIB, DANIE occlusion with Watchman on 2/11/22([place]), bifasicular block (RBBB / LAFB), recently noted to have an episode of high grade AVB on her ILR remote transmission. Pt is now status post uncomplicated single chamber  MDT PPM (L-bundle lead) Implant and loop recorder removal. Access achieved via L sided axillary stick. Pt denies any complaints post procedure.        Plan:   Bedrest x 4 hours post implant, then OOB w/ assist & progress as tolerated.    Continued observation on telemetry overnight.   Cont Ancef 2gm IV q 8 hours x 2 additional doses to complete 24 hour course.   Pain control with PO analgesia PRN.   NO HEPARIN OR LOVENOX, INCLUDING PROPHYLACTIC/SUBCUT DOSING, UNTIL OTHERWISE ADVISED BY EP.   Resume home medications.   PA/Lat CXR and device check in AM.   Pending status overnight, anticipate d/c home tomorrow with outpt f/up in 1-2 weeks.

## 2024-04-12 NOTE — H&P PST ADULT - ASSESSMENT
Pt is a 84 y/o f with cirrhosis, PAD s/p right carotid endartectomy, non Hodgkins Lymphoma (pulmonary mass s/p removal, and chemo), COPD, atrial fibrillation s/p ILR implant 10/8/21, GIB, ADNIE occlusion with Watchman on 2/11/22([place]), bifasicular block (RBBB / LAFB), who presents today for an elective PPM implant with Dr. Jim. Pt recently noted with an episode of transient complete AV block for which she reported feeling lightheaded. Of note, pt was recently admitted to [facility] with complaints of fatigue and was diagnosed with PNA, GIB, and NSTEMI. Pt was treated with a course of IV antibiotics and also had diagnostic LHC for which she did not require any intervention. Pt was noted to to have right iliac occulusion, and was cleared by vascular for outpatient follow up. TTE at the time of her hospitalization also revealed a newly depressed EF (35-40%) with concern for Takotsubo cardiomyopathy vs LAD ischemia as well as potential LV thrombus. At time of assessment this afternoon pt reports mild SOB, which has been her baseline. Pt confirms last PO intake was > 8 hours PTA. Currently denies chest pain, SOB, palpitations, dizziness.     Plan:  1) DC PPM implant  - Labs / EKG  - Consent with attending  - Maintain NPO status pending procedure

## 2024-04-12 NOTE — H&P PST ADULT - HISTORY OF PRESENT ILLNESS
Pt is a 82 y/o f with cirrhosis, PAD s/p right carotid endartectomy, non Hodgkins Lymphoma (pulmonary mass s/p removal, and chemo), COPD, atrial fibrillation s/p ILR implant 10/8/21, GIB, DANIE occlusion with Watchman on 2/11/22([place]), bifasicular block (RBBB / LAFB), who presents today for an elective PPM implant with Dr. Jim. Pt recently noted with an episode of transient complete AV block for which she reported feeling lightheaded. Of note, pt was recently admitted to [facility] with complaints of fatigue and was diagnosed with PNA, GIB, and NSTEMI. Pt was treated with a course of IV antibiotics and also had diagnostic LHC for which she did not require any intervention. Pt was noted to to have right iliac occulusion, and was cleared by vascular for outpatient follow up. TTE at the time of her hospitalization also revealed a newly depressed EF (35-40%) with concern for Takotsubo cardiomyopathy vs LAD ischemia as well as potential LV thrombus. At time of assessment this afternoon pt reports mild SOB, which has been her baseline. Pt confirms last PO intake was > 8 hours PTA. Currently denies chest pain, SOB, palpitations, dizziness.     Cardiology Summary:  Stress Test: Stress 2016- probably normal, perfusion defect likely due to artifact. LVEF 79%    Echo: LELAND 2/9/23 LVEF 55-60%, mod LA enlargement. Watchman device well-seated with no leak or thrombus. no PFO, late bubbles suggestive of pulmonary venous shunt, mild AS, mod MR, mild TR,   TTE: 2/26/24: LVEF 35-40%.        ?    ?  ?  ?  She does have conduction disease with bifasicular block, and previosly on ILR she has had brief episodes of sinus pauses and second degree AVB, which is often preceded by brief sinus bradycardia and likely of vagal etiology- she denies any symptoms with these. At this point, will continue to monitor her ILR and for associated symptoms with bradycardia events- if she does have symptomatic bradycardia, a pacemaker may ultimately be indicated, however, will defer this in the absence of symptoms or more high grade AV block or prolonged pauses.

## 2024-04-12 NOTE — DISCHARGE NOTE PROVIDER - NSDCFUADDINST_GEN_ALL_CORE_FT
Follow up with Dr. Jim/Carey Arriola NP at [facility] in 2-4 weeks. Our office will contact you in 3-5 days to schedule this appointment. Please call [phone] with questions or concerns.

## 2024-04-12 NOTE — DISCHARGE NOTE PROVIDER - NSDCCPCAREPLAN_GEN_ALL_CORE_FT
PRINCIPAL DISCHARGE DIAGNOSIS  Diagnosis: Heart block AV complete  Assessment and Plan of Treatment:

## 2024-04-12 NOTE — DISCHARGE NOTE PROVIDER - NSDCMRMEDTOKEN_GEN_ALL_CORE_FT
atorvastatin 20 mg oral tablet: 1 tab(s) orally once a day (at bedtime)  budesonide 0.5 mg/2 mL inhalation suspension: 2 milliliter(s) by nebulizer 2 times a day  folic acid 1 mg oral tablet: 1 tab(s) orally once a day  furosemide 20 mg oral tablet: 1 tab(s) orally every other day  lisinopril 20 mg oral tablet: 1 tab(s) orally once a day  metoprolol succinate 25 mg oral tablet, extended release: 1 tab(s) orally once a day  potassium chloride 10 mEq oral tablet, extended release: 1 tab(s) orally once a day  Slow Fe (as elemental iron) 45 mg oral tablet, extended release: 1 tab(s) orally once a day *Patient unsure of strength- States takes Slow Iron  Yupelri 175 mcg/3 mL inhalation solution: 175 microgram(s) inhaled once a day

## 2024-04-13 ENCOUNTER — TRANSCRIPTION ENCOUNTER (OUTPATIENT)
Age: 84
End: 2024-04-13

## 2024-04-13 VITALS
HEART RATE: 90 BPM | SYSTOLIC BLOOD PRESSURE: 176 MMHG | RESPIRATION RATE: 16 BRPM | DIASTOLIC BLOOD PRESSURE: 83 MMHG | OXYGEN SATURATION: 96 % | TEMPERATURE: 98 F

## 2024-04-13 LAB
ANION GAP SERPL CALC-SCNC: 12 MMOL/L — SIGNIFICANT CHANGE UP (ref 5–17)
APTT BLD: 38.8 SEC — HIGH (ref 24.5–35.6)
BUN SERPL-MCNC: 21.4 MG/DL — HIGH (ref 8–20)
CALCIUM SERPL-MCNC: 8.7 MG/DL — SIGNIFICANT CHANGE UP (ref 8.4–10.5)
CHLORIDE SERPL-SCNC: 103 MMOL/L — SIGNIFICANT CHANGE UP (ref 96–108)
CO2 SERPL-SCNC: 25 MMOL/L — SIGNIFICANT CHANGE UP (ref 22–29)
CREAT SERPL-MCNC: 0.95 MG/DL — SIGNIFICANT CHANGE UP (ref 0.5–1.3)
EGFR: 60 ML/MIN/1.73M2 — SIGNIFICANT CHANGE UP
GLUCOSE BLDC GLUCOMTR-MCNC: 87 MG/DL — SIGNIFICANT CHANGE UP (ref 70–99)
GLUCOSE BLDC GLUCOMTR-MCNC: 97 MG/DL — SIGNIFICANT CHANGE UP (ref 70–99)
GLUCOSE SERPL-MCNC: 94 MG/DL — SIGNIFICANT CHANGE UP (ref 70–99)
HCT VFR BLD CALC: 28.4 % — LOW (ref 34.5–45)
HGB BLD-MCNC: 9.2 G/DL — LOW (ref 11.5–15.5)
INR BLD: 1.08 RATIO — SIGNIFICANT CHANGE UP (ref 0.85–1.18)
MAGNESIUM SERPL-MCNC: 2.1 MG/DL — SIGNIFICANT CHANGE UP (ref 1.6–2.6)
MCHC RBC-ENTMCNC: 29.9 PG — SIGNIFICANT CHANGE UP (ref 27–34)
MCHC RBC-ENTMCNC: 32.4 GM/DL — SIGNIFICANT CHANGE UP (ref 32–36)
MCV RBC AUTO: 92.2 FL — SIGNIFICANT CHANGE UP (ref 80–100)
PLATELET # BLD AUTO: 92 K/UL — LOW (ref 150–400)
POTASSIUM SERPL-MCNC: 4.3 MMOL/L — SIGNIFICANT CHANGE UP (ref 3.5–5.3)
POTASSIUM SERPL-SCNC: 4.3 MMOL/L — SIGNIFICANT CHANGE UP (ref 3.5–5.3)
PROTHROM AB SERPL-ACNC: 12 SEC — SIGNIFICANT CHANGE UP (ref 9.5–13)
RBC # BLD: 3.08 M/UL — LOW (ref 3.8–5.2)
RBC # FLD: 14.7 % — HIGH (ref 10.3–14.5)
SODIUM SERPL-SCNC: 140 MMOL/L — SIGNIFICANT CHANGE UP (ref 135–145)
WBC # BLD: 2.56 K/UL — LOW (ref 3.8–10.5)
WBC # FLD AUTO: 2.56 K/UL — LOW (ref 3.8–10.5)

## 2024-04-13 PROCEDURE — 86870 RBC ANTIBODY IDENTIFICATION: CPT

## 2024-04-13 PROCEDURE — C1769: CPT

## 2024-04-13 PROCEDURE — 86905 BLOOD TYPING RBC ANTIGENS: CPT

## 2024-04-13 PROCEDURE — 93005 ELECTROCARDIOGRAM TRACING: CPT

## 2024-04-13 PROCEDURE — 36415 COLL VENOUS BLD VENIPUNCTURE: CPT

## 2024-04-13 PROCEDURE — C1786: CPT

## 2024-04-13 PROCEDURE — 83735 ASSAY OF MAGNESIUM: CPT

## 2024-04-13 PROCEDURE — 86900 BLOOD TYPING SEROLOGIC ABO: CPT

## 2024-04-13 PROCEDURE — 86901 BLOOD TYPING SEROLOGIC RH(D): CPT

## 2024-04-13 PROCEDURE — C1898: CPT

## 2024-04-13 PROCEDURE — 86850 RBC ANTIBODY SCREEN: CPT

## 2024-04-13 PROCEDURE — 71045 X-RAY EXAM CHEST 1 VIEW: CPT

## 2024-04-13 PROCEDURE — 85730 THROMBOPLASTIN TIME PARTIAL: CPT

## 2024-04-13 PROCEDURE — 85610 PROTHROMBIN TIME: CPT

## 2024-04-13 PROCEDURE — 93010 ELECTROCARDIOGRAM REPORT: CPT

## 2024-04-13 PROCEDURE — 33207 INSERT HEART PM VENTRICULAR: CPT

## 2024-04-13 PROCEDURE — C1894: CPT

## 2024-04-13 PROCEDURE — C1892: CPT

## 2024-04-13 PROCEDURE — 71046 X-RAY EXAM CHEST 2 VIEWS: CPT

## 2024-04-13 PROCEDURE — 86880 COOMBS TEST DIRECT: CPT

## 2024-04-13 PROCEDURE — 82962 GLUCOSE BLOOD TEST: CPT

## 2024-04-13 PROCEDURE — C1889: CPT

## 2024-04-13 PROCEDURE — 80048 BASIC METABOLIC PNL TOTAL CA: CPT

## 2024-04-13 PROCEDURE — 71046 X-RAY EXAM CHEST 2 VIEWS: CPT | Mod: 26

## 2024-04-13 PROCEDURE — 93306 TTE W/DOPPLER COMPLETE: CPT

## 2024-04-13 PROCEDURE — 94640 AIRWAY INHALATION TREATMENT: CPT

## 2024-04-13 PROCEDURE — 85027 COMPLETE CBC AUTOMATED: CPT

## 2024-04-13 PROCEDURE — 33286 RMVL SUBQ CAR RHYTHM MNTR: CPT | Mod: 59

## 2024-04-13 RX ADMIN — Medication 2000 MILLIGRAM(S): at 06:29

## 2024-04-13 RX ADMIN — Medication 10 MILLIEQUIVALENT(S): at 08:22

## 2024-04-13 RX ADMIN — Medication 25 MILLIGRAM(S): at 05:22

## 2024-04-13 RX ADMIN — LISINOPRIL 20 MILLIGRAM(S): 2.5 TABLET ORAL at 05:22

## 2024-04-13 RX ADMIN — Medication 0.5 MILLIGRAM(S): at 10:01

## 2024-04-13 RX ADMIN — Medication 1 MILLIGRAM(S): at 08:22

## 2024-04-13 NOTE — DISCHARGE NOTE NURSING/CASE MANAGEMENT/SOCIAL WORK - PATIENT PORTAL LINK FT
You can access the FollowMyHealth Patient Portal offered by [facility] by registering at the following website: [email or web address]. By joining [employer]’s FollowMyHealth portal, you will also be able to view your health information using other applications (apps) compatible with our system.

## 2024-04-13 NOTE — PROGRESS NOTE ADULT - SUBJECTIVE AND OBJECTIVE BOX
Pt doing well POD #1 s/p MDT 1ch PPM implant (left bundle pacing). No overnight events noted, pt denies complaint.      EKG: NSR, bifascicular block   TELE: NSR, no events     PAST MEDICAL & SURGICAL HISTORY:  Carotid artery obstruction s/p left carotid surgery 2016   Lung mass s/p right lobectomy and chemo   Lumbar radiculopathy  Hyperlipidemia  Lymphoma  Former smoker  Unspecified atrial fibrillation  Hypertension  Hip fracture 2016, left hip  History of loop recorder 9/2021  Presence of Watchman left atrial appendage closure device    MEDICATIONS  (STANDING):  atorvastatin 20 milliGRAM(s) Oral at bedtime  buDESOnide    Inhalation Suspension 0.5 milliGRAM(s) Inhalation every 12 hours  dextrose 10% Bolus 125 milliLiter(s) IV Bolus once  dextrose 5%. 1000 milliLiter(s) (50 mL/Hr) IV Continuous <Continuous>  dextrose 5%. 1000 milliLiter(s) (100 mL/Hr) IV Continuous <Continuous>  dextrose 50% Injectable 12.5 Gram(s) IV Push once  dextrose 50% Injectable 25 Gram(s) IV Push once  folic acid 1 milliGRAM(s) Oral daily  furosemide    Tablet 20 milliGRAM(s) Oral once  glucagon  Injectable 1 milliGRAM(s) IntraMuscular once  insulin lispro (ADMELOG) corrective regimen sliding scale   SubCutaneous at bedtime  insulin lispro (ADMELOG) corrective regimen sliding scale   SubCutaneous three times a day before meals  lisinopril 20 milliGRAM(s) Oral daily  metoprolol succinate ER 25 milliGRAM(s) Oral daily  potassium chloride    Tablet ER 10 milliEquivalent(s) Oral daily    MEDICATIONS  (PRN):  acetaminophen     Tablet .. 650 milliGRAM(s) Oral every 6 hours PRN Moderate Pain (4 - 6)  ALPRAZolam 0.25 milliGRAM(s) Oral every 8 hours PRN anxeity / insomnia  dextrose Oral Gel 15 Gram(s) Oral once PRN Blood Glucose LESS THAN 70 milliGRAM(s)/deciliter  ondansetron Injectable 4 milliGRAM(s) IV Push every 6 hours PRN Nausea and/or Vomiting  oxyCODONE    IR 5 milliGRAM(s) Oral every 6 hours PRN Severe Pain (7 - 10)    Allergies:  latex (Rash)  No Known Drug Allergies    Intolerances    aspirin (Stomach Upset)              Vital Signs Last 24 Hrs  T(C): 36.2 (13 Apr 2024 08:26), Max: 36.7 (12 Apr 2024 13:13)  T(F): 97.2 (13 Apr 2024 08:26), Max: 98 (12 Apr 2024 13:13)  HR: 69 (13 Apr 2024 08:26) (65 - 79)  BP: 174/69 (13 Apr 2024 08:26) (102/63 - 174/69)  BP(mean): 76 (12 Apr 2024 13:13) (76 - 76)  RR: 18 (13 Apr 2024 08:26) (13 - 19)  SpO2: 94% (13 Apr 2024 08:26) (94% - 98%)    Parameters below as of 13 Apr 2024 08:26  Patient On (Oxygen Delivery Method): room air        Physical Exam:  Constitutional: NAD, AAOx3  Cardiovascular: +S1S2 RRR  Pulmonary: CTA b/l, unlabored  Abd: soft NTND +BS  Groins: C/D/I bilaterally; no bleeding, hematoma, edema  Extremities: no pedal edema, +distal pulses b/l  Neuro: CN II-XII grossly intact, WEBER x4     LABS:                        9.2    2.56  )-----------( 92       ( 13 Apr 2024 05:12 )             28.4     04-13    140  |  103  |  21.4<H>  ----------------------------<  94  4.3   |  25.0  |  0.95    Ca    8.7      13 Apr 2024 05:12  Mg     2.1     04-13      PT/INR - ( 13 Apr 2024 05:12 )   PT: 12.0 sec;   INR: 1.08 ratio         PTT - ( 13 Apr 2024 05:12 )  PTT:38.8 sec  Urinalysis Basic - ( 13 Apr 2024 05:12 )    Color: x / Appearance: x / SG: x / pH: x  Gluc: 94 mg/dL / Ketone: x  / Bili: x / Urobili: x   Blood: x / Protein: x / Nitrite: x   Leuk Esterase: x / RBC: x / WBC x   Sq Epi: x / Non Sq Epi: x / Bacteria: x        Assessment:   *** y/o ***. Now POD #1 s/p radiofrequency ablation of atrial fibrillation via b/l FV access.     Plan:   Pt instructed as to activity limitations - no lifting/pushing/pulling >10 lbs or strenuous exercise x 1 week.   Pt instructed as to access site care and f/up - written instructions included in d/c documents.  Outpt f/up in 2-4 weeks - office will contact pt to schedule.     Pt doing well POD #1 s/p MDT 1ch PPM implant (left bundle pacing). No overnight events noted, pt denies complaint.      EKG: NSR, bifascicular block   TELE: NSR, no events     PAST MEDICAL & SURGICAL HISTORY:  Carotid artery obstruction s/p left carotid surgery 2016   Lung mass s/p right lobectomy and chemo   Lumbar radiculopathy  Hyperlipidemia  Lymphoma  Former smoker  Unspecified atrial fibrillation  Hypertension  Hip fracture 2016, left hip  History of loop recorder 9/2021  Presence of Watchman left atrial appendage closure device    MEDICATIONS  (STANDING):  atorvastatin 20 milliGRAM(s) Oral at bedtime  buDESOnide    Inhalation Suspension 0.5 milliGRAM(s) Inhalation every 12 hours  dextrose 10% Bolus 125 milliLiter(s) IV Bolus once  dextrose 5%. 1000 milliLiter(s) (50 mL/Hr) IV Continuous <Continuous>  dextrose 5%. 1000 milliLiter(s) (100 mL/Hr) IV Continuous <Continuous>  dextrose 50% Injectable 12.5 Gram(s) IV Push once  dextrose 50% Injectable 25 Gram(s) IV Push once  folic acid 1 milliGRAM(s) Oral daily  furosemide    Tablet 20 milliGRAM(s) Oral once  glucagon  Injectable 1 milliGRAM(s) IntraMuscular once  insulin lispro (ADMELOG) corrective regimen sliding scale   SubCutaneous at bedtime  insulin lispro (ADMELOG) corrective regimen sliding scale   SubCutaneous three times a day before meals  lisinopril 20 milliGRAM(s) Oral daily  metoprolol succinate ER 25 milliGRAM(s) Oral daily  potassium chloride    Tablet ER 10 milliEquivalent(s) Oral daily    MEDICATIONS  (PRN):  acetaminophen     Tablet .. 650 milliGRAM(s) Oral every 6 hours PRN Moderate Pain (4 - 6)  ALPRAZolam 0.25 milliGRAM(s) Oral every 8 hours PRN anxeity / insomnia  dextrose Oral Gel 15 Gram(s) Oral once PRN Blood Glucose LESS THAN 70 milliGRAM(s)/deciliter  ondansetron Injectable 4 milliGRAM(s) IV Push every 6 hours PRN Nausea and/or Vomiting  oxyCODONE    IR 5 milliGRAM(s) Oral every 6 hours PRN Severe Pain (7 - 10)    Allergies:  latex (Rash)    Intolerances:  aspirin (Stomach Upset)    Vital Signs Last 24 Hrs  T(C): 36.2 (13 Apr 2024 08:26), Max: 36.7 (12 Apr 2024 13:13)  T(F): 97.2 (13 Apr 2024 08:26), Max: 98 (12 Apr 2024 13:13)  HR: 69 (13 Apr 2024 08:26) (65 - 79)  BP: 174/69 (13 Apr 2024 08:26) (102/63 - 174/69)  BP(mean): 76 (12 Apr 2024 13:13) (76 - 76)  RR: 18 (13 Apr 2024 08:26) (13 - 19)  SpO2: 94% (13 Apr 2024 08:26) (94% - 98%)    Parameters below as of 13 Apr 2024 08:26  Patient On (Oxygen Delivery Method): room air    Physical Exam:  Constitutional: NAD, AAOx3  Cardiovascular: +S1S2 RRR  Chest: LACW dermabond c/d/i; no swelling or hematoma appreciated; mild ecchymosis   Pulmonary: CTA b/l, unlabored  Abd: soft NTND +BS  Extremities: no pedal edema, +distal pulses b/l  Neuro: CN II-XII grossly intact, WEBER x4     LABS:                        9.2    2.56  )-----------( 92       ( 13 Apr 2024 05:12 )             28.4     04-13    140  |  103  |  21.4<H>  ----------------------------<  94  4.3   |  25.0  |  0.95    Ca    8.7      13 Apr 2024 05:12  Mg     2.1     04-13    PT/INR - ( 13 Apr 2024 05:12 )   PT: 12.0 sec;   INR: 1.08 ratio       PTT - ( 13 Apr 2024 05:12 )  PTT:38.8 sec  Urinalysis Basic - ( 13 Apr 2024 05:12 )    Color: x / Appearance: x / SG: x / pH: x  Gluc: 94 mg/dL / Ketone: x  / Bili: x / Urobili: x   Blood: x / Protein: x / Nitrite: x   Leuk Esterase: x / RBC: x / WBC x   Sq Epi: x / Non Sq Epi: x / Bacteria: x    Assessment:   83 year old female with cirrhosis, PAD s/p right carotid endarterectomy, non-Hodgkin's lymphoma s/p lung resection and chemo, COPD, atrial fibrillation s/p ILR 10/2021, GI bleed, s/p LAAO w/ Watchman device (Dr. Jim, 2/2022), and bifascicular block (RBBB & LAFB). Recently noted to have an episode of high grade AVB on her ILR remote transmission. Now POD#1 s/p MDT 1ch PPM (left bundle pacing), and ILR removal.     Plan:   Awaiting CXR PA/LAT and device check.   If CXR and device check are unremarkable, anticipate d/c home today.   NO HEPARIN OR LOVENOX, INCLUDING PROPHYLACTIC/SUBCUT DOSING, UNTIL OTHERWISE ADVISED BY EP.   Continue home medications.   Pt instructed as to activity limitations - no lifting/pushing/pulling >10 lbs or strenuous exercise x 1 week.   Pt instructed as to access site care and f/up - written instructions included in d/c documents.  Outpt f/up in 2-4 weeks - office will contact pt to schedule.

## 2024-04-13 NOTE — DISCHARGE NOTE NURSING/CASE MANAGEMENT/SOCIAL WORK - NSDCPEFALRISK_GEN_ALL_CORE
For information on Fall & Injury Prevention, visit: [email or web address] OR  [email or web address] OR  https://www.cdc.gov/steadi/patient.html

## 2024-04-13 NOTE — PROGRESS NOTE ADULT - ASSESSMENT
Addendum:   MDT device check - device function is normal, parameters confirmed.   CXR PA/LAT: lead appears appropriate and stable.     OK for d/c home.

## 2024-04-17 ENCOUNTER — APPOINTMENT (OUTPATIENT)
Dept: ELECTROPHYSIOLOGY | Facility: CLINIC | Age: 84
End: 2024-04-17

## 2024-08-28 NOTE — PATIENT PROFILE ADULT - NSPROMEDSHERBAL_GEN_A_NUR
"History and Physical -  [facility]  Elizabeth Holman 70 y.o. female MRN: [id number]    HPI: Elizabeth Holman is a 70 y.o. year old female who presents with screening colonoscopy      Review of Systems    Historical Information   Past Medical History:   Diagnosis Date    Hypertension     Osteopenia      Past Surgical History:   Procedure Laterality Date    MANDIBLE SURGERY       Social History   Social History     Substance and Sexual Activity   Alcohol Use Never     Social History     Substance and Sexual Activity   Drug Use Never     Social History     Tobacco Use   Smoking Status Former    Current packs/day: 0.00    Average packs/day: 0.3 packs/day for 20.0 years (5.0 ttl pk-yrs)    Types: Cigarettes    Start date:     Quit date:     Years since quittin.6    Passive exposure: Never   Smokeless Tobacco Never     Family History   Problem Relation Age of Onset    Kidney cancer Mother     Lung cancer Father     Lung cancer Brother     Prostate cancer Maternal Grandfather     Prostate cancer Paternal Grandfather        Meds/Allergies     Not in a hospital admission.    No Known Allergies    Objective     BP (!) 174/76   Pulse 77   Temp 97.5 °F (36.4 °C) (Temporal)   Resp 16   Ht 5' 1\" (1.549 m)   Wt 60.3 kg (133 lb)   LMP  (LMP Unknown)   SpO2 98%   BMI 25.13 kg/m²       PHYSICAL EXAM    Gen: NAD  CV: RRR  CHEST: Clear  ABD: soft, NT/ND  EXT: no edema  Neuro: AAO      ASSESSMENT/PLAN:  This is a 70 y.o. year old female here for  screening colonoscopy    PLAN:   Procedure: colonoscopy      " no

## 2024-09-24 ENCOUNTER — APPOINTMENT (OUTPATIENT)
Dept: CARDIOLOGY | Facility: CLINIC | Age: 84
End: 2024-09-24
Payer: MEDICARE

## 2024-09-24 DIAGNOSIS — R09.89 OTHER SPECIFIED SYMPTOMS AND SIGNS INVOLVING THE CIRCULATORY AND RESPIRATORY SYSTEMS: ICD-10-CM

## 2024-09-24 PROCEDURE — G2211 COMPLEX E/M VISIT ADD ON: CPT

## 2024-09-24 PROCEDURE — 99214 OFFICE O/P EST MOD 30 MIN: CPT

## 2024-09-24 NOTE — REASON FOR VISIT
[Follow-Up - Clinic] : a clinic follow-up of [FreeTextEntry2] : Carotid stenosis [FreeTextEntry1] : 9/24/2024  Doing ok  Palp pedals on exam having some intermittent positional dizziness at times had c carotid duplex R moderate , L CEA patent.    3/26  Since last visit patient reports admitted to [facility] earlier this month  had coronary angio Dr. marsh, mild -moderate CAD, no stents placed however noted to have bilateral iliac disease with gradients on exam R DP diminished, Left DP is palp she has no rest pain or claudication    8/8/2023 Doing well no CVA, stroke, TIA or amaurosis Remains on statin  Duplex today shows patent L ICA and 16-49% R ICA not on antiplatlet therapy    82 F here for vascular eval Had carotid duplex that showd 16-49% Prior CEA she states LEFT sided, with Dr Christina Crump  at [facility] No CVA  Afib - Watchman and loop recorder- Dr. Jim NO  claudication No PPost prandial pains no wounds  She is not on antiplatelet therapy due to severe anemia She is on statin therapy

## 2024-09-24 NOTE — ASSESSMENT
[FreeTextEntry1] : Assessment: 1.  Carotid stenosis - mild bilaterally - L  CEA 2.  No Claudication  3.  Afib with Watchman and ILR  Plan 1.  Continue statin therapy 2.  Cant be on antiplatelet due to history of anemia with this 3.  Repeat carotid duplex in  Sept 2025 4.  No claudication symptoms, L DP is palp, R DP Palp.  no wounds or rest pain.    5.   Return in sept 2025 for continued vascular care 6.  Given no symptoms in the legs, no role for intervention of the iliac 7.  If dizzness continues, then nidhi ARMAS

## 2025-01-31 NOTE — DISCHARGE NOTE NURSING/CASE MANAGEMENT/SOCIAL WORK - NSDCPEPT PROEDMA_GEN_ALL_CORE
device     Safety is strongly encouraged.  You should not drive if sleepy, tired, distracted and/or fatigued.      Chest Xrays and blood work do not require appointments.  They are considered \"Walk-In\" services and can be obtained at either [facility] or [facility].  Blood work is performed at the Laboratory (Lab) from 08:00am - 04:00 pm (if applicable to your visit)    Yes

## 2025-02-25 NOTE — ED ADULT NURSE NOTE - SUICIDE SCREENING QUESTION 3
Physical Therapy Daily Treatment Note    Date:  2025    Patient Name:  Manuel Woody    :  1962  MRN: [id number]  Restrictions/Precautions:     Medical/Treatment Diagnosis Information:   Diagnosis: Z98.890 s/p scope R shoulder,  M75.21 biceps tendonitis R shoulder   Surgery 25  Insurance/Certification information:  PT Insurance Information: [place]  BCDEANDRE  Physician Information:   Taz Loya  Plan of care signed (Y/N):  N  Visit# / total visits: 3/10  Pain level: 0/10       Time In: 10:15 Time Out: 10:40    Progress Note: []  Yes  [x]  No  Next due by: Visit #10  Or by 25    Subjective:   Patient noting no pain currently.  Patient noting increased pain when sitting in the car to go to the  Dr.      Objective: PROM to the R shoulder and elbow, tightness and muscle guarding at end ROM.    Verbal cueing for sequencing and proper form.     Observation:   Test measurements:  PROM IR to 40, flexion to 105.  Abd: 60 deg    Exercises:   Exercise/Equipment Resistance/Repetitions Other comments   AROM R wrist 10x HEP         PROM R shoulder/elbow 15'         Pendulums  HEP  10x                                                 [x] Provided verbal/tactile cueing for activities related to strengthening, flexibility, endurance, ROM. (78000)  [] Provided verbal/tactile cueing for activities related to improving balance, coordination, kinesthetic sense, posture, motor skill, proprioception. (54167)    Therapeutic Activities:     [] Therapeutic activities, direct (one-on-one) patient contact (use of dynamic activities to improve functional performance). (55908)    Gait:   [] Provided training and instruction to the patient for ambulation re-education. (27128)    Self-Care/ADL's  [] Self-care/home management training and compensatory training, meal preparation, safety procedures, and instructions in use of assistive technology devices/adaptive equipment, direct one-on-one contact. (59672)    Home Exercise Program:  
Patient unable to complete

## 2025-04-07 ENCOUNTER — INPATIENT (INPATIENT)
Facility: HOSPITAL | Age: 85
LOS: 1 days | Discharge: HOME | DRG: 640 | End: 2025-04-09
Attending: FAMILY MEDICINE
Payer: MEDICARE

## 2025-04-07 VITALS
SYSTOLIC BLOOD PRESSURE: 209 MMHG | HEART RATE: 114 BPM | RESPIRATION RATE: 18 BRPM | TEMPERATURE: 97 F | OXYGEN SATURATION: 97 % | DIASTOLIC BLOOD PRESSURE: 99 MMHG | WEIGHT: 110.01 LBS

## 2025-04-07 DIAGNOSIS — R06.00 DYSPNEA, UNSPECIFIED: ICD-10-CM

## 2025-04-07 DIAGNOSIS — Z98.890 OTHER SPECIFIED POSTPROCEDURAL STATES: Chronic | ICD-10-CM

## 2025-04-07 DIAGNOSIS — R91.8 OTHER NONSPECIFIC ABNORMAL FINDING OF LUNG FIELD: Chronic | ICD-10-CM

## 2025-04-07 DIAGNOSIS — Z95.818 PRESENCE OF OTHER CARDIAC IMPLANTS AND GRAFTS: Chronic | ICD-10-CM

## 2025-04-07 DIAGNOSIS — S72.009A FRACTURE OF UNSPECIFIED PART OF NECK OF UNSPECIFIED FEMUR, INITIAL ENCOUNTER FOR CLOSED FRACTURE: Chronic | ICD-10-CM

## 2025-04-07 LAB
ALBUMIN SERPL ELPH-MCNC: 3.9 G/DL — SIGNIFICANT CHANGE UP (ref 3.3–5)
ALBUMIN SERPL ELPH-MCNC: 4.3 G/DL — SIGNIFICANT CHANGE UP (ref 3.3–5)
ALP SERPL-CCNC: 87 U/L — SIGNIFICANT CHANGE UP (ref 40–120)
ALP SERPL-CCNC: 87 U/L — SIGNIFICANT CHANGE UP (ref 40–120)
ALT FLD-CCNC: 27 U/L — SIGNIFICANT CHANGE UP (ref 12–78)
ALT FLD-CCNC: 28 U/L — SIGNIFICANT CHANGE UP (ref 12–78)
ANION GAP SERPL CALC-SCNC: 10 MMOL/L — SIGNIFICANT CHANGE UP (ref 5–17)
ANION GAP SERPL CALC-SCNC: 9 MMOL/L — SIGNIFICANT CHANGE UP (ref 5–17)
APPEARANCE UR: CLEAR — SIGNIFICANT CHANGE UP
APTT BLD: 37.4 SEC — HIGH (ref 24.5–35.6)
AST SERPL-CCNC: 23 U/L — SIGNIFICANT CHANGE UP (ref 15–37)
AST SERPL-CCNC: 30 U/L — SIGNIFICANT CHANGE UP (ref 15–37)
BASE EXCESS BLDA CALC-SCNC: -0.2 MMOL/L — SIGNIFICANT CHANGE UP (ref -2–3)
BASE EXCESS BLDA CALC-SCNC: -3.7 MMOL/L — LOW (ref -2–3)
BASOPHILS # BLD AUTO: 0.05 K/UL — SIGNIFICANT CHANGE UP (ref 0–0.2)
BASOPHILS # BLD AUTO: 0.09 K/UL — SIGNIFICANT CHANGE UP (ref 0–0.2)
BASOPHILS NFR BLD AUTO: 0.5 % — SIGNIFICANT CHANGE UP (ref 0–2)
BASOPHILS NFR BLD AUTO: 0.8 % — SIGNIFICANT CHANGE UP (ref 0–2)
BILIRUB SERPL-MCNC: 0.6 MG/DL — SIGNIFICANT CHANGE UP (ref 0.2–1.2)
BILIRUB SERPL-MCNC: 0.6 MG/DL — SIGNIFICANT CHANGE UP (ref 0.2–1.2)
BILIRUB UR-MCNC: NEGATIVE — SIGNIFICANT CHANGE UP
BLOOD GAS COMMENTS ARTERIAL: SIGNIFICANT CHANGE UP
BLOOD GAS COMMENTS ARTERIAL: SIGNIFICANT CHANGE UP
BUN SERPL-MCNC: 28 MG/DL — HIGH (ref 7–23)
BUN SERPL-MCNC: 29 MG/DL — HIGH (ref 7–23)
CALCIUM SERPL-MCNC: 8.7 MG/DL — SIGNIFICANT CHANGE UP (ref 8.5–10.1)
CALCIUM SERPL-MCNC: 9.6 MG/DL — SIGNIFICANT CHANGE UP (ref 8.5–10.1)
CHLORIDE SERPL-SCNC: 93 MMOL/L — LOW (ref 96–108)
CHLORIDE SERPL-SCNC: 93 MMOL/L — LOW (ref 96–108)
CO2 SERPL-SCNC: 23 MMOL/L — SIGNIFICANT CHANGE UP (ref 22–31)
CO2 SERPL-SCNC: 25 MMOL/L — SIGNIFICANT CHANGE UP (ref 22–31)
COLOR SPEC: YELLOW — SIGNIFICANT CHANGE UP
CREAT SERPL-MCNC: 1.1 MG/DL — SIGNIFICANT CHANGE UP (ref 0.5–1.3)
CREAT SERPL-MCNC: 1.1 MG/DL — SIGNIFICANT CHANGE UP (ref 0.5–1.3)
DIFF PNL FLD: NEGATIVE — SIGNIFICANT CHANGE UP
EGFR: 50 ML/MIN/1.73M2 — LOW
EOSINOPHIL # BLD AUTO: 0 K/UL — SIGNIFICANT CHANGE UP (ref 0–0.5)
EOSINOPHIL # BLD AUTO: 0.27 K/UL — SIGNIFICANT CHANGE UP (ref 0–0.5)
EOSINOPHIL NFR BLD AUTO: 0 % — SIGNIFICANT CHANGE UP (ref 0–6)
EOSINOPHIL NFR BLD AUTO: 2.3 % — SIGNIFICANT CHANGE UP (ref 0–6)
FLUAV AG NPH QL: SIGNIFICANT CHANGE UP
FLUBV AG NPH QL: SIGNIFICANT CHANGE UP
GAS PNL BLDA: SIGNIFICANT CHANGE UP
GAS PNL BLDA: SIGNIFICANT CHANGE UP
GLUCOSE SERPL-MCNC: 175 MG/DL — HIGH (ref 70–99)
GLUCOSE SERPL-MCNC: 246 MG/DL — HIGH (ref 70–99)
GLUCOSE UR QL: NEGATIVE MG/DL — SIGNIFICANT CHANGE UP
HCO3 BLDA-SCNC: 24 MMOL/L — SIGNIFICANT CHANGE UP (ref 21–28)
HCO3 BLDA-SCNC: 26 MMOL/L — SIGNIFICANT CHANGE UP (ref 21–28)
HCT VFR BLD CALC: 35.7 % — SIGNIFICANT CHANGE UP (ref 34.5–45)
HCT VFR BLD CALC: 37.4 % — SIGNIFICANT CHANGE UP (ref 34.5–45)
HGB BLD-MCNC: 12 G/DL — SIGNIFICANT CHANGE UP (ref 11.5–15.5)
HGB BLD-MCNC: 12.6 G/DL — SIGNIFICANT CHANGE UP (ref 11.5–15.5)
HOROWITZ INDEX BLDA+IHG-RTO: 60 — SIGNIFICANT CHANGE UP
IMM GRANULOCYTES NFR BLD AUTO: 0.3 % — SIGNIFICANT CHANGE UP (ref 0–0.9)
IMM GRANULOCYTES NFR BLD AUTO: 0.5 % — SIGNIFICANT CHANGE UP (ref 0–0.9)
INR BLD: 0.97 RATIO — SIGNIFICANT CHANGE UP (ref 0.85–1.16)
KETONES UR-MCNC: NEGATIVE MG/DL — SIGNIFICANT CHANGE UP
LACTATE SERPL-SCNC: 0.9 MMOL/L — SIGNIFICANT CHANGE UP (ref 0.7–2)
LACTATE SERPL-SCNC: 1 MMOL/L — SIGNIFICANT CHANGE UP (ref 0.7–2)
LEUKOCYTE ESTERASE UR-ACNC: NEGATIVE — SIGNIFICANT CHANGE UP
LYMPHOCYTES # BLD AUTO: 0.55 K/UL — LOW (ref 1–3.3)
LYMPHOCYTES # BLD AUTO: 28.4 % — SIGNIFICANT CHANGE UP (ref 13–44)
LYMPHOCYTES # BLD AUTO: 3.34 K/UL — HIGH (ref 1–3.3)
LYMPHOCYTES # BLD AUTO: 5.4 % — LOW (ref 13–44)
MAGNESIUM SERPL-MCNC: 1.5 MG/DL — LOW (ref 1.6–2.6)
MCHC RBC-ENTMCNC: 29.2 PG — SIGNIFICANT CHANGE UP (ref 27–34)
MCHC RBC-ENTMCNC: 29.3 PG — SIGNIFICANT CHANGE UP (ref 27–34)
MCHC RBC-ENTMCNC: 33.6 G/DL — SIGNIFICANT CHANGE UP (ref 32–36)
MCHC RBC-ENTMCNC: 33.7 G/DL — SIGNIFICANT CHANGE UP (ref 32–36)
MCV RBC AUTO: 86.8 FL — SIGNIFICANT CHANGE UP (ref 80–100)
MCV RBC AUTO: 87.3 FL — SIGNIFICANT CHANGE UP (ref 80–100)
MONOCYTES # BLD AUTO: 0.16 K/UL — SIGNIFICANT CHANGE UP (ref 0–0.9)
MONOCYTES # BLD AUTO: 0.52 K/UL — SIGNIFICANT CHANGE UP (ref 0–0.9)
MONOCYTES NFR BLD AUTO: 1.6 % — LOW (ref 2–14)
MONOCYTES NFR BLD AUTO: 4.4 % — SIGNIFICANT CHANGE UP (ref 2–14)
NEUTROPHILS # BLD AUTO: 7.5 K/UL — HIGH (ref 1.8–7.4)
NEUTROPHILS # BLD AUTO: 9.44 K/UL — HIGH (ref 1.8–7.4)
NEUTROPHILS NFR BLD AUTO: 63.8 % — SIGNIFICANT CHANGE UP (ref 43–77)
NEUTROPHILS NFR BLD AUTO: 92 % — HIGH (ref 43–77)
NITRITE UR-MCNC: NEGATIVE — SIGNIFICANT CHANGE UP
NRBC BLD AUTO-RTO: 0 /100 WBCS — SIGNIFICANT CHANGE UP (ref 0–0)
NRBC BLD AUTO-RTO: 0 /100 WBCS — SIGNIFICANT CHANGE UP (ref 0–0)
NT-PROBNP SERPL-SCNC: 1321 PG/ML — HIGH (ref 0–450)
NT-PROBNP SERPL-SCNC: 4402 PG/ML — HIGH (ref 0–450)
PCO2 BLDA: 55 MMHG — HIGH (ref 32–35)
PCO2 BLDA: 57 MMHG — HIGH (ref 32–35)
PH BLDA: 7.23 — LOW (ref 7.35–7.45)
PH BLDA: 7.29 — LOW (ref 7.35–7.45)
PH UR: 6.5 — SIGNIFICANT CHANGE UP (ref 5–8)
PLATELET # BLD AUTO: 225 K/UL — SIGNIFICANT CHANGE UP (ref 150–400)
PLATELET # BLD AUTO: 269 K/UL — SIGNIFICANT CHANGE UP (ref 150–400)
PO2 BLDA: 83 MMHG — SIGNIFICANT CHANGE UP (ref 83–108)
PO2 BLDA: 86 MMHG — SIGNIFICANT CHANGE UP (ref 83–108)
POTASSIUM SERPL-MCNC: 4.6 MMOL/L — SIGNIFICANT CHANGE UP (ref 3.5–5.3)
POTASSIUM SERPL-MCNC: 4.7 MMOL/L — SIGNIFICANT CHANGE UP (ref 3.5–5.3)
POTASSIUM SERPL-SCNC: 4.6 MMOL/L — SIGNIFICANT CHANGE UP (ref 3.5–5.3)
POTASSIUM SERPL-SCNC: 4.7 MMOL/L — SIGNIFICANT CHANGE UP (ref 3.5–5.3)
PROT SERPL-MCNC: 7.5 G/DL — SIGNIFICANT CHANGE UP (ref 6–8.3)
PROT SERPL-MCNC: 7.7 G/DL — SIGNIFICANT CHANGE UP (ref 6–8.3)
PROT UR-MCNC: SIGNIFICANT CHANGE UP MG/DL
PROTHROM AB SERPL-ACNC: 11.5 SEC — SIGNIFICANT CHANGE UP (ref 9.9–13.4)
RBC # BLD: 4.09 M/UL — SIGNIFICANT CHANGE UP (ref 3.8–5.2)
RBC # BLD: 4.31 M/UL — SIGNIFICANT CHANGE UP (ref 3.8–5.2)
RBC # FLD: 13.7 % — SIGNIFICANT CHANGE UP (ref 10.3–14.5)
RBC # FLD: 13.7 % — SIGNIFICANT CHANGE UP (ref 10.3–14.5)
RSV RNA NPH QL NAA+NON-PROBE: SIGNIFICANT CHANGE UP
SAO2 % BLDA: 97.3 % — SIGNIFICANT CHANGE UP (ref 94–98)
SAO2 % BLDA: 98.1 % — HIGH (ref 94–98)
SARS-COV-2 RNA SPEC QL NAA+PROBE: SIGNIFICANT CHANGE UP
SODIUM SERPL-SCNC: 125 MMOL/L — LOW (ref 135–145)
SODIUM SERPL-SCNC: 128 MMOL/L — LOW (ref 135–145)
SOURCE RESPIRATORY: SIGNIFICANT CHANGE UP
SP GR SPEC: 1.01 — SIGNIFICANT CHANGE UP (ref 1–1.03)
TROPONIN I, HIGH SENSITIVITY RESULT: 173.3 NG/L — HIGH
UROBILINOGEN FLD QL: 0.2 MG/DL — SIGNIFICANT CHANGE UP (ref 0.2–1)
WBC # BLD: 10.25 K/UL — SIGNIFICANT CHANGE UP (ref 3.8–10.5)
WBC # BLD: 11.76 K/UL — HIGH (ref 3.8–10.5)
WBC # FLD AUTO: 10.25 K/UL — SIGNIFICANT CHANGE UP (ref 3.8–10.5)
WBC # FLD AUTO: 11.76 K/UL — HIGH (ref 3.8–10.5)

## 2025-04-07 PROCEDURE — 86077 PHYS BLOOD BANK SERV XMATCH: CPT

## 2025-04-07 PROCEDURE — 93010 ELECTROCARDIOGRAM REPORT: CPT

## 2025-04-07 PROCEDURE — 71045 X-RAY EXAM CHEST 1 VIEW: CPT | Mod: 26

## 2025-04-07 PROCEDURE — 99291 CRITICAL CARE FIRST HOUR: CPT

## 2025-04-07 PROCEDURE — 99223 1ST HOSP IP/OBS HIGH 75: CPT

## 2025-04-07 PROCEDURE — 70450 CT HEAD/BRAIN W/O DYE: CPT | Mod: 26

## 2025-04-07 PROCEDURE — 71275 CT ANGIOGRAPHY CHEST: CPT | Mod: 26

## 2025-04-07 RX ORDER — CEFTRIAXONE 500 MG/1
1000 INJECTION, POWDER, FOR SOLUTION INTRAMUSCULAR; INTRAVENOUS ONCE
Refills: 0 | Status: COMPLETED | OUTPATIENT
Start: 2025-04-07 | End: 2025-04-07

## 2025-04-07 RX ORDER — NITROGLYCERIN 20 MG/G
5 OINTMENT TOPICAL
Qty: 50 | Refills: 0 | Status: DISCONTINUED | OUTPATIENT
Start: 2025-04-07 | End: 2025-04-07

## 2025-04-07 RX ORDER — FUROSEMIDE 10 MG/ML
20 INJECTION INTRAMUSCULAR; INTRAVENOUS ONCE
Refills: 0 | Status: COMPLETED | OUTPATIENT
Start: 2025-04-07 | End: 2025-04-07

## 2025-04-07 RX ORDER — CEFTRIAXONE 500 MG/1
1000 INJECTION, POWDER, FOR SOLUTION INTRAMUSCULAR; INTRAVENOUS EVERY 24 HOURS
Refills: 0 | Status: DISCONTINUED | OUTPATIENT
Start: 2025-04-08 | End: 2025-04-08

## 2025-04-07 RX ORDER — LORAZEPAM 4 MG/ML
1 VIAL (ML) INJECTION ONCE
Refills: 0 | Status: DISCONTINUED | OUTPATIENT
Start: 2025-04-07 | End: 2025-04-07

## 2025-04-07 RX ORDER — NITROGLYCERIN 20 MG/G
5 OINTMENT TOPICAL
Qty: 50 | Refills: 0 | Status: DISCONTINUED | OUTPATIENT
Start: 2025-04-07 | End: 2025-04-08

## 2025-04-07 RX ORDER — METHYLPREDNISOLONE ACETATE 80 MG/ML
40 INJECTION, SUSPENSION INTRA-ARTICULAR; INTRALESIONAL; INTRAMUSCULAR; SOFT TISSUE EVERY 6 HOURS
Refills: 0 | Status: DISCONTINUED | OUTPATIENT
Start: 2025-04-07 | End: 2025-04-08

## 2025-04-07 RX ORDER — IPRATROPIUM BROMIDE AND ALBUTEROL SULFATE .5; 2.5 MG/3ML; MG/3ML
3 SOLUTION RESPIRATORY (INHALATION) EVERY 6 HOURS
Refills: 0 | Status: DISCONTINUED | OUTPATIENT
Start: 2025-04-07 | End: 2025-04-08

## 2025-04-07 RX ORDER — ONDANSETRON HCL/PF 4 MG/2 ML
4 VIAL (ML) INJECTION ONCE
Refills: 0 | Status: COMPLETED | OUTPATIENT
Start: 2025-04-07 | End: 2025-04-07

## 2025-04-07 RX ORDER — METOCLOPRAMIDE HCL 10 MG
10 TABLET ORAL ONCE
Refills: 0 | Status: COMPLETED | OUTPATIENT
Start: 2025-04-07 | End: 2025-04-07

## 2025-04-07 RX ORDER — AZITHROMYCIN 250 MG
500 CAPSULE ORAL ONCE
Refills: 0 | Status: DISCONTINUED | OUTPATIENT
Start: 2025-04-07 | End: 2025-04-07

## 2025-04-07 RX ORDER — AZITHROMYCIN 250 MG
CAPSULE ORAL
Refills: 0 | Status: DISCONTINUED | OUTPATIENT
Start: 2025-04-07 | End: 2025-04-07

## 2025-04-07 RX ORDER — IPRATROPIUM BROMIDE AND ALBUTEROL SULFATE .5; 2.5 MG/3ML; MG/3ML
3 SOLUTION RESPIRATORY (INHALATION) ONCE
Refills: 0 | Status: COMPLETED | OUTPATIENT
Start: 2025-04-07 | End: 2025-04-07

## 2025-04-07 RX ORDER — FUROSEMIDE 10 MG/ML
40 INJECTION INTRAMUSCULAR; INTRAVENOUS ONCE
Refills: 0 | Status: COMPLETED | OUTPATIENT
Start: 2025-04-07 | End: 2025-04-07

## 2025-04-07 RX ORDER — DEXAMETHASONE 0.5 MG/1
10 TABLET ORAL ONCE
Refills: 0 | Status: COMPLETED | OUTPATIENT
Start: 2025-04-07 | End: 2025-04-07

## 2025-04-07 RX ORDER — DIPHENHYDRAMINE HCL 12.5MG/5ML
25 ELIXIR ORAL ONCE
Refills: 0 | Status: COMPLETED | OUTPATIENT
Start: 2025-04-07 | End: 2025-04-07

## 2025-04-07 RX ORDER — LABETALOL HYDROCHLORIDE 200 MG/1
10 TABLET, FILM COATED ORAL ONCE
Refills: 0 | Status: DISCONTINUED | OUTPATIENT
Start: 2025-04-07 | End: 2025-04-07

## 2025-04-07 RX ORDER — LORAZEPAM 4 MG/ML
0.1 VIAL (ML) INJECTION ONCE
Refills: 0 | Status: DISCONTINUED | OUTPATIENT
Start: 2025-04-07 | End: 2025-04-07

## 2025-04-07 RX ORDER — AZITHROMYCIN 250 MG
500 CAPSULE ORAL EVERY 24 HOURS
Refills: 0 | Status: DISCONTINUED | OUTPATIENT
Start: 2025-04-08 | End: 2025-04-08

## 2025-04-07 RX ADMIN — Medication 1000 MILLILITER(S): at 15:34

## 2025-04-07 RX ADMIN — FUROSEMIDE 20 MILLIGRAM(S): 10 INJECTION INTRAMUSCULAR; INTRAVENOUS at 21:02

## 2025-04-07 RX ADMIN — Medication 10 MILLIGRAM(S): at 16:55

## 2025-04-07 RX ADMIN — Medication 4 MILLIGRAM(S): at 15:35

## 2025-04-07 RX ADMIN — FUROSEMIDE 40 MILLIGRAM(S): 10 INJECTION INTRAMUSCULAR; INTRAVENOUS at 18:37

## 2025-04-07 RX ADMIN — METHYLPREDNISOLONE ACETATE 40 MILLIGRAM(S): 80 INJECTION, SUSPENSION INTRA-ARTICULAR; INTRALESIONAL; INTRAMUSCULAR; SOFT TISSUE at 23:07

## 2025-04-07 RX ADMIN — DEXAMETHASONE 102 MILLIGRAM(S): 0.5 TABLET ORAL at 16:56

## 2025-04-07 RX ADMIN — NITROGLYCERIN 1.5 MICROGRAM(S)/MIN: 20 OINTMENT TOPICAL at 21:03

## 2025-04-07 RX ADMIN — CEFTRIAXONE 100 MILLIGRAM(S): 500 INJECTION, POWDER, FOR SOLUTION INTRAMUSCULAR; INTRAVENOUS at 16:55

## 2025-04-07 RX ADMIN — Medication 25 MILLIGRAM(S): at 17:07

## 2025-04-07 RX ADMIN — IPRATROPIUM BROMIDE AND ALBUTEROL SULFATE 3 MILLILITER(S): .5; 2.5 SOLUTION RESPIRATORY (INHALATION) at 17:08

## 2025-04-07 RX ADMIN — Medication 1 MILLIGRAM(S): at 18:36

## 2025-04-07 NOTE — H&P ADULT - HISTORY OF PRESENT ILLNESS
84 F hypertension, hyperlipidemia, aortic stenosis, PAD   s/p right carotid endarterectomy, [place] Lymphoma (pulmonary mass s/p removal, chemo; last infusion fall 2021), COPD, pA.Fib s/p ILR implant 10/8/21, s/p watchman (2/11/22), cirrhosis, hx of prior GI bleed requiring blood transfusions     Presented with advancing shortness of breath, nausea, vomiting, weakness. after blood transfusion today   Patient has had prior transfusions without reaction.    In ED was hypoxic with impending failure, CXR with fluid overload, and high grade adventitious sounds, was treated for possible anaphylaxis reaction with steroids and started on BIPAP. Pt is also has HTN urgency with BP in the /100s. ICU consulted for the above. Pt was ordered for CTA and will XF to ICU for further management.

## 2025-04-07 NOTE — ED ADULT NURSE REASSESSMENT NOTE - NS ED NURSE REASSESS COMMENT FT1
Received report from Delmy DSOUZA . Bedside rounding revealed patient sleeping easily  awakened. On bi pap . on cardiac monitor.. Sinus tack on monitor  team aware..   Patient alert and orientated times 4  IV med lock , flushed with no resistance  Patient on perma- fit.  Patient resting comfortably.. Denied pain..  Call bell placed with patient ..

## 2025-04-07 NOTE — ED ADULT NURSE REASSESSMENT NOTE - PATIENT ON (OXYGEN DELIVERY METHOD)
BiPAP/CPAP Pt presents to ED c/o wound packing stuck inside the wound.  Pt states, he was seen & discharged here yesterday for I & D. pt states he was instructed to removed the packing after 24 hrs & the gauze inside broke.

## 2025-04-07 NOTE — ED ADULT NURSE NOTE - NSFALLFACTORS_ED_ALL_ED
Weakness Depth Of Tumor Invasion (For Histology): tumor not visualized (deep and peripheral margins are clear of tumor)

## 2025-04-07 NOTE — PATIENT PROFILE ADULT - FALL HARM RISK - HARM RISK INTERVENTIONS

## 2025-04-07 NOTE — ED ADULT NURSE NOTE - OBJECTIVE STATEMENT
Received pt in room T1, 84 yr/o female A+OX4, ambulatory at baseline, unable to ambulate at this time due to weakness. hx of COPD and anemia. pt presented to the ED C/O SOB and nausea that began today s/p iron transfusion. received pt on 6L NC satting at 100 respiration at 22. pt still endorsed feelings of difficulty breathing post medication administration. pt noted to be in sinus tach, MD mendiola and ICU team aware. pt is hypertensive, MD aware pending ICU consult.  pt started on bipap and states she feels improvement since starting bipap. PERRLA and facial symmetry noted. pt denies numbness and tingling peripheral extremities. abdomen is flat, soft, nontender; denies nausea, vomiting, diarrhea, and constipation. pt has active and passive ROM of all extremities, no obvious joint deformities noted. skin is clean dry and intact. right AC 20g placed, labs drawn and sent meds given as ordered. pt has a un-accessed right chest wall port.

## 2025-04-07 NOTE — PROVIDER CONTACT NOTE (EICU) - SITUATION
84 year old female who presents with acute hypoxemic/hypercapnic respiratory failure shortly following blood transfusion.    Patient with elevated BNP, findings consistent with pulmonary edema and pleural effusions on CT, with hypertension.  Findings likely related to transfusion associated circulatory overload. TRALI less likely.

## 2025-04-07 NOTE — CONSULT NOTE ADULT - SUBJECTIVE AND OBJECTIVE BOX
Date of entry of this note is equal to date of services rendered    BRIEF HOSPITAL COURSE: 84F with HTN, HLD, AS, PAD s/p R carotid endarterectomy, non hodgkins lymphoma s/p wedge resection, chemo (last infusion fall ) COPD, pAF s/p ILR 10/21, s/p watchman , cirrhosis s/p GIB in the past, anemia receiving transfusions outpt who presented today after blood transfusion where she started to develop N/V and shortness of breath post transfusion in her car. She notes she has never had a reaction with prior transfusions.   In the ED pt is hypoxic with increased WOB, CXR appears with fluid overload, lungs sound rhonchorous, was treated for possible anaphylaxis reaction with steroids and started on BIPAP. Pt is also has HTN urgency with BP in the /100s. ICU consulted for the above. Pt was ordered for CTA and will XF to ICU for further management.      PAST MEDICAL & SURGICAL HISTORY:  Carotid artery obstruction, left  had carotid surgery 2016      Lung mass  RLL, , had RL lobectomy & chemotherapy      Hip fracture  left femur, had pins placed in 2016      Lumbar radiculopathy      Hyperlipidemia      Lymphoma      Former smoker      Unspecified atrial fibrillation      Hypertension      Hip fracture  , left hip      Right lower lobe lung mass  Right lower lobectomy , chemo till 2017      Status post carotid surgery  left carotid artery stenosis       History of loop recorder  placed 2021      Presence of Watchman left atrial appendage closure device      Allergies    No Known Drug Allergies  latex (Rash)    Intolerances    aspirin (Stomach Upset)    FAMILY HISTORY:  FH: diabetes mellitus (Father)      REVIEW OF SYSTEMS:     [ ] A ten-point review of systems was otherwise negative except as noted.  [X] Due to altered mental status/intubation, subjective information were not able to be obtained from the patient. History was obtained, to the extent possible, from review of the chart and collateral sources of information.      Vital Signs Last 24 Hrs  T(C): 36.4 (2025 20:53), Max: 36.4 (2025 20:53)  T(F): 97.6 (2025 20:53), Max: 97.6 (2025 20:53)  HR: 124 (2025 20:44) (98 - 135)  BP: 168/88 (2025 20:44) (168/88 - 216/105)  BP(mean): 109 (2025 20:44) (109 - 113)  RR: 18 (2025 20:44) (14 - 22)  SpO2: 91% (2025 20:44) (91% - 100%)    Parameters below as of 2025 20:44  Patient On (Oxygen Delivery Method): nasal cannula  O2 Flow (L/min): 5    ABG - ( 2025 18:48 )  pH, Arterial: 7.23  pH, Blood: x     /  pCO2: 57    /  pO2: 86    / HCO3: 24    / Base Excess: -3.7  /  SaO2: 97.3          I&O's Detail      LABS:                        12.0   11.76 )-----------( 225      ( 2025 15:09 )             35.7     04-07    125[L]  |  93[L]  |  28[H]  ----------------------------<  175[H]  4.7   |  23  |  1.10    Ca    9.6      2025 15:09    TPro  7.7  /  Alb  4.3  /  TBili  0.6  /  DBili  x   /  AST  30  /  ALT  28  /  AlkPhos  87  04-07       PT/INR - ( 2025 15:09 )   PT: 11.5 sec;   INR: 0.97 ratio         PTT - ( 2025 15:09 )  PTT:37.4 sec Urinalysis Basic - ( 2025 19:52 )    Color: Yellow / Appearance: Clear / S.008 / pH: x  Gluc: x / Ketone: Negative mg/dL  / Bili: Negative / Urobili: 0.2 mg/dL   Blood: x / Protein: Trace mg/dL / Nitrite: Negative   Leuk Esterase: Negative / RBC: x / WBC x   Sq Epi: x / Non Sq Epi: x / Bacteria: x      PHYSICAL EXAM:  General: Ill appearing, thin elderly female  HEENT: Pupils equal, reactive to light.  Symmetric.  PULM: rhonchi b/l  CVS: Regular rate and rhythm  ABD: Soft, nondistended, nontender  EXT: No edema, nontender, Warm   NEURO: Alert, oriented. Sensation intact. No focal deficits.      RADIOLOGY:     < from: CT Angio Chest PE Protocol w/ IV Cont (25 @ 20:17) >  IMPRESSION:.    No pulmonary embolism.    Extensive consolidation throughout the lungs bilaterally.    Small left pleural effusion and left-sided interlobular septal thickening.    Diagnostic considerations include transfusion related acute lung injury   (TRALI) and infection.    --- End of Report ---    < end of copied text >               Date of entry of this note is equal to date of services rendered    BRIEF HOSPITAL COURSE: 84F with HTN, HLD, AS, PAD s/p R carotid endarterectomy, non hodgkins lymphoma s/p wedge resection, chemo (last infusion fall ) COPD, pAF s/p ILR 10/21, s/p watchman , cirrhosis s/p GIB in the past, anemia receiving transfusions outpt who presented today after iron transfusion where she started to develop N/V and shortness of breath post transfusion in her car. She notes she has never had a reaction with prior transfusions.   In the ED pt is hypoxic with increased WOB, CXR appears with fluid overload, lungs sound rhonchorous, was treated for possible anaphylaxis reaction with steroids and started on BIPAP. Pt is also has HTN urgency with BP in the /100s. ICU consulted for the above. Pt was ordered for CTA and will XF to ICU for further management.      PAST MEDICAL & SURGICAL HISTORY:  Carotid artery obstruction, left  had carotid surgery 2016      Lung mass  RLL, , had RL lobectomy & chemotherapy      Hip fracture  left femur, had pins placed in 2016      Lumbar radiculopathy      Hyperlipidemia      Lymphoma      Former smoker      Unspecified atrial fibrillation      Hypertension      Hip fracture  , left hip      Right lower lobe lung mass  Right lower lobectomy , chemo till 2017      Status post carotid surgery  left carotid artery stenosis       History of loop recorder  placed 2021      Presence of Watchman left atrial appendage closure device      Allergies    No Known Drug Allergies  latex (Rash)    Intolerances    aspirin (Stomach Upset)    FAMILY HISTORY:  FH: diabetes mellitus (Father)      REVIEW OF SYSTEMS:     [ ] A ten-point review of systems was otherwise negative except as noted.  [X] Due to altered mental status/intubation, subjective information were not able to be obtained from the patient. History was obtained, to the extent possible, from review of the chart and collateral sources of information.      Vital Signs Last 24 Hrs  T(C): 36.4 (2025 20:53), Max: 36.4 (2025 20:53)  T(F): 97.6 (2025 20:53), Max: 97.6 (2025 20:53)  HR: 124 (2025 20:44) (98 - 135)  BP: 168/88 (2025 20:44) (168/88 - 216/105)  BP(mean): 109 (2025 20:44) (109 - 113)  RR: 18 (2025 20:44) (14 - 22)  SpO2: 91% (2025 20:44) (91% - 100%)    Parameters below as of 2025 20:44  Patient On (Oxygen Delivery Method): nasal cannula  O2 Flow (L/min): 5    ABG - ( 2025 18:48 )  pH, Arterial: 7.23  pH, Blood: x     /  pCO2: 57    /  pO2: 86    / HCO3: 24    / Base Excess: -3.7  /  SaO2: 97.3          I&O's Detail      LABS:                        12.0   11.76 )-----------( 225      ( 2025 15:09 )             35.7     04-07    125[L]  |  93[L]  |  28[H]  ----------------------------<  175[H]  4.7   |  23  |  1.10    Ca    9.6      2025 15:09    TPro  7.7  /  Alb  4.3  /  TBili  0.6  /  DBili  x   /  AST  30  /  ALT  28  /  AlkPhos  87  04-07       PT/INR - ( 2025 15:09 )   PT: 11.5 sec;   INR: 0.97 ratio         PTT - ( 2025 15:09 )  PTT:37.4 sec Urinalysis Basic - ( 2025 19:52 )    Color: Yellow / Appearance: Clear / S.008 / pH: x  Gluc: x / Ketone: Negative mg/dL  / Bili: Negative / Urobili: 0.2 mg/dL   Blood: x / Protein: Trace mg/dL / Nitrite: Negative   Leuk Esterase: Negative / RBC: x / WBC x   Sq Epi: x / Non Sq Epi: x / Bacteria: x      PHYSICAL EXAM:  General: Ill appearing, thin elderly female  HEENT: Pupils equal, reactive to light.  Symmetric.  PULM: rhonchi b/l  CVS: Regular rate and rhythm  ABD: Soft, nondistended, nontender  EXT: No edema, nontender, Warm   NEURO: Alert, oriented. Sensation intact. No focal deficits.      RADIOLOGY:     < from: CT Angio Chest PE Protocol w/ IV Cont (25 @ 20:17) >  IMPRESSION:.    No pulmonary embolism.    Extensive consolidation throughout the lungs bilaterally.    Small left pleural effusion and left-sided interlobular septal thickening.    Diagnostic considerations include transfusion related acute lung injury   (TRALI) and infection.    --- End of Report ---    < end of copied text >

## 2025-04-07 NOTE — ED ADULT NURSE REASSESSMENT NOTE - NSFALLHARMRISKINTERV_ED_ALL_ED

## 2025-04-07 NOTE — H&P ADULT - ASSESSMENT
In the ED pt is hypoxic with increased WOB, CXR appears with fluid overload, lungs sound rhonchorous, was treated for possible anaphylaxis reaction with steroids and started on BIPAP. Pt is also has HTN urgency with BP in the /100s. ICU consulted for the above. Pt was ordered for CTA and will XF to ICU for further management.    -transfer to ICU setting  -possible nitroglycein infusion for uncontrolled and Urgent hypertension  -methylprednisolone  -nebs BIPAP  -oxygen therapy  -likely repeat chest xry  -demand ischemia, malignant hypertension and tachycardia with hx of a. fib  -TTE pending  -AM labs  -case discussed with ED attending and family  -reassess dvt prophylaxis in am

## 2025-04-07 NOTE — ED PROVIDER NOTE - PHYSICAL EXAMINATION
Vital signs as available reviewed.  General:  Actively vomiting.  Head:  Normocephalic, atraumatic.  Eyes:  Conjunctiva pink, no icterus.  Cardiovascular:  tachycardia.  Respiratory:  bilateral crackles at bases.  Abdomen:  Soft, non-tender.  Musculoskeletal:  No obvious deformity.  Neurologic: Alert and oriented, moving all extremities.  Skin:  Warm and dry.

## 2025-04-07 NOTE — ED PROVIDER NOTE - CARE PLAN
Principal Discharge DX:	Dyspnea  Secondary Diagnosis:	Tachycardia  Secondary Diagnosis:	Rash  Secondary Diagnosis:	Hypertension   1

## 2025-04-07 NOTE — ED PROVIDER NOTE - OBJECTIVE STATEMENT
84 F hypertension, hyperlipidemia, aortic stenosis, PAD s/p right carotid endarterectomy, non Hodgkins Lymphoma (pulmonary mass s/p removal, chemo; last infusion fall 2021), COPD, pAF s/p ILR implant 10/8/21, s/p watchman (2/11/22), cirrhosis, hx of prior GI bleed requiring blood transfusions here complaining of shortness of breath, nausea, vomiting, weakness. patient reports she had blood transfusion today then when walking out to the car started feeling shortness of breath then developed nausea, vomiting. patient has had prior . 84 F hypertension, hyperlipidemia, aortic stenosis, PAD s/p right carotid endarterectomy, non Hodgkins Lymphoma (pulmonary mass s/p removal, chemo; last infusion fall 2021), COPD, pAF s/p ILR implant 10/8/21, s/p watchman (2/11/22), cirrhosis, hx of prior GI bleed requiring blood transfusions here complaining of shortness of breath, nausea, vomiting, weakness. patient reports she had blood transfusion today then when walking out to the car started feeling shortness of breath then developed nausea, vomiting. patient has had prior transfusions without reaction.

## 2025-04-07 NOTE — ED ADULT TRIAGE NOTE - CHIEF COMPLAINT QUOTE
nausea vomiting short of breath High Dose Vitamin A Counseling: Side effects reviewed, pt to contact office should one occur.

## 2025-04-07 NOTE — H&P ADULT - NSHPLABSRESULTS_GEN_ALL_CORE
[employer] R[employer] personally reviewed  of note...  absolute neutophilia  severe hyponatremia  troponin abnormal 173  BNP abnormal   altered abg  negative respiratory panel    ekg reviewed and comapred

## 2025-04-07 NOTE — PROVIDER CONTACT NOTE (EICU) - RECOMMENDATIONS
Admit to ICU  Supportive care  Bilevel support  Monitor blood gas   Diuresis - maintain negative fluid balance.  Discussed with ICU PA.

## 2025-04-07 NOTE — CONSULT NOTE ADULT - ASSESSMENT
84F with HTN, HLD, AS, PAD s/p R carotid endarterectomy, non hodgkins lymphoma s/p wedge resection, chemo (last infusion fall 2021) COPD, pAF s/p ILR 10/21, s/p watchman 2/22, cirrhosis s/p GIB in the past, anemia receiving transfusions outpt who presented today after blood transfusion where she started to develop N/V and shortness of breath post transfusion in her car. She notes she has never had a reaction with prior transfusions.    In the ED pt is in respiratory distress with increased WOB, CXR appears with fluid overload, lungs sound rhonchorous, was treated for possible anaphylaxis reaction with steroids and started on BIPAP. Pt is also has HTN urgency with BP in the /100s. ICU consulted for the above. Pt was ordered for CTA and will XF to ICU for further management.    Problem List:  Acute Hypoxic respiratory failure  TACO?   AECOPD  Fluid overload  HTN urgency      - AHRF likely multifactorial AECOPD vs TACO? Will Tx with BIPAP 12/7 60% fio2, CXR appears volume overloaded, with elevated BNP 1321, will give 20mg of Lasix and monitor for response. Monitor UOP Strict I/Os. Will get follow up CXR in AM.  - Initial ABG with respiratory acidosis prior to NIV? 7.23/57/86/24, serial ABG overnight. Adjust for adequate minute ventilation/spo2.   - Will treat empirically for AECOPD with solumedrol 40mg Q6 for now, start empiric Abx with CTX/Azithro and send off legionella. Duonebs Q6 prn. RVP negative, Bcx sent in the ED.   -Will send off troponin and order TTE.   - Hypertensive crisis likely driven by volume overload? Will assess response with above, though might require nitroglycerine infusion to control BP gradually.   - CTA negative for PE though shows extensive consolidation b/l.   - CT Head negative for acute pathology  - NPO while on NIPPV  - GI ppx with Protonix    - Called patient daughter Geannine x2 without response, unable to leave voicemail d/t full mailbox.    Dispo: Critically ill requiring ICU level of care. FULL code. Case discussed and plan formulated with eICU attending.      CRITICAL CARE TIME SPENT: 38  minutes of critical care time spent providing medical care for patient's acute illness/conditions that impairs at least one vital organ system and/or poses a high risk of imminent or life threatening deterioration in the patient's condition. It includes time spent evaluating and treating the patient's acute illness as well as time spent reviewing labs, radiology, discussing goals of care with patient and/or patient's family, and discussing the case with a multidisciplinary team, in an effort to prevent further life threatening deterioration or end organ damage. This time is independent of any procedures performed.         84F with HTN, HLD, AS, PAD s/p R carotid endarterectomy, non hodgkins lymphoma s/p wedge resection, chemo (last infusion fall 2021) COPD, pAF s/p ILR 10/21, s/p watchman 2/22, cirrhosis s/p GIB in the past, anemia receiving transfusions outpt who presented today after blood transfusion where she started to develop N/V and shortness of breath post transfusion in her car. She notes she has never had a reaction with prior transfusions.    In the ED pt is in respiratory distress with increased WOB, CXR appears with fluid overload, lungs sound rhonchorous, was treated for possible anaphylaxis reaction with steroids and started on BIPAP. Pt is also has HTN urgency with BP in the /100s. ICU consulted for the above. Pt was ordered for CTA and will XF to ICU for further management.    Problem List:  Acute Hypoxic respiratory failure  TACO  AECOPD?  Fluid overload  HTN urgency      - AHRF likely multifactorial possible AECOPD vs likely TACO. Will Tx with BIPAP 12/7 60% fio2, CXR appears volume overloaded, with elevated BNP 1321, will give 20mg of Lasix and monitor for response. Monitor UOP Strict I/Os. Will get follow up CXR in AM.  - Initial ABG with respiratory acidosis prior to NIV 7.23/57/86/24, serial ABG overnight. Adjust NIV for adequate minute ventilation/spo2.   - Will treat empirically for AECOPD with solumedrol 40mg Q6 for now, wbc elevated, start empiric Abx with CTX/Azithro and send off legionella. Duonebs Q6 prn. RVP negative, Bcx sent in the ED.   - Will send off troponin and order TTE.   - Hypertensive crisis likely driven by volume overload? Will assess response with above, though might require nitroglycerine infusion to control BP gradually.   - CTA negative for PE though shows extensive consolidation b/l.   - CT Head negative for acute pathology  - NPO while on NIPPV  - GI ppx with Protonix    - Called patient daughter Lvei canas without response, unable to leave voicemail d/t full mailbox.    Dispo: Critically ill requiring ICU level of care. FULL code. Case discussed and plan formulated with eICU attending.      CRITICAL CARE TIME SPENT: 38  minutes of critical care time spent providing medical care for patient's acute illness/conditions that impairs at least one vital organ system and/or poses a high risk of imminent or life threatening deterioration in the patient's condition. It includes time spent evaluating and treating the patient's acute illness as well as time spent reviewing labs, radiology, discussing goals of care with patient and/or patient's family, and discussing the case with a multidisciplinary team, in an effort to prevent further life threatening deterioration or end organ damage. This time is independent of any procedures performed.         84F with HTN, HLD, AS, PAD s/p R carotid endarterectomy, non hodgkins lymphoma s/p wedge resection, chemo (last infusion fall 2021) COPD, pAF s/p ILR 10/21, s/p watchman 2/22, cirrhosis s/p GIB in the past, anemia receiving transfusions outpt who presented today after iron transfusion where she started to develop N/V and shortness of breath post transfusion in her car. She notes she has never had a reaction with prior transfusions.    In the ED pt is in respiratory distress with increased WOB, CXR appears with fluid overload, lungs sound rhonchorous, was treated for possible anaphylaxis reaction with steroids and started on BIPAP. Pt is also has HTN urgency with BP in the /100s. ICU consulted for the above. Pt was ordered for CTA and will XF to ICU for further management.    Problem List:  Acute Hypoxic respiratory failure  TACO?  AECOPD?  Fluid overload  HTN urgency      - AHRF likely multifactorial possible AECOPD vs TACO?. Will Tx with BIPAP 12/7 60% fio2, CXR appears volume overloaded, with elevated BNP 1321, will give 20mg of Lasix and monitor for response. Monitor UOP Strict I/Os. Will get follow up CXR in AM.  - Initial ABG with respiratory acidosis prior to NIV 7.23/57/86/24, serial ABG overnight. Adjust NIV for adequate minute ventilation/spo2.   - Will treat empirically for AECOPD with solumedrol 40mg Q6 for now, wbc elevated, start empiric Abx with CTX/Azithro and send off legionella. Duonebs Q6 prn. RVP negative, Bcx sent in the ED.   - Will send off troponin and order TTE.   - Hypertensive crisis likely driven by volume overload? Will assess response with above, though might require nitroglycerine infusion to control BP gradually.   - CTA negative for PE though shows extensive consolidation b/l.   - CT Head negative for acute pathology  - NPO while on NIPPV  - GI ppx with Protonix    - Called patient daughter Levi canas without response, unable to leave voicemail d/t full mailbox.    Dispo: Critically ill requiring ICU level of care. FULL code. Case discussed and plan formulated with eICU attending.      CRITICAL CARE TIME SPENT: 38  minutes of critical care time spent providing medical care for patient's acute illness/conditions that impairs at least one vital organ system and/or poses a high risk of imminent or life threatening deterioration in the patient's condition. It includes time spent evaluating and treating the patient's acute illness as well as time spent reviewing labs, radiology, discussing goals of care with patient and/or patient's family, and discussing the case with a multidisciplinary team, in an effort to prevent further life threatening deterioration or end organ damage. This time is independent of any procedures performed.

## 2025-04-07 NOTE — ED PROVIDER NOTE - CLINICAL SUMMARY MEDICAL DECISION MAKING FREE TEXT BOX
here with shortness of breath, nausea, vomiting, hypertension after iron transfusion. differential diagnosis inclusive of transfusion reaction v side effect, dehydration, electrolyte abnormality, PNA, viral illness. check labs, XR, treat symptoms / with steroids. discuss with oncology if able.

## 2025-04-07 NOTE — ED ADULT NURSE NOTE - NSFALLHARMRISKINTERV_ED_ALL_ED

## 2025-04-08 ENCOUNTER — RESULT REVIEW (OUTPATIENT)
Age: 85
End: 2025-04-08

## 2025-04-08 LAB
ANION GAP SERPL CALC-SCNC: 6 MMOL/L — SIGNIFICANT CHANGE UP (ref 5–17)
ANION GAP SERPL CALC-SCNC: 9 MMOL/L — SIGNIFICANT CHANGE UP (ref 5–17)
APPEARANCE UR: CLEAR — SIGNIFICANT CHANGE UP
BASOPHILS # BLD AUTO: 0.01 K/UL — SIGNIFICANT CHANGE UP (ref 0–0.2)
BASOPHILS NFR BLD AUTO: 0.2 % — SIGNIFICANT CHANGE UP (ref 0–2)
BILIRUB UR-MCNC: NEGATIVE — SIGNIFICANT CHANGE UP
BUN SERPL-MCNC: 30 MG/DL — HIGH (ref 7–23)
BUN SERPL-MCNC: 30 MG/DL — HIGH (ref 7–23)
CALCIUM SERPL-MCNC: 9.1 MG/DL — SIGNIFICANT CHANGE UP (ref 8.5–10.1)
CALCIUM SERPL-MCNC: 9.2 MG/DL — SIGNIFICANT CHANGE UP (ref 8.5–10.1)
CHLORIDE SERPL-SCNC: 92 MMOL/L — LOW (ref 96–108)
CHLORIDE SERPL-SCNC: 93 MMOL/L — LOW (ref 96–108)
CO2 SERPL-SCNC: 27 MMOL/L — SIGNIFICANT CHANGE UP (ref 22–31)
CO2 SERPL-SCNC: 29 MMOL/L — SIGNIFICANT CHANGE UP (ref 22–31)
COLOR SPEC: YELLOW — SIGNIFICANT CHANGE UP
CREAT SERPL-MCNC: 1.1 MG/DL — SIGNIFICANT CHANGE UP (ref 0.5–1.3)
CREAT SERPL-MCNC: 1.2 MG/DL — SIGNIFICANT CHANGE UP (ref 0.5–1.3)
DIFF PNL FLD: NEGATIVE — SIGNIFICANT CHANGE UP
EGFR: 45 ML/MIN/1.73M2 — LOW
EGFR: 45 ML/MIN/1.73M2 — LOW
EGFR: 50 ML/MIN/1.73M2 — LOW
EGFR: 50 ML/MIN/1.73M2 — LOW
EOSINOPHIL # BLD AUTO: 0 K/UL — SIGNIFICANT CHANGE UP (ref 0–0.5)
EOSINOPHIL NFR BLD AUTO: 0 % — SIGNIFICANT CHANGE UP (ref 0–6)
GLUCOSE SERPL-MCNC: 219 MG/DL — HIGH (ref 70–99)
GLUCOSE SERPL-MCNC: 232 MG/DL — HIGH (ref 70–99)
GLUCOSE UR QL: NEGATIVE MG/DL — SIGNIFICANT CHANGE UP
HCT VFR BLD CALC: 34.3 % — LOW (ref 34.5–45)
HGB BLD-MCNC: 11.8 G/DL — SIGNIFICANT CHANGE UP (ref 11.5–15.5)
IMM GRANULOCYTES NFR BLD AUTO: 0.4 % — SIGNIFICANT CHANGE UP (ref 0–0.9)
KETONES UR-MCNC: NEGATIVE MG/DL — SIGNIFICANT CHANGE UP
LEUKOCYTE ESTERASE UR-ACNC: NEGATIVE — SIGNIFICANT CHANGE UP
LYMPHOCYTES # BLD AUTO: 0.66 K/UL — LOW (ref 1–3.3)
LYMPHOCYTES # BLD AUTO: 12.4 % — LOW (ref 13–44)
MAGNESIUM SERPL-MCNC: 2.2 MG/DL — SIGNIFICANT CHANGE UP (ref 1.6–2.6)
MCHC RBC-ENTMCNC: 29.1 PG — SIGNIFICANT CHANGE UP (ref 27–34)
MCHC RBC-ENTMCNC: 34.4 G/DL — SIGNIFICANT CHANGE UP (ref 32–36)
MCV RBC AUTO: 84.7 FL — SIGNIFICANT CHANGE UP (ref 80–100)
MONOCYTES # BLD AUTO: 0.21 K/UL — SIGNIFICANT CHANGE UP (ref 0–0.9)
MONOCYTES NFR BLD AUTO: 3.9 % — SIGNIFICANT CHANGE UP (ref 2–14)
NEUTROPHILS # BLD AUTO: 4.43 K/UL — SIGNIFICANT CHANGE UP (ref 1.8–7.4)
NEUTROPHILS NFR BLD AUTO: 83.1 % — HIGH (ref 43–77)
NITRITE UR-MCNC: NEGATIVE — SIGNIFICANT CHANGE UP
NRBC BLD AUTO-RTO: 0 /100 WBCS — SIGNIFICANT CHANGE UP (ref 0–0)
PH UR: 6 — SIGNIFICANT CHANGE UP (ref 5–8)
PHOSPHATE SERPL-MCNC: 4.2 MG/DL — SIGNIFICANT CHANGE UP (ref 2.5–4.5)
PLATELET # BLD AUTO: 147 K/UL — LOW (ref 150–400)
POTASSIUM SERPL-MCNC: 3.9 MMOL/L — SIGNIFICANT CHANGE UP (ref 3.5–5.3)
POTASSIUM SERPL-MCNC: 4.4 MMOL/L — SIGNIFICANT CHANGE UP (ref 3.5–5.3)
POTASSIUM SERPL-SCNC: 3.9 MMOL/L — SIGNIFICANT CHANGE UP (ref 3.5–5.3)
POTASSIUM SERPL-SCNC: 4.4 MMOL/L — SIGNIFICANT CHANGE UP (ref 3.5–5.3)
PROT UR-MCNC: NEGATIVE MG/DL — SIGNIFICANT CHANGE UP
RBC # BLD: 4.05 M/UL — SIGNIFICANT CHANGE UP (ref 3.8–5.2)
RBC # FLD: 13.5 % — SIGNIFICANT CHANGE UP (ref 10.3–14.5)
SODIUM SERPL-SCNC: 128 MMOL/L — LOW (ref 135–145)
SODIUM SERPL-SCNC: 128 MMOL/L — LOW (ref 135–145)
SP GR SPEC: 1.02 — SIGNIFICANT CHANGE UP (ref 1–1.03)
TROPONIN I, HIGH SENSITIVITY RESULT: 403 NG/L — HIGH
UROBILINOGEN FLD QL: 0.2 MG/DL — SIGNIFICANT CHANGE UP (ref 0.2–1)
WBC # BLD: 5.33 K/UL — SIGNIFICANT CHANGE UP (ref 3.8–10.5)
WBC # FLD AUTO: 5.33 K/UL — SIGNIFICANT CHANGE UP (ref 3.8–10.5)

## 2025-04-08 PROCEDURE — 99291 CRITICAL CARE FIRST HOUR: CPT

## 2025-04-08 PROCEDURE — 99233 SBSQ HOSP IP/OBS HIGH 50: CPT

## 2025-04-08 PROCEDURE — 99233 SBSQ HOSP IP/OBS HIGH 50: CPT | Mod: GC

## 2025-04-08 PROCEDURE — 93306 TTE W/DOPPLER COMPLETE: CPT | Mod: 26

## 2025-04-08 RX ORDER — METOPROLOL SUCCINATE 50 MG/1
25 TABLET, EXTENDED RELEASE ORAL DAILY
Refills: 0 | Status: DISCONTINUED | OUTPATIENT
Start: 2025-04-08 | End: 2025-04-09

## 2025-04-08 RX ORDER — FUROSEMIDE 10 MG/ML
20 INJECTION INTRAMUSCULAR; INTRAVENOUS DAILY
Refills: 0 | Status: DISCONTINUED | OUTPATIENT
Start: 2025-04-08 | End: 2025-04-09

## 2025-04-08 RX ORDER — ENOXAPARIN SODIUM 100 MG/ML
40 INJECTION SUBCUTANEOUS EVERY 24 HOURS
Refills: 0 | Status: DISCONTINUED | OUTPATIENT
Start: 2025-04-08 | End: 2025-04-09

## 2025-04-08 RX ORDER — MAGNESIUM SULFATE 500 MG/ML
1 SYRINGE (ML) INJECTION ONCE
Refills: 0 | Status: COMPLETED | OUTPATIENT
Start: 2025-04-08 | End: 2025-04-08

## 2025-04-08 RX ORDER — FORMOTEROL FUMARATE DIHYDRATE 20 UG/2ML
2 SOLUTION RESPIRATORY (INHALATION)
Refills: 0 | DISCHARGE

## 2025-04-08 RX ORDER — TIOTROPIUM BROMIDE INHALATION SPRAY 3.12 UG/1
2 SPRAY, METERED RESPIRATORY (INHALATION) DAILY
Refills: 0 | Status: DISCONTINUED | OUTPATIENT
Start: 2025-04-08 | End: 2025-04-09

## 2025-04-08 RX ADMIN — Medication 100 GRAM(S): at 02:31

## 2025-04-08 RX ADMIN — Medication 250 MILLIGRAM(S): at 00:06

## 2025-04-08 RX ADMIN — METHYLPREDNISOLONE ACETATE 40 MILLIGRAM(S): 80 INJECTION, SUSPENSION INTRA-ARTICULAR; INTRALESIONAL; INTRAMUSCULAR; SOFT TISSUE at 05:08

## 2025-04-08 RX ADMIN — Medication 1 APPLICATION(S): at 05:08

## 2025-04-08 RX ADMIN — METOPROLOL SUCCINATE 25 MILLIGRAM(S): 50 TABLET, EXTENDED RELEASE ORAL at 17:23

## 2025-04-08 RX ADMIN — IPRATROPIUM BROMIDE AND ALBUTEROL SULFATE 3 MILLILITER(S): .5; 2.5 SOLUTION RESPIRATORY (INHALATION) at 00:56

## 2025-04-08 RX ADMIN — FUROSEMIDE 20 MILLIGRAM(S): 10 INJECTION INTRAMUSCULAR; INTRAVENOUS at 12:24

## 2025-04-08 RX ADMIN — ENOXAPARIN SODIUM 40 MILLIGRAM(S): 100 INJECTION SUBCUTANEOUS at 12:23

## 2025-04-08 RX ADMIN — Medication 40 MILLIGRAM(S): at 12:24

## 2025-04-08 RX ADMIN — IPRATROPIUM BROMIDE AND ALBUTEROL SULFATE 3 MILLILITER(S): .5; 2.5 SOLUTION RESPIRATORY (INHALATION) at 07:24

## 2025-04-08 NOTE — CAREGIVER ENGAGEMENT NOTE - CAREGIVER OUTREACH NOTES - FREE TEXT
CM spoke to dtr to explain role, transitions of care, and completion of CC assessment. All questions answered to the best of my abilities.  CM remains available throughout the hospital stay.

## 2025-04-08 NOTE — PROGRESS NOTE ADULT - SUBJECTIVE AND OBJECTIVE BOX
Patient is a 84y old  Female who presents with a chief complaint of hypoxic respiratory failure (08 Apr 2025 12:22)      Subjective:  INTERVAL HPI/OVERNIGHT EVENTS: Patient seen and examined at bedside.   Pt states breathing is much improved. She states she feels better but is scared that this happened and is worried that she doesn't know why. Currently sob improved. denies cough, chest pain. no fevers, chills, nausea, vomiting.     MEDICATIONS  (STANDING):  chlorhexidine 2% Cloths 1 Application(s) Topical <User Schedule>  enoxaparin Injectable 40 milliGRAM(s) SubCutaneous every 24 hours  fluticasone propionate/ salmeterol 250-50 MICROgram(s) Diskus 1 Dose(s) Inhalation two times a day  furosemide    Tablet 20 milliGRAM(s) Oral daily  metoprolol succinate ER 25 milliGRAM(s) Oral daily  pantoprazole  Injectable 40 milliGRAM(s) IV Push daily  tiotropium 2.5 MICROgram(s) Inhaler 2 Puff(s) Inhalation daily    MEDICATIONS  (PRN):      Allergies    No Known Drug Allergies  latex (Rash)    Intolerances    aspirin (Stomach Upset)      REVIEW OF SYSTEMS:  CONSTITUTIONAL: No fever or chills  RESPIRATORY: No cough, wheezing. SOB improved   CARDIOVASCULAR: No chest pain, palpitations  GASTROINTESTINAL: No abd pain, nausea, vomiting, or diarrhea  NEUROLOGICAL: no focal weakness or dizziness  MUSCULOSKELETAL: no myalgias     Objective:  Vital Signs Last 24 Hrs  T(C): 37 (08 Apr 2025 12:09), Max: 37 (08 Apr 2025 12:09)  T(F): 98.6 (08 Apr 2025 12:09), Max: 98.6 (08 Apr 2025 12:09)  HR: 85 (08 Apr 2025 15:00) (74 - 135)  BP: 126/55 (08 Apr 2025 15:00) (102/43 - 216/105)  BP(mean): 77 (08 Apr 2025 15:00) (62 - 124)  RR: 16 (08 Apr 2025 15:00) (14 - 31)  SpO2: 100% (08 Apr 2025 15:00) (91% - 100%)    Parameters below as of 08 Apr 2025 07:23  Patient On (Oxygen Delivery Method): 4L        GENERAL: tired appearing, uncomfortable   HEAD:  Atraumatic, Normocephalic  EYES: EOMI, conjunctiva and sclera clear  ENT: Moist mucous membranes  NECK: Supple, No JVD  CHEST/LUNG: + crackles . no accessory muscle use  HEART: Regular rate and rhythm; +murmur  ABDOMEN: Bowel sounds present; Soft, Nontender, Nondistended.  EXTREMITIES:  2+ Peripheral Pulses, brisk capillary refill. No clubbing, cyanosis, or edema  NERVOUS SYSTEM:  Alert & Oriented X3, speech clear. No deficits         LABS:                        11.8   5.33  )-----------( 147      ( 08 Apr 2025 05:30 )             34.3     CBC Full  -  ( 08 Apr 2025 05:30 )  WBC Count : 5.33 K/uL  Hemoglobin : 11.8 g/dL  Hematocrit : 34.3 %  Platelet Count - Automated : 147 K/uL  Mean Cell Volume : 84.7 fl  Mean Cell Hemoglobin : 29.1 pg  Mean Cell Hemoglobin Concentration : 34.4 g/dL  Auto Neutrophil # : x  Auto Lymphocyte # : x  Auto Monocyte # : x  Auto Eosinophil # : x  Auto Basophil # : x  Auto Neutrophil % : x  Auto Lymphocyte % : x  Auto Monocyte % : x  Auto Eosinophil % : x  Auto Basophil % : x    08 Apr 2025 14:40    128    |  93     |  30     ----------------------------<  232    4.4     |  29     |  1.20     Ca    9.1        08 Apr 2025 14:40  Phos  4.2       08 Apr 2025 05:30  Mg     2.2       08 Apr 2025 05:30    TPro  7.5    /  Alb  3.9    /  TBili  0.6    /  DBili  x      /  AST  23     /  ALT  27     /  AlkPhos  87     07 Apr 2025 21:20    PT/INR - ( 07 Apr 2025 15:09 )   PT: 11.5 sec;   INR: 0.97 ratio         PTT - ( 07 Apr 2025 15:09 )  PTT:37.4 sec  Urinalysis Basic - ( 08 Apr 2025 14:40 )    Color: x / Appearance: x / SG: x / pH: x  Gluc: 232 mg/dL / Ketone: x  / Bili: x / Urobili: x   Blood: x / Protein: x / Nitrite: x   Leuk Esterase: x / RBC: x / WBC x   Sq Epi: x / Non Sq Epi: x / Bacteria: x      CAPILLARY BLOOD GLUCOSE            Urinalysis with Rflx Culture (collected 04-07-25 @ 19:52)        RADIOLOGY & ADDITIONAL TESTS:    Personally reviewed.     Consultant(s) Notes Reviewed:  [x] YES  [ ] NO

## 2025-04-08 NOTE — PROGRESS NOTE ADULT - ASSESSMENT
84F with HTN, HLD, AS, PAD s/p R carotid endarterectomy, non hodgkins lymphoma s/p wedge resection, chemo (last infusion fall 2021) COPD, pAF s/p ILR 10/21, s/p watchman 2/22, cirrhosis s/p GIB in the past, anemia receiving transfusions outpt who presented today after IV iron transfusion where she started to develop N/V and shortness of breath post transfusion in her car. She notes she has never had a reaction with prior transfusions.  In the ED pt is in respiratory distress with increased WOB, CXR appears with fluid overload, lungs sound rhonchorous, was treated for possible anaphylaxis reaction with steroids and started on BIPAP. Pt is also has HTN urgency with BP in the /100s. ICU consulted for the above. Pt was ordered for CTA and will XF to ICU for further management.    # Acute Hypoxic respiratory failure  # Pulmonary edema  # HTN urgency  # Hyponatremia  # Valvular heart disease  # COPD    Neuro: No active issues    CV:   #HTN urgency  - improvement with IV labetalol and IV diuresis  - Restart home metoprolol succ for BP and pAFib  - Hold home lisinopril for now  - Initial trop 178 --> f/u repeat    #Valvular heart disease  - Possible flash pulm edema  - TTE (4/2024): EF 65-70%, mod LV diastolic dysfunction, mild LVH, mod MR, mod AS, mod TR, pulmonary artery pressure 51mmHg, mod pHTN  - F/u repeat TTE  - elevated pro-BNP x2  - Consult Cardiology (Dr. Watkins)    #pAFib  - not on AC, s/p Watchman procedure    Pulm:   #Acute hypoxic respiratory failure likely 2/2 pulmonary edema from transfusion and LV diastolic dysfunction with chronic pHTN vs anaphylaxis  - CTA chest: neg for PE, with extensive pulmonary edema, GGO, LLL consolidation  - s/p IV lasix 60mg in ED  - Cont home lasix 20mg qd  - Bipap off now, cont prn    #COPD  - Hold home budesonide and Yupelri  - Start spiriva and advair while inpatient    GI:  - NPO while on Bipap --> start DASH diet with 1500mL fluid restriction  - prn anti-emetics    Renal:   - Hyponatremia appears new compared to labs from 2024  - Unclear etiology, possibly related to overall fluid overload status  - s/p IV lasix, cont home PO lasix   - Follow I/Os  - F/u urine studies     ID:   - Hypoxia, no leukocytosis, afebrile  - Low concern for infectious process  - DC Ceftriaxone/Azithro     Endo:  - Elevated BG likely 2/2 steroids and stress state  - Cont to monitor, start LDSS if persists    Heme:  - Known BEE, gets regular iron transfusions, no prior reactions  - Transfusion may have precipitated pulmonary edema  - Consult Heme/Onc (Dr. Kennedy)    Dispo: Full code     84F with HTN, HLD, AS, PAD s/p R carotid endarterectomy, non hodgkins lymphoma s/p wedge resection, chemo (last infusion fall 2021) COPD, pAF s/p ILR 10/21, s/p watchman 2/22, cirrhosis s/p GIB in the past, anemia receiving transfusions outpt who presented today after IV iron transfusion where she started to develop N/V and shortness of breath post transfusion in her car. She notes she has never had a reaction with prior transfusions.  In the ED pt is in respiratory distress with increased WOB, CXR appears with fluid overload, lungs sound rhonchorous, was treated for possible anaphylaxis reaction with steroids and started on BIPAP. Pt is also has HTN urgency with BP in the /100s. ICU consulted for the above. Pt was ordered for CTA and will XF to ICU for further management.    # Acute Hypoxic respiratory failure  # Pulmonary edema  # HTN urgency  # Hyponatremia  # Valvular heart disease  # COPD    Neuro: No active issues    CV:   #HTN urgency  - improvement with IV labetalol and IV diuresis  - Restart home metoprolol succ for BP and pAFib  - Hold home lisinopril for now  - Initial trop 178 --> f/u repeat    #Valvular heart disease  - Possible flash pulm edema  - TTE (4/2024): EF 65-70%, mod LV diastolic dysfunction, mild LVH, mod MR, mod AS, mod TR, pulmonary artery pressure 51mmHg, mod pHTN  - F/u repeat TTE  - elevated pro-BNP x2  - Consult Cardiology (Dr. Watkins)    #pAFib  - not on AC, s/p Watchman procedure    Pulm:   #Acute hypoxic respiratory failure likely 2/2 pulmonary edema from transfusion and LV diastolic dysfunction with chronic pHTN vs anaphylaxis  - CTA chest: neg for PE, with extensive pulmonary edema, GGO, LLL consolidation  - s/p IV lasix 60mg in ED  - Cont home lasix 20mg qd  - Bipap off now, cont prn    #COPD  - Hold home budesonide and Yupelri  - Start spiriva and advair while inpatient    GI:  - NPO while on Bipap --> start DASH diet with 1500mL fluid restriction  - prn anti-emetics    Renal:   - Hyponatremia appears new compared to labs from 2024  - Unclear etiology, possibly related to overall fluid overload status  - s/p IV lasix, cont home PO lasix   - Follow I/Os  - F/u urine studies     ID:   - Hypoxia, no leukocytosis, afebrile  - Low concern for infectious process  - F/u blood cx  - DC Ceftriaxone/Azithro     Endo:  - Elevated BG likely 2/2 steroids and stress state  - Cont to monitor, start LDSS if persists    Heme:  - Known BEE, gets regular iron transfusions, no prior reactions  - Transfusion may have precipitated pulmonary edema  - Consult Heme/Onc (Dr. Kennedy)  - Lovenox for DVT prophylaxis. Known h/o GI bleed, monitor for dark stools.     Dispo: Full code     84F with HTN, HLD, AS, PAD s/p R carotid endarterectomy, non hodgkins lymphoma s/p wedge resection, chemo (last infusion fall 2021) COPD, pAF s/p ILR 10/21, s/p watchman 2/22, cirrhosis s/p GIB in the past, anemia receiving transfusions outpt who presented today after IV iron transfusion where she started to develop N/V and shortness of breath post transfusion in her car. She notes she has never had a reaction with prior transfusions.  In the ED pt is in respiratory distress with increased WOB, CXR appears with fluid overload, lungs sound rhonchorous, was treated for possible anaphylaxis reaction with steroids and started on BIPAP. Pt is also has HTN urgency with BP in the /100s. ICU consulted for the above. Pt was ordered for CTA and will XF to ICU for further management.    # Acute Hypoxic respiratory failure  # Pulmonary edema  # HTN urgency  # Hyponatremia  # Valvular heart disease  # COPD    Neuro: No active issues    CV:   #HTN urgency  - improvement with IV labetalol and IV diuresis  - Restart home metoprolol succ for BP and pAFib  - Hold home lisinopril for now  - Initial trop 178 --> f/u repeat    #Valvular heart disease  - Possible flash pulm edema  - TTE (4/2024): EF 65-70%, mod LV diastolic dysfunction, mild LVH, mod MR, mod AS, mod TR, pulmonary artery pressure 51mmHg, mod pHTN  - F/u repeat TTE  - elevated pro-BNP x2  - Consult Cardiology (Dr. Watkins)    #pAFib  - not on AC, s/p Watchman procedure    Pulm:   #Acute hypoxic respiratory failure likely 2/2 pulmonary edema from transfusion and LV diastolic dysfunction with chronic pHTN vs anaphylaxis  - CTA chest: neg for PE, with extensive pulmonary edema, GGO, LLL consolidation  - s/p IV lasix 60mg in ED  - Cont home lasix 20mg qd  - Bipap off now, cont prn    #COPD  - Hold home budesonide and Yupelri  - Start spiriva and advair while inpatient  - DC steroids    GI:  - NPO while on Bipap --> start DASH diet with 1500mL fluid restriction  - prn anti-emetics    Renal:   - Hyponatremia appears new compared to labs from 2024  - Unclear etiology, possibly related to overall fluid overload status  - s/p IV lasix, cont home PO lasix   - Follow I/Os  - F/u urine studies   - Repeat BMP at 14:00    ID:   - Hypoxia, no leukocytosis, afebrile  - Low concern for infectious process  - F/u blood cx  - DC Ceftriaxone/Azithro     Endo:  - Elevated BG likely 2/2 steroids and stress state  - Cont to monitor, start LDSS if persists    Heme:  - Known BEE, gets regular iron transfusions, no prior reactions  - Transfusion may have precipitated pulmonary edema  - Consult Heme/Onc (Dr. Kennedy group)  - Lovenox for DVT prophylaxis. Known h/o GI bleed, monitor for dark stools.     Dispo: Full code

## 2025-04-08 NOTE — PROGRESS NOTE ADULT - ATTENDING COMMENTS
85 yo woman with Hx htn, AS, PAD with Hx R CEA, NHL (no actively on treatment), afib s/p watchman and ILR, iron deficient anemia, presents with episode of wretching and respiratory distress which started after completion of an iron infusion.  Based on time course and her prior episode of similar symptoms 1 year ago, more suspicious for flash pulmonary edema rather than anaphylaxis to iron.  She is now much improved after lasix, BiPAP, and nitro gtt.    --mentating well  --acute puulmonary edema, hypoxemia improved, on NC  no off nitro  start maintenance lasix  d/c steroids, doubt anaphylaxis  --COPD, continue inhaled steroids and LAMA  --Afib controlled on BB, not on AC chronically  --normal renal function  hyponatremia, check urine lytes  --advance diet  --doubt infection, d/c Abx  --plan discussed with pt and daughter  --stable for remote tele

## 2025-04-08 NOTE — PROGRESS NOTE ADULT - SUBJECTIVE AND OBJECTIVE BOX
Interval Events: Patient was admitted yesterday for acute hypoxic respiratory failure 2/2 pulmonary edema following iron transfusion and HTN urgency. She improved with BIPAP, IV diuretics, and IV labetalol. Patient seen and examined at bedside this AM. She reports improved SOB, saturating well on NC. BP has normalized. Patient acknowledges a similar episode of acute onset SOB last year following a normal saline flush of her chemo-port. Additionally, reports decreased functional status due to IVAN despite PO diuretics at home.     Review of Systems:  Constitutional: No fever, chills, fatigue  Neuro: No headache, numbness, weakness  Resp: No cough, wheezing, +mild shortness of breath  CVS: No chest pain, palpitations, leg swelling  GI: No abdominal pain, nausea, vomiting, diarrhea   : No dysuria, frequency, incontinence  Skin: No itching, burning, rashes, or lesions   Msk: No joint pain or swelling  Psych: No depression, anxiety, mood swings    ICU Vital Signs Last 24 Hrs  T(C): 36.9 (08 Apr 2025 08:07), Max: 36.9 (07 Apr 2025 23:36)  T(F): 98.4 (08 Apr 2025 08:07), Max: 98.5 (07 Apr 2025 23:36)  HR: 76 (08 Apr 2025 07:23) (76 - 135)  BP: 159/67 (08 Apr 2025 06:00) (105/63 - 216/105)  BP(mean): 80 (08 Apr 2025 05:00) (76 - 124)  ABP: --  ABP(mean): --  RR: 18 (08 Apr 2025 06:00) (14 - 31)  SpO2: 100% (08 Apr 2025 07:23) (91% - 100%)    O2 Parameters below as of 08 Apr 2025 07:23  Patient On (Oxygen Delivery Method): 4L              04-07-25 @ 07:01  -  04-08-25 @ 07:00  --------------------------------------------------------  IN: 390.5 mL / OUT: 1700 mL / NET: -1309.5 mL        CAPILLARY BLOOD GLUCOSE      POCT Blood Glucose.: 131 mg/dL (07 Apr 2025 14:58)      I&O's Summary    07 Apr 2025 07:01  -  08 Apr 2025 07:00  --------------------------------------------------------  IN: 390.5 mL / OUT: 1700 mL / NET: -1309.5 mL        Physical Exam:   Gen: NAD, lying down in bed speaking in full sentences   Neuro: AOx3, responding to all questions appropriately  HEENT: PEERL, EOMI, moist mucous membranes  Resp: + bilateral crackles diffusely,   CVS:  Abd:  Ext:  Skin:     Meds:    furosemide    Tablet Oral    methylPREDNISolone sodium succinate Injectable IV Push    fluticasone propionate/ salmeterol 250-50 MICROgram(s) Diskus Inhalation  tiotropium 2.5 MICROgram(s) Inhaler Inhalation        enoxaparin Injectable SubCutaneous    pantoprazole  Injectable IV Push          chlorhexidine 2% Cloths Topical                              11.8   5.33  )-----------( 147      ( 08 Apr 2025 05:30 )             34.3       04-08    128[L]  |  92[L]  |  30[H]  ----------------------------<  219[H]  3.9   |  27  |  1.10    Ca    9.2      08 Apr 2025 05:30  Phos  4.2     04-08  Mg     2.2     04-08    TPro  7.5  /  Alb  3.9  /  TBili  0.6  /  DBili  x   /  AST  23  /  ALT  27  /  AlkPhos  87  04-07    Lactate 0.9           04-07 @ 21:20    Lactate 1.0           04-07 @ 15:09          PT/INR - ( 07 Apr 2025 15:09 )   PT: 11.5 sec;   INR: 0.97 ratio         PTT - ( 07 Apr 2025 15:09 )  PTT:37.4 sec  Urinalysis Basic - ( 08 Apr 2025 05:30 )    Color: x / Appearance: x / SG: x / pH: x  Gluc: 219 mg/dL / Ketone: x  / Bili: x / Urobili: x   Blood: x / Protein: x / Nitrite: x   Leuk Esterase: x / RBC: x / WBC x   Sq Epi: x / Non Sq Epi: x / Bacteria: x                  Radiology:    Bedside Ultrasound:    Tubes/Lines:      GLOBAL ISSUE/BEST PRACTICE:  Analgesia:  Sedation:  HOB elevation: Y  Stress ulcer prophylaxis:  VTE prophylaxis:  Glycemic control:  Nutrition:    CODE STATUS:        Interval Events: Patient was admitted yesterday for acute hypoxic respiratory failure 2/2 pulmonary edema following iron transfusion and HTN urgency. She improved with BIPAP, IV diuretics, and IV labetalol. Patient seen and examined at bedside this AM. She reports improved SOB, saturating well on NC. BP has normalized. Patient acknowledges a similar episode of acute onset SOB last year following a normal saline flush of her chemo-port. Additionally, reports decreased functional status due to IVAN despite PO diuretics at home.     Review of Systems:  Constitutional: No fever, chills, fatigue  Neuro: No headache, numbness, weakness  Resp: No cough, wheezing, +mild shortness of breath  CVS: No chest pain, palpitations, leg swelling  GI: No abdominal pain, nausea, vomiting, diarrhea   : No dysuria, frequency, incontinence  Skin: No itching, burning, rashes, or lesions   Msk: No joint pain or swelling  Psych: No depression, anxiety, mood swings    ICU Vital Signs Last 24 Hrs  T(C): 36.9 (08 Apr 2025 08:07), Max: 36.9 (07 Apr 2025 23:36)  T(F): 98.4 (08 Apr 2025 08:07), Max: 98.5 (07 Apr 2025 23:36)  HR: 76 (08 Apr 2025 07:23) (76 - 135)  BP: 159/67 (08 Apr 2025 06:00) (105/63 - 216/105)  BP(mean): 80 (08 Apr 2025 05:00) (76 - 124)  ABP: --  ABP(mean): --  RR: 18 (08 Apr 2025 06:00) (14 - 31)  SpO2: 100% (08 Apr 2025 07:23) (91% - 100%)    O2 Parameters below as of 08 Apr 2025 07:23  Patient On (Oxygen Delivery Method): 4L              04-07-25 @ 07:01  -  04-08-25 @ 07:00  --------------------------------------------------------  IN: 390.5 mL / OUT: 1700 mL / NET: -1309.5 mL        CAPILLARY BLOOD GLUCOSE      POCT Blood Glucose.: 131 mg/dL (07 Apr 2025 14:58)      I&O's Summary    07 Apr 2025 07:01  -  08 Apr 2025 07:00  --------------------------------------------------------  IN: 390.5 mL / OUT: 1700 mL / NET: -1309.5 mL        Physical Exam:   Gen: NAD, lying down in bed speaking in full sentences   Neuro: AOx3, responding to all questions appropriately  HEENT: PEERL, EOMI, moist mucous membranes  Resp: + bilateral crackles diffusely, no wheezing  CVS: RRR, +systolic murmur  Abd: soft, NT, ND  Ext: moving all 4 extremities equally, equal strength throughout   Skin: no lesions     Meds:    furosemide    Tablet Oral    methylPREDNISolone sodium succinate Injectable IV Push    fluticasone propionate/ salmeterol 250-50 MICROgram(s) Diskus Inhalation  tiotropium 2.5 MICROgram(s) Inhaler Inhalation        enoxaparin Injectable SubCutaneous    pantoprazole  Injectable IV Push          chlorhexidine 2% Cloths Topical                              11.8   5.33  )-----------( 147      ( 08 Apr 2025 05:30 )             34.3       04-08    128[L]  |  92[L]  |  30[H]  ----------------------------<  219[H]  3.9   |  27  |  1.10    Ca    9.2      08 Apr 2025 05:30  Phos  4.2     04-08  Mg     2.2     04-08    TPro  7.5  /  Alb  3.9  /  TBili  0.6  /  DBili  x   /  AST  23  /  ALT  27  /  AlkPhos  87  04-07    Lactate 0.9           04-07 @ 21:20    Lactate 1.0           04-07 @ 15:09          PT/INR - ( 07 Apr 2025 15:09 )   PT: 11.5 sec;   INR: 0.97 ratio         PTT - ( 07 Apr 2025 15:09 )  PTT:37.4 sec  Urinalysis Basic - ( 08 Apr 2025 05:30 )    Color: x / Appearance: x / SG: x / pH: x  Gluc: 219 mg/dL / Ketone: x  / Bili: x / Urobili: x   Blood: x / Protein: x / Nitrite: x   Leuk Esterase: x / RBC: x / WBC x   Sq Epi: x / Non Sq Epi: x / Bacteria: x                  Radiology:    CTA Chest (4/7):  No pulmonary embolism.    Extensive consolidation throughout the lungs bilaterally.    Small left pleural effusion and left-sided interlobular septal thickening.    Diagnostic considerations include transfusion related acute lung injury   (TRALI) and infection.        Bedside Ultrasound: N/A      Tubes/Lines: N/A      GLOBAL ISSUE/BEST PRACTICE:  Analgesia: N  Sedation: N  HOB elevation: Y  Stress ulcer prophylaxis: Y  VTE prophylaxis: Y  Glycemic control: Y  Nutrition: Y    CODE STATUS: Full code       Interval Events: Patient was admitted yesterday for acute hypoxic respiratory failure 2/2 pulmonary edema following iron transfusion and HTN urgency. She improved with BIPAP, IV diuretics, and IV labetalol. Patient seen and examined at bedside this AM. She reports improved SOB, saturating well on NC. BP has normalized. Patient acknowledges a similar episode of acute onset SOB last year following a normal saline flush of her chemo-port. Additionally, reports decreased functional status due to IVAN despite PO diuretics at home.     additional history obtained from pt, she had similar episode to this last year, that day she had a routine saline flush into mediport but no iron infusion, she had severe dyspnea, but no nausea and she had in this case      ICU Vital Signs Last 24 Hrs  T(C): 36.9 (08 Apr 2025 08:07), Max: 36.9 (07 Apr 2025 23:36)  T(F): 98.4 (08 Apr 2025 08:07), Max: 98.5 (07 Apr 2025 23:36)  HR: 76 (08 Apr 2025 07:23) (76 - 135)  BP: 159/67 (08 Apr 2025 06:00) (105/63 - 216/105)  BP(mean): 80 (08 Apr 2025 05:00) (76 - 124)  ABP: --  ABP(mean): --  RR: 18 (08 Apr 2025 06:00) (14 - 31)  SpO2: 100% (08 Apr 2025 07:23) (91% - 100%)    O2 Parameters below as of 08 Apr 2025 07:23  Patient On (Oxygen Delivery Method): 4L              04-07-25 @ 07:01  -  04-08-25 @ 07:00  --------------------------------------------------------  IN: 390.5 mL / OUT: 1700 mL / NET: -1309.5 mL        CAPILLARY BLOOD GLUCOSE      POCT Blood Glucose.: 131 mg/dL (07 Apr 2025 14:58)      I&O's Summary    07 Apr 2025 07:01  -  08 Apr 2025 07:00  --------------------------------------------------------  IN: 390.5 mL / OUT: 1700 mL / NET: -1309.5 mL        Physical Exam:   Gen: NAD, lying down in bed speaking in full sentences   Neuro: AOx3, responding to all questions appropriately  HEENT: PEERL, EOMI, moist mucous membranes  Resp: + bilateral crackles diffusely, no wheezing  CVS: RRR, + 4/6 systolic murmur loudest at LLSB and radiated to carotids  Abd: soft, NT, ND  Ext: moving all 4 extremities equally, equal strength throughout   Skin: WWP    Meds:    furosemide    Tablet Oral    methylPREDNISolone sodium succinate Injectable IV Push    fluticasone propionate/ salmeterol 250-50 MICROgram(s) Diskus Inhalation  tiotropium 2.5 MICROgram(s) Inhaler Inhalation        enoxaparin Injectable SubCutaneous    pantoprazole  Injectable IV Push          chlorhexidine 2% Cloths Topical                              11.8   5.33  )-----------( 147      ( 08 Apr 2025 05:30 )             34.3       04-08    128[L]  |  92[L]  |  30[H]  ----------------------------<  219[H]  3.9   |  27  |  1.10    Ca    9.2      08 Apr 2025 05:30  Phos  4.2     04-08  Mg     2.2     04-08    TPro  7.5  /  Alb  3.9  /  TBili  0.6  /  DBili  x   /  AST  23  /  ALT  27  /  AlkPhos  87  04-07    Lactate 0.9           04-07 @ 21:20    Lactate 1.0           04-07 @ 15:09          PT/INR - ( 07 Apr 2025 15:09 )   PT: 11.5 sec;   INR: 0.97 ratio         PTT - ( 07 Apr 2025 15:09 )  PTT:37.4 sec  Urinalysis Basic - ( 08 Apr 2025 05:30 )    Color: x / Appearance: x / SG: x / pH: x  Gluc: 219 mg/dL / Ketone: x  / Bili: x / Urobili: x   Blood: x / Protein: x / Nitrite: x   Leuk Esterase: x / RBC: x / WBC x   Sq Epi: x / Non Sq Epi: x / Bacteria: x        Radiology:    CTA Chest (4/7):  No pulmonary embolism.    Extensive consolidation throughout the lungs bilaterally.    Small left pleural effusion and left-sided interlobular septal thickening.    Diagnostic considerations include transfusion related acute lung injury   (TRALI) and infection.    DATA REVIEW  All data personally reviewed.  See above for details    Laboratory and Micro results--normal WBC, stable Hb and platelets, normal Cr and lytes  Radiology results--CXR and CT reviewed, extensive GGO and L base consolidation  Cardiology results-- check echo      Bedside Ultrasound: N/A      Tubes/Lines: N/A      GLOBAL ISSUE/BEST PRACTICE:  Analgesia: N  Sedation: N  HOB elevation: Y  Stress ulcer prophylaxis: Y  VTE prophylaxis: Y  Glycemic control: Y  Nutrition: Y    CODE STATUS: Full code

## 2025-04-08 NOTE — CONSULT NOTE ADULT - SUBJECTIVE AND OBJECTIVE BOX
[facility] - June Holliday, Faby, Bennie, Vanessa, Jada, Thuan; Office Number: [phone]    Initial Consult Note  CHIEF COMPLAINT: Patient is a 84y old  Female who presents with a chief complaint of hypoxic respiratory failure (08 Apr 2025 11:35)    HPI: 84 F hypertension, hyperlipidemia, aortic stenosis, PAD, carotid stenosis, s/p right carotid endarterectomy, NH Lymphoma (pulmonary mass s/p removal, chemo; last infusion fall 2021), COPD, pA.Fib s/p ILR implant 10/8/21, s/p watchman (2/11/22), cirrhosis, hx of prior GI bleed requiring blood transfusions p/w advancing shortness of breath, nausea, vomiting, weakness after blood transfusion today. Patient has had prior transfusions without reaction. In ED was hypoxic with impending failure, CXR with fluid overload, and high grade adventitious sounds, was treated for possible anaphylaxis reaction with steroids and started on BIPAP. Pt is also has HTN urgency with BP in the /100s. Pt now in ICU for further management. CXR showed Somewhat increased left lung interstitial findings particularly in the left lower lung. CT head negative. CTPA showed No pulmonary embolism. Extensive consolidation throughout the lungs bilaterally. Small left pleural effusion and left-sided interlobular septal thickening. Diagnostic considerations include transfusion related acute lung injury (TRALI) and infection.    In ED, T(F): 98.6, HR:  (76 - 135), BP:  (105/63 - 216/105), RR: 18, SpO2:  (91% - 100%). Labs remarkable for Hb 11.8,HCT 34.3, Platelet 147 Na 128, Troponin HsI 173.3,BNP 4402. EKG showed EKG w/ , RBBB, LAFB. Cardiology consulted for HTN. Pt s/p Lasix total 60 mg and was transiently on nitro gtt. BP now improved to 130-150s. Follows Dr Lamberto Cordon/[facility] [place].     Allergies    No Known Drug Allergies  latex (Rash)    Intolerances    aspirin (Stomach Upset)    PAST MEDICAL & SURGICAL HISTORY:  Carotid artery obstruction, left  had carotid surgery 2016      Lung mass  RLL, 2016, had RL lobectomy & chemotherapy      Hip fracture  left femur, had pins placed in 2016      Lumbar radiculopathy      Hyperlipidemia      Lymphoma      Former smoker      Unspecified atrial fibrillation      Hypertension      Hypertension      Hip fracture  2016, left hip      Right lower lobe lung mass  Right lower lobectomy 2016, chemo till 01/2017      Status post carotid surgery  left carotid artery stenosis 2016      History of loop recorder  placed 9/2021      Presence of Watchman left atrial appendage closure device        MEDICATIONS  (STANDING):  chlorhexidine 2% Cloths 1 Application(s) Topical <User Schedule>  enoxaparin Injectable 40 milliGRAM(s) SubCutaneous every 24 hours  fluticasone propionate/ salmeterol 250-50 MICROgram(s) Diskus 1 Dose(s) Inhalation two times a day  furosemide    Tablet 20 milliGRAM(s) Oral daily  metoprolol succinate ER 25 milliGRAM(s) Oral daily  pantoprazole  Injectable 40 milliGRAM(s) IV Push daily  tiotropium 2.5 MICROgram(s) Inhaler 2 Puff(s) Inhalation daily    MEDICATIONS  (PRN):    FAMILY HISTORY:  FH: diabetes mellitus (Father)       No family history of acute MI or sudden cardiac death.    SOCIAL HISTORY: No active tobacco, ethanol, or drug abuse.    REVIEW OF SYSTEMS   All other review of systems is negative unless indicated above.    VITAL SIGNS:   Vital Signs Last 24 Hrs  T(C): 37 (08 Apr 2025 12:09), Max: 37 (08 Apr 2025 12:09)  T(F): 98.6 (08 Apr 2025 12:09), Max: 98.6 (08 Apr 2025 12:09)  HR: 76 (08 Apr 2025 07:23) (76 - 135)  BP: 159/67 (08 Apr 2025 06:00) (105/63 - 216/105)  BP(mean): 80 (08 Apr 2025 05:00) (76 - 124)  RR: 18 (08 Apr 2025 06:00) (14 - 31)  SpO2: 100% (08 Apr 2025 07:23) (91% - 100%)    Parameters below as of 08 Apr 2025 07:23  Patient On (Oxygen Delivery Method): 4L        Physical Exam:  Constitutional: NAD, awake and alert  HEENT: Moist Mucous Membranes, Anicteric  Pulmonary: Non-labored, breath sounds are clear bilaterally, No wheezing, rales or rhonchi  Cardiovascular: Regular, S1 and S2, No murmurs, No rubs, gallops or clicks  Gastrointestinal: Bowel Sounds present, soft, nontender.   Lymph: No peripheral edema. No lymphadenopathy.  Skin: No visible rashes or ulcers.  Psych:  Mood & affect appropriate    I&O's Summary    07 Apr 2025 07:01  -  08 Apr 2025 07:00  --------------------------------------------------------  IN: 390.5 mL / OUT: 1700 mL / NET: -1309.5 mL        LABS: All Labs Reviewed:                        11.8   5.33  )-----------( 147      ( 08 Apr 2025 05:30 )             34.3                         12.6   10.25 )-----------( 269      ( 07 Apr 2025 21:20 )             37.4                         12.0   11.76 )-----------( 225      ( 07 Apr 2025 15:09 )             35.7     08 Apr 2025 05:30    128    |  92     |  30     ----------------------------<  219    3.9     |  27     |  1.10   07 Apr 2025 21:20    128    |  93     |  29     ----------------------------<  246    4.6     |  25     |  1.10   07 Apr 2025 15:09    125    |  93     |  28     ----------------------------<  175    4.7     |  23     |  1.10     Ca    9.2        08 Apr 2025 05:30  Ca    8.7        07 Apr 2025 21:20  Ca    9.6        07 Apr 2025 15:09  Phos  4.2       08 Apr 2025 05:30  Mg     2.2       08 Apr 2025 05:30  Mg     1.5       07 Apr 2025 21:20    TPro  7.5    /  Alb  3.9    /  TBili  0.6    /  DBili  x      /  AST  23     /  ALT  27     /  AlkPhos  87     07 Apr 2025 21:20  TPro  7.7    /  Alb  4.3    /  TBili  0.6    /  DBili  x      /  AST  30     /  ALT  28     /  AlkPhos  87     07 Apr 2025 15:09    PT/INR - ( 07 Apr 2025 15:09 )   PT: 11.5 sec;   INR: 0.97 ratio         PTT - ( 07 Apr 2025 15:09 )  PTT:37.4 secTroponin I, High Sensitivity Result: 173.3 ng/L (04-07-25 @ 21:20)          TRANSTHORACIC ECHOCARDIOGRAM REPORT  ________________________________________________________________________________                                      _______       Pt. Name:       CAROL ALEXANDRE Study Date:    4/12/2024  MRN:            [id number]            YOB: 1940  Accession #:    [id number]           Age:           83 years  Account#:       [id number]          Gender:        F  Visit ID#  Heart Rate:                         Height:        63.00 in (160.02 cm)  Rhythm:                            Weight:        120.00 lb (54.43 kg)  Blood Pressure: 170/58 mmHg         BSA/BMI:       1.56 m² / 21.26 kg/m²  ________________________________________________________________________________________  Referring Physician: [id number] Medardo Jim  Primary Sonographer: Amelie De Leon    CPT:               ECHO TTE WO CON COMP W DOPP - 02009.m  Indication(s):     Abnormal electrocardiogram ECG/EKG - R94.31  Procedure:         Transthoracic echocardiogram with 2-D, M-mode and complete                     spectral and color flow Doppler.  Ordering Location: [facility]  Admission Status:  Inpatient    _______________________________________________________________________________________     CONCLUSIONS:      1. Left ventricular cavity is normal in size. Left ventricular systolic function is normal with an ejection fraction visually estimated at 65 to 70 %.   2. There is moderate (grade 2) left ventricular diastolic dysfunction, with elevated filling pressure.   3. Normal right ventricular cavity size and normal systolic function.   4. Mild left ventricular hypertrophy.   5. The left atrium is mildly dilated.   6. There is mild calcification of the mitral valve annulus.   7. Thickened mitral valve leaflets.   8. Moderate mitral regurgitation.   9. Aortic valve anatomy cannot be determined. Moderate aortic stenosis.  10. Trace aortic regurgitation.  11. Moderate tricuspid regurgitation.  12. No pericardial effusion seen.  13. Estimated pulmonary artery systolic pressure is 51 mmHg, consistent with moderate pulmonary hypertension.    ________________________________________________________________________________________  FINDINGS:     Left Ventricle:  The left ventricular cavity is normal in size. Left ventricular systolic function is normal with a calculated ejection fraction of 75 % by the Gabriel's biplane method of disks with an ejection fraction visually estimated at 65 to 70%. There is moderate (grade 2) left ventricular diastolic dysfunction, withelevated filling pressure. Mild left ventricular hypertrophy.     Right Ventricle:  The right ventricular cavity is normal in size and normal systolic function. Tricuspid annular plane systolic excursion (TAPSE) is 2.0 cm (normal >=1.7 cm). Tricuspidannular tissue Doppler S' is 10.0 cm/s (normal >10 cm/s).     Left Atrium:  The left atrium is mildly dilated with an indexed volume of 35.08 ml/m².     Right Atrium:  The right atrium is normal in size with an indexed volume of 18.63 ml/m² and an indexed area of 8.77 cm²/m².     Interatrial Septum:  The interatrial septum appears intact.     Aortic Valve:  The aortic valve anatomy cannot be determined. There is moderate aortic stenosis. The highest velocity was obtained from the apical window. The peak transaortic velocity is 3.13 m/s, peak transaortic gradient is 39.1 mmHg and mean transaortic gradient is 22.9 mmHg with an LVOT/aortic valve VTI ratio of 0.33. The aortic valve area is estimated at 1.04 cm² by the continuity equation. The peak transaortic velocity is 3.13 m/s, peak transaortic gradient is 39.1 mmHg and mean transaortic gradient is 22.9 mmHg with an LVOT/aortic valve VTI ratio of 0.33. The aortic valve area is estimated at 1.04 cm² by the continuity equation. There is trace aortic regurgitation.     Mitral Valve:  Structurally normal mitral valve with normal leaflet excursion. There is reduced leaflet mobility of the mitral valve. There is mild calcification of the mitral valve annulus. Thickened mitral valve leaflets.The mitral valve peak gradient is 12.5 mmHg and a mean gradient is 4.74 mmHg at a heart rate of 75 bpm. The calculated mitral valve area is 1.6 cm². The mitral valve DT is 300 msec. The calculated mitral valve area is 2.7 cm². There is moderate mitral regurgitation. The mitral regurgitant jet is centrally directed.     Tricuspid Valve:  Structurally normal tricuspid valve with normal leaflet excursion. There is moderate tricuspid regurgitation. There is normal hepatic vein flow by Doppler. Estimated pulmonary artery systolic pressure is 51 mmHg, consistent with moderate pulmonary hypertension.     Pulmonic Valve:  There is trace pulmonic regurgitation.     Aorta:  The aortic root at the sinuses of Valsalva is normal in size, measuring 2.70 cm(indexed 1.73 cm/m²). The aortic diameter at the sinotubular junction is normal in size, measuring 1.6 cm. The ascending aorta diameter is normal in size, measuring 2.20 cm (indexed 1.41 cm/m²).     Pericardium:  No pericardial effusion seen.     Systemic Veins:  The inferior vena cava is normal in size measuring 2.10 cm in diameter, (normal <2.1cm) with abnormal inspiratory collapse (abnormal <50%) consistent with mildly elevated right atrial pressure (~8, range 5-10mmHg).  ____________________________________________________________________  QUANTITATIVE DATA:  Left Ventricle Measurements: (Indexed to BSA)     IVSd (2D):   1.3 cm  LVPWd (2D):  1.2 cm  LVIDd (2D):  5.0 cm  LVIDs (2D):  3.2 cm  LV Mass:     255 g  163.8 g/m²  LV Vol d, MOD A2C: 70.6 ml   45.36 ml/m²  LV Vol d, MOD A4C: 69.4 ml   44.58 ml/m²  LV Vol d, MOD BP:  71.7 ml   46.04 ml/m²  LV Vol s, MOD A2C: 17.4 ml   11.21 ml/m²  LV Vol s, MOD A4C: 16.0 ml   10.29 ml/m²  LV Vol s, MOD BP:  17.8 ml   11.44 ml/m²  LVOT SV MOD BP:   53.9 ml  LV EF% MOD BP:     75 %  Visualized LV EF%: 65 to 70%     MV E Vmax:    1.67 m/s  MV A Vmax:    1.30 m/s  MV E/A:       1.29  e' lateral:   5.08 cm/s  e' medial:    3.55 cm/s  E/e' lateral: 32.87  E/e' medial:  47.11  E/e' Average: 38.72  MV DT:        300 msec  MV DT:        277 msec    Aorta Measurements: (Normal range) (Indexed to BSA)     Sinuses of Valsalva: 2.70 cm (2.7 - 3.3 cm)  Ao ST Junct:         1.6 cm  Ao Asc:              2.2 cm  Ao Asc prox:         2.20 cm       Left Atrium Measurements: (Indexed to BSA)  LA Diam 2D:        4.55 cm  LA Vol s, MOD A4C: 84.43 ml.  LA Vol s, MOD A2C: 55.00 ml.  LA Vol s, MOD BP:  54.59 ml  35.08 ml/m²    Right Ventricle Measurements: Right Atrial Measurements:     RV d, 2D:   2.7 cm         RA Vol A4C:      29.0 ml  TAPSE:      2.0 cm            RA Vol:          29.00 ml  RV S' Vmax: 9.92 cm/s         RA Vol Index:    18.63 ml/m²                                RA Vol Inx A4C:  18.6 ml/m²                                RA Area A4C:     13.70                                RA Teddy Axis A4C: 5.5       LVOT / RVOT/ Qp/Qs Data: (Indexed to BSA)  LVOT Diameter: 1.99 cm  LVOT Vmax:     1.02 m/s  LVOT VTI:      24.48 cm  LVOT SV:       76.4 ml  49.12 ml/m²    Aortic Valve Measurements:  AV Vmax:                3.1 m/s  AV Peak Gradient:       39.1 mmHg  AV Mean Gradient:       22.9 mmHg  AV VTI:                 73.7 cm  AV VTI Ratio:           0.33  AoV EOA, Contin:        1.04 cm²  AoV EOA, Contin i:      0.67 cm²/m²  AoV Dimensionless Index 0.33    Mitral Valve Measurements:     MV Vmax:         1.77 m/s  MV VTI:          46.40 cm  MV VTI (tips):   46.40 cm  MV Mean Grad:    4.7 mmHg  MV Peak Grad:    12.5 mmHg  MV E Vmax:       1.7 m/s  MV A Vmax:       1.3 m/s  MV E/A:       1.3  MV Gradient HR:  75 bpm  MV P1/2T:        83 msec  MV Area P 1/2 t: 2.7 cm²  MV Area P1/2 T:  2.7 cm²  MV Area (Cont):  1.6 cm²       Tricuspid Valve Measurements:     TV S'             10.0 cm/s  TR Vmax:          3.3 m/s  TR Peak Gradient: 42.9 mmHg  RA Pressure:      8 mmHg  PASP:             51 mmHg       Pulmonic Valve Measurments:  PV Vmax:          2.0 m/s  PV Peak Gradient: 15.7 mmHg  PV Mean Gradient: 10.4 mmHg    ________________________________________________________________________________________  Electronically signed on 4/12/2024 at 4:19:30 PM by Cory Haque MD         *** Final ***   [facility] - June Holliday, Faby, Bennie, Vanessa, Jada, Thuan; Office Number: [phone]    Initial Consult Note  CHIEF COMPLAINT: Patient is a 84y old  Female who presents with a chief complaint of hypoxic respiratory failure (08 Apr 2025 11:35)    HPI: 84 F hypertension, hyperlipidemia, aortic stenosis, PAD, carotid stenosis, s/p right carotid endarterectomy, NH Lymphoma (pulmonary mass s/p removal, chemo; last infusion fall 2021), COPD, pA.Fib s/p ILR implant 10/8/21, s/p watchman (2/11/22), cirrhosis, hx of prior GI bleed requiring blood transfusions p/w advancing shortness of breath, nausea, vomiting, weakness after iron infusion on 4/7. Reports she had many infusions without reaction. In ED was hypoxic with impending failure, CXR with fluid overload, and high grade adventitious sounds, was treated for possible anaphylaxis reaction with steroids and started on BiPAP. Pt is also has HTN urgency with BP in the /100s. Pt now in ICU for further management. CXR showed Somewhat increased left lung interstitial findings particularly in the left lower lung. CT head negative. CTPA showed No pulmonary embolism. Extensive consolidation throughout the lungs bilaterally. Small left pleural effusion and left-sided interlobular septal thickening. Diagnostic considerations include transfusion related acute lung injury (TRALI) and infection.    In ED, T(F): 98.6, HR:  (76 - 135), BP:  (105/63 - 216/105), RR: 18, SpO2:  (91% - 100%). Labs remarkable for Hb 11.8,HCT 34.3, Platelet 147 Na 128, Troponin HsI 173.3,BNP 4402. EKG showed EKG w/ , RBBB, LAFB. Cardiology consulted for HTN. Pt s/p Lasix total 60 mg and was transiently on nitro gtt. BP now improved to 130-150s. Follows Dr SOW for cardiology.     Allergies    No Known Drug Allergies  latex (Rash)    Intolerances    aspirin (Stomach Upset)    PAST MEDICAL & SURGICAL HISTORY:  Carotid artery obstruction, left  had carotid surgery 2016      Lung mass  RLL, 2016, had RL lobectomy & chemotherapy      Hip fracture  left femur, had pins placed in 2016      Lumbar radiculopathy      Hyperlipidemia      Lymphoma      Former smoker      Unspecified atrial fibrillation      Hypertension      Hypertension      Hip fracture  2016, left hip      Right lower lobe lung mass  Right lower lobectomy 2016, chemo till 01/2017      Status post carotid surgery  left carotid artery stenosis 2016      History of loop recorder  placed 9/2021      Presence of Watchman left atrial appendage closure device        MEDICATIONS  (STANDING):  chlorhexidine 2% Cloths 1 Application(s) Topical <User Schedule>  enoxaparin Injectable 40 milliGRAM(s) SubCutaneous every 24 hours  fluticasone propionate/ salmeterol 250-50 MICROgram(s) Diskus 1 Dose(s) Inhalation two times a day  furosemide    Tablet 20 milliGRAM(s) Oral daily  metoprolol succinate ER 25 milliGRAM(s) Oral daily  pantoprazole  Injectable 40 milliGRAM(s) IV Push daily  tiotropium 2.5 MICROgram(s) Inhaler 2 Puff(s) Inhalation daily    MEDICATIONS  (PRN):    FAMILY HISTORY:  FH: diabetes mellitus (Father)       No family history of acute MI or sudden cardiac death.    SOCIAL HISTORY: No active tobacco, ethanol, or drug abuse.    REVIEW OF SYSTEMS   All other review of systems is negative unless indicated above.    VITAL SIGNS:   Vital Signs Last 24 Hrs  T(C): 37 (08 Apr 2025 12:09), Max: 37 (08 Apr 2025 12:09)  T(F): 98.6 (08 Apr 2025 12:09), Max: 98.6 (08 Apr 2025 12:09)  HR: 76 (08 Apr 2025 07:23) (76 - 135)  BP: 159/67 (08 Apr 2025 06:00) (105/63 - 216/105)  BP(mean): 80 (08 Apr 2025 05:00) (76 - 124)  RR: 18 (08 Apr 2025 06:00) (14 - 31)  SpO2: 100% (08 Apr 2025 07:23) (91% - 100%)    Parameters below as of 08 Apr 2025 07:23  Patient On (Oxygen Delivery Method): 4L        Physical Exam:  Constitutional: NAD, awake and alert, Frail   HEENT: Moist Mucous Membranes, Anicteric  Pulmonary: Non-labored, breath sounds are clear bilaterally, No wheezing, rales or rhonchi  Cardiovascular: Regular, S1 and S2, + murmurs, No rubs, gallops or clicks  Gastrointestinal: Bowel Sounds present, soft, nontender.   Lymph: No peripheral edema. No lymphadenopathy.  Skin: No visible rashes or ulcers.  Psych:  Mood & affect appropriate    I&O's Summary    07 Apr 2025 07:01  -  08 Apr 2025 07:00  --------------------------------------------------------  IN: 390.5 mL / OUT: 1700 mL / NET: -1309.5 mL        LABS: All Labs Reviewed:                        11.8   5.33  )-----------( 147      ( 08 Apr 2025 05:30 )             34.3                         12.6   10.25 )-----------( 269      ( 07 Apr 2025 21:20 )             37.4                         12.0   11.76 )-----------( 225      ( 07 Apr 2025 15:09 )             35.7     08 Apr 2025 05:30    128    |  92     |  30     ----------------------------<  219    3.9     |  27     |  1.10   07 Apr 2025 21:20    128    |  93     |  29     ----------------------------<  246    4.6     |  25     |  1.10   07 Apr 2025 15:09    125    |  93     |  28     ----------------------------<  175    4.7     |  23     |  1.10     Ca    9.2        08 Apr 2025 05:30  Ca    8.7        07 Apr 2025 21:20  Ca    9.6        07 Apr 2025 15:09  Phos  4.2       08 Apr 2025 05:30  Mg     2.2       08 Apr 2025 05:30  Mg     1.5       07 Apr 2025 21:20    TPro  7.5    /  Alb  3.9    /  TBili  0.6    /  DBili  x      /  AST  23     /  ALT  27     /  AlkPhos  87     07 Apr 2025 21:20  TPro  7.7    /  Alb  4.3    /  TBili  0.6    /  DBili  x      /  AST  30     /  ALT  28     /  AlkPhos  87     07 Apr 2025 15:09    PT/INR - ( 07 Apr 2025 15:09 )   PT: 11.5 sec;   INR: 0.97 ratio         PTT - ( 07 Apr 2025 15:09 )  PTT:37.4 secTroponin I, High Sensitivity Result: 173.3 ng/L (04-07-25 @ 21:20)          TRANSTHORACIC ECHOCARDIOGRAM REPORT  ________________________________________________________________________________                                      _______       Pt. Name:       CAROL ALEXANDRE Study Date:    4/12/2024  MRN:            [id number]            YOB: 1940  Accession #:    [id number]           Age:           83 years  Account#:       [id number]          Gender:        F  Visit ID#  Heart Rate:                         Height:        63.00 in (160.02 cm)  Rhythm:                            Weight:        120.00 lb (54.43 kg)  Blood Pressure: 170/58 mmHg         BSA/BMI:       1.56 m² / 21.26 kg/m²  ________________________________________________________________________________________  Referring Physician: [id number] Medardo Jim  Primary Sonographer: Amelie De Leon    CPT:               ECHO TTE WO CON COMP W DOPP - 37372.m  Indication(s):     Abnormal electrocardiogram ECG/EKG - R94.31  Procedure:         Transthoracic echocardiogram with 2-D, M-mode and complete                     spectral and color flow Doppler.  Ordering Location: [facility]  Admission Status:  Inpatient    _______________________________________________________________________________________     CONCLUSIONS:      1. Left ventricular cavity is normal in size. Left ventricular systolic function is normal with an ejection fraction visually estimated at 65 to 70 %.   2. There is moderate (grade 2) left ventricular diastolic dysfunction, with elevated filling pressure.   3. Normal right ventricular cavity size and normal systolic function.   4. Mild left ventricular hypertrophy.   5. The left atrium is mildly dilated.   6. There is mild calcification of the mitral valve annulus.   7. Thickened mitral valve leaflets.   8. Moderate mitral regurgitation.   9. Aortic valve anatomy cannot be determined. Moderate aortic stenosis.  10. Trace aortic regurgitation.  11. Moderate tricuspid regurgitation.  12. No pericardial effusion seen.  13. Estimated pulmonary artery systolic pressure is 51 mmHg, consistent with moderate pulmonary hypertension.    ________________________________________________________________________________________  FINDINGS:     Left Ventricle:  The left ventricular cavity is normal in size. Left ventricular systolic function is normal with a calculated ejection fraction of 75 % by the Gabriel's biplane method of disks with an ejection fraction visually estimated at 65 to 70%. There is moderate (grade 2) left ventricular diastolic dysfunction, withelevated filling pressure. Mild left ventricular hypertrophy.     Right Ventricle:  The right ventricular cavity is normal in size and normal systolic function. Tricuspid annular plane systolic excursion (TAPSE) is 2.0 cm (normal >=1.7 cm). Tricuspidannular tissue Doppler S' is 10.0 cm/s (normal >10 cm/s).     Left Atrium:  The left atrium is mildly dilated with an indexed volume of 35.08 ml/m².     Right Atrium:  The right atrium is normal in size with an indexed volume of 18.63 ml/m² and an indexed area of 8.77 cm²/m².     Interatrial Septum:  The interatrial septum appears intact.     Aortic Valve:  The aortic valve anatomy cannot be determined. There is moderate aortic stenosis. The highest velocity was obtained from the apical window. The peak transaortic velocity is 3.13 m/s, peak transaortic gradient is 39.1 mmHg and mean transaortic gradient is 22.9 mmHg with an LVOT/aortic valve VTI ratio of 0.33. The aortic valve area is estimated at 1.04 cm² by the continuity equation. The peak transaortic velocity is 3.13 m/s, peak transaortic gradient is 39.1 mmHg and mean transaortic gradient is 22.9 mmHg with an LVOT/aortic valve VTI ratio of 0.33. The aortic valve area is estimated at 1.04 cm² by the continuity equation. There is trace aortic regurgitation.     Mitral Valve:  Structurally normal mitral valve with normal leaflet excursion. There is reduced leaflet mobility of the mitral valve. There is mild calcification of the mitral valve annulus. Thickened mitral valve leaflets.The mitral valve peak gradient is 12.5 mmHg and a mean gradient is 4.74 mmHg at a heart rate of 75 bpm. The calculated mitral valve area is 1.6 cm². The mitral valve DT is 300 msec. The calculated mitral valve area is 2.7 cm². There is moderate mitral regurgitation. The mitral regurgitant jet is centrally directed.     Tricuspid Valve:  Structurally normal tricuspid valve with normal leaflet excursion. There is moderate tricuspid regurgitation. There is normal hepatic vein flow by Doppler. Estimated pulmonary artery systolic pressure is 51 mmHg, consistent with moderate pulmonary hypertension.     Pulmonic Valve:  There is trace pulmonic regurgitation.     Aorta:  The aortic root at the sinuses of Valsalva is normal in size, measuring 2.70 cm(indexed 1.73 cm/m²). The aortic diameter at the sinotubular junction is normal in size, measuring 1.6 cm. The ascending aorta diameter is normal in size, measuring 2.20 cm (indexed 1.41 cm/m²).     Pericardium:  No pericardial effusion seen.     Systemic Veins:  The inferior vena cava is normal in size measuring 2.10 cm in diameter, (normal <2.1cm) with abnormal inspiratory collapse (abnormal <50%) consistent with mildly elevated right atrial pressure (~8, range 5-10mmHg).  ____________________________________________________________________  QUANTITATIVE DATA:  Left Ventricle Measurements: (Indexed to BSA)     IVSd (2D):   1.3 cm  LVPWd (2D):  1.2 cm  LVIDd (2D):  5.0 cm  LVIDs (2D):  3.2 cm  LV Mass:     255 g  163.8 g/m²  LV Vol d, MOD A2C: 70.6 ml   45.36 ml/m²  LV Vol d, MOD A4C: 69.4 ml   44.58 ml/m²  LV Vol d, MOD BP:  71.7 ml   46.04 ml/m²  LV Vol s, MOD A2C: 17.4 ml   11.21 ml/m²  LV Vol s, MOD A4C: 16.0 ml   10.29 ml/m²  LV Vol s, MOD BP:  17.8 ml   11.44 ml/m²  LVOT SV MOD BP:   53.9 ml  LV EF% MOD BP:     75 %  Visualized LV EF%: 65 to 70%     MV E Vmax:    1.67 m/s  MV A Vmax:    1.30 m/s  MV E/A:       1.29  e' lateral:   5.08 cm/s  e' medial:    3.55 cm/s  E/e' lateral: 32.87  E/e' medial:  47.11  E/e' Average: 38.72  MV DT:        300 msec  MV DT:        277 msec    Aorta Measurements: (Normal range) (Indexed to BSA)     Sinuses of Valsalva: 2.70 cm (2.7 - 3.3 cm)  Ao ST Junct:         1.6 cm  Ao Asc:              2.2 cm  Ao Asc prox:         2.20 cm       Left Atrium Measurements: (Indexed to BSA)  LA Diam 2D:        4.55 cm  LA Vol s, MOD A4C: 84.43 ml.  LA Vol s, MOD A2C: 55.00 ml.  LA Vol s, MOD BP:  54.59 ml  35.08 ml/m²    Right Ventricle Measurements: Right Atrial Measurements:     RV d, 2D:   2.7 cm         RA Vol A4C:      29.0 ml  TAPSE:      2.0 cm            RA Vol:          29.00 ml  RV S' Vmax: 9.92 cm/s         RA Vol Index:    18.63 ml/m²                                RA Vol Inx A4C:  18.6 ml/m²                                RA Area A4C:     13.70                                RA Teddy Axis A4C: 5.5       LVOT / RVOT/ Qp/Qs Data: (Indexed to BSA)  LVOT Diameter: 1.99 cm  LVOT Vmax:     1.02 m/s  LVOT VTI:      24.48 cm  LVOT SV:       76.4 ml  49.12 ml/m²    Aortic Valve Measurements:  AV Vmax:                3.1 m/s  AV Peak Gradient:       39.1 mmHg  AV Mean Gradient:       22.9 mmHg  AV VTI:                 73.7 cm  AV VTI Ratio:           0.33  AoV EOA, Contin:        1.04 cm²  AoV EOA, Contin i:      0.67 cm²/m²  AoV Dimensionless Index 0.33    Mitral Valve Measurements:     MV Vmax:         1.77 m/s  MV VTI:          46.40 cm  MV VTI (tips):   46.40 cm  MV Mean Grad:    4.7 mmHg  MV Peak Grad:    12.5 mmHg  MV E Vmax:       1.7 m/s  MV A Vmax:       1.3 m/s  MV E/A:       1.3  MV Gradient HR:  75 bpm  MV P1/2T:        83 msec  MV Area P 1/2 t: 2.7 cm²  MV Area P1/2 T:  2.7 cm²  MV Area (Cont):  1.6 cm²       Tricuspid Valve Measurements:     TV S'             10.0 cm/s  TR Vmax:          3.3 m/s  TR Peak Gradient: 42.9 mmHg  RA Pressure:      8 mmHg  PASP:             51 mmHg       Pulmonic Valve Measurments:  PV Vmax:          2.0 m/s  PV Peak Gradient: 15.7 mmHg  PV Mean Gradient: 10.4 mmHg    ________________________________________________________________________________________  Electronically signed on 4/12/2024 at 4:19:30 PM by Cory Haque MD         *** Final ***

## 2025-04-08 NOTE — CARE COORDINATION ASSESSMENT. - NSCAREPROVIDERS_GEN_ALL_CORE_FT
CARE PROVIDERS:  Accepting Physician: Samuel Morales  Access Services: Angelita Asif  Administration: Pipo Montoya  Administration: Samira Guzman  Admitting: Doctor, Unknown  Attending: Doctor, Unknown  Cardiology Technician: Otilia Tang  Case Management: Celia Rodríguez  Consultant: Norma Arreaga  Consultant: Jocelyn Grant  Consultant: Simón Watkins  Consultant: Fay Potts  ED Attending: Akash Gordillo  ED Nurse: Rajani Villagomez  ED Nurse 2: Deisy Mcgrath  HIM/Billing & Coding: Mague Crow  Infection Control: Celestino Stern  Nurse: Milad Avery  Nurse: Lakeshia Otto  Nurse: Kennedi Luis  Nurse: Edwin Cortez  Outpatient Provider: Sushant Friedman  Outpatient Provider: Raheem Juarez  Override: Salma Norris  Override: Kosta Sarmiento  PCA/Nursing Assistant: Ana Leger  PCA/Nursing Assistant: Lizzy Barnard  Primary Team: Nikolai Church  Primary Team: Marlon Galaviz  Primary Team: Norma Sosa  Primary Team: Hien Holbrook  Primary Team: Christopher Contreras  Primary Team: Lizzy Lopez  Respiratory Therapy: Justina Dsouza  : Ira Trinh  Team: [facility], Team  UR// Supp. Assoc.: Ezra Cobb

## 2025-04-08 NOTE — CARE COORDINATION ASSESSMENT. - NSPASTMEDSURGHISTORY_GEN_ALL_CORE_FT
PAST MEDICAL & SURGICAL HISTORY:  Lumbar radiculopathy      Hip fracture  left femur, had pins placed in 2016      Lung mass  RLL, 2016, had RL lobectomy & chemotherapy      Carotid artery obstruction, left  had carotid surgery 2016      Status post carotid surgery  left carotid artery stenosis 2016      Right lower lobe lung mass  Right lower lobectomy 2016, chemo till 01/2017      Hip fracture  2016, left hip      Former smoker      Lymphoma      Hyperlipidemia      Hypertension      Unspecified atrial fibrillation      History of loop recorder  placed 9/2021      Presence of Watchman left atrial appendage closure device

## 2025-04-08 NOTE — CONSULT NOTE ADULT - ASSESSMENT
84 F hypertension, hyperlipidemia, aortic stenosis, PAD, carotid stenosis, s/p right carotid endarterectomy, [place] Lymphoma (pulmonary mass s/p removal, chemo; last infusion fall 2021), COPD, pA.Fib s/p ILR implant 10/8/21, s/p watchman (2/11/22), cirrhosis, hx of prior GI bleed requiring blood transfusions p/w advancing shortness of breath, nausea, vomiting, weakness after iron transfusion. In ED was hypoxic with impending failure, CXR with fluid overload, and high grade adventitious sounds, was treated for possible anaphylaxis reaction with steroids and started on BIPAP. Pt is also has HTN urgency with BP in the /100s. Pt now in ICU for further management. Cardiology consulted for HTN. Follows Dr Lamberto Cordon/[facility] [place] for cardiology.     - Pt p/w shortness of breath, nausea, vomiting, weakness after iron transfusion  - CXR showed Somewhat increased left lung interstitial findings particularly in the left lower lung.  - CTPA showed No pulmonary embolism. Extensive consolidation throughout the lungs bilaterally. Small left pleural effusion and left-sided interlobular septal thickening. Diagnostic considerations include transfusion related acute lung injury (TRALI) and infection.  - BNP 4402.   - Treated for possible anaphylaxis reaction with steroids and started on BIPAP  - improved with BIPAP, IV diuretics, and IV labetalol.  - TTE 04/2024 w/ normal LV & RV size and function EF 65-70%, mild LVH, mil dil LA, mod MR, mod TR, PASP 51, mod pulm HTN   - Please obtain transthoracic echocardiogram to assess ventricular function, wall motion and valvular abnormalities.   - Reports some IVAN at home  - Continue Lasix 20 mg PO daily     - EKG showed EKG w/ , RBBB, LAFB.   - Troponin: <-173.3  - Continue to trend to peak, likely demand from HTN and SOB    - BP 200s in ER  - Pt s/p Lasix total 60 mg and was transiently on nitro gtt.   - BP now improved to 130-150s.   - Continue Toprol 25 mg     - Monitor and replete lytes, keep K>4, Mg>2.  - Will continue to follow.    Jocelyn Grant, MS FNP, AGACNP  Nurse Practitioner- Cardiology   Please call on TEAMS   84 F hypertension, hyperlipidemia, aortic stenosis, PAD, carotid stenosis, s/p right carotid endarterectomy, [place] Lymphoma (pulmonary mass s/p removal, chemo; last infusion fall 2021), COPD, pA.Fib s/p ILR implant 10/8/21, s/p watchman (2/11/22), cirrhosis, hx of prior GI bleed requiring blood transfusions p/w advancing shortness of breath, nausea, vomiting, weakness after iron transfusion. In ED was hypoxic with impending failure, CXR with fluid overload, and high grade adventitious sounds, was treated for possible anaphylaxis reaction with steroids and started on BIPAP. Pt is also has HTN urgency with BP in the /100s. Pt now in ICU for further management. Cardiology consulted for HTN. Follows [facility] for [place]ology.     - Pt p/w shortness of breath, nausea, vomiting, weakness after iron transfusion  - CXR showed Somewhat increased left lung interstitial findings particularly in the left lower lung.  - CTPA showed No pulmonary embolism. Extensive consolidation throughout the lungs bilaterally. Small left pleural effusion and left-sided interlobular septal thickening. Diagnostic considerations include transfusion related acute lung injury (TRALI) and infection.  - BNP 4402.   - Treated for possible anaphylaxis reaction with steroids and started on BIPAP  - Improved with BIPAP, IV diuretics, and IV labetalol.  - TTE 04/2024 w/ normal LV & RV size and function EF 65-70%, mild LVH, mil dil LA, mod MR, mod TR, PASP 51, mod pulm HTN   - Please obtain transthoracic echocardiogram to assess ventricular function, wall motion and valvular abnormalities.   - Reports some IVAN at home  - Continue Lasix 20 mg PO daily     - EKG showed EKG w/ , RBBB, LAFB.   - Troponin: <-173.3  - No evidence of any active ischemia   - Continue to trend to peak, likely demand from HTN and SOB    - BP 200s in ER  - Pt s/p Lasix total 60 mg and was transiently on nitro gtt.   - BP now improved to 130-150s.   - Continue Toprol 25 mg     - Monitor and replete lytes, keep K>4, Mg>2.  - Will continue to follow.    Jocelyn Grant, MS FNP, AGACNP  Nurse Practitioner- Cardiology   Please call on TEAMS

## 2025-04-08 NOTE — PHARMACOTHERAPY INTERVENTION NOTE - COMMENTS
Medication reconciliation completed on admission with patient, pharmacies and daughter at bedside. Updated medication list in OMR/prescription writer, discussed with Dr Lopez.    Home Medications:  atorvastatin 20 mg oral tablet: 1 tab(s) orally once a day (at bedtime) (08 Apr 2025 16:21)  budesonide 0.5 mg/2 mL inhalation suspension: 2 milliliter(s) by nebulizer 2 times a day (08 Apr 2025 16:21)  folic acid 1 mg oral tablet: 1 tab(s) orally once a day (08 Apr 2025 16:21)  formoterol 20 mcg/2 mL inhalation solution: 2 milliliter(s) inhaled 2 times a day (08 Apr 2025 16:21)  furosemide 20 mg oral tablet: 1 tab(s) orally every other day (08 Apr 2025 16:21)  lisinopril 20 mg oral tablet: 1 tab(s) orally once a day (08 Apr 2025 16:21)  Slow Fe (as elemental iron) 45 mg oral tablet, extended release: 1 tab(s) orally once a day *Patient unsure of strength- States takes Slow Iron (08 Apr 2025 16:21)  Yupelri 175 mcg/3 mL inhalation solution: 175 microgram(s) inhaled once a day (08 Apr 2025 16:21)

## 2025-04-08 NOTE — PROGRESS NOTE ADULT - ASSESSMENT
84F with HTN, HLD, AS, PAD s/p R carotid endarterectomy, non hodgkins lymphoma s/p wedge resection, chemo (last infusion fall 2021) COPD, pAF s/p ILR 10/21, s/p watchman 2/22, cirrhosis s/p GIB in the past, anemia receiving transfusions outpt who presented today after IV iron transfusion where she started to develop N/V and shortness of breath post transfusion in her car admitted for acute hypoxic respiratory failure and HTN urgency with BP in the /100s    # Acute Hypoxic respiratory failure  # Pulmonary edema 2/2 ?iron transfusion vs HTN emergency  # COPD  # pHTN  - respiratory failure multifactorial- 2/2 pulmonary edema from transfusion , pt also with known diastolic dysfunction and phtn  - CTA chest: neg for PE, with extensive pulmonary edema, GGO, LLL consolidation  - s/p IV lasix. continue  - off bipap. wean o2 as tolerated   - continue spiriva and advair   - s/p solumedrol, holding further steroids for now     # Hyponatremia  - ?hypervolemic hyponatremia   - being diuresed. f/u repeat bmp  - urine studies     #HTN urgency  - s/p IV labetalol , bp improved  - Restart home metoprolol  - Hold home lisinopril for now  - TTE (4/2024): EF 65-70%, mod LV diastolic dysfunction, mild LVH, mod MR, mod AS, mod TR, pulmonary artery pressure 51mmHg, mod pHTN  - repeat tte pending   - Consult Cardiology (Dr. Watkins)    #pAFib  - not on AC, s/p Watchman procedure  - continue metoprolol     #Anemia  - outpatient iron transfusions  - Transfusion may have precipitated pulmonary edema  - Consult Heme/Onc (Dr. Kennedy group)    #VTE ppx  - Lovenox for DVT

## 2025-04-08 NOTE — CARE COORDINATION ASSESSMENT. - NSDCPLANSERVICES_GEN_ALL_CORE
CM spoke with patient to discuss home care agency choices. Family requesting [facility] [phone] services as needed, but will discuss final decision with patient./Anticipated Needs Unclear at Present

## 2025-04-08 NOTE — CONSULT NOTE ADULT - CRITICAL CARE ATTENDING COMMENT
84 F hypertension, hyperlipidemia, aortic stenosis, PAD, carotid stenosis, s/p right carotid endarterectomy, NH Lymphoma (pulmonary mass s/p removal, chemo; last infusion fall 2021), COPD, pA.Fib s/p ILR implant 10/8/21, s/p watchman (2/11/22), cirrhosis, hx of prior GI bleed requiring blood transfusions p/w advancing shortness of breath, nausea, vomiting, weakness after iron transfusion. In ED was hypoxic with impending failure, CXR with fluid overload, and high grade adventitious sounds, was treated for possible anaphylaxis reaction with steroids and started on BIPAP. Pt is also has HTN urgency with BP in the /100s. Pt now in ICU for further management. Cardiology consulted for HTN. Follows [facility] for cardiology.     p/w shortness of breath, nausea, vomiting, weakness after iron infusion  ct chest with sig abnormalities diffusely, some of which might reflect vol ol  vol ol occurs in the setting of transient severe htn to sbp 200 and known AS at least mod to severe  now improved in terms of volume and bp, remains on nc having been in severe resp distress on bipap  repeat echo    cont po lasix  mild trop elevation likely demand in the setting of valve disease, resp failure and htn    cont bb, lasix and we will make further bp adj as needed    Upon my evaluation, this patient is at high risk for imminent or life threatening deterioration due to resp failure, hypertension,  and other active medical issues which require my direct attention, intervention, and personal management.  I have personally spent >35 minutes  of critical care time exclusive of time spent on separate billing procedures. This includes review of laboratory data, radiology results, discussion with primary team\patient, and monitoring for potential decompensation Interventions were performed as documented above.

## 2025-04-08 NOTE — CARE COORDINATION ASSESSMENT. - OTHER PERTINENT DISCHARGE PLANNING INFORMATION:
CM spoke with dtr (Levi) via phone call to explain role and transitions of care. Patient lives with alone with friend living on second floor in a private house with 1 step to get in and resides on  main floor.  Patient was fully independent prior to admission.  No caregiver identified, no home care or DMEs.  Dtr (Levi) will transport patient home when ready to be discharged.  CM explained  home care expectation, process, insurance provision and home safety with good understanding.  CM to make referral. Dtr verbalized understanding of plans after discharge and are in agreement.  Resource folder left at bedside.  All questions answered to the best of my abilities.  CM remains available throughout the hospital stay.

## 2025-04-09 ENCOUNTER — TRANSCRIPTION ENCOUNTER (OUTPATIENT)
Age: 85
End: 2025-04-09

## 2025-04-09 VITALS
TEMPERATURE: 98 F | DIASTOLIC BLOOD PRESSURE: 64 MMHG | RESPIRATION RATE: 18 BRPM | HEART RATE: 69 BPM | SYSTOLIC BLOOD PRESSURE: 118 MMHG | OXYGEN SATURATION: 100 %

## 2025-04-09 LAB
ALBUMIN SERPL ELPH-MCNC: 3.4 G/DL — SIGNIFICANT CHANGE UP (ref 3.3–5)
ALP SERPL-CCNC: 60 U/L — SIGNIFICANT CHANGE UP (ref 40–120)
ALT FLD-CCNC: 22 U/L — SIGNIFICANT CHANGE UP (ref 12–78)
ANION GAP SERPL CALC-SCNC: 7 MMOL/L — SIGNIFICANT CHANGE UP (ref 5–17)
AST SERPL-CCNC: 21 U/L — SIGNIFICANT CHANGE UP (ref 15–37)
BASOPHILS # BLD AUTO: 0.01 K/UL — SIGNIFICANT CHANGE UP (ref 0–0.2)
BASOPHILS NFR BLD AUTO: 0.2 % — SIGNIFICANT CHANGE UP (ref 0–2)
BILIRUB SERPL-MCNC: 0.6 MG/DL — SIGNIFICANT CHANGE UP (ref 0.2–1.2)
BUN SERPL-MCNC: 30 MG/DL — HIGH (ref 7–23)
CALCIUM SERPL-MCNC: 9.3 MG/DL — SIGNIFICANT CHANGE UP (ref 8.5–10.1)
CHLORIDE SERPL-SCNC: 97 MMOL/L — SIGNIFICANT CHANGE UP (ref 96–108)
CO2 SERPL-SCNC: 31 MMOL/L — SIGNIFICANT CHANGE UP (ref 22–31)
CREAT ?TM UR-MCNC: 44 MG/DL — SIGNIFICANT CHANGE UP
CREAT SERPL-MCNC: 1.1 MG/DL — SIGNIFICANT CHANGE UP (ref 0.5–1.3)
EGFR: 50 ML/MIN/1.73M2 — LOW
EGFR: 50 ML/MIN/1.73M2 — LOW
EOSINOPHIL # BLD AUTO: 0.01 K/UL — SIGNIFICANT CHANGE UP (ref 0–0.5)
EOSINOPHIL NFR BLD AUTO: 0.2 % — SIGNIFICANT CHANGE UP (ref 0–6)
GLUCOSE SERPL-MCNC: 155 MG/DL — HIGH (ref 70–99)
HCT VFR BLD CALC: 30.7 % — LOW (ref 34.5–45)
HGB BLD-MCNC: 10.4 G/DL — LOW (ref 11.5–15.5)
IMM GRANULOCYTES NFR BLD AUTO: 0.4 % — SIGNIFICANT CHANGE UP (ref 0–0.9)
LEGIONELLA AG UR QL: NEGATIVE — SIGNIFICANT CHANGE UP
LYMPHOCYTES # BLD AUTO: 0.94 K/UL — LOW (ref 1–3.3)
LYMPHOCYTES # BLD AUTO: 17 % — SIGNIFICANT CHANGE UP (ref 13–44)
MAGNESIUM SERPL-MCNC: 1.8 MG/DL — SIGNIFICANT CHANGE UP (ref 1.6–2.6)
MCHC RBC-ENTMCNC: 29.6 PG — SIGNIFICANT CHANGE UP (ref 27–34)
MCHC RBC-ENTMCNC: 33.9 G/DL — SIGNIFICANT CHANGE UP (ref 32–36)
MCV RBC AUTO: 87.5 FL — SIGNIFICANT CHANGE UP (ref 80–100)
MONOCYTES # BLD AUTO: 0.51 K/UL — SIGNIFICANT CHANGE UP (ref 0–0.9)
MONOCYTES NFR BLD AUTO: 9.2 % — SIGNIFICANT CHANGE UP (ref 2–14)
MRSA PCR RESULT.: SIGNIFICANT CHANGE UP
NEUTROPHILS # BLD AUTO: 4.03 K/UL — SIGNIFICANT CHANGE UP (ref 1.8–7.4)
NEUTROPHILS NFR BLD AUTO: 73 % — SIGNIFICANT CHANGE UP (ref 43–77)
NRBC BLD AUTO-RTO: 0 /100 WBCS — SIGNIFICANT CHANGE UP (ref 0–0)
OSMOLALITY UR: 421 MOSM/KG — SIGNIFICANT CHANGE UP (ref 50–1200)
PHOSPHATE SERPL-MCNC: 2.2 MG/DL — LOW (ref 2.5–4.5)
PLATELET # BLD AUTO: 119 K/UL — LOW (ref 150–400)
POTASSIUM SERPL-MCNC: 4.2 MMOL/L — SIGNIFICANT CHANGE UP (ref 3.5–5.3)
POTASSIUM SERPL-SCNC: 4.2 MMOL/L — SIGNIFICANT CHANGE UP (ref 3.5–5.3)
POTASSIUM UR-SCNC: 25.8 MMOL/L — SIGNIFICANT CHANGE UP
PROT ?TM UR-MCNC: 12 MG/DL — SIGNIFICANT CHANGE UP (ref 0–12)
PROT SERPL-MCNC: 6.4 G/DL — SIGNIFICANT CHANGE UP (ref 6–8.3)
PROT/CREAT UR-RTO: 0.3 RATIO — HIGH (ref 0–0.2)
RBC # BLD: 3.51 M/UL — LOW (ref 3.8–5.2)
RBC # FLD: 14 % — SIGNIFICANT CHANGE UP (ref 10.3–14.5)
S AUREUS DNA NOSE QL NAA+PROBE: SIGNIFICANT CHANGE UP
S PNEUM AG UR QL: NEGATIVE — SIGNIFICANT CHANGE UP
SODIUM SERPL-SCNC: 135 MMOL/L — SIGNIFICANT CHANGE UP (ref 135–145)
SODIUM UR-SCNC: 54 MMOL/L — SIGNIFICANT CHANGE UP
UUN UR-MCNC: 542 MG/DL — SIGNIFICANT CHANGE UP
WBC # BLD: 5.52 K/UL — SIGNIFICANT CHANGE UP (ref 3.8–10.5)
WBC # FLD AUTO: 5.52 K/UL — SIGNIFICANT CHANGE UP (ref 3.8–10.5)

## 2025-04-09 PROCEDURE — 81003 URINALYSIS AUTO W/O SCOPE: CPT

## 2025-04-09 PROCEDURE — 84540 ASSAY OF URINE/UREA-N: CPT

## 2025-04-09 PROCEDURE — 99291 CRITICAL CARE FIRST HOUR: CPT | Mod: 25

## 2025-04-09 PROCEDURE — 94660 CPAP INITIATION&MGMT: CPT

## 2025-04-09 PROCEDURE — 99232 SBSQ HOSP IP/OBS MODERATE 35: CPT

## 2025-04-09 PROCEDURE — 84484 ASSAY OF TROPONIN QUANT: CPT

## 2025-04-09 PROCEDURE — 85025 COMPLETE CBC W/AUTO DIFF WBC: CPT

## 2025-04-09 PROCEDURE — 87640 STAPH A DNA AMP PROBE: CPT

## 2025-04-09 PROCEDURE — 71275 CT ANGIOGRAPHY CHEST: CPT | Mod: MC

## 2025-04-09 PROCEDURE — 84100 ASSAY OF PHOSPHORUS: CPT

## 2025-04-09 PROCEDURE — 86900 BLOOD TYPING SEROLOGIC ABO: CPT

## 2025-04-09 PROCEDURE — 70450 CT HEAD/BRAIN W/O DYE: CPT | Mod: MC

## 2025-04-09 PROCEDURE — 87637 SARSCOV2&INF A&B&RSV AMP PRB: CPT

## 2025-04-09 PROCEDURE — 85730 THROMBOPLASTIN TIME PARTIAL: CPT

## 2025-04-09 PROCEDURE — 82962 GLUCOSE BLOOD TEST: CPT

## 2025-04-09 PROCEDURE — 93306 TTE W/DOPPLER COMPLETE: CPT

## 2025-04-09 PROCEDURE — 87040 BLOOD CULTURE FOR BACTERIA: CPT

## 2025-04-09 PROCEDURE — 83605 ASSAY OF LACTIC ACID: CPT

## 2025-04-09 PROCEDURE — 82570 ASSAY OF URINE CREATININE: CPT

## 2025-04-09 PROCEDURE — 87641 MR-STAPH DNA AMP PROBE: CPT

## 2025-04-09 PROCEDURE — 85610 PROTHROMBIN TIME: CPT

## 2025-04-09 PROCEDURE — 84300 ASSAY OF URINE SODIUM: CPT

## 2025-04-09 PROCEDURE — 83735 ASSAY OF MAGNESIUM: CPT

## 2025-04-09 PROCEDURE — 93005 ELECTROCARDIOGRAM TRACING: CPT

## 2025-04-09 PROCEDURE — 94640 AIRWAY INHALATION TREATMENT: CPT

## 2025-04-09 PROCEDURE — 86850 RBC ANTIBODY SCREEN: CPT

## 2025-04-09 PROCEDURE — 96375 TX/PRO/DX INJ NEW DRUG ADDON: CPT

## 2025-04-09 PROCEDURE — 84156 ASSAY OF PROTEIN URINE: CPT

## 2025-04-09 PROCEDURE — 83880 ASSAY OF NATRIURETIC PEPTIDE: CPT

## 2025-04-09 PROCEDURE — 83036 HEMOGLOBIN GLYCOSYLATED A1C: CPT

## 2025-04-09 PROCEDURE — 86870 RBC ANTIBODY IDENTIFICATION: CPT

## 2025-04-09 PROCEDURE — 83935 ASSAY OF URINE OSMOLALITY: CPT

## 2025-04-09 PROCEDURE — 86901 BLOOD TYPING SEROLOGIC RH(D): CPT

## 2025-04-09 PROCEDURE — 36415 COLL VENOUS BLD VENIPUNCTURE: CPT

## 2025-04-09 PROCEDURE — 84133 ASSAY OF URINE POTASSIUM: CPT

## 2025-04-09 PROCEDURE — 71045 X-RAY EXAM CHEST 1 VIEW: CPT

## 2025-04-09 PROCEDURE — 93306 TTE W/DOPPLER COMPLETE: CPT | Mod: 26

## 2025-04-09 PROCEDURE — 82803 BLOOD GASES ANY COMBINATION: CPT

## 2025-04-09 PROCEDURE — 87899 AGENT NOS ASSAY W/OPTIC: CPT

## 2025-04-09 PROCEDURE — 96374 THER/PROPH/DIAG INJ IV PUSH: CPT

## 2025-04-09 PROCEDURE — 99239 HOSP IP/OBS DSCHRG MGMT >30: CPT

## 2025-04-09 PROCEDURE — 80053 COMPREHEN METABOLIC PANEL: CPT

## 2025-04-09 PROCEDURE — 86880 COOMBS TEST DIRECT: CPT

## 2025-04-09 PROCEDURE — 94760 N-INVAS EAR/PLS OXIMETRY 1: CPT

## 2025-04-09 PROCEDURE — 80048 BASIC METABOLIC PNL TOTAL CA: CPT

## 2025-04-09 RX ADMIN — TIOTROPIUM BROMIDE INHALATION SPRAY 2 PUFF(S): 3.12 SPRAY, METERED RESPIRATORY (INHALATION) at 06:02

## 2025-04-09 RX ADMIN — METOPROLOL SUCCINATE 25 MILLIGRAM(S): 50 TABLET, EXTENDED RELEASE ORAL at 06:01

## 2025-04-09 RX ADMIN — FUROSEMIDE 20 MILLIGRAM(S): 10 INJECTION INTRAMUSCULAR; INTRAVENOUS at 06:01

## 2025-04-09 RX ADMIN — Medication 1 DOSE(S): at 06:02

## 2025-04-09 NOTE — PROGRESS NOTE ADULT - NS ATTEND AMEND GEN_ALL_CORE FT
84 F hypertension, hyperlipidemia, aortic stenosis, PAD, carotid stenosis, s/p right carotid endarterectomy, NH Lymphoma (pulmonary mass s/p removal, chemo; last infusion fall 2021), COPD, pA.Fib s/p ILR implant 10/8/21, s/p watchman (2/11/22), cirrhosis, hx of prior GI bleed requiring blood transfusions p/w advancing shortness of breath, nausea, vomiting, weakness after iron transfusion. In ED was hypoxic with impending failure, CXR with fluid overload, and high grade adventitious sounds, was treated for possible anaphylaxis reaction with steroids and started on BIPAP. Pt is also has HTN urgency with BP in the /100s. Pt now in ICU for further management. Cardiology consulted for HTN. Follows [facility] for [place]ology.     - Treated for possible anaphylaxis reaction with steroids and started on BIPAP  - Improved with BIPAP, IV diuretics, and IV labetalol.  - TTE pending   - Continue Lasix 20 mg PO daily   - Pt s/p Lasix total 60 mg and was transiently on nitro gtt.   - BP now improved to 102-167  - Continue Toprol 25 mg

## 2025-04-09 NOTE — DISCHARGE NOTE PROVIDER - HOSPITAL COURSE
HPI:  84 F hypertension, hyperlipidemia, aortic stenosis, PAD   s/p right carotid endarterectomy, NH Lymphoma (pulmonary mass s/p removal, chemo; last infusion fall 2021), COPD, pA.Fib s/p ILR implant 10/8/21, s/p watchman (2/11/22), cirrhosis, hx of prior GI bleed requiring blood transfusions     Presented with advancing shortness of breath, nausea, vomiting, weakness. after blood transfusion today   Patient has had prior transfusions without reaction.    In ED was hypoxic with impending failure, CXR with fluid overload, and high grade adventitious sounds, was treated for possible anaphylaxis reaction with steroids and started on BIPAP. Pt is also has HTN urgency with BP in the /100s. ICU consulted for the above. Pt was ordered for CTA and will XF to ICU for further management. (07 Apr 2025 23:46)      ---  HOSPITAL COURSE: Patient admitted to medicine floor for management of     Pt seen and examined on day of discharge. Patient is medically optimized for discharge to home with close outpatient followup.    PHYSICAL EXAM ON DAY OF DISCHARGE:        ---  CONSULTANTS:     ---  TIME SPENT:  I, the attending physician, was physically present for the key portions of the evaluation and management (E/M) service provided. The total amount of time spent reviewing the hospital notes, laboratory values, imaging findings, assessing/counseling the patient, discussing with consultant physicians, social work, nursing staff was -- minutes    ---  Primary care provider was made aware of plan for discharge:      [  ] NO     [  ] YES   HPI:  84 F hypertension, hyperlipidemia, aortic stenosis, PAD   s/p right carotid endarterectomy, NH Lymphoma (pulmonary mass s/p removal, chemo; last infusion fall 2021), COPD, pA.Fib s/p ILR implant 10/8/21, s/p watchman (2/11/22), cirrhosis, hx of prior GI bleed requiring blood transfusions     Presented with advancing shortness of breath, nausea, vomiting, weakness. after blood transfusion today   Patient has had prior transfusions without reaction.    In ED was hypoxic with impending failure, CXR with fluid overload, and high grade adventitious sounds, was treated for possible anaphylaxis reaction with steroids and started on BIPAP. Pt is also has HTN urgency with BP in the /100s. ICU consulted for the above. Pt was ordered for CTA and will XF to ICU for further management. (07 Apr 2025 23:46)      ---  HOSPITAL COURSE: Patient admitted to medicine floor for management of Acute Hypoxic respiratory failure  Pulmonary edema 2/2  and volume over lode with  iron transfusion  TACO  and   HTN emergency , pt placed on bipap   , s/p iv labetalol bp stabilize , wean from bipap  after iv diuretics  treatment    volume over lode resolved  switch to po Lasix    , pt transfer to floor remain room air  rest and ambulatory above 90   resp faliure resolved .  clear by cardio  dr michel brown home/ follow up out pt. you notice with transfusion rash / received tt steroid benadryl  rash resolved.     Pt seen and examined on day of discharge. Patient is medically optimized for discharge to home with close outpatient followup.    PHYSICAL EXAM ON DAY OF DISCHARGE:4/9/25  Vital Signs Last 24 Hrs  T(C): 36.6 (09 Apr 2025 12:06), Max: 37.6 (08 Apr 2025 15:58)  T(F): 97.9 (09 Apr 2025 12:06), Max: 99.6 (08 Apr 2025 15:58)  HR: 69 (09 Apr 2025 12:06) (69 - 96)  BP: 118/64 (09 Apr 2025 12:06) (118/64 - 154/87)  BP(mean): 70 (08 Apr 2025 23:00) (70 - 104)  RR: 18 (09 Apr 2025 12:06) (12 - 25)  SpO2: 100% (09 Apr 2025 12:06) (98% - 100%)    Parameters below as of 09 Apr 2025 12:06  Patient On (Oxygen Delivery Method): nasal cannula  O2 Flow (L/min): 2      PHYSICAL EXAM:  GENERAL: NAD   HEAD:  Atraumatic, Normocephalic  EYES: EOMI, PERRLA, conjunctiva and sclera clear  ENMT: ; Moist mucous membranes  NECK: Supple, No JVD  NERVOUS SYSTEM:  Alert & Oriented X3; Motor Strength 5/5 B/L upper and lower extremities; DTRs 2+ intact and symmetric  CHEST/LUNG: percussion bilaterally; No rales, rhonchi, wheezing,   HEART: Regular rate and rhythm; No murmurs,  no tachy   ABDOMEN: Soft, Nontender, Nondistended; Bowel sounds present  EXTREMITIES:  2+ Peripheral Pulses, No clubbing, cyanosis, or edema  SKIN: No rashes or lesions    ---  TIME SPENT:  I, the attending physician, was physically present for the key portions of the evaluation and management (E/M) service provided. The total amount of time spent reviewing the hospital notes, laboratory values, imaging findings, assessing/counseling the patient, discussing with consultant physicians, social work, nursing staff was 45-- minutes

## 2025-04-09 NOTE — PROGRESS NOTE ADULT - TIME BILLING
Note written by attending, see above.  Time spent: 50min. More than 50% of the visit was spent counseling the patient on medical condition and coordination of care
Note written by attending, see above.  Time spent: 50min. More than 50% of the visit was spent counseling the patient on medical condition and coordination of care

## 2025-04-09 NOTE — DISCHARGE NOTE PROVIDER - PROVIDER TOKENS
PROVIDER:[TOKEN:[[id number]],FOLLOWUP:[1 week]] PROVIDER:[TOKEN:[[id number]],FOLLOWUP:[1 week]],PROVIDER:[TOKEN:[[id number]]]

## 2025-04-09 NOTE — DISCHARGE NOTE PROVIDER - NSDCQMPCI_CARD_ALL_CORE
Patient Denies latex allergy.  Medications reviewed with patient.  Tobacco use verified.  Allergies verified.    REFERRING MD: Priyanka Colin MD  PRIMARY MEDICAL DOCTOR: Jada Palmer MD  DATE OF INJURY/SURGERY/ONSET: DOI 2/8/22, DOS 2/10/22  WORK RELATED: NO  PLACE OF EMPLOYMENT:      Patient is here for right wrist ORIF follow up. New xrays today. Working on motion- still having issues making a full fist. She is taking aleve PRN for pain.           
No
114

## 2025-04-09 NOTE — DISCHARGE NOTE PROVIDER - NSDCMRMEDTOKEN_GEN_ALL_CORE_FT
atorvastatin 20 mg oral tablet: 1 tab(s) orally once a day (at bedtime)  budesonide 0.5 mg/2 mL inhalation suspension: 2 milliliter(s) by nebulizer 2 times a day  folic acid 1 mg oral tablet: 1 tab(s) orally once a day  formoterol 20 mcg/2 mL inhalation solution: 2 milliliter(s) inhaled 2 times a day  furosemide 20 mg oral tablet: 1 tab(s) orally every other day  lisinopril 20 mg oral tablet: 1 tab(s) orally once a day  metoprolol succinate 25 mg oral tablet, extended release: 1 tab(s) orally once a day  Slow Fe (as elemental iron) 45 mg oral tablet, extended release: 1 tab(s) orally once a day *Patient unsure of strength- States takes Slow Iron  Yupelri 175 mcg/3 mL inhalation solution: 175 microgram(s) inhaled once a day

## 2025-04-09 NOTE — DISCHARGE NOTE PROVIDER - NSDCCPGOAL_GEN_ALL_CORE_FT
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
To get better and follow your care plan as instructed.

## 2025-04-09 NOTE — PROGRESS NOTE ADULT - ASSESSMENT
84 F hypertension, hyperlipidemia, aortic stenosis, PAD, carotid stenosis, s/p right carotid endarterectomy, NH Lymphoma (pulmonary mass s/p removal, chemo; last infusion fall 2021), COPD, pA.Fib s/p ILR implant 10/8/21, s/p watchman (2/11/22), cirrhosis, hx of prior GI bleed requiring blood transfusions p/w advancing shortness of breath, nausea, vomiting, weakness after iron transfusion. In ED was hypoxic with impending failure, CXR with fluid overload, and high grade adventitious sounds, was treated for possible anaphylaxis reaction with steroids and started on BIPAP. Pt is also has HTN urgency with BP in the /100s. Pt now in ICU for further management. Cardiology consulted for HTN. [place] [facility] [place]     - Pt p/w shortness of breath, nausea, vomiting, weakness after iron transfusion  - CXR showed Somewhat increased left lung interstitial findings particularly in the left lower lung.  - CTPA showed No pulmonary embolism. Extensive consolidation throughout the lungs bilaterally. Small left pleural effusion and left-sided interlobular septal thickening. Diagnostic considerations include transfusion related acute lung injury (TRALI) and infection.  - BNP 4402.   - Treated for possible anaphylaxis reaction with steroids and started on BIPAP  - Improved with BIPAP, IV diuretics, and IV labetalol.  - TTE 04/2024 w/ normal LV & RV size and function EF 65-70%, mild LVH, mil dil LA, mod MR, mod TR, PASP 51, mod pulm HTN   - TTE pending   - Reports some IVAN at home  - Continue Lasix 20 mg PO daily     - EKG showed EKG w/ , RBBB, LAFB.   - Troponin: <-173.3  - No evidence of any active ischemia   - Continue to trend to peak, likely demand from HTN and SOB     - BP on presentation 200s  - Pt s/p Lasix total 60 mg and was transiently on nitro gtt.   - BP now improved to 102-167  - Continue Toprol 25 mg     - Monitor and replete lytes, keep K>4, Mg>2.  - Will continue to follow.    Hiro Richardson DNP, [facility]  Cardiology   Call Teams

## 2025-04-09 NOTE — DISCHARGE NOTE NURSING/CASE MANAGEMENT/SOCIAL WORK - PATIENT PORTAL LINK FT
You can access the FollowMyHealth Patient Portal offered by [facility] by registering at the following website: [email or web address]. By joining [employer]’s FollowMyHealth portal, you will also be able to view your health information using other applications (apps) compatible with our system.

## 2025-04-09 NOTE — DISCHARGE NOTE PROVIDER - CARE PROVIDER_API CALL
Nayla Brunson  Family Medicine  [address] [place] [address], [address] [place] [address]  Phone: [phone]  Fax: [phone]  Follow Up Time: 1 week   Nayla Brunson  Family Medicine  [address], [address]  [place] [address]  Phone: [phone]  Fax: [phone]  Follow Up Time: 1 week    Flakito Hobbs  Cardiology  [address] [place] [address]  Phone: [phone]  Fax: [phone]  Follow Up Time:

## 2025-04-09 NOTE — DISCHARGE NOTE PROVIDER - NSDCCPCAREPLAN_GEN_ALL_CORE_FT
PRINCIPAL DISCHARGE DIAGNOSIS  Diagnosis: Dyspnea  Assessment and Plan of Treatment: You were admitted with shortness of breath secondary to pulmonary edema secondary to iron transfusion. YOu wer seen by the cardiologist and pulmonologist. You were given intravenous steroids and diuretics with good effect and placed on non-invasive ventilation. Your symptoms have improved. Please follow with your cardiologist Dr. Elias on discharge.      SECONDARY DISCHARGE DIAGNOSES  Diagnosis: Tachycardia  Assessment and Plan of Treatment: Your heart rate was elevated on admission, likely secondary to the shortness of breath. Your heart rate has remained stable. Please follow with your cardiologist Dr. Elias.    Diagnosis: Hypertension  Assessment and Plan of Treatment: You were admitted with elevated blood pressure 200/100. You were seen monitored in the intensive care unit. Given intravenous medication. Your blood pressure has improved. Please continue with metoprolol       Diagnosis: Rash  Assessment and Plan of Treatment: Rash has resolved.

## 2025-04-09 NOTE — CASE MANAGEMENT PROGRESS NOTE - NSCMPROGRESSNOTE_GEN_ALL_CORE
As per Dr BRANDT Dee, patient is medically cleared for discharge home today.  met with patient to discussed discharge disposition and home care acceptance. IMM Discharge notice reviewed with patient and her daughter Geannine, Daughter verbalized understanding, signed and copy given. Patient's daughter will transport home. Patient's daughter verbalized understanding of the transition plan and is in agreement. Case Management remains available throughout hospital stay.

## 2025-04-09 NOTE — PROGRESS NOTE ADULT - REASON FOR ADMISSION
hypoxic respiratory failure

## 2025-04-09 NOTE — CASE MANAGEMENT PROGRESS NOTE - NSCMPROGRESSNOTE_GEN_ALL_CORE
Pt discussed during round. Pt admitted for acute hypoxic respiratory failure and HTN urgency with BP in the /100s. B/P stable 127/61. Pt remains on O2 2l/NC. Pt has no home O2.  will continue to monitor for possible home O2 needs.

## 2025-04-09 NOTE — DISCHARGE NOTE NURSING/CASE MANAGEMENT/SOCIAL WORK - FINANCIAL ASSISTANCE
[facility] provides services at a reduced cost to those who are determined to be eligible through [facility]’s financial assistance program. Information regarding [facility]’s financial assistance program can be found by going to [email or web address] or by calling [phone].

## 2025-04-09 NOTE — DISCHARGE NOTE PROVIDER - CARE PROVIDERS DIRECT ADDRESSES
,DirectAddress_Unknown ,DirectAddress_Unknown,[email or web address]

## 2025-04-09 NOTE — PROGRESS NOTE ADULT - SUBJECTIVE AND OBJECTIVE BOX
Patient is a 84y old  Female who presents with a chief complaint of hypoxic respiratory failure (09 Apr 2025 09:35)      INTERVAL HPI/OVERNIGHT EVENTS:     T(C): 36.6 (04-09-25 @ 12:06), Max: 37.6 (04-08-25 @ 15:58)  HR: 69 (04-09-25 @ 12:06) (69 - 97)  BP: 118/64 (04-09-25 @ 12:06) (118/64 - 167/59)  RR: 18 (04-09-25 @ 12:06) (12 - 25)  SpO2: 100% (04-09-25 @ 12:06) (98% - 100%)  Wt(kg): --  I&O's Summary    08 Apr 2025 07:01  -  09 Apr 2025 07:00  --------------------------------------------------------  IN: 480 mL / OUT: 600 mL / NET: -120 mL    09 Apr 2025 07:01  -  09 Apr 2025 12:56  --------------------------------------------------------  IN: 0 mL / OUT: 900 mL / NET: -900 mL        REVIEW OF SYSTEMS:  CONSTITUTIONAL: No fever, weight loss, or fatigue  EYES: No eye pain, visual disturbances, or discharge  ENMT:  No difficulty hearing, tinnitus, vertigo; No sinus or throat pain  NECK: No pain or stiffness  BREASTS: No pain, no masses  RESPIRATORY: No cough, wheezing, chills or hemoptysis; No shortness of breath  CARDIOVASCULAR: No chest pain, palpitations, dizziness, or leg swelling  GASTROINTESTINAL: No abdominal or epigastric pain. No nausea, vomiting, or hematemesis; No diarrhea or constipation. No melena or hematochezia.  GENITOURINARY: No dysuria, frequency, hematuria, or incontinence  NEUROLOGICAL: No headaches, memory loss, loss of strength, numbness, or tremors  SKIN: No itching, burning, rashes, or lesions   MUSCULOSKELETAL: No joint pain or swelling; No muscle, back, or extremity pain    PHYSICAL EXAM:  GENERAL: NAD, well-groomed, well-developed  HEAD:  Atraumatic, Normocephalic  EYES: EOMI, PERRLA, conjunctiva and sclera clear  ENMT: No tonsillar erythema, exudates, or enlargement; Moist mucous membranes  NECK: Supple, No JVD  NERVOUS SYSTEM:  Alert & Oriented X3; Motor Strength 5/5 B/L upper and lower extremities; DTRs 2+ intact and symmetric  CHEST/LUNG: Clear to percussion bilaterally; No rales, rhonchi, wheezing, or rubs  HEART: Regular rate and rhythm; No murmurs, rubs, or gallops  ABDOMEN: Soft, Nontender, Nondistended; Bowel sounds present  EXTREMITIES:  2+ Peripheral Pulses, No clubbing, cyanosis, or edema  SKIN: No rashes or lesions    MEDICATIONS  (STANDING):  enoxaparin Injectable 40 milliGRAM(s) SubCutaneous every 24 hours  fluticasone propionate/ salmeterol 250-50 MICROgram(s) Diskus 1 Dose(s) Inhalation two times a day  furosemide    Tablet 20 milliGRAM(s) Oral daily  metoprolol succinate ER 25 milliGRAM(s) Oral daily  pantoprazole  Injectable 40 milliGRAM(s) IV Push daily  tiotropium 2.5 MICROgram(s) Inhaler 2 Puff(s) Inhalation daily    MEDICATIONS  (PRN):      LABS:                        10.4   5.52  )-----------( 119      ( 09 Apr 2025 07:05 )             30.7     04-09    135  |  97  |  30[H]  ----------------------------<  155[H]  4.2   |  31  |  1.10    Ca    9.3      09 Apr 2025 07:05  Phos  2.2     04-09  Mg     1.8     04-09    TPro  6.4  /  Alb  3.4  /  TBili  0.6  /  DBili  x   /  AST  21  /  ALT  22  /  AlkPhos  60  04-09    PT/INR - ( 07 Apr 2025 15:09 )   PT: 11.5 sec;   INR: 0.97 ratio         PTT - ( 07 Apr 2025 15:09 )  PTT:37.4 sec  Urinalysis Basic - ( 09 Apr 2025 07:05 )    Color: x / Appearance: x / SG: x / pH: x  Gluc: 155 mg/dL / Ketone: x  / Bili: x / Urobili: x   Blood: x / Protein: x / Nitrite: x   Leuk Esterase: x / RBC: x / WBC x   Sq Epi: x / Non Sq Epi: x / Bacteria: x      CAPILLARY BLOOD GLUCOSE        ABG - ( 07 Apr 2025 21:41 )  pH, Arterial: 7.29  pH, Blood: x     /  pCO2: 55    /  pO2: 83    / HCO3: 26    / Base Excess: -0.2  /  SaO2: 98.1              04-07 @ 15:10   No growth at 24 hours  --  --  04-07 @ 15:00   No growth at 24 hours  --  --          RADIOLOGY & ADDITIONAL TESTS:    Imaging Personally Reviewed:       Advance Directives:      Palliative Care:  Appropriate

## 2025-04-09 NOTE — PROGRESS NOTE ADULT - SUBJECTIVE AND OBJECTIVE BOX
[facility] -- June Holliday Pannella, Patel, Savella, Goodger, Cohen: Office # [phone]    Follow Up:  HTN    Subjective/Observations: No events overnight resting comfortably in bed.  No complaints of chest pain, dyspnea, or palpitations reported. No signs of orthopnea or PND. Wearing nasal cannula     REVIEW OF SYSTEMS: All other review of systems are negative unless indicated above    PAST MEDICAL & SURGICAL HISTORY:  Carotid artery obstruction, left  had carotid surgery 2016      Lung mass  RLL, 2016, had RL lobectomy & chemotherapy      Hip fracture  left femur, had pins placed in 2016      Lumbar radiculopathy      Hyperlipidemia      Lymphoma      Former smoker      Unspecified atrial fibrillation      Hypertension      Hip fracture  2016, left hip      Right lower lobe lung mass  Right lower lobectomy 2016, chemo till 01/2017      Status post carotid surgery  left carotid artery stenosis 2016      History of loop recorder  placed 9/2021      Presence of Watchman left atrial appendage closure device          MEDICATIONS  (STANDING):  enoxaparin Injectable 40 milliGRAM(s) SubCutaneous every 24 hours  fluticasone propionate/ salmeterol 250-50 MICROgram(s) Diskus 1 Dose(s) Inhalation two times a day  furosemide    Tablet 20 milliGRAM(s) Oral daily  metoprolol succinate ER 25 milliGRAM(s) Oral daily  pantoprazole  Injectable 40 milliGRAM(s) IV Push daily  tiotropium 2.5 MICROgram(s) Inhaler 2 Puff(s) Inhalation daily    MEDICATIONS  (PRN):    Allergies    No Known Drug Allergies  latex (Rash)    Intolerances    aspirin (Stomach Upset)    Vital Signs Last 24 Hrs  T(C): 36.8 (09 Apr 2025 05:00), Max: 37.6 (08 Apr 2025 15:58)  T(F): 98.3 (09 Apr 2025 05:00), Max: 99.6 (08 Apr 2025 15:58)  HR: 76 (09 Apr 2025 05:00) (74 - 97)  BP: 127/61 (09 Apr 2025 05:00) (102/43 - 167/59)  BP(mean): 70 (08 Apr 2025 23:00) (62 - 104)  RR: 18 (09 Apr 2025 05:00) (12 - 25)  SpO2: 98% (09 Apr 2025 05:00) (98% - 100%)    Parameters below as of 09 Apr 2025 05:00  Patient On (Oxygen Delivery Method): nasal cannula      I&O's Summary    08 Apr 2025 07:01  -  09 Apr 2025 07:00  --------------------------------------------------------  IN: 480 mL / OUT: 600 mL / NET: -120 mL    09 Apr 2025 07:01  -  09 Apr 2025 09:36  --------------------------------------------------------  IN: 0 mL / OUT: 900 mL / NET: -900 mL          TELE:   PHYSICAL EXAM:  Constitutional: NAD, awake and alert  HEENT: Moist Mucous Membranes, Anicteric  Pulmonary: Non-labored, breath sounds are clear bilaterally, No wheezing, rales or rhonchi  Cardiovascular: Regular, S1 and S2, + murmur No rubs, gallops or clicks   Lymph: No peripheral edema. No lymphadenopathy.   Skin: No visible rashes or ulcers.  Psych:  Mood & affect appropriate      LABS: All Labs Reviewed:                        10.4   5.52  )-----------( 119      ( 09 Apr 2025 07:05 )             30.7                         11.8   5.33  )-----------( 147      ( 08 Apr 2025 05:30 )             34.3                         12.6   10.25 )-----------( 269      ( 07 Apr 2025 21:20 )             37.4     09 Apr 2025 07:05    135    |  97     |  30     ----------------------------<  155    4.2     |  31     |  1.10   08 Apr 2025 14:40    128    |  93     |  30     ----------------------------<  232    4.4     |  29     |  1.20   08 Apr 2025 05:30    128    |  92     |  30     ----------------------------<  219    3.9     |  27     |  1.10     Ca    9.3        09 Apr 2025 07:05  Ca    9.1        08 Apr 2025 14:40  Ca    9.2        08 Apr 2025 05:30  Phos  2.2       09 Apr 2025 07:05  Phos  4.2       08 Apr 2025 05:30  Mg     1.8       09 Apr 2025 07:05  Mg     2.2       08 Apr 2025 05:30  Mg     1.5       07 Apr 2025 21:20    TPro  6.4    /  Alb  3.4    /  TBili  0.6    /  DBili  x      /  AST  21     /  ALT  22     /  AlkPhos  60     09 Apr 2025 07:05  TPro  7.5    /  Alb  3.9    /  TBili  0.6    /  DBili  x      /  AST  23     /  ALT  27     /  AlkPhos  87     07 Apr 2025 21:20  TPro  7.7    /  Alb  4.3    /  TBili  0.6    /  DBili  x      /  AST  30     /  ALT  28     /  AlkPhos  87     07 Apr 2025 15:09   LIVER FUNCTIONS - ( 09 Apr 2025 07:05 )  Alb: 3.4 g/dL / Pro: 6.4 g/dL / ALK PHOS: 60 U/L / ALT: 22 U/L / AST: 21 U/L / GGT: x           PT/INR - ( 07 Apr 2025 15:09 )   PT: 11.5 sec;   INR: 0.97 ratio         PTT - ( 07 Apr 2025 15:09 )  PTT:37.4 secTroponin I, High Sensitivity Result: 403.0 ng/L (04-08-25 @ 05:30)  Troponin I, High Sensitivity Result: 173.3 ng/L (04-07-25 @ 21:20)  Lactate, Blood: 0.9 mmol/L (04-07-25 @ 21:20)  Lactate, Blood: 1.0 mmol/L (04-07-25 @ 15:09)    12 Lead ECG:   Ventricular Rate 101 BPM    Atrial Rate 101 BPM    P-R Interval 148 ms    QRS Duration 136 ms    Q-T Interval 378 ms    QTC Calculation(Bazett) 490 ms    P Axis 37 degrees    R Axis -70 degrees    T Axis 62 degrees    Diagnosis Line Sinus tachycardia  Right bundle branch block  Left anterior fascicular block  *** Bifascicular block ***  Abnormal ECG  When compared with ECG of 01-MAR-2024 05:14,  Right bundle branch block is now present  Confirmed by Simón Watkins MD ([id number]) on 4/8/2025 12:27:17PM (04-07-25 @ 14:56)      TRANSTHORACIC ECHOCARDIOGRAM REPORT  ________________________________________________________________________________                                      _______       Pt. Name:       CAROL ALEXANDRE Study Date:    4/12/2024  MRN:            [id number]            YOB: 1940  Accession #:    [id number]           Age:           83 years  Account#:       [id number]          Gender:        F  Visit ID#  Heart Rate:                         Height:        63.00 in (160.02 cm)  Rhythm:                            Weight:        120.00 lb (54.43 kg)  Blood Pressure: 170/58 mmHg         BSA/BMI:       1.56 m² / 21.26 kg/m²  ________________________________________________________________________________________  Referring Physician: [id number] Medardo Jim  Primary Sonographer: Amelie De Leon    CPT:               ECHO TTE WO CON COMP W DOPP - 24142.m  Indication(s):     Abnormal electrocardiogram ECG/EKG - R94.31  Procedure:         Transthoracic echocardiogram with 2-D, M-mode and complete                     spectral and color flow Doppler.  Ordering Location: [facility]  Admission Status:  Inpatient    _______________________________________________________________________________________     CONCLUSIONS:      1. Left ventricular cavity is normal in size. Left ventricular systolic function is normal with an ejection fraction visually estimated at 65 to 70 %.   2. There is moderate (grade 2) left ventricular diastolic dysfunction, with elevated filling pressure.   3. Normal right ventricular cavity size and normal systolic function.   4. Mild left ventricular hypertrophy.   5. The left atrium is mildly dilated.   6. There is mild calcification of the mitral valve annulus.   7. Thickened mitral valve leaflets.   8. Moderate mitral regurgitation.   9. Aortic valve anatomy cannot be determined. Moderate aortic stenosis.  10. Trace aortic regurgitation.  11. Moderate tricuspid regurgitation.  12. No pericardial effusion seen.  13. Estimated pulmonary artery systolic pressure is 51 mmHg, consistent with moderate pulmonary hypertension.    ________________________________________________________________________________________  FINDINGS:     Left Ventricle:  The left ventricular cavity is normal in size. Left ventricular systolic function is normal with a calculated ejection fraction of 75 % by the Gabriel's biplane method of disks with an ejection fraction visually estimated at 65 to 70%. There is moderate (grade 2) left ventricular diastolic dysfunction, withelevated filling pressure. Mild left ventricular hypertrophy.     Right Ventricle:  The right ventricular cavity is normal in size and normal systolic function. Tricuspid annular plane systolic excursion (TAPSE) is 2.0 cm (normal >=1.7 cm). Tricuspidannular tissue Doppler S' is 10.0 cm/s (normal >10 cm/s).     Left Atrium:  The left atrium is mildly dilated with an indexed volume of 35.08 ml/m².     Right Atrium:  The right atrium is normal in size with an indexed volume of 18.63 ml/m² and an indexed area of 8.77 cm²/m².     Interatrial Septum:  The interatrial septum appears intact.     Aortic Valve:  The aortic valve anatomy cannot be determined. There is moderate aortic stenosis. The highest velocity was obtained from the apical window. The peak transaortic velocity is 3.13 m/s, peak transaortic gradient is 39.1 mmHg and mean transaortic gradient is 22.9 mmHg with an LVOT/aortic valve VTI ratio of 0.33. The aortic valve area is estimated at 1.04 cm² by the continuity equation. The peak transaortic velocity is 3.13 m/s, peak transaortic gradient is 39.1 mmHg and mean transaortic gradient is 22.9 mmHg with an LVOT/aortic valve VTI ratio of 0.33. The aortic valve area is estimated at 1.04 cm² by the continuity equation. There is trace aortic regurgitation.     Mitral Valve:  Structurally normal mitral valve with normal leaflet excursion. There is reduced leaflet mobility of the mitral valve. There is mild calcification of the mitral valve annulus. Thickened mitral valve leaflets.The mitral valve peak gradient is 12.5 mmHg and a mean gradient is 4.74 mmHg at a heart rate of 75 bpm. The calculated mitral valve area is 1.6 cm². The mitral valve DT is 300 msec. The calculated mitral valve area is 2.7 cm². There is moderate mitral regurgitation. The mitral regurgitant jet is centrally directed.     Tricuspid Valve:  Structurally normal tricuspid valve with normal leaflet excursion. There is moderate tricuspid regurgitation. There is normal hepatic vein flow by Doppler. Estimated pulmonary artery systolic pressure is 51 mmHg, consistent with moderate pulmonary hypertension.     Pulmonic Valve:  There is trace pulmonic regurgitation.     Aorta:  The aortic root at the sinuses of Valsalva is normal in size, measuring 2.70 cm(indexed 1.73 cm/m²). The aortic diameter at the sinotubular junction is normal in size, measuring 1.6 cm. The ascending aorta diameter is normal in size, measuring 2.20 cm (indexed 1.41 cm/m²).     Pericardium:  No pericardial effusion seen.     Systemic Veins:  The inferior vena cava is normal in size measuring 2.10 cm in diameter, (normal <2.1cm) with abnormal inspiratory collapse (abnormal <50%) consistent with mildly elevated right atrial pressure (~8, range 5-10mmHg).  ____________________________________________________________________  QUANTITATIVE DATA:  Left Ventricle Measurements: (Indexed to BSA)     IVSd (2D):   1.3 cm  LVPWd (2D):  1.2 cm  LVIDd (2D):  5.0 cm  LVIDs (2D):  3.2 cm  LV Mass:     255 g  163.8 g/m²  LV Vol d, MOD A2C: 70.6 ml   45.36 ml/m²  LV Vol d, MOD A4C: 69.4 ml   44.58 ml/m²  LV Vol d, MOD BP:  71.7 ml   46.04 ml/m²  LV Vol s, MOD A2C: 17.4 ml   11.21 ml/m²  LV Vol s, MOD A4C: 16.0 ml   10.29 ml/m²  LV Vol s, MOD BP:  17.8 ml   11.44 ml/m²  LVOT SV MOD BP:   53.9 ml  LV EF% MOD BP:     75 %  Visualized LV EF%: 65 to 70%     MV E Vmax:    1.67 m/s  MV A Vmax:    1.30 m/s  MV E/A:       1.29  e' lateral:   5.08 cm/s  e' medial:    3.55 cm/s  E/e' lateral: 32.87  E/e' medial:  47.11  E/e' Average: 38.72  MV DT:        300 msec  MV DT:        277 msec    Aorta Measurements: (Normal range) (Indexed to BSA)     Sinuses of Valsalva: 2.70 cm (2.7 - 3.3 cm)  Ao ST Junct:         1.6 cm  Ao Asc:              2.2 cm  Ao Asc prox:         2.20 cm       Left Atrium Measurements: (Indexed to BSA)  LA Diam 2D:        4.55 cm  LA Vol s, MOD A4C: 84.43 ml.  LA Vol s, MOD A2C: 55.00 ml.  LA Vol s, MOD BP:  54.59 ml  35.08 ml/m²    Right Ventricle Measurements: Right Atrial Measurements:     RV d, 2D:   2.7 cm         RA Vol A4C:      29.0 ml  TAPSE:      2.0 cm            RA Vol:          29.00 ml  RV S' Vmax: 9.92 cm/s         RA Vol Index:    18.63 ml/m²                                RA Vol Inx A4C:  18.6 ml/m²                                RA Area A4C:     13.70                                RA Teddy Axis A4C: 5.5       LVOT / RVOT/ Qp/Qs Data: (Indexed to BSA)  LVOT Diameter: 1.99 cm  LVOT Vmax:     1.02 m/s  LVOT VTI:      24.48 cm  LVOT SV:       76.4 ml  49.12 ml/m²    Aortic Valve Measurements:  AV Vmax:                3.1 m/s  AV Peak Gradient:       39.1 mmHg  AV Mean Gradient:       22.9 mmHg  AV VTI:                 73.7 cm  AV VTI Ratio:           0.33  AoV EOA, Contin:        1.04 cm²  AoV EOA, Contin i:      0.67 cm²/m²  AoV Dimensionless Index 0.33    Mitral Valve Measurements:     MV Vmax:         1.77 m/s  MV VTI:          46.40 cm  MV VTI (tips):   46.40 cm  MV Mean Grad:    4.7 mmHg  MV Peak Grad:    12.5 mmHg  MV E Vmax:       1.7 m/s  MV A Vmax:       1.3 m/s  MV E/A:       1.3  MV Gradient HR:  75 bpm  MV P1/2T:        83 msec  MV Area P 1/2 t: 2.7 cm²  MV Area P1/2 T:  2.7 cm²  MV Area (Cont):  1.6 cm²       Tricuspid Valve Measurements:     TV S'             10.0 cm/s  TR Vmax:          3.3 m/s  TR Peak Gradient: 42.9 mmHg  RA Pressure:      8 mmHg  PASP:             51 mmHg       Pulmonic Valve Measurments:  PV Vmax:          2.0 m/s  PV Peak Gradient: 15.7 mmHg  PV Mean Gradient: 10.4 mmHg    ________________________________________________________________________________________  Electronically signed on 4/12/2024 at 4:19:30 PM by Cory Haque MD         *** Final ***

## 2025-04-13 LAB
CULTURE RESULTS: SIGNIFICANT CHANGE UP
CULTURE RESULTS: SIGNIFICANT CHANGE UP
SPECIMEN SOURCE: SIGNIFICANT CHANGE UP
SPECIMEN SOURCE: SIGNIFICANT CHANGE UP

## 2025-07-28 NOTE — DISCHARGE NOTE PROVIDER - NSDCQMSTROKE_NEU_ALL_CORE
"I have personally taken the history and examined this patient and agree with the resident's note as stated above with the following thoughts:  /76 (BP Location: Right arm, Patient Position: Sitting)   Pulse 84   Ht 5' 6" (1.676 m)   Wt 93.8 kg (206 lb 12.7 oz)   SpO2 97%   BMI 33.38 kg/m²     Skeletal muscular pain from [place] and working with the Reformer  " No

## 2025-09-16 ENCOUNTER — APPOINTMENT (OUTPATIENT)
Dept: CARDIOLOGY | Facility: CLINIC | Age: 85
End: 2025-09-16
Payer: MEDICARE

## 2025-09-16 DIAGNOSIS — R09.89 OTHER SPECIFIED SYMPTOMS AND SIGNS INVOLVING THE CIRCULATORY AND RESPIRATORY SYSTEMS: ICD-10-CM

## 2025-09-16 PROCEDURE — G2211 COMPLEX E/M VISIT ADD ON: CPT

## 2025-09-16 PROCEDURE — 99214 OFFICE O/P EST MOD 30 MIN: CPT
